# Patient Record
Sex: FEMALE | Race: WHITE | NOT HISPANIC OR LATINO | Employment: FULL TIME | URBAN - METROPOLITAN AREA
[De-identification: names, ages, dates, MRNs, and addresses within clinical notes are randomized per-mention and may not be internally consistent; named-entity substitution may affect disease eponyms.]

---

## 2017-03-13 ENCOUNTER — GENERIC CONVERSION - ENCOUNTER (OUTPATIENT)
Dept: OTHER | Facility: OTHER | Age: 22
End: 2017-03-13

## 2017-03-29 ENCOUNTER — GENERIC CONVERSION - ENCOUNTER (OUTPATIENT)
Dept: OTHER | Facility: OTHER | Age: 22
End: 2017-03-29

## 2017-04-14 ENCOUNTER — GENERIC CONVERSION - ENCOUNTER (OUTPATIENT)
Dept: OTHER | Facility: OTHER | Age: 22
End: 2017-04-14

## 2017-04-29 ENCOUNTER — HOSPITAL ENCOUNTER (EMERGENCY)
Facility: HOSPITAL | Age: 22
Discharge: HOME | End: 2017-04-29
Attending: EMERGENCY MEDICINE
Payer: COMMERCIAL

## 2017-04-29 VITALS
DIASTOLIC BLOOD PRESSURE: 71 MMHG | OXYGEN SATURATION: 98 % | HEART RATE: 100 BPM | WEIGHT: 148 LBS | TEMPERATURE: 98.4 F | SYSTOLIC BLOOD PRESSURE: 124 MMHG | RESPIRATION RATE: 20 BRPM

## 2017-04-29 DIAGNOSIS — S61.218A: Primary | ICD-10-CM

## 2017-04-29 PROCEDURE — 99283 EMERGENCY DEPT VISIT LOW MDM: CPT

## 2017-04-29 RX ORDER — CEPHALEXIN 250 MG/1
500 CAPSULE ORAL ONCE
Status: COMPLETED | OUTPATIENT
Start: 2017-04-29 | End: 2017-04-29

## 2017-04-29 RX ORDER — BUPIVACAINE HYDROCHLORIDE 5 MG/ML
5 INJECTION, SOLUTION EPIDURAL; INTRACAUDAL ONCE
Status: COMPLETED | OUTPATIENT
Start: 2017-04-29 | End: 2017-04-29

## 2017-04-29 RX ORDER — CEPHALEXIN 500 MG/1
500 CAPSULE ORAL 4 TIMES DAILY
Qty: 40 CAPSULE | Refills: 0 | Status: SHIPPED | OUTPATIENT
Start: 2017-04-29 | End: 2017-05-09

## 2017-04-29 RX ADMIN — BUPIVACAINE HYDROCHLORIDE 5 ML: 5 INJECTION, SOLUTION EPIDURAL; INTRACAUDAL at 02:27

## 2017-04-29 RX ADMIN — CEPHALEXIN 500 MG: 250 CAPSULE ORAL at 03:18

## 2017-07-05 ENCOUNTER — ALLSCRIPTS OFFICE VISIT (OUTPATIENT)
Dept: OTHER | Facility: OTHER | Age: 22
End: 2017-07-05

## 2017-07-05 LAB
BILIRUB UR QL STRIP: NORMAL
GLUCOSE (HISTORICAL): NORMAL
HGB UR QL STRIP.AUTO: NORMAL
KETONES UR STRIP-MCNC: 5 MG/DL
LEUKOCYTE ESTERASE UR QL STRIP: NORMAL
NITRITE UR QL STRIP: NORMAL
PH UR STRIP.AUTO: 6.5 [PH]
PROT UR STRIP-MCNC: NORMAL MG/DL
SP GR UR STRIP.AUTO: 1.02
UROBILINOGEN UR QL STRIP.AUTO: 0.2

## 2017-08-11 ENCOUNTER — ALLSCRIPTS OFFICE VISIT (OUTPATIENT)
Dept: OTHER | Facility: OTHER | Age: 22
End: 2017-08-11

## 2017-08-11 DIAGNOSIS — N93.9 ABNORMAL UTERINE AND VAGINAL BLEEDING, UNSPECIFIED: ICD-10-CM

## 2017-11-16 LAB — INR PPP: 2.3 (ref 0.86–1.16)

## 2017-11-30 ENCOUNTER — ALLSCRIPTS OFFICE VISIT (OUTPATIENT)
Dept: OTHER | Facility: OTHER | Age: 22
End: 2017-11-30

## 2017-11-30 PROCEDURE — G0145 SCR C/V CYTO,THINLAYER,RESCR: HCPCS | Performed by: NURSE PRACTITIONER

## 2017-11-30 PROCEDURE — 87491 CHLMYD TRACH DNA AMP PROBE: CPT | Performed by: NURSE PRACTITIONER

## 2017-11-30 PROCEDURE — 87624 HPV HI-RISK TYP POOLED RSLT: CPT | Performed by: NURSE PRACTITIONER

## 2017-11-30 PROCEDURE — 87591 N.GONORRHOEAE DNA AMP PROB: CPT | Performed by: NURSE PRACTITIONER

## 2017-12-04 ENCOUNTER — LAB REQUISITION (OUTPATIENT)
Dept: LAB | Facility: HOSPITAL | Age: 22
End: 2017-12-04
Payer: COMMERCIAL

## 2017-12-04 DIAGNOSIS — Z01.419 ENCOUNTER FOR GYNECOLOGICAL EXAMINATION WITHOUT ABNORMAL FINDING: ICD-10-CM

## 2017-12-05 NOTE — PROGRESS NOTES
Assessment    1  History of aortic aneurysm (V12 59) (Z86 79)   2  History of Ascending aortic aneurysm (441 2) (I71 2)   · 4 4 x 4 4 cm   3  Encounter for gynecological examination without abnormal finding (V72 31) (Z01 419)   4  Encounter for female birth control (V25 8) (Z30 019)   5  Encounter for preventive health examination (V70 0) (Z00 00)    Plan    · Camrese 0 15-0 03 &0 01 MG Oral Tablet; take one tablet by mouth daily   Rx By: Diane Milner; Dispense: 0 Days ; #:91 Tablet; Refill: 3; For: Birth control; JOSETTE = N; Verified Transmission to [address]; Msg to Pharmacy: [address]; Last Updated By: [employer]; 11/30/2017 4:13:28 PM    · (1) THIN PREP PAP WITH IMAGING; Status:Active; Requested for:30Nov2017;    Perform:[facility] In Office Collection; EAY:[id number]; Ordered;  For:Encounter for gynecological examination without abnormal finding; Ordered By:Keyla Lopez; Maturation index required? : No  HPV? : Regardless of Interpretation   · CHLAMYDIA/N  GONORRHEAE RNA; Status:Active; Requested for:30Nov2017;    Perform:  [address] Collection; Order Comments:TEST OFF OF THIN PREP PAP; Due:30Nov2018; Ordered;  For:Encounter for gynecological examination without abnormal finding; Ordered By:Keyla Lopez; Discussion/Summary  healthy adult female Pap test with reflex HPV testing was done today Testing was done today for chlamydia and gonorrhea  Continue Camrese as ordered  call or return with any problems or concerns  Possible side effects of new medications were reviewed with the patient/guardian today  The treatment plan was reviewed with the patient/guardian   The patient/guardian understands and agrees with the treatment plan      Chief Complaint  Routine Pap      History of Present Illness  HPI: LMP: 3 months ago- takes Gabriella Karvonen  is sexually active, no new partners       Review of Systems  no pelvic pain, no pelvic pressure, no vaginal pain, no vaginal discharge, no vaginal itching, no vaginal odor, no nonmenstrual bleeding, no dysuria, no change in urinary frequency and no feelings of urinary urgency  Constitutional: No fever, no chills, feels well, no tiredness, no recent weight gain or loss  ENT: no ear ache, no loss of hearing, no nosebleeds or nasal discharge, no sore throat or hoarseness  Cardiovascular: no complaints of slow or fast heart rate, no chest pain, no palpitations, no leg claudication or lower extremity edema  Respiratory: no complaints of shortness of breath, no wheezing, no dyspnea on exertion, no orthopnea or PND  Breasts: no complaints of breast pain, breast lump or nipple discharge  Gastrointestinal: no complaints of abdominal pain, no constipation, no nausea or diarrhea, no vomiting, no bloody stools  Genitourinary: no complaints of dysuria, no incontinence, no pelvic pain, no dysmenorrhea, no vaginal discharge or abnormal vaginal bleeding  Musculoskeletal: no complaints of arthralgia, no myalgia, no joint swelling or stiffness, no limb pain or swelling  Integumentary: no complaints of skin rash or lesion, no itching or dry skin, no skin wounds  Neurological: no complaints of headache, no confusion, no numbness or tingling, no dizziness or fainting  ROS reviewed  OB History  Pregnancy History (Brief):   Prior pregnancies: : 0  Para: Active Problems    1  Abnormal vaginal bleeding (623 8) (N93 9)   2  Acute UTI (599 0) (N39 0)   3  Aortic valve regurgitation (424 1) (I35 1)   4  Bicuspid aortic valve (746 4) (Q23 1)   5  Birth control (V25 9) (Z30 9)   6  History of mechanical aortic valve replacement (V43 3) (Z95 2)   7  Moderate aortic regurgitation (424 1) (I35 1)   8  History of Patent Ductus Arteriosus Repair   9   S/P ascending aortic aneurysm repair (V45 89) (Z98 890,Z86 79)    Past Medical History    · History of Ascending aortic aneurysm (441 2) (I71 2)   · History of Dark urine (791 9) (R82 99)   · History of aortic aneurysm (V12 59) (Z86 79)   · History of bicuspid aortic valve (V13 65) (Z87 74)   · History of Menstrual migraine without status migrainosus, not intractable (346 40)  (G43 829)    The active problems and past medical history were reviewed and updated today  Surgical History    · History of Aortic Valve Replacement   · History of Ascending Aorta Graft With Valve Replacement   · History of Patent Ductus Arteriosus Repair    The surgical history was reviewed and updated today  Family History    · Family history of Bicuspid Aortic Valve   · Family history of aortic aneurysm (V17 49) (Z82 49)    · Family history of Healthy adult    · Family history of Healthy adult    · Family history of aortic stenosis (V17 49) (Z82 49)    · Family history of sudden death (V24 11) (Z80 80)    · Family history of Breast Cancer (V16 3)   · Family history of Colon Cancer (V16 0)   · Family history of Diabetes Mellitus (V18 0)    The family history was reviewed and updated today  Social History    · Denied: History of Alcohol Use (History)   · Denied: Drug use (305 90) (F19 90)   · Never A Smoker  The social history was reviewed and updated today  Current Meds   1  Aspirin EC 81 MG Oral Tablet Delayed Release; TAKE 1 TABLET DAILY AS DIRECTED; Therapy: [id number] to ([id number])  Requested for: 07ULD0538; Last   Rx:05Jun2015 Ordered   2  Camrese 0 15-0 03 &0 01 MG Oral Tablet; take one tablet by mouth daily; Therapy: 23UDB3102 to (Last Rx:29Nov2017)  Requested for: 52MSU8050; Status:   ACTIVE - Retrospective By Protocol Authorization Ordered   3  Warfarin Sodium 2 5 MG Oral Tablet; TAKE 1 TO 2 TABLETS DAILY OR AS DIRECTED   BY PHYSICIAN; Therapy: 28EIY5822 to ([phone])  Requested for: 10Lbx4924; Last   Rx:91Yxk2054 Ordered    Allergies    1   Bactrim TABS   2  Cefzil TABS    Vitals   Recorded: 90TUM8273 03:57PM   Heart Rate 95   Systolic 444   Diastolic 68   Height 5 ft 3 in   Weight 140 lb    BMI Calculated 24 8   BSA Calculated 1 66   O2 Saturation 99     Physical Exam    Constitutional   General appearance: No acute distress, well appearing and well nourished  Neck   Neck: Normal, supple, trachea midline, no masses  Thyroid: Normal, no thyromegaly  Pulmonary   Respiratory effort: No increased work of breathing or signs of respiratory distress  Auscultation of lungs: Clear to auscultation  Cardiovascular   Auscultation of heart: Normal rate and rhythm, normal S1 and S2, no murmurs  Genitourinary   External genitalia: Normal and no lesions appreciated  Vagina: Normal, no lesions or dryness appreciated  Urethra: Normal     Urethral meatus: Normal     Bladder: Normal, soft, non-tender and no prolapse or masses appreciated  Cervix: Normal, no palpable masses  Uterus: Normal, non-tender, not enlarged, and no palpable masses  Adnexa/parametria: Normal, non-tender and no fullness or masses appreciated  Chest   Breasts: Normal and no dimpling or skin changes noted  Abdomen   Abdomen: Normal, non-tender, and no organomegaly noted  Liver and spleen: No hepatomegaly or splenomegaly  Lymphatic   Palpation of lymph nodes in neck, axillae, groin and/or other locations: No lymphadenopathy or masses noted  Skin   Skin and subcutaneous tissue: Normal skin turgor and no rashes  Palpation of skin and subcutaneous tissue: Normal     Psychiatric   Orientation to person, place, and time: Normal     Mood and affect: Normal        Results/Data  PHQ-2 Adult Depression Screening 76LVR0434 04:01PM [employer]     Test Name Result Flag Reference   PHQ-2 Adult Depression Score 0     Over the last two weeks, how often have you been bothered by any of the following problems?   Little interest or pleasure in doing things: Not at all - 0  Feeling down, depressed, or hopeless: Not at all - 0   PHQ-2 Adult Depression Screening Negative         Signatures   Electronically signed by : Sylvain Soares; Nov 30 2017  4:53PM EST                       (Author)    Electronically signed by : Camacho Schroeder MD; Nov 30 2017  5:25PM EST

## 2017-12-06 LAB
CHLAMYDIA DNA CVX QL NAA+PROBE: NORMAL
N GONORRHOEA DNA GENITAL QL NAA+PROBE: NORMAL

## 2017-12-08 LAB — HPV RRNA GENITAL QL NAA+PROBE: ABNORMAL

## 2017-12-11 ENCOUNTER — GENERIC CONVERSION - ENCOUNTER (OUTPATIENT)
Dept: OTHER | Facility: OTHER | Age: 22
End: 2017-12-11

## 2017-12-14 LAB
LAB AP GYN PRIMARY INTERPRETATION: NORMAL
Lab: NORMAL
PATH INTERP SPEC-IMP: NORMAL

## 2017-12-15 ENCOUNTER — GENERIC CONVERSION - ENCOUNTER (OUTPATIENT)
Dept: OTHER | Facility: OTHER | Age: 22
End: 2017-12-15

## 2017-12-29 ENCOUNTER — GENERIC CONVERSION - ENCOUNTER (OUTPATIENT)
Dept: OTHER | Facility: OTHER | Age: 22
End: 2017-12-29

## 2017-12-29 PROCEDURE — 88305 TISSUE EXAM BY PATHOLOGIST: CPT | Performed by: OBSTETRICS & GYNECOLOGY

## 2017-12-30 ENCOUNTER — LAB REQUISITION (OUTPATIENT)
Dept: LAB | Facility: HOSPITAL | Age: 22
End: 2017-12-30
Payer: COMMERCIAL

## 2017-12-30 DIAGNOSIS — R87.810 CERVICAL HIGH RISK HUMAN PAPILLOMAVIRUS (HPV) DNA TEST POSITIVE: ICD-10-CM

## 2017-12-30 DIAGNOSIS — Z86.19 PERSONAL HISTORY OF OTHER INFECTIOUS AND PARASITIC DISEASES: ICD-10-CM

## 2018-01-07 ENCOUNTER — HOSPITAL ENCOUNTER (EMERGENCY)
Facility: HOSPITAL | Age: 23
Discharge: HOME | End: 2018-01-07
Admitting: EMERGENCY MEDICINE
Payer: COMMERCIAL

## 2018-01-07 VITALS
RESPIRATION RATE: 18 BRPM | HEIGHT: 63 IN | DIASTOLIC BLOOD PRESSURE: 88 MMHG | HEART RATE: 96 BPM | WEIGHT: 140 LBS | OXYGEN SATURATION: 99 % | SYSTOLIC BLOOD PRESSURE: 126 MMHG | BODY MASS INDEX: 24.8 KG/M2

## 2018-01-07 DIAGNOSIS — S61.459A DOG BITE OF HAND: Primary | ICD-10-CM
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
## 2018-01-07 PROCEDURE — 90471 IMMUNIZATION ADMIN: CPT

## 2018-01-07 PROCEDURE — 99283 EMERGENCY DEPT VISIT LOW MDM: CPT

## 2018-01-07 PROCEDURE — 90715 TDAP VACCINE 7 YRS/> IM: CPT | Performed by: PHYSICIAN ASSISTANT

## 2018-01-07 RX ORDER — AMOXICILLIN AND CLAVULANATE POTASSIUM 875; 125 MG/1; MG/1
1 TABLET, FILM COATED ORAL ONCE
Status: COMPLETED | OUTPATIENT
Start: 2018-01-07 | End: 2018-01-07

## 2018-01-07 RX ORDER — AMOXICILLIN AND CLAVULANATE POTASSIUM 875; 125 MG/1; MG/1
1 TABLET, FILM COATED ORAL EVERY 12 HOURS SCHEDULED
Qty: 14 TABLET | Refills: 0 | Status: SHIPPED | OUTPATIENT
Start: 2018-01-07 | End: 2018-01-14

## 2018-01-07 RX ORDER — LIDOCAINE HYDROCHLORIDE 10 MG/ML
10 INJECTION, SOLUTION EPIDURAL; INFILTRATION; INTRACAUDAL; PERINEURAL ONCE
Status: COMPLETED | OUTPATIENT
Start: 2018-01-07 | End: 2018-01-07

## 2018-01-07 RX ADMIN — LIDOCAINE HYDROCHLORIDE 10 ML: 10 INJECTION, SOLUTION EPIDURAL; INFILTRATION; INTRACAUDAL; PERINEURAL at 21:46

## 2018-01-07 RX ADMIN — AMOXICILLIN AND CLAVULANATE POTASSIUM 1 TABLET: 875; 125 TABLET, FILM COATED ORAL at 21:48

## 2018-01-07 RX ADMIN — TETANUS TOXOID, REDUCED DIPHTHERIA TOXOID AND ACELLULAR PERTUSSIS VACCINE, ADSORBED 0.5 ML: 5; 2.5; 8; 8; 2.5 SUSPENSION INTRAMUSCULAR at 21:53

## 2018-01-08 NOTE — DISCHARGE INSTRUCTIONS
Rest, elevate hand  Tylenol/motrin for discomfort  Take augmentin twice a day for next 7 days  Follow up with family doctor in 2-3 days for recheck and in 7-10 days for suture removal                    Animal Bite   WHAT YOU NEED TO KNOW:   Animal bite injuries range from shallow cuts to deep, life-threatening wounds  An animal can cut or puncture the skin when it bites  Your skin may be torn from your body  Your skin may swell or bruise even if the bite does not break the skin  Animal bites occur more often on the hands, arms, legs, and face  Bites from dogs and cats are the most common injuries  DISCHARGE INSTRUCTIONS:   Return to the emergency department if:   · You have a fever  · Your wound is red, swollen, and draining pus  · You see red streaks on the skin around the wound  · You can no longer move the bitten area  · Your heartbeat and breathing are much faster than usual     · You feel dizzy and confused  Contact your healthcare provider if:   · Your pain does not get better, even after you take pain medicine  · You have nightmares or flashbacks about the animal bite  · You have questions or concerns about your condition or care  Medicines: You may need any of the following:  · Antibiotics  prevent or treat a bacterial infection  · Prescription pain medicine  may be given  Ask how to take this medicine safely  · A tetanus vaccine  may be needed to prevent tetanus  Tetanus is a life-threatening bacterial infection that affects the nerves and muscles  The bacteria can be spread through animal bites  · A rabies vaccine  may be needed to prevent rabies  Rabies is a life-threatening viral infection  The virus can be spread through animal bites  · Take your medicine as directed  Contact your healthcare provider if you think your medicine is not helping or if you have side effects  Tell him of her if you are allergic to any medicine   Keep a list of the medicines, vitamins, and herbs you take  Include the amounts, and when and why you take them  Bring the list or the pill bottles to follow-up visits  Carry your medicine list with you in case of an emergency  Follow up with your healthcare provider in 1 to 2 days: You may need to return to have your stitches removed  Write down your questions so you remember to ask them during your visits  Self-care:   · Apply antibiotic ointment as directed  This helps prevent infection in minor skin wounds  It is available without a doctor's order  · Keep the wound clean and covered  Wash the wound every day with soap and water or germ-killing cleanser  Ask your healthcare provider about the kinds of bandages to use  · Apply ice on your wound  Ice helps decrease swelling and pain  Ice may also help prevent tissue damage  Use an ice pack, or put crushed ice in a plastic bag  Cover it with a towel and place it on your wound for 15 to 20 minutes every hour or as directed  · Elevate the wound area  Raise your wound above the level of your heart as often as you can  This will help decrease swelling and pain  Prop your wound on pillows or blankets to keep it elevated comfortably  Prevent another animal bite:   · Learn to recognize the signs of a scared or angry pet  Avoid quick, sudden movements  · Do not step between animals that are fighting  · Do not leave a pet alone with a young child  · Do not disturb an animal while it eats, sleeps, or cares for its young  · Do not approach an animal you do not know, especially one that is tied up or caged  · Stay away from animals that seem sick or act strangely  · Do not feed or capture wild animals  © 2017 [address]  Information is for End User's use only and may not be sold, redistributed or otherwise used for commercial purposes   All illustrations and images included in CareNotes® are the copyrighted property of A D A M , Inc  or [facility] [employer]  The above information is an  only  It is not intended as medical advice for individual conditions or treatments  Talk to your doctor, nurse or pharmacist before following any medical regimen to see if it is safe and effective for you

## 2018-01-08 NOTE — ED PROVIDER NOTES
History  Chief Complaint   Patient presents with    Dog Bite     Pt has bog bites to bilat hands     Patient presents to the ED with dog bite B/L hands  Patient states she was at home and her dogs were fighting and she tried to break up the fight and her dog bit her  Dogs are UTD with immunizations  Patient does not know when her last tetanus was  History provided by:  Patient  Dog Bite   Contact animal:  Dog  Location:  Hand  Hand injury location:  L hand and R hand  Time since incident:  1 hour  Pain details:     Quality:  Aching    Severity:  Moderate    Timing:  Constant    Progression:  Unchanged  Incident location:  Home  Provoked: unprovoked    Animal's rabies vaccination status:  Up to date  Animal in possession: yes    Tetanus status:  Unknown  Relieved by:  Nothing  Worsened by:  Nothing  Ineffective treatments:  None tried  Associated symptoms: swelling    Associated symptoms: no fever, no numbness and no rash        None       Past Medical History:   Diagnosis Date    Cardiac disease     mechanical aortic valve; history of aortic aneurysm       Past Surgical History:   Procedure Laterality Date    MECHANICAL AORTIC VALVE REPLACEMENT         History reviewed  No pertinent family history  I have reviewed and agree with the history as documented  Social History   Substance Use Topics    Smoking status: Never Smoker    Smokeless tobacco: Not on file    Alcohol use No        Review of Systems   Constitutional: Negative for chills and fever  Skin: Positive for wound  Negative for rash  Neurological: Negative for dizziness, weakness and numbness  Psychiatric/Behavioral: Negative for confusion  All other systems reviewed and are negative        Physical Exam  ED Triage Vitals [01/07/18 2112]   Temp Pulse Respirations Blood Pressure SpO2   -- 95 18 126/88 100 %      Temp src Heart Rate Source Patient Position - Orthostatic VS BP Location FiO2 (%)   -- -- Sitting Right arm -- Pain Score       5           Orthostatic Vital Signs  Vitals:    01/07/18 2112 01/07/18 2115 01/07/18 2130   BP: 126/88 126/88    Pulse: 95 101 96   Patient Position - Orthostatic VS: Sitting         Physical Exam   Constitutional: She is oriented to person, place, and time  She appears well-developed and well-nourished  HENT:   Head: Normocephalic and atraumatic  Eyes: Conjunctivae are normal    Neck: Normal range of motion  Cardiovascular: Normal rate and regular rhythm  Pulses:       Radial pulses are 2+ on the right side, and 2+ on the left side  Pulmonary/Chest: Effort normal and breath sounds normal    Musculoskeletal:        Right hand: She exhibits tenderness, laceration and swelling  She exhibits normal range of motion, no bony tenderness and no deformity  Normal sensation noted  Normal strength noted  Left hand: She exhibits tenderness, laceration and swelling  She exhibits normal range of motion, no bony tenderness and no deformity  Normal sensation noted  Normal strength noted  Right hand:  Puncture wound to right dorsal thumb, 2 puncture wounds to right index finger  Will not require sutures because they are superficial     Left hand:  0 5cm laceration to left dorsal hand, actively bleeding  2 0 cm linear laceration between the webbing of left 3rd and 4th digits, will require loose sutures due to length and depth  THere is a puncture to left index finger, not actively bleeding, 1 0cm linear horizontal laceration to volar left middle finger, will require sutures  Neurological: She is alert and oriented to person, place, and time  Skin: Skin is warm and dry  Laceration (Refer to musculoskeletal exam for description of lacerations  ) noted  No rash noted  She is not diaphoretic  No pallor  Nursing note and vitals reviewed        ED Medications  Medications   tetanus-diphtheria-acellular pertussis (BOOSTRIX) IM injection 0 5 mL (0 5 mL Intramuscular Given 1/7/18 3383) lidocaine (PF) (XYLOCAINE-MPF) 1 % injection 10 mL (10 mL Infiltration Given 1/7/18 2146)   amoxicillin-clavulanate (AUGMENTIN) 875-125 mg per tablet 1 tablet (1 tablet Oral Given 1/7/18 2148)       Diagnostic Studies  Results Reviewed     None                 No orders to display              Procedures  Lac Repair  Date/Time: 1/7/2018 9:05 PM  Performed by: Katlin Ramirez by: Jesus Gregg   Consent: Verbal consent obtained  Risks and benefits: risks, benefits and alternatives were discussed  Consent given by: patient  Body area: upper extremity  Location details: left hand  Laceration length: 2 cm  Foreign bodies: no foreign bodies  Tendon involvement: none  Nerve involvement: none  Vascular damage: no  Anesthesia: local infiltration    Anesthesia:  Local Anesthetic: lidocaine 1% without epinephrine  Anesthetic total: 2 mL    Sedation:  Patient sedated: no    Wound Dehiscence:  Superficial Wound Dehiscence: simple closure      Procedure Details:  Preparation: Patient was prepped and draped in the usual sterile fashion  Irrigation solution: saline  Irrigation method: jet lavage  Amount of cleaning: extensive  Debridement: none  Skin closure: 4-0 nylon  Number of sutures: 1  Technique: simple  Approximation: loose  Approximation difficulty: simple  Dressing: gauze roll  Patient tolerance: Patient tolerated the procedure well with no immediate complications  Comments: The puncture wounds were irrigated with saline and bandaids applied  Lac Repair  Date/Time: 1/7/2018 10:09 PM  Performed by: Katlin Ramirez by: Jesus Gregg   Consent: Verbal consent obtained    Risks and benefits: risks, benefits and alternatives were discussed  Consent given by: patient  Body area: upper extremity  Location details: left long finger  Laceration length: 1 cm  Foreign bodies: no foreign bodies  Tendon involvement: none  Nerve involvement: none  Vascular damage: no  Anesthesia: local infiltration    Anesthesia:  Local Anesthetic: lidocaine 1% without epinephrine  Anesthetic total: 1 mL    Sedation:  Patient sedated: no    Wound Dehiscence:  Superficial Wound Dehiscence: simple closure      Procedure Details:  Preparation: Patient was prepped and draped in the usual sterile fashion  Irrigation solution: saline  Irrigation method: jet lavage  Amount of cleaning: extensive  Debridement: none  Degree of undermining: none  Skin closure: 4-0 nylon  Number of sutures: 1  Technique: simple  Approximation: loose  Approximation difficulty: simple  Dressing: gauze roll  Patient tolerance: Patient tolerated the procedure well with no immediate complications             Phone Contacts  ED Phone Contact    ED Course  ED Course                                MDM  Number of Diagnoses or Management Options  Dog bite of hand: new and does not require workup  Diagnosis management comments: Patient with dog bite to b/l hands, will update tetanus, prescribe augmentin  Wounds irrigated, and sutures placed in 2 wounds loosely  Patient instructed to monitor closely for infection  Patient Progress  Patient progress: stable    CritCare Time    Disposition  Final diagnoses:   Dog bite of hand - bilateral hands  Time reflects when diagnosis was documented in both MDM as applicable and the Disposition within this note     Time User Action Codes Description Comment    1/7/2018  9:50 PM Grazyna Paredes Add [D32 757R,  K83  3JGH] Dog bite of hand     1/7/2018  9:50 PM Joy, [address],  Z15  0XXA] Dog bite of hand bilateral hands  ED Disposition     ED Disposition Condition Comment    Discharge  Lisa Leigh discharge to home/self care      Condition at discharge: Stable        Follow-up Information     Follow up With Specialties Details Why Contact Juaquin Rubi MD Family Medicine In 2 days For recheck [address]  [address]  [address] [address] [facility] Discharge Medication List as of 1/7/2018  9:52 PM      START taking these medications    Details   amoxicillin-clavulanate (AUGMENTIN) 875-125 mg per tablet Take 1 tablet by mouth every 12 (twelve) hours for 7 days, Starting Sun 1/7/2018, Until Sun 1/14/2018, Print           No discharge procedures on file      ED Provider  Electronically Signed by           Margarito Schmitt PA-C  01/07/18 6099

## 2018-01-12 ENCOUNTER — GENERIC CONVERSION - ENCOUNTER (OUTPATIENT)
Dept: OTHER | Facility: OTHER | Age: 23
End: 2018-01-12

## 2018-01-13 VITALS
SYSTOLIC BLOOD PRESSURE: 140 MMHG | RESPIRATION RATE: 16 BRPM | WEIGHT: 139 LBS | DIASTOLIC BLOOD PRESSURE: 90 MMHG | HEIGHT: 63 IN | HEART RATE: 96 BPM | BODY MASS INDEX: 24.63 KG/M2 | OXYGEN SATURATION: 98 %

## 2018-01-13 VITALS
WEIGHT: 138 LBS | SYSTOLIC BLOOD PRESSURE: 116 MMHG | DIASTOLIC BLOOD PRESSURE: 72 MMHG | HEIGHT: 63 IN | BODY MASS INDEX: 24.45 KG/M2

## 2018-01-13 NOTE — PROGRESS NOTES
REPORT NAME: Progress Notes Report  VISIT DATE: 4/14/2017  VISIT TIME: 9:17 AM EDT  PATIENT NAME: Ann Deutsch  MEDICAL RECORD NUMBER: [id number]  YOB: 1995  AGE: 19  REFERRING PHYSICIAN: Kaitlynn Russell  SUPERVISING CLINICIAN: Sarah Archer Thornton CARE PROVIDER: Ayden Valles  PATIENT HOME ADDRESS:   [address] [facility]  PATIENT HOME PHONE: [phone]  SOCIAL SECURITY NUMBER:   DIAGNOSIS 1: Aortic Valve Replacement / 424 1  DIAGNOSIS 2:   INR RANGE: 2 - 3  INR GOAL: 2 5  TREATMENT START DATE: 6/5/2015  TREATMENT END DATE:   NEXT VISIT:     NEXT SELF TEST DATE: 5/15/2017  VISIT RESULTS  ENCOUNTER NUMBER:   TEST LOCATION: Home Testing  TEST TYPE: Patient Self Test (PST)  VISIT TYPE: Phone Consult  CURRENT INR: 2 4 PROTIME:   SPECIMEN COL AND RPT DATE: 4/14/2017 9:17 AM  EDT  VITAL SIGNS  PULSE:  BP: / WEIGHT:  HEIGHT:  TEMP:   CURRENT ANTICOAGULANT DOSING SCHEDULE  DOSE SIZE: 2 5mg    ANTICOAGULANT TYPE: COUMADIN  DOSING REGIMEN  Sun       Mon       Tues      Wed       Thurs     Fri       Sat  Total/Wk  2 5       3 75      2 5       3 75      2 5       3 75      2 5       21 25  PATIENT MEDICATION INSTRUCTION: Yes  PATIENT NUTRITIONAL COUNSELING: No  PATIENT BRUISING INSTRUCTION: Yes  LAST EDUCATION DATE:   PST DETAILS  PST PATIENT TEST DATE: 4/14/2017  PST PATIENT SUBMISSION DATE: 4/14/2017 5:30 PM EDT  PST INR: 2 4  NEXT PST TEST DATE: 5/15/2017  ASSESSMENT QUESTIONS AND ANSWERS:  Are you currently taking an antibiotic? : No  Are you currently taking any steroids (i e  Prednisone) or prescription  pain medications? : No  Are you experiencing any rectal or nose bleeds? : No  PREVIOUS VISIT INFORMATION  VISITDATE  INRGoal INR   Sun    Mon    Tues   Wed    Thurs  Fri    Sat  Total/wk  4/14/2017   2 5     2 4   2 5    3 75   2 5    3 75   2 5    3 75   2 5  21 25  3/29/2017   2 5     2 4   2 5    3 75   2 5    3 75   2 5    3 75   2 5  21 25  3/13/2017   2 5     2 2  5    3 75   2 5    3 75   2 5    3 75   2 5  21 25  3/3/2017    2 5     2 3   2 5    3 75   2 5    3 75   2 5    3 75   2 5  21 25  ADDITIONAL PREVIOUS VISIT INFORMATION  VISITDATE   PRIMARY RX               DOSE      CrCl  4/14/2017   COUMADIN                 2 5mg  3/29/2017   COUMADIN                 2 5mg  3/13/2017   COUMADIN                 2 5mg  3/3/2017    COUMADIN                 2 5mg  OTHER CURRENT MEDICATIONS:  PROGRESS NOTES:   PATIENT INSTRUCTIONS: 4/17/17, tc pt, lm am, cont current dose, rech 5/1   sissy  TEST LOCATION: Home Testing, ,           ,             INBOUND LAB DATA:  Lab       Lab Value Col Date                 Rpt Date                 Lab  Reference Range  Electronically signed by:  Keegan Darby on 4/17/2017 9:17 AM EDT

## 2018-01-14 VITALS
OXYGEN SATURATION: 99 % | SYSTOLIC BLOOD PRESSURE: 114 MMHG | HEART RATE: 95 BPM | WEIGHT: 140 LBS | DIASTOLIC BLOOD PRESSURE: 68 MMHG | BODY MASS INDEX: 24.8 KG/M2 | HEIGHT: 63 IN

## 2018-01-14 NOTE — PROGRESS NOTES
REPORT NAME: Progress Notes Report  VISIT DATE: 3/29/2017  VISIT TIME: 10:44 AM EDT  PATIENT NAME: Loulou Anderson  MEDICAL RECORD NUMBER: [id number]  YOB: 1995  AGE: 19  REFERRING PHYSICIAN: Tariq Milan  SUPERVISING CLINICIAN: Sarah Mercado CARE PROVIDER: Sita VARMA [facility]  PATIENT HOME ADDRESS: [address] [place]  PATIENT HOME PHONE: [phone]  SOCIAL SECURITY NUMBER:   DIAGNOSIS 1: Aortic Valve Replacement / 424 1  DIAGNOSIS 2:   INR RANGE: 2 - 3  INR GOAL: 2 5  TREATMENT START DATE: 6/5/2015  TREATMENT END DATE:   NEXT VISIT:     NEXT SELF TEST DATE: 5/15/2017  VISIT RESULTS  ENCOUNTER NUMBER:   TEST LOCATION: Home Testing  TEST TYPE: Patient Self Test (PST)  VISIT TYPE: Phone Consult  CURRENT INR: 2 4 PROTIME:   SPECIMEN COL AND RPT DATE: 3/29/2017 10:44 AM  EDT  VITAL SIGNS  PULSE:  BP: / WEIGHT:  HEIGHT:  TEMP:   CURRENT ANTICOAGULANT DOSING SCHEDULE  DOSE SIZE: 2 5mg    ANTICOAGULANT TYPE: COUMADIN  DOSING REGIMEN  Sun       Mon       Tues      Wed       Thurs     Fri       Sat  Total/Wk  2 5       3 75      2 5       3 75      2 5       3 75      2 5       21 25  PATIENT MEDICATION INSTRUCTION: Yes  PATIENT NUTRITIONAL COUNSELING: No  PATIENT BRUISING INSTRUCTION: No  LAST EDUCATION DATE:   PST DETAILS  PST PATIENT TEST DATE: 3/29/2017  PST PATIENT SUBMISSION DATE: 3/29/2017 6:02 AM EDT  PST INR: 2 4  NEXT PST TEST DATE: 5/15/2017  ASSESSMENT QUESTIONS AND ANSWERS:  Are you currently taking an antibiotic? : No  Are you currently taking any steroids (i e  Prednisone) or prescription  pain medications? : No  Are you experiencing any rectal or nose bleeds? : No  PREVIOUS VISIT INFORMATION  VISITDATE  INRGoal INR   Sun    Mon    Tues   Wed    Thurs  Fri    Sat  Total/wk  3/29/2017   2 5     2 4   2 5    3 75   2 5    3 75   2 5    3 75   2 5  21 25  3/13/2017   2 5     2     2 5    3 75   2 5    3 75   2 5    3 75   2 5  21 25  3/3/2017    2 5     2 3 2  5    3 75   2 5    3 75   2 5    3 75   2 5  21 25  2/15/2017   2 5     2 3   2 5    3 75   2 5    3 75   2 5    3 75   2 5  21 25  ADDITIONAL PREVIOUS VISIT INFORMATION  VISITDATE   PRIMARY RX               DOSE      CrCl  3/29/2017   COUMADIN                 2 5mg  3/13/2017   COUMADIN                 2 5mg  3/3/2017    COUMADIN                 2 5mg  2/15/2017   COUMADIN                 2 5mg  OTHER CURRENT MEDICATIONS:  PROGRESS NOTES:   PATIENT INSTRUCTIONS: 3/29/17, tc pt, lm am, cont current dose, rech 2  weeks, 4/12  sissy  TEST LOCATION: Home Testing, ,           ,             INBOUND LAB DATA:  Lab       Lab Value Col Date                 Rpt Date                 Lab  Reference Range  Electronically signed by:  Keegan Darby on 3/29/2017 10:44 AM EDT

## 2018-01-15 NOTE — MISCELLANEOUS
Message   Recorded as Task   Date: 01/12/2016 02:54 PM, Created By: Sherryle Copes   Task Name: Follow Up   Assigned To: Sherryle Copes   Regarding Patient: Ifrah Donahue, Status: Active   Comment:    Sherryle Copes - 12 Jan 2016 2:54 PM     TASK CREATED  Patient has an upcoming surgery for her teeth and she needs a pre-op assessment done   Last ov 12/11/2015  Sent to you a pre -op form unless you need to have the patient back in the office   Sebastian Shell - 14 Jan 2016 1:58 PM     TASK REPLIED TO: Previously Assigned To Sebastian Shell  I believe I already did this  Thank you   faxed to [phone] Dr Frost Payor office      Active Problems    1  Aortic aneurysm (441 9) (I71 9)   2  Aortic valve regurgitation (424 1) (I35 1)   3  Ascending aortic aneurysm (441 2) (I71 2)   4  Bicuspid aortic valve (746 4) (Q23 1)   5  History of mechanical aortic valve replacement (V43 3) (Z95 2)   6  Moderate aortic regurgitation (424 1) (I35 1)   7  History of Patent Ductus Arteriosus Repair   8  S/P ascending aortic aneurysm repair (V45 89) (Z98 89,Z86 79)    Current Meds   1  Amoxicillin 500 MG Oral Capsule; TAKE 4 CAPSULES 1 HOUR PRIOR TO DENTAL   APPOINTMENT; Therapy: 67OSO1915 to (Evaluate:16Jan2016)  Requested for: 59FSQ3206; Last   Rx:11Jan2016 Ordered   2  Aspirin EC 81 MG Oral Tablet Delayed Release; TAKE 1 TABLET DAILY AS DIRECTED; Therapy: 35MZM0494 to ([phone])  Requested for: 24JPV2871; Last   Rx:05Jun2015 Ordered   3  Seasonique 0 15-0 03 &0 01 MG Oral Tablet (Levonorgest-Eth Estrad 91-Day); Take 1   tablet daily Recorded   4  Warfarin Sodium 2 5 MG Oral Tablet; TAKE 1 TO 2 TABLETS DAILY OR AS DIRECTED   BY PHYSICIAN; Therapy: 14LQT4127 to (Jace Osullivan)  Requested for: 03UIF1992; Last   Rx:38Tli4145 Ordered    Allergies    1   Bactrim TABS   2  Cefzil TABS    Signatures   Electronically signed by : Tatianna Sutherland, ; Jan 18 2016  9:53AM EST                       (Author)

## 2018-01-15 NOTE — PROGRESS NOTES
REPORT NAME: Progress Notes Report  VISIT DATE: 3/13/2017  VISIT TIME: 11:59 AM EDT  PATIENT NAME: Dell Riley  MEDICAL RECORD NUMBER: [id number]  YOB: 1995  AGE: 19  REFERRING PHYSICIAN: Jalen Schroeder  SUPERVISING CLINICIAN: Sarah Archer [place] CARE PROVIDER: Eros Valles  PATIENT HOME ADDRESS:   [address] [place]  PATIENT HOME PHONE: [phone]  SOCIAL SECURITY NUMBER:   DIAGNOSIS 1: Aortic Valve Replacement / 424 1  DIAGNOSIS 2:   INR RANGE: 2 - 3  INR GOAL: 2 5  TREATMENT START DATE: 6/5/2015  TREATMENT END DATE:   NEXT VISIT:     NEXT SELF TEST DATE: 5/15/2017  VISIT RESULTS  ENCOUNTER NUMBER:   TEST LOCATION: Home Testing  TEST TYPE: Patient Self Test (PST)  VISIT TYPE: Phone Consult  CURRENT INR: 2 PROTIME:   SPECIMEN COL AND RPT DATE: 3/13/2017 11:59 AM EDT  VITAL SIGNS  PULSE:  BP: / WEIGHT:  HEIGHT:  TEMP:   CURRENT ANTICOAGULANT DOSING SCHEDULE  DOSE SIZE: 2 5mg    ANTICOAGULANT TYPE: COUMADIN  DOSING REGIMEN  Sun       Mon       Tues      Wed       Thurs     Fri       Sat  Total/Wk  2 5       3 75      2 5       3 75      2 5       3 75      2 5       21 25  PATIENT MEDICATION INSTRUCTION: Yes  PATIENT NUTRITIONAL COUNSELING: No  PATIENT BRUISING INSTRUCTION: No  LAST EDUCATION DATE:   PST DETAILS  PST PATIENT TEST DATE: 3/13/2017  PST PATIENT SUBMISSION DATE: 3/13/2017 6:29 PM EDT  PST INR: 2  NEXT PST TEST DATE: 5/15/2017  ASSESSMENT QUESTIONS AND ANSWERS:  Are you currently taking an antibiotic? : No  Are you currently taking any steroids (i e  Prednisone) or prescription  pain medications? : No  Are you experiencing any rectal or nose bleeds? : No  PREVIOUS VISIT INFORMATION  VISITDATE  INRGoal INR   Sun    Mon    Tues   Wed    Thurs  Fri    Sat  Total/wk  3/13/2017   2 5     2     2 5    3 75   2 5    3 75   2 5    3 75   2 5  21 25  3/3/2017    2 5     2 3   2 5    3 75   2 5    3 75   2 5    3 75   2 5  21 25  2/15/2017   2 5     2 3 2  5    3 75   2 5    3 75   2 5    3 75   2 5  21 25  2/1/2017    2 5     2 1   2 5    3 75   2 5    3 75   2 5    3 75   2 5  21 25  ADDITIONAL PREVIOUS VISIT INFORMATION  VISITDATE   PRIMARY RX               DOSE      CrCl  3/13/2017   COUMADIN                 2 5mg  3/3/2017    COUMADIN                 2 5mg  2/15/2017   COUMADIN                 2 5mg  2/1/2017    COUMADIN                 2 5mg  OTHER CURRENT MEDICATIONS:  PROGRESS NOTES:   PATIENT INSTRUCTIONS: 3/15/17, tc pt, lm cell phone, cont current dose,  rech 2 weeks, 3/27  sissy  TEST LOCATION: Home Testing, ,           ,             INBOUND LAB DATA:  Lab       Lab Value Col Date                 Rpt Date                 Lab  Reference Range  Electronically signed by:  Ayden Darby on 3/15/2017 11:59 AM EDT

## 2018-01-16 LAB — INR PPP: 2.3 (ref 0.86–1.16)

## 2018-01-18 ENCOUNTER — ANTICOAG VISIT (OUTPATIENT)
Dept: CARDIOLOGY CLINIC | Facility: CLINIC | Age: 23
End: 2018-01-18

## 2018-01-18 NOTE — PROGRESS NOTES
Assessment    1  History of aortic aneurysm (V12 59) (Z86 79)   2  History of Ascending aortic aneurysm (441 2) (I71 2)   · 4 4 x 4 4 cm   3  Encounter for gynecological examination without abnormal finding (V72 31) (Z01 419)   4  Encounter for female birth control (V25 8) (Z30 019)   5  Encounter for preventive health examination (V70 0) (Z00 00)    Plan  Birth control    · Camrese 0 15-0 03 &0 01 MG Oral Tablet; take one tablet by mouth daily  Encounter for gynecological examination without abnormal finding    · (1) THIN PREP PAP WITH IMAGING; Status:Active; Requested for:30Nov2017; Maturation index required? : No  HPV? : Regardless of Interpretation   · CHLAMYDIA/N  GONORRHEAE RNA; Status:Active; Requested for:30Nov2017;     Discussion/Summary  health maintenance visit Pap test with reflex HPV testing was done today Testing was done today for chlamydia and gonorrhea  Continue Camrese as ordered  Call or return with any problems or concerns  Possible side effects of new medications were reviewed with the patient/guardian today  The treatment plan was reviewed with the patient/guardian  The patient/guardian understands and agrees with the treatment plan      Chief Complaint  Routine PE      Advance Directives  Advance Directive [facility]:   NO - Patient does not have an advance health care directive  History of Present Illness  HM, Adult Female: The patient is being seen for a health maintenance evaluation  Social History: Household members include mother  She is unmarried  Work status: working full time  The patient has never smoked cigarettes and has never used smokeless tobacco  She reports rare alcohol use and denies binge drinking  The patient has no concerns about alcohol abuse  She has never used illicit drugs  General Health: The patient's health since the last visit is described as good  She has regular dental visits  She denies vision problems  She denies hearing loss   Immunizations status: up to date  Lifestyle:  She consumes a diverse and healthy diet  She does not have any weight concerns  She exercises regularly  She does not use tobacco  She denies alcohol use  She denies drug use  Reproductive health: the patient is premenopausal   she reports normal menses  she uses contraception  she is sexually active  she denies prior pregnancies  Screening:       HPI: annual HM      Review of Systems    Constitutional: No fever, no chills, feels well, no tiredness, no recent weight gain or weight loss  Eyes: No complaints of eye pain, no red eyes, no eyesight problems, no discharge, no dry eyes, no itching of eyes  ENT: no complaints of earache, no loss of hearing, no nose bleeds, no nasal discharge, no sore throat, no hoarseness  Cardiovascular: No complaints of slow heart rate, no fast heart rate, no chest pain, no palpitations, no leg claudication, no lower extremity edema  Respiratory: No complaints of shortness of breath, no wheezing, no cough, no SOB on exertion, no orthopnea, no PND  Gastrointestinal: No complaints of abdominal pain, no constipation, no nausea or vomiting, no diarrhea, no bloody stools  Genitourinary: No complaints of dysuria, no incontinence, no pelvic pain, no dysmenorrhea, no vaginal discharge or bleeding  Musculoskeletal: No complaints of arthralgias, no myalgias, no joint swelling or stiffness, no limb pain or swelling  Integumentary: No complaints of skin rash or lesions, no itching, no skin wounds, no breast pain or lump  Neurological: No complaints of headache, no confusion, no convulsions, no numbness, no dizziness or fainting, no tingling, no limb weakness, no difficulty walking  Psychiatric: Not suicidal, no sleep disturbance, no anxiety or depression, no change in personality, no emotional problems  Endocrine: No complaints of proptosis, no hot flashes, no muscle weakness, no deepening of the voice, no feelings of weakness  Hematologic/Lymphatic: No complaints of swollen glands, no swollen glands in the neck, does not bleed easily, does not bruise easily  ROS reviewed  Active Problems    1  Abnormal vaginal bleeding (623 8) (N93 9)   2  Acute UTI (599 0) (N39 0)   3  Aortic valve regurgitation (424 1) (I35 1)   4  Bicuspid aortic valve (746 4) (Q23 1)   5  Birth control (V25 9) (Z30 9)   6  History of mechanical aortic valve replacement (V43 3) (Z95 2)   7  Moderate aortic regurgitation (424 1) (I35 1)   8  History of Patent Ductus Arteriosus Repair   9  S/P ascending aortic aneurysm repair (V45 89) (Z98 890,Z86 79)    Past Medical History    · History of Dark urine (791 9) (R82 99)   · History of bicuspid aortic valve (V13 65) (Z87 74)   · History of Menstrual migraine without status migrainosus, not intractable (346 40)  (G43 829)    Surgical History    · History of Aortic Valve Replacement   · History of Ascending Aorta Graft With Valve Replacement   · History of Patent Ductus Arteriosus Repair    Family History  Father    · Family history of Bicuspid Aortic Valve   · Family history of aortic aneurysm (V17 49) (Z82 49)  Sister    · Family history of Healthy adult  Brother    · Family history of Healthy adult  Maternal Grandmother    · Family history of aortic stenosis (V17 49) (Z82 49)  Paternal Grandfather    · Family history of sudden death (V25 9) (Z80 80)  Family History    · Family history of Breast Cancer (V16 3)   · Family history of Colon Cancer (V16 0)   · Family history of Diabetes Mellitus (V18 0)    Social History    · Denied: History of Alcohol Use (History)   · Denied: Drug use (305 90) (F19 90)   · Never A Smoker    Current Meds   1  Aspirin EC 81 MG Oral Tablet Delayed Release; TAKE 1 TABLET DAILY AS DIRECTED; Therapy: 15HWI5163 to [phone])  Requested for: 84RCT2321; Last   Rx:05Jun2015 Ordered   2  Camrese 0 15-0 03 &0 01 MG Oral Tablet; take one tablet by mouth daily;    Therapy: 53CRO4396 to (Last [phone])  Requested for: 07LST4306; Status:   ACTIVE - Retrospective By Protocol Authorization Ordered   3  Warfarin Sodium 2 5 MG Oral Tablet; TAKE 1 TO 2 TABLETS DAILY OR AS DIRECTED   BY PHYSICIAN; Therapy: 68XLZ5193 to ([phone])  Requested for: 98Rhp6143; Last   Rx:02Pst9089 Ordered    Allergies    1  Bactrim TABS   2  Cefzil TABS    Vitals   Recorded: 95CQW1492 03:57PM   Heart Rate 95   Systolic 140   Diastolic 68   Height 5 ft 3 in   Weight 140 lb    BMI Calculated 24 8   BSA Calculated 1 66   O2 Saturation 99     Physical Exam    Constitutional   General appearance: No acute distress, well appearing and well nourished  Ears, Nose, Mouth, and Throat   External inspection of ears and nose: Normal     Neck   Neck: Supple, symmetric, trachea midline, no masses  Thyroid: Normal, no thyromegaly  Pulmonary   Respiratory effort: No increased work of breathing or signs of respiratory distress  Auscultation of lungs: Clear to auscultation  Cardiovascular   Auscultation of heart: Normal rate and rhythm, normal S1 and S2, no murmurs  Chest   Breasts: Normal, no dimpling or skin changes appreciated  Palpation of breasts and axillae: Normal, no masses palpated  Chest: Normal     Abdomen   Abdomen: Non-tender, no masses  Liver and spleen: No hepatomegaly or splenomegaly  Genitourinary   External genitalia and vagina: Normal, no lesions appreciated  Urethra: Normal, no discharge  Bladder: Not distended, no tenderness  Cervix: Normal, no lesions, Pap obtained  Uterus: Normal size, no tenderness, no masses  Adnexa/Parametria: Normal, no masses or tenderness  Lymphatic   Palpation of lymph nodes in neck: No lymphadenopathy  Palpation of lymph nodes in axillae: No lymphadenopathy  Musculoskeletal   Gait and station: Normal     Digits and nails: Normal without clubbing or cyanosis      Joints, bones, and muscles: Normal     Range of motion: Normal  Stability: Normal     Muscle strength/tone: Normal     Skin   Skin and subcutaneous tissue: Normal without rashes or lesions  Palpation of skin and subcutaneous tissue: Normal turgor  Neurologic   Cranial nerves: Cranial nerves II-XII intact  Cortical function: Normal mental status  Reflexes: 2+ and symmetric  Sensation: No sensory loss  Coordination: Normal finger to nose and heel to shin  Psychiatric   Judgment and insight: Normal     Orientation to person, place, and time: Normal     Recent and remote memory: Intact  Mood and affect: Normal        Results/Data  PHQ-2 Adult Depression Screening 74LSK1364 04:01PM User, s     Test Name Result Flag Reference   PHQ-2 Adult Depression Score 0     Over the last two weeks, how often have you been bothered by any of the following problems?   Little interest or pleasure in doing things: Not at all - 0  Feeling down, depressed, or hopeless: Not at all - 0   PHQ-2 Adult Depression Screening Negative         Signatures   Electronically signed by : Noe Luna; Nov 30 2017  4:52PM EST                       (Author)    Electronically signed by : Katy Nation MD; Nov 30 2017  5:25PM EST

## 2018-01-23 NOTE — RESULT NOTES
Verified Results  (1) THIN PREP PAP WITH IMAGING [id number] 09:44AM Barbara Raygoza     Test Name Result Flag Reference   LAB AP CASE REPORT (Report)     Gynecologic Cytology Report            Case: [id number]                  Authorizing Provider: WANDA Pardo    Collected:      11/30/2017           First Screen:     Juju Russ        Received:      12/07/2017 8946        Pathologist:      Marilin Rao MD                             Specimen:  LIQUID-BASED PAP, SCREENING, Endocervical   HPV HIGH RISK RESULT (Report)     HPV, High Risk: HPV POS, HPV16 NEG, HPV18 NEG      Other High Risk HPV Positive, HPV 16 Negative, HPV 18 Negative  Specimen is positive for the DNA of any one of, or combination of the following high risk HPV types: 99,52,54,53,57,80,30,53,98,48,35,57  HPV types 16 and 18 DNA were undetectable or below the pre-set threshold  [employer] FDA approved Dina 4800 is utilized with strict adherence to the manufacturers instruction  manual to test for the presence of High-Risk HPV DNA, as well as HPV 16 and HPV 18  This instrument  has been validated by our laboratory and/or by the   A negative result does not preclude the presence of HPV infection because results depend on adequate  specimen collection, absence of inhibitors and sufficient DNA to be detected  Additionally, HPV negative  results are not intended to prevent women from proceeding to colposcopy if clinically warranted  Positive HPV test results indicate the presence of any one or more of the high risk types, but since patients  are often co-infected with low-risk types it does not rule out the presence of low-risk types in patients  with mixed infections     LAB AP GYN PRIMARY INTERPRETATION      Epithelial cell abnormality  Electronically signed by Marilin Rao MD on 12/14/2017 at 9:44 AM   LAB AP GYN INTERPRETATION      Low grade squamous intraepithelial lesion   LAB AP GYN SPECIMEN ADEQUACY Satisfactory for evaluation  Absence of endocervical/transformation zone component  LAB AP GYN ADDITIONAL INFORMATION (Report)     [employer]'s FDA approved ,  and ThinPrep Imaging System are   utilized with strict adherence to the 's instruction manual to   prepare gynecologic and non-gynecologic cytology specimens for the   production of ThinPrep slides as well as for gynecologic ThinPrep imaging  These processes have been validated by our laboratory and/or by the     The Pap test is not a diagnostic procedure and should not be used as the   sole means to detect cervical cancer  It is only a screening procedure to   aid in the detection of cervical cancer and its precursors  Both   false-negative and false-positive results have been experienced  Your   patient's test result should be interpreted in this context together with   the history and clinical findings

## 2018-01-23 NOTE — RESULT NOTES
Verified Results  (1) CHLAMYDIA/GC AMPLIFIED DNA, PCR [id number] 11:07PM [facility]     Test Name Result Flag Reference   CHLAMYDIA,AMPLIFIED DNA PROBE   C  trachomatis Amplified DNA Negative   C  trachomatis Amplified DNA Negative   N  GONORRHOEAE AMPLIFIED DNA   N  gonorrhoeae Amplified DNA Negative   N  gonorrhoeae Amplified DNA Negative

## 2018-01-24 VITALS
DIASTOLIC BLOOD PRESSURE: 70 MMHG | BODY MASS INDEX: 25.34 KG/M2 | SYSTOLIC BLOOD PRESSURE: 124 MMHG | HEIGHT: 63 IN | WEIGHT: 143 LBS

## 2018-02-01 ENCOUNTER — ANTICOAG VISIT (OUTPATIENT)
Dept: CARDIOLOGY CLINIC | Facility: CLINIC | Age: 23
End: 2018-02-01

## 2018-02-01 LAB — INR PPP: 1.9 (ref 0.86–1.16)

## 2018-02-02 ENCOUNTER — OFFICE VISIT (OUTPATIENT)
Dept: CARDIOLOGY CLINIC | Facility: CLINIC | Age: 23
End: 2018-02-02
Payer: COMMERCIAL

## 2018-02-02 VITALS
DIASTOLIC BLOOD PRESSURE: 80 MMHG | WEIGHT: 144 LBS | BODY MASS INDEX: 25.51 KG/M2 | SYSTOLIC BLOOD PRESSURE: 120 MMHG | HEART RATE: 72 BPM

## 2018-02-02 DIAGNOSIS — Z98.890 HX OF REPAIR OF ASCENDING AORTA: ICD-10-CM

## 2018-02-02 DIAGNOSIS — I35.1 NONRHEUMATIC AORTIC VALVE INSUFFICIENCY: ICD-10-CM

## 2018-02-02 DIAGNOSIS — Z95.2 HISTORY OF MECHANICAL AORTIC VALVE REPLACEMENT: Primary | ICD-10-CM

## 2018-02-02 PROCEDURE — 99214 OFFICE O/P EST MOD 30 MIN: CPT | Performed by: INTERNAL MEDICINE

## 2018-02-02 RX ORDER — LEVONORGESTREL / ETHINYL ESTRADIOL AND ETHINYL ESTRADIOL 150-30(84)
1 KIT ORAL DAILY
COMMUNITY
Start: 2017-05-30 | End: 2019-03-12 | Stop reason: SDUPTHER

## 2018-02-02 RX ORDER — ASPIRIN 81 MG/1
1 TABLET ORAL DAILY
COMMUNITY
Start: 2015-06-05 | End: 2019-03-22

## 2018-02-02 RX ORDER — WARFARIN SODIUM 2.5 MG/1
TABLET ORAL
COMMUNITY
Start: 2015-10-08 | End: 2018-07-06 | Stop reason: SDUPTHER

## 2018-02-02 NOTE — PATIENT INSTRUCTIONS
Warfarin (By mouth)   Warfarin (WAR-far-in)  Prevents and treats blood clots  May lower the risk of serious complications after a heart attack  This medicine is a blood thinner  Brand Name(s): Coumadin, Jantoven   There may be other brand names for this medicine  When This Medicine Should Not Be Used: This medicine is not right for everyone  Do not use it if you had an allergic reaction to warfarin, if you are pregnant, or if you have health problems that could cause bleeding  How to Use This Medicine:   Tablet  · Take your medicine as directed  Your dose may need to be changed several times to find what works best for you  · This medicine should come with a Medication Guide  Ask your pharmacist for a copy if you do not have one  · Missed dose: Take a dose as soon as you remember  If it is almost time for your next dose, wait until then and take a regular dose  Do not take extra medicine to make up for a missed dose  · Store the medicine in a closed container at room temperature, away from heat, moisture, and direct light  Drugs and Foods to Avoid:   Ask your doctor or pharmacist before using any other medicine, including over-the-counter medicines, vitamins, and herbal products  · Many medicines and foods can affect how warfarin works and may affect the PT/INR test results   Tell your doctor before you start or stop any medicine, especially the following:   ¨ Ginkgo, echinacea, goldenseal, or Debbi's wort  ¨ Another blood thinner, including apixaban, argatroban, bivalirudin, cilostazol, clopidogrel, dabigatran, desirudin, dipyridamole, heparin, lepirudin, prasugrel, rivaroxaban, ticlopidine  ¨ Medicine to treat depression or anxiety, including citalopram, desvenlafaxine, duloxetine, escitalopram, fluoxetine, fluvoxamine, milnacipran, paroxetine, sertraline, venlafaxine, vilazodone  ¨ Medicine to treat an infection  ¨ NSAID pain or arthritis medicine, including aspirin, celecoxib, diclofenac, diflunisal, fenoprofen, ibuprofen, indomethacin, ketoprofen, ketorolac, mefenamic acid, naproxen, oxaprozin, piroxicam, sulindac  Check labels for over-the-counter medicines to find out if they contain an NSAID  ¨ Steroid medicine, including dexamethasone, hydrocortisone, methylprednisolone, prednisolone, prednisone  · Warfarin works best if you eat about the same amount of vitamin K every day  Foods high in vitamin K include asparagus, broccoli, brussels sprouts, cabbage, green leafy vegetables, plums, rhubarb, and canola oil  Talk to your doctor before you make changes to your normal diet  · Do not eat grapefruit or drink grapefruit juice while you are using this medicine  Warnings While Using This Medicine:   · It is not safe to take this medicine during pregnancy  It could harm an unborn baby  Tell your doctor right away if you become pregnant  Use an effective form of birth control to keep from getting pregnant during treatment and for at least 1 month after your last dose  · Tell your doctor if you are breastfeeding, or if you have kidney disease, liver disease, diabetes, heart disease, heart failure, high blood pressure, an infection, a stomach ulcer, or protein C deficiency  Also tell your doctor if you had recent surgery or an injury, or a history of stroke, anemia, severe bleeding or bruising, or problems caused by heparin use  · This medicine may cause the following problems:   ¨ Bleeding, which may be life-threatening  ¨ Gangrene (skin or tissue damage)  ¨ Calciphylaxis or calcium uremic arteriolopathy  ¨ Purple toes syndrome  · You must have a PT/INR blood test while you use this medicine to check how well your blood is clotting  Your doctor will use the test results to make sure the medicine is working properly  Keep all appointments for the PT/INR blood tests  · You may bleed or bruise more easily with warfarin   To prevent injury or cuts, do not play rough sports, be careful with sharp objects, and brush and floss your teeth gently  Graylin Niya your nose gently, and do not pick your nose  · Carry an ID card or wear a medical alert bracelet to let emergency caregivers know that you use warfarin  · Tell any doctor or dentist who treats you that you are using this medicine  You may need to stop using this medicine several days before you have surgery or medical tests  · Keep all medicine out of the reach of children  Never share your medicine with anyone  Possible Side Effects While Using This Medicine:   Call your doctor right away if you notice any of these side effects:  · Allergic reaction: Itching or hives, swelling in your face or hands, swelling or tingling in your mouth or throat, chest tightness, trouble breathing  · Bleeding from your gums or nose, coughing up blood, heavy monthly periods  · Bleeding that does not stop, bruising, or weakness  · Dizziness, fainting, lightheadedness  · Pain, brown or black skin, or skin that is cool to the touch  · Purple toes or feet, or new pain in your leg, foot, or toes  · Purplish red, net-like, blotchy spots on the skin  · Red or dark brown urine, or red or black, tarry stools  · Vomiting blood or material that looks like coffee grounds  If you notice other side effects that you think are caused by this medicine, tell your doctor  Call your doctor for medical advice about side effects  You may report side effects to FDA at 6-312-FDA-4930  © 2017 [address] [employer] Information is for End User's use only and may not be sold, redistributed or otherwise used for commercial purposes  The above information is an  only  It is not intended as medical advice for individual conditions or treatments  Talk to your doctor, nurse or pharmacist before following any medical regimen to see if it is safe and effective for you

## 2018-02-02 NOTE — PROGRESS NOTES
Cardiology Follow Up    Moi Jama  1995  [id number]  HEART & VASCULAR  [facility] [place]  [address]  [address] [address]    1  History of mechanical aortic valve replacement     2  Nonrheumatic aortic valve insufficiency     3  Hx of repair of ascending aorta         Interval History:     Jan Lesch comes in for followup given her history of a bicuspid aortic valve, with moderate aortic regurgitation and mild aortic stenosis, along with dilation of the ascending aorta  Later in 2014 she did have a CT scan that showed an increase of her ascending aortic aneurysm from 4 1-4 4 cm  This was a significant change and the size of this was significant for her body surface area  Her aortic regurgitation remained in the moderate range  She went an sought consultation with Dr Arlette Goel  Then on 6/1/15 she had a mechanical AVR and ascending aortic repair with graft replacement  This repair was above the sinotubular junction  She has had a prior closure of a PDA back at the age of 1  After our last visit, I got a baseline echocardiogram that showed normal LV systolic function and a normally functioning aortic valve replacement      Jan Lesch continues to feel well  She has no limitations or symptoms  She has had no chest pain, shortness of breath or any syncope  She denies any symptoms of congestive heart failure  She denies palpitations, lightheadedness or any presyncope  She has been active and exercises regularly without limitations   She is tolerating her Coumadin anticoagulation and her INRs have been well controlled        Patient Active Problem List   Diagnosis    Aortic valve regurgitation    Bicuspid aortic valve    History of mechanical aortic valve replacement    Hx of repair of ascending aorta     Past Medical History:   Diagnosis Date    Cardiac disease     mechanical aortic valve; history of aortic aneurysm     Social History     Social History    Marital status: Single     Spouse name: N/A    Number of children: N/A    Years of education: N/A     Occupational History    Not on file  Social History Main Topics    Smoking status: Never Smoker    Smokeless tobacco: Never Used    Alcohol use No    Drug use: No    Sexual activity: Not on file     Other Topics Concern    Not on file     Social History Narrative    No narrative on file      No family history on file  Past Surgical History:   Procedure Laterality Date    MECHANICAL AORTIC VALVE REPLACEMENT         Current Outpatient Prescriptions:     aspirin (ECOTRIN LOW STRENGTH) 81 mg EC tablet, Take 1 tablet by mouth daily, Disp: , Rfl:     Levonorgest-Eth Estrad 91-Day (CAMRESE) 0 15-0 03 &0 01 MG TABS, Take 1 tablet by mouth daily, Disp: , Rfl:     warfarin (COUMADIN) 2 5 mg tablet, Take by mouth, Disp: , Rfl:   Allergies   Allergen Reactions    Cefprozil Rash    Sulfamethoxazole-Trimethoprim Rash       Imaging: No results found  Review of Systems:  Review of Systems   Constitutional: Negative  HENT: Negative  Eyes: Negative  Respiratory: Negative  Cardiovascular: Negative  Gastrointestinal: Negative  Musculoskeletal: Negative  Skin: Negative  Allergic/Immunologic: Negative  Neurological: Negative  Hematological: Negative  Psychiatric/Behavioral: Negative  All other systems reviewed and are negative  Physical Exam:  Physical Exam   Constitutional: She is oriented to person, place, and time  She appears well-developed and well-nourished  HENT:   Head: Normocephalic and atraumatic  Eyes: EOM are normal  Pupils are equal, round, and reactive to light  Right eye exhibits no discharge  Left eye exhibits no discharge  No scleral icterus  Neck: Normal range of motion  Neck supple  No JVD present  No thyromegaly present  Cardiovascular: Normal rate, regular rhythm, S1 normal, S2 normal, intact distal pulses and normal pulses     No extrasystoles are present  Exam reveals no gallop and no friction rub  Murmur heard  Crescendo decrescendo systolic murmur is present with a grade of 2/6   Pulses:       Carotid pulses are 2+ on the right side, and 2+ on the left side  Radial pulses are 2+ on the right side, and 2+ on the left side  Femoral pulses are 2+ on the right side, and 2+ on the left side  Popliteal pulses are 2+ on the right side, and 2+ on the left side  Dorsalis pedis pulses are 2+ on the right side, and 2+ on the left side  Posterior tibial pulses are 2+ on the right side, and 2+ on the left side  Normal-sounding mechanical S2    Pulmonary/Chest: Effort normal and breath sounds normal  No respiratory distress  She has no wheezes  She has no rales  Abdominal: Soft  Bowel sounds are normal  She exhibits no distension  There is no tenderness  Musculoskeletal: Normal range of motion  She exhibits no edema, tenderness or deformity  Neurological: She is alert and oriented to person, place, and time  No cranial nerve deficit  Skin: Skin is warm and dry  No rash noted  Psychiatric: She has a normal mood and affect  Judgment and thought content normal    Nursing note and vitals reviewed  Discussion/Summary:    1  Mechanical aortic valve replacement and  Ascending aortic repair -  [facility] is doing well  She is asymptomatic  Tolerating Coumadin  No changes made today  No testing needed    We will see her back in 1 year and at that time we will get an updated echocardiogram

## 2018-02-16 ENCOUNTER — ANTICOAG VISIT (OUTPATIENT)
Dept: CARDIOLOGY CLINIC | Facility: CLINIC | Age: 23
End: 2018-02-16

## 2018-02-16 LAB — INR PPP: 2.1 (ref 0.86–1.16)

## 2018-02-26 NOTE — MISCELLANEOUS
Message   Recorded as Task   Date: 01/05/2018 08:47 AM, Created By: Ginger Mars   Task Name: Go to Result   Assigned To: Marylu Sosa   Regarding Patient: Rogelia Fothergill, Status: In Progress   Comment:    Allen Gleason - 05 Jan 2018 8:47 AM     TASK CREATED  Please let Claire Gamez know her ECC was negative and therefore only needs to have a repeat Pap smear with her next annual visit  Marisel Rangel - 05 Jan 2018 1:11 PM     TASK IN PROGRESS   Marisel Rangel - 12 Jan 2018 7:27 AM     TASK EDITED  normal letter mailed to pt    pt did not answer any messages           Active Problems    1  Abnormal vaginal bleeding (623 8) (N93 9)   2  Acute UTI (599 0) (N39 0)   3  Aortic valve regurgitation (424 1) (I35 1)   4  Bicuspid aortic valve (746 4) (Q23 1)   5  Birth control (V25 9) (Z30 9)   6  Cervical high risk HPV (human papillomavirus) test positive (795 05) (R87 810)   7  ENDER I (cervical intraepithelial neoplasia I) (622 11) (N87 0)   8  Encounter for female birth control (V25 8) (Z30 019)   5  Encounter for gynecological examination without abnormal finding (V72 31) (Z01 419)   10  History of HPV infection (V12 09) (Z86 19)   11  History of mechanical aortic valve replacement (V43 3) (Z95 2)   12  Moderate aortic regurgitation (424 1) (I35 1)   13  History of Patent Ductus Arteriosus Repair   14  S/P ascending aortic aneurysm repair (V45 89) (Z98 890,Z86 79)    Current Meds   1  Aspirin EC 81 MG Oral Tablet Delayed Release; TAKE 1 TABLET DAILY AS DIRECTED; Therapy: [id number] to ([phone])  Requested for: 58GRJ9052; Last   Rx:05Jun2015 Ordered   2  Camrese 0 15-0 03 &0 01 MG Oral Tablet; take one tablet by mouth daily; Therapy: 90VMD0022 to (Last Rx:30Nov2017)  Requested for: 10JHN1255 Ordered   3  Warfarin Sodium 2 5 MG Oral Tablet; TAKE 1 TO 2 TABLETS DAILY OR AS DIRECTED   BY PHYSICIAN;    Therapy: 67LUI4000 to ([phone])  Requested for: 56YWZ4434; Last   Rx:[id number] Ordered    Allergies    1   Bactrim TABS   2  Cefzil TABS    Signatures   Electronically signed by : Christie Castillo, ; Jan 12 2018  7:28AM EST                       (Author)

## 2018-02-26 NOTE — MISCELLANEOUS
Message   Recorded as Task   Date: 01/05/2018 08:47 AM, Created By: Umm Brooke   Task Name: Go to Result   Assigned To: Jessie Sellersro   Regarding Patient: Marlin Thomas, Status: Complete   Comment:    Allen Gleason - 05 Jan 2018 8:47 AM     TASK CREATED  Please let Martha Coffey know her ECC was negative and therefore only needs to have a repeat Pap smear with her next annual visit  Marisel Rangel - 05 Jan 2018 1:11 PM     TASK IN PROGRESS   ClaireMarisel - 12 Jan 2018 7:27 AM     TASK EDITED  normal letter mailed to pt    pt did not answer any messages  Octavio Clarke - 12 Jan 2018 7:28 AM     TASK COMPLETED   Patient informed, finally called back  Active Problems    1  Abnormal vaginal bleeding (623 8) (N93 9)   2  Acute UTI (599 0) (N39 0)   3  Aortic valve regurgitation (424 1) (I35 1)   4  Bicuspid aortic valve (746 4) (Q23 1)   5  Birth control (V25 9) (Z30 9)   6  Cervical high risk HPV (human papillomavirus) test positive (795 05) (R87 810)   7  ENDER I (cervical intraepithelial neoplasia I) (622 11) (N87 0)   8  Encounter for female birth control (V25 8) (Z30 019)   5  Encounter for gynecological examination without abnormal finding (V72 31) (Z01 419)   10  History of HPV infection (V12 09) (Z86 19)   11  History of mechanical aortic valve replacement (V43 3) (Z95 2)   12  Moderate aortic regurgitation (424 1) (I35 1)   13  History of Patent Ductus Arteriosus Repair   14  S/P ascending aortic aneurysm repair (V45 89) (Z98 890,Z86 79)    Current Meds   1  Aspirin EC 81 MG Oral Tablet Delayed Release; TAKE 1 TABLET DAILY AS DIRECTED; Therapy: 85GGQ9370 to ([phone])  Requested for: 16IBT3286; Last   Rx:[id number] Ordered   2  Camrese 0 15-0 03 &0 01 MG Oral Tablet; take one tablet by mouth daily; Therapy: 92LXL5491 to (Last Rx:30Nov2017)  Requested for: 77TQU2717 Ordered   3  Warfarin Sodium 2 5 MG Oral Tablet; TAKE 1 TO 2 TABLETS DAILY OR AS DIRECTED   BY PHYSICIAN;    Therapy: 39FEG7436 to (Evaluate:67Mgu6875)  Requested for: 71Vnd3234; Last   Rx:[id number] Ordered    Allergies    1   Bactrim TABS   2  Cefzil TABS    Signatures   Electronically signed by : Ese Berger, ; Jan 12 2018  9:29AM EST                       (Author)

## 2018-03-04 LAB — INR PPP: 2.5 (ref 0.86–1.16)

## 2018-03-05 ENCOUNTER — ANTICOAG VISIT (OUTPATIENT)
Dept: CARDIOLOGY CLINIC | Facility: CLINIC | Age: 23
End: 2018-03-05

## 2018-03-10 LAB — INR PPP: 2.3 (ref 0.86–1.16)

## 2018-03-12 ENCOUNTER — ANTICOAG VISIT (OUTPATIENT)
Dept: CARDIOLOGY CLINIC | Facility: CLINIC | Age: 23
End: 2018-03-12

## 2018-03-25 LAB — INR PPP: 2.3 (ref 0.86–1.16)

## 2018-03-26 ENCOUNTER — ANTICOAG VISIT (OUTPATIENT)
Dept: CARDIOLOGY CLINIC | Facility: CLINIC | Age: 23
End: 2018-03-26

## 2018-04-10 LAB — INR PPP: 2.4 (ref 0.86–1.16)

## 2018-04-11 ENCOUNTER — ANTICOAG VISIT (OUTPATIENT)
Dept: CARDIOLOGY CLINIC | Facility: CLINIC | Age: 23
End: 2018-04-11

## 2018-04-25 ENCOUNTER — ANTICOAG VISIT (OUTPATIENT)
Dept: CARDIOLOGY CLINIC | Facility: CLINIC | Age: 23
End: 2018-04-25

## 2018-04-25 DIAGNOSIS — Z95.2 HISTORY OF MECHANICAL AORTIC VALVE REPLACEMENT: ICD-10-CM

## 2018-04-25 LAB — INR PPP: 2 (ref 0.86–1.16)

## 2018-05-11 ENCOUNTER — ANTICOAG VISIT (OUTPATIENT)
Dept: CARDIOLOGY CLINIC | Facility: CLINIC | Age: 23
End: 2018-05-11

## 2018-05-11 DIAGNOSIS — Z95.2 HISTORY OF MECHANICAL AORTIC VALVE REPLACEMENT: ICD-10-CM

## 2018-05-11 LAB — INR PPP: 2.5 (ref 0.86–1.16)

## 2018-05-26 LAB — INR PPP: 2 (ref 0.86–1.17)

## 2018-05-29 ENCOUNTER — ANTICOAG VISIT (OUTPATIENT)
Dept: CARDIOLOGY CLINIC | Facility: CLINIC | Age: 23
End: 2018-05-29

## 2018-05-29 DIAGNOSIS — Z95.2 HISTORY OF MECHANICAL AORTIC VALVE REPLACEMENT: ICD-10-CM

## 2018-06-02 ENCOUNTER — OFFICE VISIT (OUTPATIENT)
Dept: FAMILY MEDICINE CLINIC | Facility: CLINIC | Age: 23
End: 2018-06-02
Payer: COMMERCIAL

## 2018-06-02 VITALS
WEIGHT: 144 LBS | BODY MASS INDEX: 25.52 KG/M2 | HEART RATE: 76 BPM | HEIGHT: 63 IN | DIASTOLIC BLOOD PRESSURE: 82 MMHG | RESPIRATION RATE: 16 BRPM | SYSTOLIC BLOOD PRESSURE: 124 MMHG

## 2018-06-02 DIAGNOSIS — F41.9 ANXIETY: Primary | ICD-10-CM

## 2018-06-02 PROCEDURE — 99214 OFFICE O/P EST MOD 30 MIN: CPT | Performed by: FAMILY MEDICINE

## 2018-06-02 PROCEDURE — 3008F BODY MASS INDEX DOCD: CPT | Performed by: FAMILY MEDICINE

## 2018-06-02 PROCEDURE — 1036F TOBACCO NON-USER: CPT | Performed by: FAMILY MEDICINE

## 2018-06-02 RX ORDER — METOPROLOL SUCCINATE 25 MG/1
25 TABLET, EXTENDED RELEASE ORAL 2 TIMES DAILY
Qty: 180 TABLET | Refills: 3 | Status: SHIPPED | OUTPATIENT
Start: 2018-06-02 | End: 2019-03-22

## 2018-06-02 RX ORDER — LORAZEPAM 0.5 MG/1
0.5 TABLET ORAL EVERY 8 HOURS PRN
Qty: 90 TABLET | Refills: 1 | Status: SHIPPED | OUTPATIENT
Start: 2018-06-02 | End: 2019-06-24 | Stop reason: SDUPTHER

## 2018-06-02 RX ORDER — ESCITALOPRAM OXALATE 10 MG/1
10 TABLET ORAL DAILY
Qty: 90 TABLET | Refills: 3 | Status: SHIPPED | OUTPATIENT
Start: 2018-06-02 | End: 2019-06-19 | Stop reason: SDUPTHER

## 2018-06-02 NOTE — PROGRESS NOTES
[address] [employer]         NAME: Jeana Castro is a 23 y o  female  : 1995    MRN: [id number]  DATE: 2018  TIME: 8:39 AM    Assessment and Plan   Anxiety [F41 9]  1  Anxiety  escitalopram (LEXAPRO) 10 mg tablet    metoprolol succinate (TOPROL-XL) 25 mg 24 hr tablet    LORazepam (ATIVAN) 0 5 mg tablet         Patient Instructions     Patient Instructions   25min face-face----start toprol/lexapro          Chief Complaint     Chief Complaint   Patient presents with    Anxiety     DISUSS ATIVAN         History of Present Illness       c/o anx/panic attacks      Anxiety   Patient reports no chest pain, nausea, palpitations or shortness of breath  Review of Systems   Review of Systems   Constitutional: Negative for appetite change, chills, diaphoresis and fever  HENT: Negative for ear pain, rhinorrhea, sinus pressure and sore throat  Eyes: Negative for discharge, redness and itching  Respiratory: Negative for cough, shortness of breath and wheezing  Cardiovascular: Negative for chest pain and palpitations  Gastrointestinal: Negative for abdominal pain, diarrhea, nausea and vomiting           Current Medications       Current Outpatient Prescriptions:     aspirin (ECOTRIN LOW STRENGTH) 81 mg EC tablet, Take 1 tablet by mouth daily, Disp: , Rfl:     escitalopram (LEXAPRO) 10 mg tablet, Take 1 tablet (10 mg total) by mouth daily, Disp: 90 tablet, Rfl: 3    Levonorgest-Eth Estrad -Day (CAMRESE) 0 15-0 03 &0 01 MG TABS, Take 1 tablet by mouth daily, Disp: , Rfl:     LORazepam (ATIVAN) 0 5 mg tablet, Take 1 tablet (0 5 mg total) by mouth every 8 (eight) hours as needed for anxiety, Disp: 90 tablet, Rfl: 1    metoprolol succinate (TOPROL-XL) 25 mg 24 hr tablet, Take 1 tablet (25 mg total) by mouth 2 (two) times a day, Disp: 180 tablet, Rfl: 3    warfarin (COUMADIN) 2 5 mg tablet, Take by mouth, Disp: , Rfl:     Current Allergies     Allergies as of 06/02/2018 - Reviewed 06/02/2018   Allergen Reaction Noted    Cefprozil Rash 01/22/2013    Sulfamethoxazole-trimethoprim Rash 01/22/2013            The following portions of the patient's history were reviewed and updated as appropriate: allergies, current medications, past family history, past medical history, past social history, past surgical history and problem list      Past Medical History:   Diagnosis Date    Cardiac disease     mechanical aortic valve; history of aortic aneurysm       Past Surgical History:   Procedure Laterality Date    MECHANICAL AORTIC VALVE REPLACEMENT         No family history on file  Medications have been verified  Objective   /82   Pulse 76   Resp 16   Ht 5' 3" (1 6 m)   Wt 65 3 kg (144 lb)   BMI 25 51 kg/m²        Physical Exam     Physical Exam   Constitutional: She appears well-developed and well-nourished  HENT:   Right Ear: Tympanic membrane, external ear and ear canal normal  Tympanic membrane is not injected  Left Ear: Tympanic membrane, external ear and ear canal normal  Tympanic membrane is not injected  Nose: Nose normal    Mouth/Throat: Oropharynx is clear and moist and mucous membranes are normal    Eyes: Conjunctivae are normal  Pupils are equal, round, and reactive to light  Neck: Normal range of motion  Neck supple  No thyromegaly present  Cardiovascular: Normal rate, regular rhythm, normal heart sounds and intact distal pulses  Pulmonary/Chest: Effort normal and breath sounds normal  She has no wheezes  Nursing note and vitals reviewed

## 2018-06-12 ENCOUNTER — ANTICOAG VISIT (OUTPATIENT)
Dept: CARDIOLOGY CLINIC | Facility: CLINIC | Age: 23
End: 2018-06-12

## 2018-06-12 DIAGNOSIS — Z95.2 HISTORY OF MECHANICAL AORTIC VALVE REPLACEMENT: ICD-10-CM

## 2018-06-12 LAB — INR PPP: 1.8 (ref 0.86–1.17)

## 2018-06-24 LAB — INR PPP: 2.4 (ref 0.86–1.17)

## 2018-06-25 ENCOUNTER — ANTICOAG VISIT (OUTPATIENT)
Dept: CARDIOLOGY CLINIC | Facility: CLINIC | Age: 23
End: 2018-06-25

## 2018-06-25 DIAGNOSIS — Z95.2 HISTORY OF MECHANICAL AORTIC VALVE REPLACEMENT: ICD-10-CM

## 2018-07-06 DIAGNOSIS — Z95.2 S/P AVR: Primary | ICD-10-CM

## 2018-07-09 RX ORDER — WARFARIN SODIUM 2.5 MG/1
2.5 TABLET ORAL
Qty: 90 TABLET | Refills: 2 | Status: SHIPPED | OUTPATIENT
Start: 2018-07-09 | End: 2018-09-17 | Stop reason: SDUPTHER

## 2018-07-10 LAB — INR PPP: 2.4 (ref 0.86–1.17)

## 2018-07-11 ENCOUNTER — ANTICOAG VISIT (OUTPATIENT)
Dept: CARDIOLOGY CLINIC | Facility: CLINIC | Age: 23
End: 2018-07-11

## 2018-07-11 DIAGNOSIS — Z95.2 HISTORY OF MECHANICAL AORTIC VALVE REPLACEMENT: ICD-10-CM

## 2018-07-26 LAB — INR PPP: 2.8 (ref 0.86–1.17)

## 2018-07-27 ENCOUNTER — ANTICOAG VISIT (OUTPATIENT)
Dept: CARDIOLOGY CLINIC | Facility: CLINIC | Age: 23
End: 2018-07-27

## 2018-07-27 DIAGNOSIS — Z95.2 HISTORY OF MECHANICAL AORTIC VALVE REPLACEMENT: ICD-10-CM

## 2018-08-11 LAB — INR PPP: 2.7 (ref 0.86–1.17)

## 2018-08-13 ENCOUNTER — ANTICOAG VISIT (OUTPATIENT)
Dept: CARDIOLOGY CLINIC | Facility: CLINIC | Age: 23
End: 2018-08-13

## 2018-08-13 DIAGNOSIS — Z95.2 HISTORY OF MECHANICAL AORTIC VALVE REPLACEMENT: ICD-10-CM

## 2018-08-27 LAB — INR PPP: 3.3 (ref 0.86–1.17)

## 2018-08-28 ENCOUNTER — ANTICOAG VISIT (OUTPATIENT)
Dept: CARDIOLOGY CLINIC | Facility: CLINIC | Age: 23
End: 2018-08-28

## 2018-08-28 DIAGNOSIS — Z95.2 HISTORY OF MECHANICAL AORTIC VALVE REPLACEMENT: ICD-10-CM

## 2018-09-10 ENCOUNTER — ANTICOAG VISIT (OUTPATIENT)
Dept: CARDIOLOGY CLINIC | Facility: CLINIC | Age: 23
End: 2018-09-10

## 2018-09-10 DIAGNOSIS — Z95.2 HISTORY OF MECHANICAL AORTIC VALVE REPLACEMENT: ICD-10-CM

## 2018-09-10 LAB — INR PPP: 1.8 (ref 0.86–1.17)

## 2018-09-17 DIAGNOSIS — Z95.2 S/P AVR: ICD-10-CM

## 2018-09-17 RX ORDER — WARFARIN SODIUM 2.5 MG/1
TABLET ORAL
Qty: 180 TABLET | Refills: 1 | Status: SHIPPED | OUTPATIENT
Start: 2018-09-17 | End: 2019-07-24 | Stop reason: SDUPTHER

## 2018-09-25 LAB — INR PPP: 3.5 (ref 0.86–1.17)

## 2018-09-26 ENCOUNTER — ANTICOAG VISIT (OUTPATIENT)
Dept: CARDIOLOGY CLINIC | Facility: CLINIC | Age: 23
End: 2018-09-26

## 2018-09-26 DIAGNOSIS — Z95.2 HISTORY OF MECHANICAL AORTIC VALVE REPLACEMENT: ICD-10-CM

## 2018-10-04 ENCOUNTER — ANTICOAG VISIT (OUTPATIENT)
Dept: CARDIOLOGY CLINIC | Facility: CLINIC | Age: 23
End: 2018-10-04

## 2018-10-04 DIAGNOSIS — Z95.2 HISTORY OF MECHANICAL AORTIC VALVE REPLACEMENT: ICD-10-CM

## 2018-10-04 LAB — INR PPP: 2.2 (ref 0.86–1.17)

## 2018-10-18 ENCOUNTER — OFFICE VISIT (OUTPATIENT)
Dept: CARDIOLOGY CLINIC | Facility: CLINIC | Age: 23
End: 2018-10-18
Payer: COMMERCIAL

## 2018-10-18 VITALS
BODY MASS INDEX: 28.35 KG/M2 | HEART RATE: 66 BPM | SYSTOLIC BLOOD PRESSURE: 114 MMHG | HEIGHT: 63 IN | WEIGHT: 160 LBS | DIASTOLIC BLOOD PRESSURE: 80 MMHG

## 2018-10-18 DIAGNOSIS — Z95.2 HISTORY OF MECHANICAL AORTIC VALVE REPLACEMENT: Primary | ICD-10-CM

## 2018-10-18 DIAGNOSIS — Z98.890 HX OF REPAIR OF ASCENDING AORTA: ICD-10-CM

## 2018-10-18 DIAGNOSIS — Q23.1 BICUSPID AORTIC VALVE: ICD-10-CM

## 2018-10-18 PROCEDURE — 93000 ELECTROCARDIOGRAM COMPLETE: CPT | Performed by: INTERNAL MEDICINE

## 2018-10-18 PROCEDURE — 99214 OFFICE O/P EST MOD 30 MIN: CPT | Performed by: INTERNAL MEDICINE

## 2018-10-18 NOTE — PROGRESS NOTES
Cardiology Follow Up    Marci Yoon  1995  [id number]  HEART & VASCULAR  [facility] Tania Peraza  [address]  [address] [address] [address]    1  History of mechanical aortic valve replacement  POCT ECG   2  Hx of repair of ascending aorta     3  Bicuspid aortic valve         Interval History:     Lawyer Fish comes in for followup given her history of a bicuspid aortic valve, with moderate aortic regurgitation and mild aortic stenosis, along with dilation of the ascending aorta  Later in 2014 she did have a CT scan that showed an increase of her ascending aortic aneurysm from 4 1-4 4 cm  This was a significant change and the size of this was significant for her body surface area  Her aortic regurgitation remained in the moderate range  She went an sought consultation with Dr Hawk Child  Then on 6/1/15 she had a mechanical AVR and ascending aortic repair with graft replacement  This repair was above the sinotubular junction  She has had a prior closure of a PDA back at the age of 1  Close to 3 years ago, I got a baseline echocardiogram that showed normal LV systolic function and a normally functioning aortic valve replacement      Lawyer Fish continues to feel well  She has no limitations or symptoms  She has had no chest pain, shortness of breath or any syncope  She denies any symptoms of congestive heart failure  She denies palpitations, lightheadedness or any presyncope  She has been active and exercises regularly without limitations   She is tolerating her Coumadin anticoagulation and her INRs have been well controlled        Patient Active Problem List   Diagnosis    Aortic valve regurgitation    Bicuspid aortic valve    History of mechanical aortic valve replacement    Hx of repair of ascending aorta     Past Medical History:   Diagnosis Date    Cardiac disease     mechanical aortic valve; history of aortic aneurysm     Social History     Social History  Marital status: Single     Spouse name: N/A    Number of children: N/A    Years of education: N/A     Occupational History    Not on file  Social History Main Topics    Smoking status: Never Smoker    Smokeless tobacco: Never Used    Alcohol use No    Drug use: No    Sexual activity: Not on file     Other Topics Concern    Not on file     Social History Narrative    No narrative on file      No family history on file  Past Surgical History:   Procedure Laterality Date    MECHANICAL AORTIC VALVE REPLACEMENT         Current Outpatient Prescriptions:     aspirin (ECOTRIN LOW STRENGTH) 81 mg EC tablet, Take 1 tablet by mouth daily, Disp: , Rfl:     escitalopram (LEXAPRO) 10 mg tablet, Take 1 tablet (10 mg total) by mouth daily, Disp: 90 tablet, Rfl: 3    Levonorgest-Eth Estrad 91-Day (CAMRESE) 0 15-0 03 &0 01 MG TABS, Take 1 tablet by mouth daily, Disp: , Rfl:     LORazepam (ATIVAN) 0 5 mg tablet, Take 1 tablet (0 5 mg total) by mouth every 8 (eight) hours as needed for anxiety, Disp: 90 tablet, Rfl: 1    metoprolol succinate (TOPROL-XL) 25 mg 24 hr tablet, Take 1 tablet (25 mg total) by mouth 2 (two) times a day, Disp: 180 tablet, Rfl: 3    warfarin (COUMADIN) 2 5 mg tablet, Take one to two tablets daily as directed by doctor, Disp: 180 tablet, Rfl: 1  Allergies   Allergen Reactions    Cefprozil Rash    Sulfamethoxazole-Trimethoprim Rash       Imaging: No results found  Review of Systems:  Review of Systems   Constitutional: Negative  HENT: Negative  Eyes: Negative  Respiratory: Negative  Cardiovascular: Negative  Gastrointestinal: Negative  Musculoskeletal: Negative  Skin: Negative  Allergic/Immunologic: Negative  Neurological: Negative  Hematological: Negative  Psychiatric/Behavioral: Negative  All other systems reviewed and are negative  Physical Exam:  Physical Exam   Constitutional: She is oriented to person, place, and time   She appears well-developed and well-nourished  HENT:   Head: Normocephalic and atraumatic  Eyes: Pupils are equal, round, and reactive to light  EOM are normal  Right eye exhibits no discharge  Left eye exhibits no discharge  No scleral icterus  Neck: Normal range of motion  Neck supple  No JVD present  No thyromegaly present  Cardiovascular: Normal rate, regular rhythm, S1 normal, S2 normal, intact distal pulses and normal pulses  No extrasystoles are present  Exam reveals no gallop and no friction rub  Murmur heard  Crescendo decrescendo systolic murmur is present with a grade of 2/6   Pulses:       Carotid pulses are 2+ on the right side, and 2+ on the left side  Radial pulses are 2+ on the right side, and 2+ on the left side  Femoral pulses are 2+ on the right side, and 2+ on the left side  Popliteal pulses are 2+ on the right side, and 2+ on the left side  Dorsalis pedis pulses are 2+ on the right side, and 2+ on the left side  Posterior tibial pulses are 2+ on the right side, and 2+ on the left side  Normal-sounding mechanical S2    Pulmonary/Chest: Effort normal and breath sounds normal  No respiratory distress  She has no wheezes  She has no rales  Abdominal: Soft  Bowel sounds are normal  She exhibits no distension  There is no tenderness  Musculoskeletal: Normal range of motion  She exhibits no edema, tenderness or deformity  Neurological: She is alert and oriented to person, place, and time  No cranial nerve deficit  Skin: Skin is warm and dry  No rash noted  Psychiatric: She has a normal mood and affect  Judgment and thought content normal    Nursing note and vitals reviewed  Discussion/Summary:    1  Mechanical aortic valve replacement and Ascending aortic repair -  [facility] is doing well  She is asymptomatic  Tolerating Coumadin  No changes made today  We ordered an updated echocardiogram today    If there are no changes clinically she does not need to see is back for about 2 years  She will continue to follow closely with our Coumadin Clinic  She knows to take antibiotic prophylaxis for any dental procedures  Counseling / Coordination of Care  Total office/ unit time spent today 25 minutes  Greater than 50% of total time was spent with the patient and / or family counseling and / or coordination of care

## 2018-10-20 LAB — INR PPP: 2.6 (ref 0.86–1.17)

## 2018-10-22 ENCOUNTER — ANTICOAG VISIT (OUTPATIENT)
Dept: CARDIOLOGY CLINIC | Facility: CLINIC | Age: 23
End: 2018-10-22

## 2018-10-22 DIAGNOSIS — Z95.2 HISTORY OF MECHANICAL AORTIC VALVE REPLACEMENT: ICD-10-CM

## 2018-11-05 LAB — INR PPP: 2.3 (ref 0.86–1.17)

## 2018-11-06 ENCOUNTER — TELEPHONE (OUTPATIENT)
Dept: CARDIOLOGY CLINIC | Facility: CLINIC | Age: 23
End: 2018-11-06

## 2018-11-06 ENCOUNTER — ANTICOAG VISIT (OUTPATIENT)
Dept: CARDIOLOGY CLINIC | Facility: CLINIC | Age: 23
End: 2018-11-06

## 2018-11-06 ENCOUNTER — APPOINTMENT (OUTPATIENT)
Dept: LAB | Facility: HOSPITAL | Age: 23
End: 2018-11-06
Attending: INTERNAL MEDICINE
Payer: COMMERCIAL

## 2018-11-06 DIAGNOSIS — Z95.2 HISTORY OF MECHANICAL AORTIC VALVE REPLACEMENT: ICD-10-CM

## 2018-11-06 DIAGNOSIS — Z95.2 S/P AVR (AORTIC VALVE REPLACEMENT): ICD-10-CM

## 2018-11-06 DIAGNOSIS — Z95.2 S/P AVR (AORTIC VALVE REPLACEMENT): Primary | ICD-10-CM

## 2018-11-06 LAB
INR PPP: 2.6 (ref 0.86–1.17)
PROTHROMBIN TIME: 26.4 SECONDS (ref 11.8–14.2)

## 2018-11-06 PROCEDURE — 85610 PROTHROMBIN TIME: CPT

## 2018-11-06 PROCEDURE — 36415 COLL VENOUS BLD VENIPUNCTURE: CPT

## 2018-11-06 NOTE — PROGRESS NOTES
10/22/18 tc to pt, left message on cell phone, continue current dose, inr due 2 weeks, sissy   11/6/18, above description was copied today  11/6/18, tc to pt, left message regarding FDA recall on certain coag u check testing strips  Asked pt to call office to review if her test strips are part of recall, if so, she will need to go to lab for inr testing until new approved strips are reeived  Will wait call back  sissy    11/6/18, pt called back, felipe told pt her strips are include with recall  Will enter new pt/inr script into system   She will use [facility]

## 2018-11-07 ENCOUNTER — HOSPITAL ENCOUNTER (OUTPATIENT)
Dept: NON INVASIVE DIAGNOSTICS | Facility: CLINIC | Age: 23
Discharge: HOME | End: 2018-11-07
Payer: COMMERCIAL

## 2018-11-07 DIAGNOSIS — Z95.2 HISTORY OF MECHANICAL AORTIC VALVE REPLACEMENT: ICD-10-CM

## 2018-11-07 DIAGNOSIS — Z98.890 HX OF REPAIR OF ASCENDING AORTA: ICD-10-CM

## 2018-11-07 PROCEDURE — 93306 TTE W/DOPPLER COMPLETE: CPT | Performed by: INTERNAL MEDICINE

## 2018-11-07 PROCEDURE — 93306 TTE W/DOPPLER COMPLETE: CPT

## 2018-11-13 DIAGNOSIS — Z95.2 HISTORY OF MECHANICAL AORTIC VALVE REPLACEMENT: Primary | ICD-10-CM

## 2018-12-03 ENCOUNTER — HOSPITAL ENCOUNTER (OUTPATIENT)
Dept: CT IMAGING | Facility: HOSPITAL | Age: 23
Discharge: HOME | End: 2018-12-03
Payer: COMMERCIAL

## 2018-12-03 DIAGNOSIS — Z95.2 HISTORY OF MECHANICAL AORTIC VALVE REPLACEMENT: ICD-10-CM

## 2018-12-03 PROCEDURE — 71250 CT THORAX DX C-: CPT

## 2018-12-06 ENCOUNTER — ANTICOAG VISIT (OUTPATIENT)
Dept: CARDIOLOGY CLINIC | Facility: CLINIC | Age: 23
End: 2018-12-06

## 2018-12-06 DIAGNOSIS — Z95.2 HISTORY OF MECHANICAL AORTIC VALVE REPLACEMENT: ICD-10-CM

## 2018-12-06 LAB — INR PPP: 2.1 (ref 0.86–1.17)

## 2018-12-06 NOTE — PROGRESS NOTES
12/6/18, tc to pt, reminded to test inr   She received new NPC IIIre home testing strips   She will test tonight   sissy

## 2018-12-07 ENCOUNTER — ANTICOAG VISIT (OUTPATIENT)
Dept: CARDIOLOGY CLINIC | Facility: CLINIC | Age: 23
End: 2018-12-07

## 2018-12-07 DIAGNOSIS — Z95.2 HISTORY OF MECHANICAL AORTIC VALVE REPLACEMENT: ICD-10-CM

## 2018-12-12 ENCOUNTER — OFFICE VISIT (OUTPATIENT)
Dept: CARDIAC SURGERY | Facility: CLINIC | Age: 23
End: 2018-12-12
Payer: COMMERCIAL

## 2018-12-12 VITALS
WEIGHT: 164.6 LBS | TEMPERATURE: 99.6 F | HEIGHT: 63 IN | SYSTOLIC BLOOD PRESSURE: 130 MMHG | RESPIRATION RATE: 18 BRPM | DIASTOLIC BLOOD PRESSURE: 70 MMHG | HEART RATE: 90 BPM | BODY MASS INDEX: 29.16 KG/M2

## 2018-12-12 DIAGNOSIS — Z98.890 HX OF REPAIR OF ASCENDING AORTA: Primary | ICD-10-CM

## 2018-12-12 DIAGNOSIS — Z95.2 HISTORY OF MECHANICAL AORTIC VALVE REPLACEMENT: ICD-10-CM

## 2018-12-12 PROCEDURE — 99215 OFFICE O/P EST HI 40 MIN: CPT | Performed by: PHYSICIAN ASSISTANT

## 2018-12-12 NOTE — LETTER
December 12, 2018     Hilario Duane, MD  [facility]    Patient: Haley Qureshi   YOB: 1995   Date of Visit: 12/12/2018       Dear Dr Eulalia Casillas: Thank you for referring Gisselleoleg Pompa to me for evaluation  Below are my notes for this consultation  If you have questions, please do not hesitate to call me  I look forward to following your patient along with you  Sincerely,        Rangel Proper, DO        CC: No Recipients  Adenike Gerard  12/12/2018 11:22 AM  Attested  Consultation - Cardiothoracic Surgery   Haley Qureshi 20 y o  female MRN: [id number]    Reason for Consult / Principal Problem: Aortic aneurysm, Aortic regurgitation s/p mechanical AVR & ascending aortic replacement    History of Present Illness: Haley Qureshi is a 20y o  year old female who presents to the aortic clinic for a 2 year follow-up with scan  Her last visit was on 10/12/16 which the aneurysm repair was found to be stable w/o evidence of pseudoaneurysm or endoleak  Upon radiologic examination today, the repair is found to be stable w/o evidence of pseudoaneurysm formation or endoleak  She admits to abiding by her lifting & exertion restrictions since her last visit  She is active & reports walking daily  She denies chest pain, palpitations, SOB, CABEZAS, LE edema b/l, back pain, arm pain, numbness/tingling/paresthesias in UE b/l, HA, lightheadedness, dizziness, presyncopal symptoms, or syncopal events today or since his last visit  Changes to his medical history since his last visit consist of 2 ED visits for a dog bite & finger laceration repair, menorrhagia w/u w/ transvaginal ultrasound/colposcopy w/ normal findings & return of normal cycle, dx & tx of anxiety disorder w/ improvement in sx  She does have a FH of BAV w/ aortic aneurysm repair for her father & aortic stenosis in her maternal grandmother requiring AVR   She sees Dr Damian Canas as her cardiologist who manages her cardiac medications including Aspirin 81mg, Toprol 25mg, Warfarin 2 5mg  Denies tobacco use, ETOH use, or drug use of any kind  Past Medical History:  Past Medical History:   Diagnosis Date    Bicuspid aortic valve     s/p mechanical AVR    Moderate aortic insufficiency     s/p mechanical AVR    Patent ductus arteriosus     s/p repair    S/P ascending aortic aneurysm repair      Past Surgical History:   Past Surgical History:   Procedure Laterality Date    ASCENDING AORTIC ANEURYSM REPAIR      MECHANICAL AORTIC VALVE REPLACEMENT      PATENT DUCTUS ARTERIOUS LIGATION       Family History:  Family History   Problem Relation Age of Onset    Aneurysm Father         Aortic aneurysm    Other Father         Bicuspid AV    Aortic stenosis Maternal Grandmother     Breast cancer Family     Colon cancer Family     Diabetes Family     Sudden death Paternal Grandfather      Social History:  History   Alcohol Use No     History   Drug Use No     History   Smoking Status    Never Smoker   Smokeless Tobacco    Never Used       Home Medications:   Prior to Admission medications    Medication Sig Start Date End Date Taking?  Authorizing Provider   aspirin (ECOTRIN LOW STRENGTH) 81 mg EC tablet Take 1 tablet by mouth daily 6/5/15  Yes Historical Provider, MD   escitalopram (LEXAPRO) 10 mg tablet Take 1 tablet (10 mg total) by mouth daily 6/2/18  Yes Cherelle De Oliveira MD   Levonorgest-Eth [facility]-Day (CAMRESE) 0 15-0 03 &0 01 MG TABS Take 1 tablet by mouth daily 5/30/17  Yes Historical Provider, MD   LORazepam (ATIVAN) 0 5 mg tablet Take 1 tablet (0 5 mg total) by mouth every 8 (eight) hours as needed for anxiety 6/2/18  Yes Cherelle De Oliveira MD   metoprolol succinate (TOPROL-XL) 25 mg 24 hr tablet Take 1 tablet (25 mg total) by mouth 2 (two) times a day 6/2/18  Yes Cherelle De Oliveira MD   warfarin (COUMADIN) 2 5 mg tablet Take one to two tablets daily as directed by doctor 9/17/18  Yes Alexei Angeles MD Allergies: Allergies   Allergen Reactions    Cefprozil Rash    Sulfamethoxazole-Trimethoprim Rash       Review of Systems:  Review of Systems   Constitutional: Negative  Negative for activity change, diaphoresis, fatigue and unexpected weight change  HENT: Negative  Negative for dental problem  Respiratory: Negative  Negative for chest tightness, shortness of breath and wheezing  Cardiovascular: Negative  Negative for chest pain, palpitations and leg swelling  Gastrointestinal: Negative  Negative for blood in stool, constipation, diarrhea, nausea and vomiting  Genitourinary: Negative  Musculoskeletal: Negative  Negative for arthralgias, back pain, gait problem and myalgias  Skin: Negative  Neurological: Negative  Negative for dizziness, syncope, weakness, light-headedness, numbness and headaches  Hematological: Negative  Does not bruise/bleed easily  All other systems reviewed and are negative  Vital Signs:     Vitals:    12/12/18 1052   BP: 130/70   BP Location: Left arm   Patient Position: Sitting   Cuff Size: Standard   Pulse: 90   Resp: 18   Temp: 99 6 °F (37 6 °C)   TempSrc: Tympanic   Weight: 74 7 kg (164 lb 9 6 oz)   Height: 5' 3" (1 6 m)       Physical Exam:  Physical Exam   Constitutional: She is oriented to person, place, and time  Vital signs are normal  She appears well-developed and well-nourished  Non-toxic appearance  She does not appear ill  No distress  Sitting on exam table in NAD   HENT:   Head: Normocephalic and atraumatic  Neck: Trachea normal and normal range of motion  Neck supple  No JVD present  Carotid bruit is not present  Cardiovascular: Normal rate, regular rhythm, S1 normal and S2 normal   PMI is not displaced  Exam reveals no gallop, no S3, no S4 and no friction rub  Murmur heard    No heaves/lifts on palpation; (+) mechanical valve click on ausculatation   Pulmonary/Chest: Effort normal and breath sounds normal  No accessory muscle usage  No respiratory distress  She has no wheezes  She has no rhonchi  She has no rales  Good inspiratory effort, equal expansion bilaterally   Abdominal: Normal appearance and bowel sounds are normal  She exhibits no distension and no mass  There is no tenderness  There is no rigidity, no rebound and no guarding  Neurological: She is alert and oriented to person, place, and time  She has normal strength  No cranial nerve deficit or sensory deficit  No focal deficits   Skin: Skin is warm, dry and intact  Trace b/l LE; sternum stable w/ deep inspiration/coughing; sternotomy incision healed   Psychiatric: She has a normal mood and affect  Her mood appears not anxious  She does not exhibit a depressed mood  Lab Results:               Invalid input(s): LABGLOM    Results from last 7 days  Lab Units 12/06/18   INR  2 10*     Lab Results   Component Value Date    HGBA1C 5 0 05/18/2015     No results found for: CKTOTAL, CKMB, CKMBINDEX, TROPONINI    Imaging Studies:     CT Chest w/o 11/13/18: unchanged, no pseudoaneurysm or endovascular leak    I have personally reviewed pertinent films in PACS    Assessment:  Patient Active Problem List    Diagnosis Date Noted    History of mechanical aortic valve replacement 02/02/2018    Hx of repair of ascending aorta 02/02/2018    Aortic valve regurgitation 11/18/2014    Bicuspid aortic valve 11/18/2014     S/p mechanical AVR & ascending aortic replacement    Plan:    Ms Joleen Gonzales returns to the aortic clinic today for 2 year follow-up with repeat CT scan without contrast of the chest for surveillance of ascending aortic replamcement  Most recent CT scan demonstrates stable repair w/o evidence of pseudoaneurysm or endovascular leak & stable in comparison to prior studies  Her INR remains therapeutic  I counseled the patient on her lifestyle restrictions as well as routine follow-up with her cardiologist for management of his other cardiac conditions      In regards to Dr John Kat note, her INR remains therapeutic  She should remain under his care for this  I have advised her to contact our office &/or Dr John Kat should she decide she would like to get pregnant in the coming years for an anticoagulation plan  We recommend follow-up in the aortic clinic in five years with repeat CT scan without contrast of the chest for ongoing surveillance  Elise Olivera PA-C  DATE: December 12, 2018  TIME: 11:17 AM  Attestation signed by Emma Bernard DO at 12/12/2018  1:06 PM:  The patient was seen and examined, and I agree with the midlevel's history, physical exam, assessment and plan with the following additions:    Mikki Dahl presents for follow-up from her ascending aneurysm replacement [place] the chemical valve conduit  Her INR has been therapeutic and she has been tolerating her Coumadin well  Her CT scan was reviewed with her today which demonstrates stable repair  I recommended a 5 year follow-up

## 2018-12-12 NOTE — PROGRESS NOTES
Consultation - Cardiothoracic Surgery   Jessica Rawls 20 y o  female MRN: [id number]    Reason for Consult / Principal Problem: Aortic aneurysm, Aortic regurgitation s/p mechanical AVR & ascending aortic replacement    History of Present Illness: Jessica Rawls is a 20y o  year old female who presents to the aortic clinic for a 2 year follow-up with scan  Her last visit was on 10/12/16 which the aneurysm repair was found to be stable w/o evidence of pseudoaneurysm or endoleak  Upon radiologic examination today, the repair is found to be stable w/o evidence of pseudoaneurysm formation or endoleak  She admits to abiding by her lifting & exertion restrictions since her last visit  She is active & reports walking daily  She denies chest pain, palpitations, SOB, CABEZAS, LE edema b/l, back pain, arm pain, numbness/tingling/paresthesias in UE b/l, HA, lightheadedness, dizziness, presyncopal symptoms, or syncopal events today or since his last visit  Changes to his medical history since his last visit consist of 2 ED visits for a dog bite & finger laceration repair, menorrhagia w/u w/ transvaginal ultrasound/colposcopy w/ normal findings & return of normal cycle, dx & tx of anxiety disorder w/ improvement in sx  She does have a FH of BAV w/ aortic aneurysm repair for her father & aortic stenosis in her maternal grandmother requiring AVR  She sees Dr Heath Urbina as her cardiologist who manages her cardiac medications including Aspirin 81mg, Toprol 25mg, Warfarin 2 5mg  Denies tobacco use, ETOH use, or drug use of any kind      Past Medical History:  Past Medical History:   Diagnosis Date    Bicuspid aortic valve     s/p mechanical AVR    Moderate aortic insufficiency     s/p mechanical AVR    Patent ductus arteriosus     s/p repair    S/P ascending aortic aneurysm repair      Past Surgical History:   Past Surgical History:   Procedure Laterality Date    ASCENDING AORTIC ANEURYSM REPAIR      MECHANICAL AORTIC VALVE REPLACEMENT      PATENT DUCTUS ARTERIOUS LIGATION       Family History:  Family History   Problem Relation Age of Onset    Aneurysm Father         Aortic aneurysm    Other Father         Bicuspid AV    Aortic stenosis Maternal Grandmother     Breast cancer Family     Colon cancer Family     Diabetes Family     Sudden death Paternal Grandfather      Social History:  History   Alcohol Use No     History   Drug Use No     History   Smoking Status    Never Smoker   Smokeless Tobacco    Never Used       Home Medications:   Prior to Admission medications    Medication Sig Start Date End Date Taking? Authorizing Provider   aspirin (ECOTRIN LOW STRENGTH) 81 mg EC tablet Take 1 tablet by mouth daily 6/5/15  Yes Historical Provider, MD   escitalopram (LEXAPRO) 10 mg tablet Take 1 tablet (10 mg total) by mouth daily 6/2/18  Yes Renata Tran MD   Levonorgest-Eth Jacqualine Crate 91-Day (CAMRESE) 0 15-0 03 &0 01 MG TABS Take 1 tablet by mouth daily 5/30/17  Yes Historical Provider, MD   LORazepam (ATIVAN) 0 5 mg tablet Take 1 tablet (0 5 mg total) by mouth every 8 (eight) hours as needed for anxiety 6/2/18  Yes Renata Tran MD   metoprolol succinate (TOPROL-XL) 25 mg 24 hr tablet Take 1 tablet (25 mg total) by mouth 2 (two) times a day 6/2/18  Yes Renata Tran MD   warfarin (COUMADIN) 2 5 mg tablet Take one to two tablets daily as directed by doctor 9/17/18  Yes Kya Russell MD       Allergies: Allergies   Allergen Reactions    Cefprozil Rash    Sulfamethoxazole-Trimethoprim Rash       Review of Systems:  Review of Systems   Constitutional: Negative  Negative for activity change, diaphoresis, fatigue and unexpected weight change  HENT: Negative  Negative for dental problem  Respiratory: Negative  Negative for chest tightness, shortness of breath and wheezing  Cardiovascular: Negative  Negative for chest pain, palpitations and leg swelling  Gastrointestinal: Negative    Negative for blood in stool, constipation, diarrhea, nausea and vomiting  Genitourinary: Negative  Musculoskeletal: Negative  Negative for arthralgias, back pain, gait problem and myalgias  Skin: Negative  Neurological: Negative  Negative for dizziness, syncope, weakness, light-headedness, numbness and headaches  Hematological: Negative  Does not bruise/bleed easily  All other systems reviewed and are negative  Vital Signs:     Vitals:    12/12/18 1052   BP: 130/70   BP Location: Left arm   Patient Position: Sitting   Cuff Size: Standard   Pulse: 90   Resp: 18   Temp: 99 6 °F (37 6 °C)   TempSrc: Tympanic   Weight: 74 7 kg (164 lb 9 6 oz)   Height: 5' 3" (1 6 m)       Physical Exam:  Physical Exam   Constitutional: She is oriented to person, place, and time  Vital signs are normal  She appears well-developed and well-nourished  Non-toxic appearance  She does not appear ill  No distress  Sitting on exam table in NAD   HENT:   Head: Normocephalic and atraumatic  Neck: Trachea normal and normal range of motion  Neck supple  No JVD present  Carotid bruit is not present  Cardiovascular: Normal rate, regular rhythm, S1 normal and S2 normal   PMI is not displaced  Exam reveals no gallop, no S3, no S4 and no friction rub  Murmur heard  No heaves/lifts on palpation; (+) mechanical valve click on ausculatation   Pulmonary/Chest: Effort normal and breath sounds normal  No accessory muscle usage  No respiratory distress  She has no wheezes  She has no rhonchi  She has no rales  Good inspiratory effort, equal expansion bilaterally   Abdominal: Normal appearance and bowel sounds are normal  She exhibits no distension and no mass  There is no tenderness  There is no rigidity, no rebound and no guarding  Neurological: She is alert and oriented to person, place, and time  She has normal strength  No cranial nerve deficit or sensory deficit  No focal deficits   Skin: Skin is warm, dry and intact     Trace b/l LE; sternum stable w/ deep inspiration/coughing; sternotomy incision healed   Psychiatric: She has a normal mood and affect  Her mood appears not anxious  She does not exhibit a depressed mood  Lab Results:               Invalid input(s): LABGLOM    Results from last 7 days  Lab Units 12/06/18   INR  2 10*     Lab Results   Component Value Date    HGBA1C 5 0 05/18/2015     No results found for: CKTOTAL, CKMB, CKMBINDEX, TROPONINI    Imaging Studies:     CT Chest w/o 11/13/18: unchanged, no pseudoaneurysm or endovascular leak    I have personally reviewed pertinent films in PACS    Assessment:  Patient Active Problem List    Diagnosis Date Noted    History of mechanical aortic valve replacement 02/02/2018    Hx of repair of ascending aorta 02/02/2018    Aortic valve regurgitation 11/18/2014    Bicuspid aortic valve 11/18/2014     S/p mechanical AVR & ascending aortic replacement    Plan:    Ms Prem Francisco returns to the aortic clinic today for 2 year follow-up with repeat CT scan without contrast of the chest for surveillance of ascending aortic replamcement  Most recent CT scan demonstrates stable repair w/o evidence of pseudoaneurysm or endovascular leak & stable in comparison to prior studies  Her INR remains therapeutic  I counseled the patient on her lifestyle restrictions as well as routine follow-up with her cardiologist for management of his other cardiac conditions  In regards to Dr Farideh Mckeon note, her INR remains therapeutic  She should remain under his care for this  I have advised her to contact our office &/or Dr Farideh Mckeon should she decide she would like to get pregnant in the coming years for an anticoagulation plan  We recommend follow-up in the aortic clinic in five years with repeat CT scan without contrast of the chest for ongoing surveillance      SIGNATURE: Ed Omer PA-C  DATE: December 12, 2018  TIME: 11:17 AM

## 2018-12-22 LAB — INR PPP: 3 (ref 0.86–1.17)

## 2018-12-24 ENCOUNTER — ANTICOAG VISIT (OUTPATIENT)
Dept: CARDIOLOGY CLINIC | Facility: CLINIC | Age: 23
End: 2018-12-24

## 2018-12-24 DIAGNOSIS — Z95.2 HISTORY OF MECHANICAL AORTIC VALVE REPLACEMENT: ICD-10-CM

## 2018-12-24 NOTE — PROGRESS NOTES
12/24/18, tc to pt, left message on answering machine, continue current dose, inr due 1 week   sissy

## 2019-01-03 ENCOUNTER — ANTICOAG VISIT (OUTPATIENT)
Dept: CARDIOLOGY CLINIC | Facility: CLINIC | Age: 24
End: 2019-01-03

## 2019-01-03 DIAGNOSIS — Z95.2 HISTORY OF MECHANICAL AORTIC VALVE REPLACEMENT: ICD-10-CM

## 2019-01-05 LAB — INR PPP: 2.5 (ref 0.86–1.17)

## 2019-01-07 ENCOUNTER — ANTICOAG VISIT (OUTPATIENT)
Dept: CARDIOLOGY CLINIC | Facility: CLINIC | Age: 24
End: 2019-01-07

## 2019-01-07 DIAGNOSIS — Z95.2 HISTORY OF MECHANICAL AORTIC VALVE REPLACEMENT: ICD-10-CM

## 2019-01-23 ENCOUNTER — ANTICOAG VISIT (OUTPATIENT)
Dept: CARDIOLOGY CLINIC | Facility: CLINIC | Age: 24
End: 2019-01-23

## 2019-01-23 DIAGNOSIS — Z95.2 HISTORY OF MECHANICAL AORTIC VALVE REPLACEMENT: ICD-10-CM

## 2019-01-23 LAB — INR PPP: 2.3 (ref 0.86–1.17)

## 2019-01-23 NOTE — PROGRESS NOTES
1/23/19, tc to pt, left message on answering machine, continue current dose, inr due 2 weeks  Pt to call if any questions or concerns   sissy

## 2019-02-10 LAB — INR PPP: 3.2 (ref 0.86–1.17)

## 2019-02-11 ENCOUNTER — ANTICOAG VISIT (OUTPATIENT)
Dept: CARDIOLOGY CLINIC | Facility: CLINIC | Age: 24
End: 2019-02-11

## 2019-02-11 DIAGNOSIS — Z95.2 HISTORY OF MECHANICAL AORTIC VALVE REPLACEMENT: ICD-10-CM

## 2019-02-11 NOTE — PROGRESS NOTES
2/11/19, tc to pt, left message on answering machine, take 2 5 mg today in place of 3 75 mg , then resume previous dose, inr due 1 week  Pt yo call if any changes in health, medication or bleeding   sissy

## 2019-02-27 ENCOUNTER — ANTICOAG VISIT (OUTPATIENT)
Dept: CARDIOLOGY CLINIC | Facility: CLINIC | Age: 24
End: 2019-02-27

## 2019-02-27 DIAGNOSIS — Z95.2 HISTORY OF MECHANICAL AORTIC VALVE REPLACEMENT: ICD-10-CM

## 2019-02-27 LAB — INR PPP: 3 (ref 0.86–1.17)

## 2019-02-27 NOTE — PROGRESS NOTES
2/27/19, tc to pt, left message on answering machine, decrease dose, 2 5 mg sun/mon/wed/fri, 3 75 mg other days of the week, inr due 1 week  Pt to call if any changes in health, medication or bleeding   sissy

## 2019-03-12 DIAGNOSIS — Z30.9 ENCOUNTER FOR CONTRACEPTIVE MANAGEMENT, UNSPECIFIED TYPE: Primary | ICD-10-CM

## 2019-03-12 RX ORDER — LEVONORGESTREL / ETHINYL ESTRADIOL AND ETHINYL ESTRADIOL 150-30(84)
KIT ORAL
Qty: 91 EACH | Refills: 0 | Status: SHIPPED | OUTPATIENT
Start: 2019-03-12 | End: 2019-03-22 | Stop reason: SDUPTHER

## 2019-03-15 ENCOUNTER — ANTICOAG VISIT (OUTPATIENT)
Dept: CARDIOLOGY CLINIC | Facility: CLINIC | Age: 24
End: 2019-03-15
Payer: COMMERCIAL

## 2019-03-15 DIAGNOSIS — Z95.2 HISTORY OF MECHANICAL AORTIC VALVE REPLACEMENT: ICD-10-CM

## 2019-03-15 LAB — INR PPP: 2.7 (ref 0.86–1.17)

## 2019-03-15 PROCEDURE — 93793 ANTICOAG MGMT PT WARFARIN: CPT

## 2019-03-15 NOTE — PROGRESS NOTES
3/15/19, tc to pt, left message on answering continue current dose, inr due 2 weeks  Pt reminded to call if any changes in health, medication, diet or bleeding   sissy

## 2019-03-22 ENCOUNTER — ANNUAL EXAM (OUTPATIENT)
Dept: OBGYN CLINIC | Facility: CLINIC | Age: 24
End: 2019-03-22
Payer: COMMERCIAL

## 2019-03-22 VITALS
DIASTOLIC BLOOD PRESSURE: 70 MMHG | SYSTOLIC BLOOD PRESSURE: 112 MMHG | BODY MASS INDEX: 30.12 KG/M2 | HEIGHT: 63 IN | WEIGHT: 170 LBS

## 2019-03-22 DIAGNOSIS — N87.0 CIN I (CERVICAL INTRAEPITHELIAL NEOPLASIA I): ICD-10-CM

## 2019-03-22 DIAGNOSIS — Z12.4 SCREENING FOR CERVICAL CANCER: Primary | ICD-10-CM

## 2019-03-22 DIAGNOSIS — Z30.9 ENCOUNTER FOR CONTRACEPTIVE MANAGEMENT, UNSPECIFIED TYPE: ICD-10-CM

## 2019-03-22 DIAGNOSIS — Z30.41 ORAL CONTRACEPTIVE USE: ICD-10-CM

## 2019-03-22 DIAGNOSIS — Z01.419 ENCOUNTER FOR GYNECOLOGICAL EXAMINATION WITHOUT ABNORMAL FINDING: ICD-10-CM

## 2019-03-22 PROBLEM — R87.810 CERVICAL HIGH RISK HPV (HUMAN PAPILLOMAVIRUS) TEST POSITIVE: Status: ACTIVE | Noted: 2017-12-29

## 2019-03-22 PROCEDURE — 99395 PREV VISIT EST AGE 18-39: CPT | Performed by: OBSTETRICS & GYNECOLOGY

## 2019-03-22 PROCEDURE — G0145 SCR C/V CYTO,THINLAYER,RESCR: HCPCS | Performed by: PATHOLOGY

## 2019-03-22 PROCEDURE — G0124 SCREEN C/V THIN LAYER BY MD: HCPCS | Performed by: PATHOLOGY

## 2019-03-22 RX ORDER — LEVONORGESTREL / ETHINYL ESTRADIOL AND ETHINYL ESTRADIOL 150-30(84)
1 KIT ORAL DAILY
Qty: 91 EACH | Refills: 3 | Status: SHIPPED | OUTPATIENT
Start: 2019-03-22 | End: 2020-07-09 | Stop reason: SDUPTHER

## 2019-03-22 NOTE — PROGRESS NOTES
CC:  Annual exam    HPI: Nai Cox presents for routine annual gyn exam   She is doing well with no complaints or concerns  Has been using oral contraception without any adverse effects and wishes to continue  Past Medical History:  Past Medical History:   Diagnosis Date    Bicuspid aortic valve     s/p mechanical AVR    Moderate aortic insufficiency     s/p mechanical AVR    Patent ductus arteriosus     s/p repair    S/P ascending aortic aneurysm repair        Past Surgical History:  Past Surgical History:   Procedure Laterality Date    ASCENDING AORTIC ANEURYSM REPAIR      MECHANICAL AORTIC VALVE REPLACEMENT      PATENT DUCTUS ARTERIOUS LIGATION         Past OB/Gyn History:  Menstrual cycles are regular, short duration, very light bleeding  Denies any history of sexually transmitted infection  No history of abnormal pap smears  Her last pap smear was 2018 with ENDER 1 noted and negative colposcopy      ALLERGIES:   Allergies   Allergen Reactions    Cefprozil Rash    Sulfamethoxazole-Trimethoprim Rash       MEDS:   Current Outpatient Medications:     CAMRESE 0 15-0 03 &0 01 MG TABS    escitalopram (LEXAPRO) 10 mg tablet    LORazepam (ATIVAN) 0 5 mg tablet    warfarin (COUMADIN) 2 5 mg tablet    Family History:  Family History   Problem Relation Age of Onset    Aneurysm Father         Aortic aneurysm    Other Father         Bicuspid AV    Aortic stenosis Maternal Grandmother     Breast cancer Family     Colon cancer Family     Diabetes Family     Sudden death Paternal Grandfather        Social History:  Social History     Socioeconomic History    Marital status: Single     Spouse name: Not on file    Number of children: Not on file    Years of education: Not on file    Highest education level: Not on file   Occupational History    Not on file   Social Needs    Financial resource strain: Not on file    Food insecurity:     Worry: Not on file     Inability: Not on file   Veronica Foley Transportation needs:     Medical: Not on file     Non-medical: Not on file   Tobacco Use    Smoking status: Never Smoker    Smokeless tobacco: Never Used   Substance and Sexual Activity    Alcohol use: No    Drug use: No    Sexual activity: Yes     Partners: Male     Birth control/protection: Pill   Lifestyle    Physical activity:     Days per week: Not on file     Minutes per session: Not on file    Stress: Not on file   Relationships    Social connections:     Talks on phone: Not on file     Gets together: Not on file     Attends Catholic service: Not on file     Active member of club or organization: Not on file     Attends meetings of clubs or organizations: Not on file     Relationship status: Not on file    Intimate partner violence:     Fear of current or ex partner: Not on file     Emotionally abused: Not on file     Physically abused: Not on file     Forced sexual activity: Not on file   Other Topics Concern    Not on file   Social History Narrative    Not on file         Review of Systems:  Gen:   Denies fatigue, chills, nausea, vomiting, fever  Skin: No rashes or discolorations of any concern  RESP: Denies SOB, no cough  CV: Denies chest pain or palpitations  Breasts: Denies masses, pain, skin changes and nipple discharge  GI: Denies abdominal pain, heartburn, nausea, vomiting, changes in bowel habits  : Denies dysuria, frequency, CVA tenderness, incontinence and hematuria  Genitalia: Denies abnormal vaginal discharge, external lesions, rashes, pelvic pain, pressure, abnormal bleeding  Rectal:  Denies pain, bleeding, hemorrhoids,    Physical Exam:  /70 (BP Location: Left arm, Patient Position: Sitting, Cuff Size: Large)   Ht 5' 3" (1 6 m)   Wt 77 1 kg (170 lb)   LMP 03/08/2019 (Within Days)   BMI 30 11 kg/m²    Gen: The patient was alert and oriented x3, pleasant well-appearing female in no acute distress     Neck:  Unremarkable, no lymphadenopathy, no thyromegaly, or tenderness  Breasts: Symmetric  No dominant, discrete, fixed  or suspicious masses are noted  No skin or nipple changes  No palpable axillary nodes  No supraclavicular adenopathy  Abd:  Soft, nontender, nondistended, no masses or organomegaly  Back:  No CVA tenderness, no tenderness to palpation along spine  Pelvic  Normal appearing external female genitalia, no visible lesions, no rashes  Vagina is free of discharge, normal vaginal epithelium, no abnormal  lesions, no evidence of prolapse anteriorly or posteriorly  Normal appearing cervix, mobile and nontender  A thin prep pap smear was obtained today  Uterus is normal size, mobile and, nontender  No palpable adnexal masses or tenderness  No anoperineal lesions  Skin:  No concerning lesions  Extremeties: No edema      Assessment & Plan:   1  Routine annual exam      RTO one year orPRN  2  Contraception, continue same birth control pills for 1 year  3   History of ENDER 1, Pap smear repeated today, await results

## 2019-03-29 LAB
LAB AP GYN PRIMARY INTERPRETATION: ABNORMAL
Lab: ABNORMAL
PATH INTERP SPEC-IMP: ABNORMAL

## 2019-03-31 LAB — INR PPP: 2.1 (ref 0.86–1.17)

## 2019-04-01 ENCOUNTER — TELEPHONE (OUTPATIENT)
Dept: OBGYN CLINIC | Facility: CLINIC | Age: 24
End: 2019-04-01

## 2019-04-01 ENCOUNTER — ANTICOAG VISIT (OUTPATIENT)
Dept: CARDIOLOGY CLINIC | Facility: CLINIC | Age: 24
End: 2019-04-01
Payer: COMMERCIAL

## 2019-04-01 DIAGNOSIS — Z95.2 HISTORY OF MECHANICAL AORTIC VALVE REPLACEMENT: ICD-10-CM

## 2019-04-01 PROCEDURE — 93793 ANTICOAG MGMT PT WARFARIN: CPT

## 2019-04-09 ENCOUNTER — TELEPHONE (OUTPATIENT)
Dept: OBGYN CLINIC | Facility: CLINIC | Age: 24
End: 2019-04-09

## 2019-04-11 ENCOUNTER — TELEPHONE (OUTPATIENT)
Dept: OBGYN CLINIC | Facility: CLINIC | Age: 24
End: 2019-04-11

## 2019-04-16 ENCOUNTER — ANTICOAG VISIT (OUTPATIENT)
Dept: CARDIOLOGY CLINIC | Facility: CLINIC | Age: 24
End: 2019-04-16
Payer: COMMERCIAL

## 2019-04-16 DIAGNOSIS — Z95.2 HISTORY OF MECHANICAL AORTIC VALVE REPLACEMENT: ICD-10-CM

## 2019-04-16 LAB — INR PPP: 2.1 (ref 0.86–1.17)

## 2019-04-16 PROCEDURE — 93793 ANTICOAG MGMT PT WARFARIN: CPT

## 2019-05-01 LAB — INR PPP: 3.3 (ref 0.86–1.17)

## 2019-05-02 ENCOUNTER — ANTICOAG VISIT (OUTPATIENT)
Dept: CARDIOLOGY CLINIC | Facility: CLINIC | Age: 24
End: 2019-05-02
Payer: COMMERCIAL

## 2019-05-02 DIAGNOSIS — Z95.2 HISTORY OF MECHANICAL AORTIC VALVE REPLACEMENT: ICD-10-CM

## 2019-05-02 PROCEDURE — 93793 ANTICOAG MGMT PT WARFARIN: CPT

## 2019-05-16 LAB — INR PPP: 2.5 (ref 0.86–1.17)

## 2019-05-17 ENCOUNTER — ANTICOAG VISIT (OUTPATIENT)
Dept: CARDIOLOGY CLINIC | Facility: CLINIC | Age: 24
End: 2019-05-17
Payer: COMMERCIAL

## 2019-05-17 DIAGNOSIS — Z95.2 HISTORY OF MECHANICAL AORTIC VALVE REPLACEMENT: ICD-10-CM

## 2019-05-17 PROCEDURE — 93793 ANTICOAG MGMT PT WARFARIN: CPT

## 2019-06-04 ENCOUNTER — ANTICOAG VISIT (OUTPATIENT)
Dept: CARDIOLOGY CLINIC | Facility: CLINIC | Age: 24
End: 2019-06-04
Payer: COMMERCIAL

## 2019-06-04 DIAGNOSIS — Z95.2 HISTORY OF MECHANICAL AORTIC VALVE REPLACEMENT: ICD-10-CM

## 2019-06-04 LAB — INR PPP: 2.4 (ref 0.86–1.17)

## 2019-06-04 PROCEDURE — 93793 ANTICOAG MGMT PT WARFARIN: CPT

## 2019-06-13 ENCOUNTER — TELEPHONE (OUTPATIENT)
Dept: OBGYN CLINIC | Facility: CLINIC | Age: 24
End: 2019-06-13

## 2019-06-18 ENCOUNTER — ANTICOAG VISIT (OUTPATIENT)
Dept: CARDIOLOGY CLINIC | Facility: CLINIC | Age: 24
End: 2019-06-18
Payer: COMMERCIAL

## 2019-06-18 DIAGNOSIS — Z95.2 HISTORY OF MECHANICAL AORTIC VALVE REPLACEMENT: ICD-10-CM

## 2019-06-18 LAB — INR PPP: 2.1 (ref 0.86–1.17)

## 2019-06-18 PROCEDURE — 93793 ANTICOAG MGMT PT WARFARIN: CPT

## 2019-06-19 DIAGNOSIS — F41.9 ANXIETY: ICD-10-CM

## 2019-06-19 RX ORDER — METOPROLOL SUCCINATE 25 MG/1
TABLET, EXTENDED RELEASE ORAL
Qty: 180 TABLET | Refills: 0 | Status: SHIPPED | OUTPATIENT
Start: 2019-06-19 | End: 2019-06-24 | Stop reason: SDUPTHER

## 2019-06-19 RX ORDER — ESCITALOPRAM OXALATE 10 MG/1
TABLET ORAL
Qty: 90 TABLET | Refills: 0 | Status: SHIPPED | OUTPATIENT
Start: 2019-06-19 | End: 2019-06-24 | Stop reason: SDUPTHER

## 2019-06-24 ENCOUNTER — OFFICE VISIT (OUTPATIENT)
Dept: FAMILY MEDICINE CLINIC | Facility: CLINIC | Age: 24
End: 2019-06-24
Payer: COMMERCIAL

## 2019-06-24 VITALS
SYSTOLIC BLOOD PRESSURE: 120 MMHG | TEMPERATURE: 99 F | BODY MASS INDEX: 29.48 KG/M2 | HEART RATE: 79 BPM | HEIGHT: 63 IN | WEIGHT: 166.4 LBS | DIASTOLIC BLOOD PRESSURE: 80 MMHG | OXYGEN SATURATION: 97 %

## 2019-06-24 DIAGNOSIS — F41.9 ANXIETY: ICD-10-CM

## 2019-06-24 DIAGNOSIS — Z98.890 HX OF REPAIR OF ASCENDING AORTA: ICD-10-CM

## 2019-06-24 DIAGNOSIS — I35.1 NONRHEUMATIC AORTIC VALVE INSUFFICIENCY: Primary | ICD-10-CM

## 2019-06-24 DIAGNOSIS — Z95.2 MECHANICAL HEART VALVE PRESENT: ICD-10-CM

## 2019-06-24 DIAGNOSIS — Z00.00 WELLNESS EXAMINATION: ICD-10-CM

## 2019-06-24 PROCEDURE — 3008F BODY MASS INDEX DOCD: CPT | Performed by: FAMILY MEDICINE

## 2019-06-24 PROCEDURE — 1036F TOBACCO NON-USER: CPT | Performed by: FAMILY MEDICINE

## 2019-06-24 PROCEDURE — 99214 OFFICE O/P EST MOD 30 MIN: CPT | Performed by: FAMILY MEDICINE

## 2019-06-24 PROCEDURE — 99395 PREV VISIT EST AGE 18-39: CPT | Performed by: FAMILY MEDICINE

## 2019-06-24 RX ORDER — LORAZEPAM 0.5 MG/1
0.5 TABLET ORAL EVERY 8 HOURS PRN
Qty: 90 TABLET | Refills: 5 | Status: SHIPPED | OUTPATIENT
Start: 2019-06-24 | End: 2020-01-07

## 2019-06-24 RX ORDER — METOPROLOL SUCCINATE 25 MG/1
25 TABLET, EXTENDED RELEASE ORAL 2 TIMES DAILY
Qty: 180 TABLET | Refills: 3 | Status: SHIPPED | OUTPATIENT
Start: 2019-06-24 | End: 2020-11-17 | Stop reason: SDUPTHER

## 2019-06-24 RX ORDER — ESCITALOPRAM OXALATE 10 MG/1
10 TABLET ORAL DAILY
Qty: 90 TABLET | Refills: 3 | Status: SHIPPED | OUTPATIENT
Start: 2019-06-24 | End: 2020-07-23

## 2019-07-09 ENCOUNTER — ANTICOAG VISIT (OUTPATIENT)
Dept: CARDIOLOGY CLINIC | Facility: CLINIC | Age: 24
End: 2019-07-09

## 2019-07-09 DIAGNOSIS — Z95.2 HISTORY OF MECHANICAL AORTIC VALVE REPLACEMENT: ICD-10-CM

## 2019-07-09 LAB — INR PPP: 2.3 (ref 0.84–1.19)

## 2019-07-12 ENCOUNTER — PROCEDURE VISIT (OUTPATIENT)
Dept: OBGYN CLINIC | Facility: CLINIC | Age: 24
End: 2019-07-12
Payer: COMMERCIAL

## 2019-07-12 VITALS — SYSTOLIC BLOOD PRESSURE: 110 MMHG | WEIGHT: 168.4 LBS | BODY MASS INDEX: 29.47 KG/M2 | DIASTOLIC BLOOD PRESSURE: 68 MMHG

## 2019-07-12 DIAGNOSIS — Z32.02 ENCOUNTER FOR PREGNANCY TEST, RESULT NEGATIVE: Primary | ICD-10-CM

## 2019-07-12 DIAGNOSIS — N87.0 CIN I (CERVICAL INTRAEPITHELIAL NEOPLASIA I): ICD-10-CM

## 2019-07-12 LAB — SL AMB POCT URINE HCG: NEGATIVE

## 2019-07-12 PROCEDURE — 88305 TISSUE EXAM BY PATHOLOGIST: CPT | Performed by: PATHOLOGY

## 2019-07-12 PROCEDURE — 57456 ENDOCERV CURETTAGE W/SCOPE: CPT | Performed by: OBSTETRICS & GYNECOLOGY

## 2019-07-12 PROCEDURE — 81025 URINE PREGNANCY TEST: CPT | Performed by: OBSTETRICS & GYNECOLOGY

## 2019-07-12 NOTE — PROGRESS NOTES
Colposcopy  Date/Time: 7/12/2019 9:51 AM  Performed by: Alen Tejada MD  Authorized by:  Alen Tejada MD     Consent:     Consent obtained:  Written    Consent given by:  Patient    Procedural risks discussed:  Possible continued pain, bleeding and infection    Patient questions answered: yes      Patient agrees, verbalizes understanding, and wants to proceed: yes      Educational handouts given: no      Instructions and paperwork completed: yes    Pre-procedure:     Pre-procedure timeout performed: yes      Prepped with: acetic acid    Indication:     Indication:  LSIL  Procedure:     Procedure: Colposcopy w/ endocervical curettage      Under satisfactory analgesia the patient was prepped and draped in the dorsal lithotomy position: yes      [place]anga speculum was placed in the vagina: yes      Under colposcopic examination the transition zone was seen in entirety: yes      Intracervical block was performed: no      Endocervix was curetted using a Kevorkian curette: yes      Tampon inserted: no      Monsel's solution was applied: no      Biopsy(s): no      Specimen to pathology: yes    Post-procedure:     Findings: Friable cervix      Impression: Low grade cervical dysplasia

## 2019-07-16 ENCOUNTER — TELEPHONE (OUTPATIENT)
Dept: CARDIOLOGY CLINIC | Facility: CLINIC | Age: 24
End: 2019-07-16

## 2019-07-16 NOTE — TELEPHONE ENCOUNTER
Phone call from patient, reports she will be losing her health insurance coverage under her mother at the end of July  She is going to have insurance from her new employer in 3 months  She will call to [facility] home inr testing regarding charges for inr testing without insurance  She will call [facility] for information on lab inr testing  She will call me back with her decision

## 2019-07-18 ENCOUNTER — TELEPHONE (OUTPATIENT)
Dept: OBGYN CLINIC | Facility: CLINIC | Age: 24
End: 2019-07-18

## 2019-07-18 NOTE — TELEPHONE ENCOUNTER
----- Message from Bri Auguste MD sent at 7/17/2019 11:13 AM EDT -----  Please inform David Ruth her colposcopy findings were negative and that we will simply repeat the Pap smear next year

## 2019-07-19 ENCOUNTER — TELEPHONE (OUTPATIENT)
Dept: OBGYN CLINIC | Facility: CLINIC | Age: 24
End: 2019-07-19

## 2019-07-19 NOTE — TELEPHONE ENCOUNTER
----- Message from Faye Obrien MD sent at 7/17/2019 11:13 AM EDT -----  Please inform Danielito Bright her colposcopy findings were negative and that we will simply repeat the Pap smear next year

## 2019-07-24 DIAGNOSIS — Z95.2 S/P AVR: ICD-10-CM

## 2019-07-25 ENCOUNTER — TELEPHONE (OUTPATIENT)
Dept: FAMILY MEDICINE CLINIC | Facility: CLINIC | Age: 24
End: 2019-07-25

## 2019-07-25 ENCOUNTER — ANTICOAG VISIT (OUTPATIENT)
Dept: CARDIOLOGY CLINIC | Facility: CLINIC | Age: 24
End: 2019-07-25

## 2019-07-25 ENCOUNTER — TELEPHONE (OUTPATIENT)
Dept: CARDIOLOGY CLINIC | Facility: CLINIC | Age: 24
End: 2019-07-25

## 2019-07-25 DIAGNOSIS — Z95.2 HISTORY OF MECHANICAL AORTIC VALVE REPLACEMENT: ICD-10-CM

## 2019-07-25 DIAGNOSIS — Z95.2 HISTORY OF MECHANICAL AORTIC VALVE REPLACEMENT: Primary | ICD-10-CM

## 2019-07-25 LAB — INR PPP: 2 (ref 0.84–1.19)

## 2019-07-25 RX ORDER — WARFARIN SODIUM 2.5 MG/1
TABLET ORAL
Qty: 180 TABLET | Refills: 0 | Status: SHIPPED | OUTPATIENT
Start: 2019-07-25 | End: 2020-03-12 | Stop reason: SDUPTHER

## 2019-07-25 NOTE — TELEPHONE ENCOUNTER
7/25/19, tc to pt, has been using [employer] home inr monitoring  She will be losing insurance , but will obtain insurance in 2 months  [employer] will suspend her account until new insurance obtained  In the interim, she will go to [facility] lab for inr blood work  Will supply pt/inr script for patient

## 2019-07-25 NOTE — PROGRESS NOTES
7/25/19, tc to pt, will continue current dose, inr due 1 month  Pt will be changing insurance, for the next 2 months will go to [facility] lab for inr testing, she will contact [employer] when she has insurance  In the meantime, [employer] will suspend her account   sissy

## 2019-07-25 NOTE — TELEPHONE ENCOUNTER
Please send a letter to Dr Leonard Rayo office  Saying that dr Shoshana Hewitt treats patient for anxiety   Fax to

## 2019-07-25 NOTE — LETTER
July 26, 2019     [address] [place] [address] [address]    Patient: Ken Gipson   YOB: 1995   Date of Visit: 7/25/2019       Dear Dr Vince Dixon: This letter is to provide you with a list of our patient's diagnosis's  If you have questions, please do not hesitate to call me  I look forward to following your patient along with you      ANXIETY ([phone])         Sincerely,        Alison Saucedo MD        CC: No Recipients

## 2019-08-22 ENCOUNTER — APPOINTMENT (OUTPATIENT)
Dept: LAB | Facility: HOSPITAL | Age: 24
End: 2019-08-22
Attending: INTERNAL MEDICINE
Payer: COMMERCIAL

## 2019-08-22 DIAGNOSIS — Z95.2 HISTORY OF MECHANICAL AORTIC VALVE REPLACEMENT: ICD-10-CM

## 2019-08-22 LAB
INR PPP: 5.59 (ref 0.84–1.19)
PROTHROMBIN TIME: 50.6 SECONDS (ref 11.6–14.5)

## 2019-08-22 PROCEDURE — 85610 PROTHROMBIN TIME: CPT

## 2019-08-22 PROCEDURE — 36415 COLL VENOUS BLD VENIPUNCTURE: CPT

## 2019-08-23 ENCOUNTER — ANTICOAG VISIT (OUTPATIENT)
Dept: CARDIOLOGY CLINIC | Facility: CLINIC | Age: 24
End: 2019-08-23

## 2019-08-23 DIAGNOSIS — Z95.2 HISTORY OF MECHANICAL AORTIC VALVE REPLACEMENT: ICD-10-CM

## 2019-08-23 NOTE — PROGRESS NOTES
8/23/19, tc to pt, reports she had received a call from dr Prakash Senior last pm,she held warfarin thurs  reveiwed with patient, reports she had been on vacation,eating differently and had drank alcoholic drinks  deneis any bleeding or bruising  reveiwed with dr Alessandro Acosta, hold total 2 days, take 2 5 mg sat/sun,inr due Monday  Tc to pt,left message on answering machine, reviewed instructions per dr Alessandro Acosta, patient aware to call if any bleeding  Pt to call if any concerns  sissy

## 2019-08-26 ENCOUNTER — APPOINTMENT (OUTPATIENT)
Dept: LAB | Facility: HOSPITAL | Age: 24
End: 2019-08-26
Attending: INTERNAL MEDICINE
Payer: COMMERCIAL

## 2019-08-26 ENCOUNTER — ANTICOAG VISIT (OUTPATIENT)
Dept: CARDIOLOGY CLINIC | Facility: CLINIC | Age: 24
End: 2019-08-26

## 2019-08-26 DIAGNOSIS — Z95.2 HISTORY OF MECHANICAL AORTIC VALVE REPLACEMENT: ICD-10-CM

## 2019-08-26 LAB
INR PPP: 1.64 (ref 0.84–1.19)
PROTHROMBIN TIME: 19.1 SECONDS (ref 11.6–14.5)

## 2019-08-26 PROCEDURE — 85610 PROTHROMBIN TIME: CPT

## 2019-08-26 PROCEDURE — 36415 COLL VENOUS BLD VENIPUNCTURE: CPT

## 2019-08-26 NOTE — PROGRESS NOTES
8/26/19, tc to pt, left message on answering machine, resume previous dose, 3 75 mg mon/sat, 2 5 mg other days of the week, inr due 1 week, pt to call if any questions  sissy

## 2019-09-26 ENCOUNTER — APPOINTMENT (OUTPATIENT)
Dept: LAB | Facility: HOSPITAL | Age: 24
End: 2019-09-26
Attending: INTERNAL MEDICINE
Payer: COMMERCIAL

## 2019-09-26 ENCOUNTER — ANTICOAG VISIT (OUTPATIENT)
Dept: CARDIOLOGY CLINIC | Facility: CLINIC | Age: 24
End: 2019-09-26

## 2019-09-26 DIAGNOSIS — Z95.2 HISTORY OF MECHANICAL AORTIC VALVE REPLACEMENT: ICD-10-CM

## 2019-09-26 LAB
INR PPP: 1.62 (ref 0.84–1.19)
PROTHROMBIN TIME: 18.9 SECONDS (ref 11.6–14.5)

## 2019-09-26 PROCEDURE — 85610 PROTHROMBIN TIME: CPT

## 2019-09-26 PROCEDURE — 36415 COLL VENOUS BLD VENIPUNCTURE: CPT

## 2019-09-26 NOTE — PROGRESS NOTES
9/26/19, tc to pt, left message on answering machine, increase dose, 2 5 mg m/w/f, 3 75 mg other days of the week, inr due 1 week  Pt to call if any concerns  a sissy

## 2019-10-21 ENCOUNTER — APPOINTMENT (OUTPATIENT)
Dept: LAB | Facility: HOSPITAL | Age: 24
End: 2019-10-21
Attending: INTERNAL MEDICINE
Payer: COMMERCIAL

## 2019-10-21 ENCOUNTER — ANTICOAG VISIT (OUTPATIENT)
Dept: CARDIOLOGY CLINIC | Facility: CLINIC | Age: 24
End: 2019-10-21

## 2019-10-21 DIAGNOSIS — Z95.2 HISTORY OF MECHANICAL AORTIC VALVE REPLACEMENT: ICD-10-CM

## 2019-10-21 LAB
INR PPP: 1.81 (ref 0.84–1.19)
PROTHROMBIN TIME: 20.6 SECONDS (ref 11.6–14.5)

## 2019-10-21 PROCEDURE — 36415 COLL VENOUS BLD VENIPUNCTURE: CPT

## 2019-10-21 PROCEDURE — 85610 PROTHROMBIN TIME: CPT

## 2019-10-21 NOTE — PROGRESS NOTES
10/21/19, tc to pt, increase dose, 2 5 mg wed, 3 75 mg other days of the week, inr due 10 days  Pt has new insurance  Will take cards to [facility] office so we can reapply to [employer] home inr testing   sissy

## 2019-10-31 ENCOUNTER — APPOINTMENT (OUTPATIENT)
Dept: LAB | Facility: HOSPITAL | Age: 24
End: 2019-10-31
Attending: INTERNAL MEDICINE
Payer: COMMERCIAL

## 2019-10-31 ENCOUNTER — ANTICOAG VISIT (OUTPATIENT)
Dept: CARDIOLOGY CLINIC | Facility: CLINIC | Age: 24
End: 2019-10-31

## 2019-10-31 DIAGNOSIS — Z95.2 HISTORY OF MECHANICAL AORTIC VALVE REPLACEMENT: ICD-10-CM

## 2019-11-17 LAB — INR PPP: 2.3 (ref 0.84–1.19)

## 2019-11-18 ENCOUNTER — ANTICOAG VISIT (OUTPATIENT)
Dept: CARDIOLOGY CLINIC | Facility: CLINIC | Age: 24
End: 2019-11-18
Payer: COMMERCIAL

## 2019-11-18 DIAGNOSIS — Z95.2 HISTORY OF MECHANICAL AORTIC VALVE REPLACEMENT: ICD-10-CM

## 2019-11-18 PROCEDURE — 93793 ANTICOAG MGMT PT WARFARIN: CPT | Performed by: INTERNAL MEDICINE

## 2019-12-06 ENCOUNTER — ANTICOAG VISIT (OUTPATIENT)
Dept: CARDIOLOGY CLINIC | Facility: CLINIC | Age: 24
End: 2019-12-06

## 2019-12-06 DIAGNOSIS — Z95.2 HISTORY OF MECHANICAL AORTIC VALVE REPLACEMENT: ICD-10-CM

## 2019-12-06 LAB — INR PPP: 2.4 (ref 0.84–1.19)

## 2019-12-22 LAB — INR PPP: 2.1 (ref 0.84–1.19)

## 2019-12-23 ENCOUNTER — ANTICOAG VISIT (OUTPATIENT)
Dept: CARDIOLOGY CLINIC | Facility: CLINIC | Age: 24
End: 2019-12-23
Payer: COMMERCIAL

## 2019-12-23 DIAGNOSIS — Z95.2 HISTORY OF MECHANICAL AORTIC VALVE REPLACEMENT: ICD-10-CM

## 2019-12-23 PROCEDURE — 93793 ANTICOAG MGMT PT WARFARIN: CPT | Performed by: INTERNAL MEDICINE

## 2019-12-23 NOTE — PROGRESS NOTES
12/23/19, tc to pt, left message on answering machine, continue current dose, inr due 2 weeks  Pt to call if any concerns   sissy

## 2020-01-06 DIAGNOSIS — F41.9 ANXIETY: ICD-10-CM

## 2020-01-06 LAB — INR PPP: 2 (ref 0.84–1.19)

## 2020-01-07 ENCOUNTER — ANTICOAG VISIT (OUTPATIENT)
Dept: CARDIOLOGY CLINIC | Facility: CLINIC | Age: 25
End: 2020-01-07
Payer: COMMERCIAL

## 2020-01-07 DIAGNOSIS — Z95.2 HISTORY OF MECHANICAL AORTIC VALVE REPLACEMENT: ICD-10-CM

## 2020-01-07 PROCEDURE — 93793 ANTICOAG MGMT PT WARFARIN: CPT | Performed by: INTERNAL MEDICINE

## 2020-01-07 RX ORDER — LORAZEPAM 0.5 MG/1
TABLET ORAL
Qty: 90 TABLET | Refills: 0 | Status: SHIPPED | OUTPATIENT
Start: 2020-01-07 | End: 2020-11-17 | Stop reason: SDUPTHER

## 2020-01-16 ENCOUNTER — TELEPHONE (OUTPATIENT)
Dept: CARDIOLOGY CLINIC | Facility: CLINIC | Age: 25
End: 2020-01-16

## 2020-01-16 ENCOUNTER — TELEPHONE (OUTPATIENT)
Dept: FAMILY MEDICINE CLINIC | Facility: CLINIC | Age: 25
End: 2020-01-16

## 2020-01-16 NOTE — TELEPHONE ENCOUNTER
Valid   Referral #: [id number]  Effective: 11/18/2019  Expires: 02/15/2020    Referral [id number] has been successfully submitted

## 2020-01-16 NOTE — TELEPHONE ENCOUNTER
Referral needed for cardiology   I- [id number]  ICD 10- Z95 2   APPOINTMENT DATES 11/18/19 AND 12/23/19

## 2020-01-16 NOTE — TELEPHONE ENCOUNTER
Phone call to patient, left message requesting she call our office regarding need for referrals to be obtain with each home inr test by meter  Since her insurance changed, she will need referral for testing  Will wait for return call form patient

## 2020-01-20 NOTE — PROGRESS NOTES
20, pt insurance change in nov  Now requires referrals for inr home testing, current referral will  2/15/20, pt will need referral completed again by pcp for inr home testing after 2/15 per darrius  Per darrius, our office would need to obtain  any future  referrals from pcp

## 2020-01-23 NOTE — TELEPHONE ENCOUNTER
Return call from patient, insurance told patient she must have referral extended by physician , she will call her pcp office to question possibly getting referral to cover for one year for inr testing  She will call our office back

## 2020-01-23 NOTE — TELEPHONE ENCOUNTER
Phone call from patient, explained need for referrals for home inr testing  She will call her insurance to discuss possibly extending referrals for one year rather then 3 months due to frequency of testing with home inr she will call office to report back on her decision

## 2020-01-24 LAB — INR PPP: 2.2 (ref 0.84–1.19)

## 2020-01-27 ENCOUNTER — TELEPHONE (OUTPATIENT)
Dept: FAMILY MEDICINE CLINIC | Facility: CLINIC | Age: 25
End: 2020-01-27

## 2020-01-27 ENCOUNTER — ANTICOAG VISIT (OUTPATIENT)
Dept: CARDIOLOGY CLINIC | Facility: CLINIC | Age: 25
End: 2020-01-27
Payer: COMMERCIAL

## 2020-01-27 DIAGNOSIS — Z95.2 HISTORY OF MECHANICAL AORTIC VALVE REPLACEMENT: ICD-10-CM

## 2020-01-27 PROCEDURE — 93793 ANTICOAG MGMT PT WARFARIN: CPT | Performed by: INTERNAL MEDICINE

## 2020-01-27 NOTE — PROGRESS NOTES
1/27/20, tc to pt, left message on answering machine, continue current dose, inr due 2 weeks  Pt has referral active until 2/15/20   sissy

## 2020-01-27 NOTE — TELEPHONE ENCOUNTER
Radha Patterson from [address] call request a referral for pt  With start date Feb,16 2020     NPI: [id number]  [id number]

## 2020-01-27 NOTE — TELEPHONE ENCOUNTER
Please see insurance referral information bellow  A copy will be scanned into patient chart        Referral #: [id number]  Effective: 01/27/2020  Expires: 04/25/2020

## 2020-01-31 ENCOUNTER — OFFICE VISIT (OUTPATIENT)
Dept: FAMILY MEDICINE CLINIC | Facility: CLINIC | Age: 25
End: 2020-01-31
Payer: COMMERCIAL

## 2020-01-31 VITALS
BODY MASS INDEX: 30.3 KG/M2 | DIASTOLIC BLOOD PRESSURE: 72 MMHG | HEIGHT: 63 IN | WEIGHT: 171 LBS | HEART RATE: 94 BPM | TEMPERATURE: 97.9 F | OXYGEN SATURATION: 98 % | SYSTOLIC BLOOD PRESSURE: 118 MMHG

## 2020-01-31 DIAGNOSIS — Z95.2 HISTORY OF MECHANICAL AORTIC VALVE REPLACEMENT: ICD-10-CM

## 2020-01-31 DIAGNOSIS — J01.00 ACUTE NON-RECURRENT MAXILLARY SINUSITIS: Primary | ICD-10-CM

## 2020-01-31 PROCEDURE — 3008F BODY MASS INDEX DOCD: CPT | Performed by: FAMILY MEDICINE

## 2020-01-31 PROCEDURE — 99213 OFFICE O/P EST LOW 20 MIN: CPT | Performed by: FAMILY MEDICINE

## 2020-01-31 RX ORDER — AZITHROMYCIN 250 MG/1
TABLET, FILM COATED ORAL
Qty: 6 TABLET | Refills: 0 | Status: SHIPPED | OUTPATIENT
Start: 2020-01-31 | End: 2020-02-04

## 2020-01-31 NOTE — PROGRESS NOTES
[facility]        NAME: Paul Sears is a 30 y o  female  : 1995    MRN: [id number]  DATE: 2020  TIME: 9:52 AM    Assessment and Plan   Acute non-recurrent maxillary sinusitis [J01 00]  1  Acute non-recurrent maxillary sinusitis  azithromycin (ZITHROMAX) 250 mg tablet   2  History of mechanical aortic valve replacement  Ambulatory referral to Cardiology         Patient Instructions     Patient Instructions   z pk if worse          Chief Complaint     Chief Complaint   Patient presents with    Sore Throat    Sinus Problem         History of Present Illness       C/o ST/sinus pain for sev days--mod severity--no resp OTC meds      Review of Systems   Review of Systems   Constitutional: Negative for appetite change, chills, diaphoresis and fever  HENT: Positive for postnasal drip, sinus pain and sore throat  Negative for ear pain, rhinorrhea and sinus pressure  Eyes: Negative for discharge, redness and itching  Respiratory: Negative for cough, shortness of breath and wheezing  Cardiovascular: Negative for chest pain and palpitations  Gastrointestinal: Negative for abdominal pain, diarrhea, nausea and vomiting           Current Medications       Current Outpatient Medications:     escitalopram (LEXAPRO) 10 mg tablet, Take 1 tablet (10 mg total) by mouth daily, Disp: 90 tablet, Rfl: 3    Levonorgest-Eth Estrad 91-Day (CAMRESE) 0 15-0 03 &0 01 MG TABS, Take 1 tablet by mouth daily, Disp: 91 each, Rfl: 3    LORazepam (ATIVAN) 0 5 mg tablet, Take 1 tablet by mouth every 8 hours as needed for anxiety, Disp: 90 tablet, Rfl: 0    metoprolol succinate (TOPROL-XL) 25 mg 24 hr tablet, Take 1 tablet (25 mg total) by mouth 2 (two) times a day, Disp: 180 tablet, Rfl: 3    warfarin (COUMADIN) 2 5 mg tablet, Take one to two tablets daily as directed by doctor, Disp: 180 tablet, Rfl: 0    azithromycin (ZITHROMAX) 250 mg tablet, 2 tabs Day 1, then 1 tab daily X 4 days, Disp: 6 tablet, Rfl: 0    Current Allergies     Allergies as of 01/31/2020 - Reviewed 01/31/2020   Allergen Reaction Noted    Cefprozil Rash 01/22/2013    Sulfamethoxazole-trimethoprim Rash 01/22/2013            The following portions of the patient's history were reviewed and updated as appropriate: allergies, current medications, past family history, past medical history, past social history, past surgical history and problem list      Past Medical History:   Diagnosis Date    Bicuspid aortic valve     s/p mechanical AVR    Moderate aortic insufficiency     s/p mechanical AVR    Patent ductus arteriosus     s/p repair    S/P ascending aortic aneurysm repair        Past Surgical History:   Procedure Laterality Date    ASCENDING AORTIC ANEURYSM REPAIR      MECHANICAL AORTIC VALVE REPLACEMENT      PATENT DUCTUS ARTERIOUS LIGATION         Family History   Problem Relation Age of Onset    Aneurysm Father         Aortic aneurysm    Other Father         Bicuspid AV    Aortic stenosis Maternal Grandmother     Breast cancer Family     Colon cancer Family     Diabetes Family     Sudden death Paternal Grandfather          Medications have been verified  Objective   /72   Pulse 94   Temp 97 9 °F (36 6 °C)   Ht 5' 3" (1 6 m)   Wt 77 6 kg (171 lb)   SpO2 98%   BMI 30 29 kg/m²        Physical Exam     Physical Exam   Constitutional: She appears well-developed and well-nourished  No distress  HENT:   Right Ear: Tympanic membrane, external ear and ear canal normal  Tympanic membrane is not injected  Left Ear: Tympanic membrane, external ear and ear canal normal  Tympanic membrane is not injected  Nose: Nose normal    Mouth/Throat: Oropharynx is clear and moist and mucous membranes are normal    Eyes: Pupils are equal, round, and reactive to light  Conjunctivae and EOM are normal  Right eye exhibits no discharge  Left eye exhibits no discharge  Neck: Normal range of motion  Neck supple   No thyromegaly present  Cardiovascular: Normal rate, regular rhythm and normal heart sounds  No murmur heard  Pulmonary/Chest: Effort normal and breath sounds normal  No respiratory distress  She has no wheezes  Lymphadenopathy:     She has no cervical adenopathy  Skin: She is not diaphoretic  Nursing note and vitals reviewed  Nasal turbs red/swollen bilat    BMI Counseling: Body mass index is 30 29 kg/m²  The BMI is above normal  Nutrition recommendations include reducing portion sizes

## 2020-02-10 LAB — INR PPP: 2.2 (ref 0.84–1.19)

## 2020-02-11 ENCOUNTER — ANTICOAG VISIT (OUTPATIENT)
Dept: CARDIOLOGY CLINIC | Facility: CLINIC | Age: 25
End: 2020-02-11

## 2020-02-11 DIAGNOSIS — Z95.2 HISTORY OF MECHANICAL AORTIC VALVE REPLACEMENT: ICD-10-CM

## 2020-03-06 ENCOUNTER — ANTICOAG VISIT (OUTPATIENT)
Dept: CARDIOLOGY CLINIC | Facility: CLINIC | Age: 25
End: 2020-03-06

## 2020-03-06 DIAGNOSIS — Z95.2 HISTORY OF MECHANICAL AORTIC VALVE REPLACEMENT: ICD-10-CM

## 2020-03-06 LAB — INR PPP: 2.3 (ref 0.84–1.19)

## 2020-03-12 DIAGNOSIS — Z95.2 S/P AVR: ICD-10-CM

## 2020-03-12 RX ORDER — WARFARIN SODIUM 2.5 MG/1
TABLET ORAL
Qty: 180 TABLET | Refills: 1 | Status: SHIPPED | OUTPATIENT
Start: 2020-03-12 | End: 2020-10-21 | Stop reason: SDUPTHER

## 2020-03-23 ENCOUNTER — ANTICOAG VISIT (OUTPATIENT)
Dept: CARDIOLOGY CLINIC | Facility: CLINIC | Age: 25
End: 2020-03-23
Payer: COMMERCIAL

## 2020-03-23 DIAGNOSIS — Z95.2 HISTORY OF MECHANICAL AORTIC VALVE REPLACEMENT: ICD-10-CM

## 2020-03-23 LAB — INR PPP: 2 (ref 0.84–1.19)

## 2020-03-23 PROCEDURE — 93793 ANTICOAG MGMT PT WARFARIN: CPT | Performed by: INTERNAL MEDICINE

## 2020-03-23 NOTE — PROGRESS NOTES
3/23/20, tc to pt, left message on answering machine, continue current dose, inr due 2 weeks  Pt to call f any questions or concerns   sissy

## 2020-03-25 ENCOUNTER — TELEPHONE (OUTPATIENT)
Dept: OBGYN CLINIC | Facility: CLINIC | Age: 25
End: 2020-03-25

## 2020-04-08 ENCOUNTER — ANTICOAG VISIT (OUTPATIENT)
Dept: CARDIOLOGY CLINIC | Facility: CLINIC | Age: 25
End: 2020-04-08

## 2020-04-08 DIAGNOSIS — Z95.2 HISTORY OF MECHANICAL AORTIC VALVE REPLACEMENT: ICD-10-CM

## 2020-04-08 LAB — INR PPP: 2.2 (ref 0.84–1.19)

## 2020-04-24 ENCOUNTER — ANTICOAG VISIT (OUTPATIENT)
Dept: CARDIOLOGY CLINIC | Facility: CLINIC | Age: 25
End: 2020-04-24
Payer: COMMERCIAL

## 2020-04-24 DIAGNOSIS — Z95.2 HISTORY OF MECHANICAL AORTIC VALVE REPLACEMENT: ICD-10-CM

## 2020-04-24 LAB — INR PPP: 2.3 (ref 0.84–1.19)

## 2020-04-24 PROCEDURE — 93793 ANTICOAG MGMT PT WARFARIN: CPT | Performed by: INTERNAL MEDICINE

## 2020-04-25 ENCOUNTER — TELEPHONE (OUTPATIENT)
Dept: FAMILY MEDICINE CLINIC | Facility: CLINIC | Age: 25
End: 2020-04-25

## 2020-05-11 ENCOUNTER — ANTICOAG VISIT (OUTPATIENT)
Dept: CARDIOLOGY CLINIC | Facility: CLINIC | Age: 25
End: 2020-05-11

## 2020-05-11 DIAGNOSIS — Z95.2 HISTORY OF MECHANICAL AORTIC VALVE REPLACEMENT: ICD-10-CM

## 2020-05-11 LAB — INR PPP: 2.1 (ref 0.84–1.19)

## 2020-05-28 LAB — INR PPP: 2 (ref 0.84–1.19)

## 2020-05-29 ENCOUNTER — ANTICOAG VISIT (OUTPATIENT)
Dept: CARDIOLOGY CLINIC | Facility: CLINIC | Age: 25
End: 2020-05-29
Payer: COMMERCIAL

## 2020-05-29 DIAGNOSIS — Z95.2 HISTORY OF MECHANICAL AORTIC VALVE REPLACEMENT: ICD-10-CM

## 2020-05-29 PROCEDURE — 93793 ANTICOAG MGMT PT WARFARIN: CPT | Performed by: INTERNAL MEDICINE

## 2020-06-17 ENCOUNTER — ANTICOAG VISIT (OUTPATIENT)
Dept: CARDIOLOGY CLINIC | Facility: CLINIC | Age: 25
End: 2020-06-17
Payer: COMMERCIAL

## 2020-06-17 DIAGNOSIS — Z95.2 HISTORY OF MECHANICAL AORTIC VALVE REPLACEMENT: ICD-10-CM

## 2020-06-17 LAB — INR PPP: 2.2 (ref 0.84–1.19)

## 2020-06-17 PROCEDURE — 93793 ANTICOAG MGMT PT WARFARIN: CPT | Performed by: INTERNAL MEDICINE

## 2020-07-02 DIAGNOSIS — Z30.9 ENCOUNTER FOR CONTRACEPTIVE MANAGEMENT, UNSPECIFIED TYPE: ICD-10-CM

## 2020-07-08 LAB — INR PPP: 1.9 (ref 0.84–1.19)

## 2020-07-09 ENCOUNTER — ANTICOAG VISIT (OUTPATIENT)
Dept: CARDIOLOGY CLINIC | Facility: CLINIC | Age: 25
End: 2020-07-09
Payer: COMMERCIAL

## 2020-07-09 DIAGNOSIS — Z95.2 HISTORY OF MECHANICAL AORTIC VALVE REPLACEMENT: ICD-10-CM

## 2020-07-09 DIAGNOSIS — Z30.9 ENCOUNTER FOR CONTRACEPTIVE MANAGEMENT, UNSPECIFIED TYPE: ICD-10-CM

## 2020-07-09 PROCEDURE — 93793 ANTICOAG MGMT PT WARFARIN: CPT | Performed by: INTERNAL MEDICINE

## 2020-07-09 RX ORDER — LEVONORGESTREL / ETHINYL ESTRADIOL AND ETHINYL ESTRADIOL 150-30(84)
1 KIT ORAL DAILY
Qty: 91 EACH | Refills: 0 | Status: SHIPPED | OUTPATIENT
Start: 2020-07-09 | End: 2020-08-03 | Stop reason: SDUPTHER

## 2020-07-09 NOTE — PROGRESS NOTES
Tc to pt, left message on answering machine, increase dose, 3 75 mg daily, inr due 10 days  Current referral expires on 7/23

## 2020-07-15 RX ORDER — LEVONORGESTREL / ETHINYL ESTRADIOL AND ETHINYL ESTRADIOL 150-30(84)
KIT ORAL
Qty: 91 EACH | Refills: 0 | OUTPATIENT
Start: 2020-07-15

## 2020-07-23 DIAGNOSIS — F41.9 ANXIETY: ICD-10-CM

## 2020-07-23 RX ORDER — ESCITALOPRAM OXALATE 10 MG/1
10 TABLET ORAL DAILY
Qty: 30 TABLET | Refills: 0 | Status: SHIPPED | OUTPATIENT
Start: 2020-07-23 | End: 2020-11-17 | Stop reason: SDUPTHER

## 2020-07-23 RX ORDER — ESCITALOPRAM OXALATE 10 MG/1
10 TABLET ORAL DAILY
Qty: 30 TABLET | Refills: 0 | OUTPATIENT
Start: 2020-07-23

## 2020-07-29 ENCOUNTER — ANTICOAG VISIT (OUTPATIENT)
Dept: CARDIOLOGY CLINIC | Facility: CLINIC | Age: 25
End: 2020-07-29

## 2020-07-29 DIAGNOSIS — Z95.2 HISTORY OF MECHANICAL AORTIC VALVE REPLACEMENT: ICD-10-CM

## 2020-07-29 LAB — INR PPP: 2.1 (ref 0.84–1.19)

## 2020-08-03 ENCOUNTER — ANNUAL EXAM (OUTPATIENT)
Dept: OBGYN CLINIC | Facility: CLINIC | Age: 25
End: 2020-08-03
Payer: COMMERCIAL

## 2020-08-03 VITALS
DIASTOLIC BLOOD PRESSURE: 78 MMHG | HEIGHT: 63 IN | SYSTOLIC BLOOD PRESSURE: 126 MMHG | BODY MASS INDEX: 31.82 KG/M2 | TEMPERATURE: 99 F | WEIGHT: 179.6 LBS

## 2020-08-03 DIAGNOSIS — Z30.41 ORAL CONTRACEPTIVE USE: ICD-10-CM

## 2020-08-03 DIAGNOSIS — N87.0 MILD DYSPLASIA OF CERVIX: Primary | ICD-10-CM

## 2020-08-03 DIAGNOSIS — Z01.419 ENCOUNTER FOR GYNECOLOGICAL EXAMINATION WITHOUT ABNORMAL FINDING: ICD-10-CM

## 2020-08-03 DIAGNOSIS — N87.0 CIN I (CERVICAL INTRAEPITHELIAL NEOPLASIA I): ICD-10-CM

## 2020-08-03 DIAGNOSIS — Z30.9 ENCOUNTER FOR CONTRACEPTIVE MANAGEMENT, UNSPECIFIED TYPE: ICD-10-CM

## 2020-08-03 PROCEDURE — 3008F BODY MASS INDEX DOCD: CPT | Performed by: OBSTETRICS & GYNECOLOGY

## 2020-08-03 PROCEDURE — 1036F TOBACCO NON-USER: CPT | Performed by: OBSTETRICS & GYNECOLOGY

## 2020-08-03 PROCEDURE — 99395 PREV VISIT EST AGE 18-39: CPT | Performed by: OBSTETRICS & GYNECOLOGY

## 2020-08-03 RX ORDER — LEVONORGESTREL / ETHINYL ESTRADIOL AND ETHINYL ESTRADIOL 150-30(84)
1 KIT ORAL DAILY
Qty: 91 EACH | Refills: 3 | Status: SHIPPED | OUTPATIENT
Start: 2020-08-03 | End: 2020-10-20 | Stop reason: SDUPTHER

## 2020-08-03 NOTE — PROGRESS NOTES
CC:  Annual exam    HPI: Maynor Henley presents for routine gyn exam   Rome Vargas is doing well and expresses no complaints or concerns at this time  She has been usingCamrese for contraception without any adverse effects or other problems and wishes to continue  Past Medical History:  Past Medical History:   Diagnosis Date    Bicuspid aortic valve     s/p mechanical AVR    Moderate aortic insufficiency     s/p mechanical AVR    Patent ductus arteriosus     s/p repair    S/P ascending aortic aneurysm repair        Past Surgical History:  Past Surgical History:   Procedure Laterality Date    ASCENDING AORTIC ANEURYSM REPAIR      MECHANICAL AORTIC VALVE REPLACEMENT      PATENT DUCTUS ARTERIOUS LIGATION         Past OB/Gyn History:  Menstrual cycles are regular with light bleeding  Denies any history of sexually transmitted infection  Her last pap smear was last year and did show mild dysplasia  Colposcopy was negative however      ALLERGIES:   Allergies   Allergen Reactions    Cefprozil Rash    Sulfamethoxazole-Trimethoprim Rash       MEDS:   Current Outpatient Medications:     escitalopram (LEXAPRO) 10 mg tablet    Levonorgest-Eth Estrad 91-Day (Camrese) 0 15-0 03 &0 01 MG TABS    LORazepam (ATIVAN) 0 5 mg tablet    metoprolol succinate (TOPROL-XL) 25 mg 24 hr tablet    warfarin (COUMADIN) 2 5 mg tablet    Family History:  Family History   Problem Relation Age of Onset    Aneurysm Father         Aortic aneurysm    Other Father         Bicuspid AV    Aortic stenosis Maternal Grandmother     Breast cancer Family     Colon cancer Family     Diabetes Family     Sudden death Paternal Grandfather        Social History:  Social History     Socioeconomic History    Marital status: Single     Spouse name: Not on file    Number of children: Not on file    Years of education: Not on file    Highest education level: Not on file   Occupational History    Not on file   Social Needs    Financial resource strain: Not on file    Food insecurity     Worry: Not on file     Inability: Not on file    Transportation needs     Medical: Not on file     Non-medical: Not on file   Tobacco Use    Smoking status: Never Smoker    Smokeless tobacco: Never Used   Substance and Sexual Activity    Alcohol use: Yes     Frequency: 2-3 times a week     Drinks per session: 1 or 2     Binge frequency: Never    Drug use: No    Sexual activity: Yes     Partners: Male     Birth control/protection: Pill   Lifestyle    Physical activity     Days per week: Not on file     Minutes per session: Not on file    Stress: Not on file   Relationships    Social connections     Talks on phone: Not on file     Gets together: Not on file     Attends [employer] service: Not on file     Active member of club or organization: Not on file     Attends meetings of clubs or organizations: Not on file     Relationship status: Not on file    Intimate partner violence     Fear of current or ex partner: Not on file     Emotionally abused: Not on file     Physically abused: Not on file     Forced sexual activity: Not on file   Other Topics Concern    Not on file   Social History Narrative    Not on file         Review of Systems:  Gen:   Denies fatigue, chills, nausea, vomiting, fever  Skin: No rashes or discolorations of any concern  RESP: Denies SOB, no cough  CV: Denies chest pain or palpitations  Breasts: Denies masses, pain, skin changes and nipple discharge  GI: Denies abdominal pain, heartburn, nausea, vomiting, changes in bowel habits  : Denies dysuria, frequency, CVA tenderness, incontinence and hematuria  Genitalia: Denies abnormal vaginal discharge, external lesions, rashes, pelvic pain, pressure, abnormal bleeding  Rectal:  Denies pain, bleeding, hemorrhoids,    Physical Exam:  There were no vitals taken for this visit  Gen: The patient was alert and oriented x3, pleasant well-appearing female in no acute distress  Neck:  Unremarkable, no lymphadenopathy, no thyromegaly, or tenderness  CV:  RRR, no murmurs  Resp:  Clear to auscultation bilaterally, no wheezing  Breasts: Symmetric  No dominant, discrete, fixed  or suspicious masses are noted  No skin or nipple changes  No palpable axillary nodes  No supraclavicular adenopathy  Abd:  Soft, nontender, nondistended, no masses or organomegaly  Back:  No CVA tenderness, no tenderness to palpation along spine  Pelvic:  Normal appearing external female genitalia, no visible lesions, no rashes  Vagina is free of discharge, normal vaginal epithelium, no abnormal  lesions, no evidence of prolapse anteriorly or posteriorly  Normal appearing cervix, mobile and nontender  A thin prep pap smear was  Obtained today  Uterus is normal size, mobile and, nontender  No palpable adnexal masses or tenderness  No anoperineal lesions  Skin:  No concerning lesions  Extremeties: No edema      Assessment & Plan:   1  Routine annual exam      RTO one year orPRN  2  Encounter for cervical cancer screening, Pap smear performed  3    Oral contraceptive use, continue current birth control pill for 1 year

## 2020-08-05 LAB
CYTOLOGIST CVX/VAG CYTO: NORMAL
DX ICD CODE: NORMAL
HPV I/H RISK 1 DNA CVX QL PROBE+SIG AMP: NEGATIVE
OTHER STN SPEC: NORMAL
PATH REPORT.FINAL DX SPEC: NORMAL
SL AMB NOTE:: NORMAL
SL AMB SPECIMEN ADEQUACY: NORMAL
SL AMB TEST METHODOLOGY: NORMAL

## 2020-08-17 LAB — INR PPP: 2.3 (ref 0.84–1.19)

## 2020-08-19 ENCOUNTER — TELEPHONE (OUTPATIENT)
Dept: FAMILY MEDICINE CLINIC | Facility: CLINIC | Age: 25
End: 2020-08-19

## 2020-08-19 ENCOUNTER — ANTICOAG VISIT (OUTPATIENT)
Dept: CARDIOLOGY CLINIC | Facility: CLINIC | Age: 25
End: 2020-08-19
Payer: COMMERCIAL

## 2020-08-19 DIAGNOSIS — Z95.2 HISTORY OF MECHANICAL AORTIC VALVE REPLACEMENT: ICD-10-CM

## 2020-08-19 PROCEDURE — 93793 ANTICOAG MGMT PT WARFARIN: CPT | Performed by: INTERNAL MEDICINE

## 2020-08-19 NOTE — TELEPHONE ENCOUNTER
John Tran from [facility] cardio called requesting a referral    [id number]  ICD10 Z95 2  [id number]    Referral tracking number is [id number] and  on 2020 thank you

## 2020-08-19 NOTE — PROGRESS NOTES
Tc to pt, left message on answering machine, continue current dose inr due 2 weeks, referral expires 11/16/20

## 2020-09-08 ENCOUNTER — ANTICOAG VISIT (OUTPATIENT)
Dept: CARDIOLOGY CLINIC | Facility: CLINIC | Age: 25
End: 2020-09-08

## 2020-09-08 DIAGNOSIS — Z95.2 HISTORY OF MECHANICAL AORTIC VALVE REPLACEMENT: ICD-10-CM

## 2020-09-08 LAB — INR PPP: 2.5 (ref 0.84–1.19)

## 2020-09-28 LAB — INR PPP: 1.8 (ref 0.84–1.19)

## 2020-09-29 ENCOUNTER — ANTICOAG VISIT (OUTPATIENT)
Dept: CARDIOLOGY CLINIC | Facility: CLINIC | Age: 25
End: 2020-09-29
Payer: COMMERCIAL

## 2020-09-29 DIAGNOSIS — Z95.2 HISTORY OF MECHANICAL AORTIC VALVE REPLACEMENT: ICD-10-CM

## 2020-09-29 PROCEDURE — 93793 ANTICOAG MGMT PT WARFARIN: CPT | Performed by: INTERNAL MEDICINE

## 2020-09-29 NOTE — PROGRESS NOTES
Tc to pt, left message on answering machine, take 5 mg today in place of 3 75 mg x 1, then resume current dose, inr due 2 weeks  referral expires 11/16/20

## 2020-10-20 ENCOUNTER — ANTICOAG VISIT (OUTPATIENT)
Dept: CARDIOLOGY CLINIC | Facility: CLINIC | Age: 25
End: 2020-10-20
Payer: COMMERCIAL

## 2020-10-20 ENCOUNTER — TELEPHONE (OUTPATIENT)
Dept: CARDIOLOGY CLINIC | Facility: CLINIC | Age: 25
End: 2020-10-20

## 2020-10-20 DIAGNOSIS — Z95.2 S/P AVR: ICD-10-CM

## 2020-10-20 DIAGNOSIS — Z30.9 ENCOUNTER FOR CONTRACEPTIVE MANAGEMENT, UNSPECIFIED TYPE: ICD-10-CM

## 2020-10-20 DIAGNOSIS — Z95.2 HISTORY OF MECHANICAL AORTIC VALVE REPLACEMENT: ICD-10-CM

## 2020-10-20 DIAGNOSIS — F41.9 ANXIETY: ICD-10-CM

## 2020-10-20 LAB — INR PPP: 2.1 (ref 0.84–1.19)

## 2020-10-20 PROCEDURE — 93793 ANTICOAG MGMT PT WARFARIN: CPT | Performed by: INTERNAL MEDICINE

## 2020-10-20 RX ORDER — ESCITALOPRAM OXALATE 10 MG/1
10 TABLET ORAL DAILY
Qty: 30 TABLET | Refills: 0 | Status: CANCELLED | OUTPATIENT
Start: 2020-10-20

## 2020-10-20 RX ORDER — WARFARIN SODIUM 2.5 MG/1
TABLET ORAL
Qty: 180 TABLET | Refills: 0 | Status: CANCELLED | OUTPATIENT
Start: 2020-10-20

## 2020-10-20 RX ORDER — METOPROLOL SUCCINATE 25 MG/1
25 TABLET, EXTENDED RELEASE ORAL 2 TIMES DAILY
Qty: 180 TABLET | Refills: 0 | Status: CANCELLED | OUTPATIENT
Start: 2020-10-20

## 2020-10-20 RX ORDER — LORAZEPAM 0.5 MG/1
0.5 TABLET ORAL EVERY 8 HOURS PRN
Qty: 90 TABLET | Refills: 0 | Status: CANCELLED | OUTPATIENT
Start: 2020-10-20

## 2020-10-21 DIAGNOSIS — Z95.2 S/P AVR: ICD-10-CM

## 2020-10-21 RX ORDER — WARFARIN SODIUM 2.5 MG/1
TABLET ORAL
Qty: 180 TABLET | Refills: 1 | Status: SHIPPED | OUTPATIENT
Start: 2020-10-21 | End: 2020-11-17 | Stop reason: SDUPTHER

## 2020-10-27 RX ORDER — LEVONORGESTREL / ETHINYL ESTRADIOL AND ETHINYL ESTRADIOL 150-30(84)
1 KIT ORAL DAILY
Qty: 91 EACH | Refills: 2 | Status: SHIPPED | OUTPATIENT
Start: 2020-10-27 | End: 2020-11-17 | Stop reason: SDUPTHER

## 2020-11-15 LAB — INR PPP: 2.3 (ref 0.84–1.19)

## 2020-11-16 ENCOUNTER — ANTICOAG VISIT (OUTPATIENT)
Dept: CARDIOLOGY CLINIC | Facility: CLINIC | Age: 25
End: 2020-11-16
Payer: COMMERCIAL

## 2020-11-16 ENCOUNTER — TELEPHONE (OUTPATIENT)
Dept: FAMILY MEDICINE CLINIC | Facility: CLINIC | Age: 25
End: 2020-11-16

## 2020-11-16 DIAGNOSIS — Z95.2 HISTORY OF MECHANICAL AORTIC VALVE REPLACEMENT: ICD-10-CM

## 2020-11-16 PROCEDURE — 93793 ANTICOAG MGMT PT WARFARIN: CPT | Performed by: INTERNAL MEDICINE

## 2020-11-17 ENCOUNTER — TELEMEDICINE (OUTPATIENT)
Dept: FAMILY MEDICINE CLINIC | Facility: CLINIC | Age: 25
End: 2020-11-17
Payer: COMMERCIAL

## 2020-11-17 DIAGNOSIS — Q23.1 BICUSPID AORTIC VALVE: Primary | ICD-10-CM

## 2020-11-17 DIAGNOSIS — Z98.890 HX OF REPAIR OF ASCENDING AORTA: ICD-10-CM

## 2020-11-17 DIAGNOSIS — Z95.2 S/P AVR: ICD-10-CM

## 2020-11-17 DIAGNOSIS — Z30.9 ENCOUNTER FOR CONTRACEPTIVE MANAGEMENT, UNSPECIFIED TYPE: ICD-10-CM

## 2020-11-17 DIAGNOSIS — F41.9 ANXIETY: ICD-10-CM

## 2020-11-17 DIAGNOSIS — Z95.2 HISTORY OF MECHANICAL AORTIC VALVE REPLACEMENT: ICD-10-CM

## 2020-11-17 PROCEDURE — 99213 OFFICE O/P EST LOW 20 MIN: CPT | Performed by: FAMILY MEDICINE

## 2020-11-17 RX ORDER — METOPROLOL SUCCINATE 25 MG/1
25 TABLET, EXTENDED RELEASE ORAL 2 TIMES DAILY
Qty: 180 TABLET | Refills: 3 | Status: SHIPPED | OUTPATIENT
Start: 2020-11-17 | End: 2021-04-21 | Stop reason: SDUPTHER

## 2020-11-17 RX ORDER — LEVONORGESTREL / ETHINYL ESTRADIOL AND ETHINYL ESTRADIOL 150-30(84)
1 KIT ORAL DAILY
Qty: 91 EACH | Refills: 2 | Status: SHIPPED | OUTPATIENT
Start: 2020-11-17 | End: 2021-04-21 | Stop reason: SDUPTHER

## 2020-11-17 RX ORDER — ESCITALOPRAM OXALATE 10 MG/1
10 TABLET ORAL DAILY
Qty: 90 TABLET | Refills: 3 | Status: SHIPPED | OUTPATIENT
Start: 2020-11-17 | End: 2021-04-21 | Stop reason: SDUPTHER

## 2020-11-17 RX ORDER — WARFARIN SODIUM 2.5 MG/1
TABLET ORAL
Qty: 180 TABLET | Refills: 5 | Status: SHIPPED | OUTPATIENT
Start: 2020-11-17 | End: 2021-04-21 | Stop reason: SDUPTHER

## 2020-11-17 RX ORDER — LORAZEPAM 0.5 MG/1
0.5 TABLET ORAL EVERY 8 HOURS PRN
Qty: 90 TABLET | Refills: 3 | Status: SHIPPED | OUTPATIENT
Start: 2020-11-17

## 2020-12-03 ENCOUNTER — TELEPHONE (OUTPATIENT)
Dept: CARDIOLOGY CLINIC | Facility: CLINIC | Age: 25
End: 2020-12-03

## 2020-12-04 ENCOUNTER — ANTICOAG VISIT (OUTPATIENT)
Dept: CARDIOLOGY CLINIC | Facility: CLINIC | Age: 25
End: 2020-12-04
Payer: COMMERCIAL

## 2020-12-04 DIAGNOSIS — Z95.2 HISTORY OF MECHANICAL AORTIC VALVE REPLACEMENT: ICD-10-CM

## 2020-12-04 LAB — INR PPP: 2.1 (ref 0.84–1.19)

## 2020-12-04 PROCEDURE — 93793 ANTICOAG MGMT PT WARFARIN: CPT | Performed by: INTERNAL MEDICINE

## 2020-12-17 LAB — INR PPP: 2 (ref 0.84–1.19)

## 2020-12-18 ENCOUNTER — ANTICOAG VISIT (OUTPATIENT)
Dept: CARDIOLOGY CLINIC | Facility: CLINIC | Age: 25
End: 2020-12-18
Payer: COMMERCIAL

## 2020-12-18 DIAGNOSIS — Z95.2 HISTORY OF MECHANICAL AORTIC VALVE REPLACEMENT: ICD-10-CM

## 2020-12-18 PROCEDURE — 93793 ANTICOAG MGMT PT WARFARIN: CPT | Performed by: INTERNAL MEDICINE

## 2021-01-08 LAB — INR PPP: 2.1 (ref 0.84–1.19)

## 2021-01-11 ENCOUNTER — ANTICOAG VISIT (OUTPATIENT)
Dept: CARDIOLOGY CLINIC | Facility: CLINIC | Age: 26
End: 2021-01-11
Payer: COMMERCIAL

## 2021-01-11 DIAGNOSIS — Z95.2 HISTORY OF MECHANICAL AORTIC VALVE REPLACEMENT: ICD-10-CM

## 2021-01-11 PROCEDURE — 93793 ANTICOAG MGMT PT WARFARIN: CPT | Performed by: INTERNAL MEDICINE

## 2021-01-11 NOTE — PROGRESS NOTES
Tc to pt, left message on answering machine, continue current dose, inr due 2 weeks  Home inr testing referral expires 2/13/21

## 2021-01-25 LAB — INR PPP: 2.3 (ref 0.84–1.19)

## 2021-01-26 ENCOUNTER — ANTICOAG VISIT (OUTPATIENT)
Dept: CARDIOLOGY CLINIC | Facility: CLINIC | Age: 26
End: 2021-01-26
Payer: COMMERCIAL

## 2021-01-26 DIAGNOSIS — Z95.2 HISTORY OF MECHANICAL AORTIC VALVE REPLACEMENT: ICD-10-CM

## 2021-01-26 PROCEDURE — 93793 ANTICOAG MGMT PT WARFARIN: CPT | Performed by: INTERNAL MEDICINE

## 2021-01-26 NOTE — PROGRESS NOTES
Tc to pt, left message on answering machine, continue current dose, inr due 2 weeks  Current referral will  on 21

## 2021-02-04 ENCOUNTER — IMMUNIZATIONS (OUTPATIENT)
Dept: FAMILY MEDICINE CLINIC | Facility: HOSPITAL | Age: 26
End: 2021-02-04

## 2021-02-04 DIAGNOSIS — Z23 ENCOUNTER FOR IMMUNIZATION: Primary | ICD-10-CM

## 2021-02-04 PROCEDURE — 0011A SARS-COV-2 / COVID-19 MRNA VACCINE (MODERNA) 100 MCG: CPT

## 2021-02-04 PROCEDURE — 91301 SARS-COV-2 / COVID-19 MRNA VACCINE (MODERNA) 100 MCG: CPT

## 2021-02-16 ENCOUNTER — TELEPHONE (OUTPATIENT)
Dept: FAMILY MEDICINE CLINIC | Facility: CLINIC | Age: 26
End: 2021-02-16

## 2021-02-16 ENCOUNTER — ANTICOAG VISIT (OUTPATIENT)
Dept: CARDIOLOGY CLINIC | Facility: CLINIC | Age: 26
End: 2021-02-16
Payer: COMMERCIAL

## 2021-02-16 DIAGNOSIS — Z95.2 HISTORY OF MECHANICAL AORTIC VALVE REPLACEMENT: ICD-10-CM

## 2021-02-16 LAB — INR PPP: 2.3 (ref 0.84–1.19)

## 2021-02-16 PROCEDURE — 93793 ANTICOAG MGMT PT WARFARIN: CPT | Performed by: INTERNAL MEDICINE

## 2021-02-16 NOTE — TELEPHONE ENCOUNTER
Valid Referral #: [id number] Effective: 02/14/2021 Expires: 05/14/2021   Referral [id number] has been successfully submitted  [place] [address]  [address] [place] [address],[address] [id number]    PATIENT'S INSURANCE  Member ID: [id number]    PRIMARY CARE PHYSICIAN  LYLA  Dee Mitchell [facility]  NPI: [id number]  From  [address] [address] [facility]  NPI: [id number]  [address]  [address]  [facility], [address]  To  [facility]  NPI: [id number]  [address], [place] [address], [facility]  ,    Service Type: Medical Care (Consult and Treat)  Place of Service: Office    Note to Patient  This referral is valid at any location for the above group    Clinical Information     Diagnoses (1)    Diagnosis  1   Z95 2 - Presence of prosthetic heart valve     Procedures (1)    Procedure  1   [id number]

## 2021-03-04 ENCOUNTER — IMMUNIZATIONS (OUTPATIENT)
Dept: FAMILY MEDICINE CLINIC | Facility: HOSPITAL | Age: 26
End: 2021-03-04

## 2021-03-04 ENCOUNTER — ANTICOAG VISIT (OUTPATIENT)
Dept: CARDIOLOGY CLINIC | Facility: CLINIC | Age: 26
End: 2021-03-04
Payer: COMMERCIAL

## 2021-03-04 DIAGNOSIS — Z23 ENCOUNTER FOR IMMUNIZATION: Primary | ICD-10-CM

## 2021-03-04 DIAGNOSIS — Z95.2 HISTORY OF MECHANICAL AORTIC VALVE REPLACEMENT: ICD-10-CM

## 2021-03-04 LAB — INR PPP: 2 (ref 0.84–1.19)

## 2021-03-04 PROCEDURE — 93793 ANTICOAG MGMT PT WARFARIN: CPT | Performed by: INTERNAL MEDICINE

## 2021-03-04 PROCEDURE — 0012A SARS-COV-2 / COVID-19 MRNA VACCINE (MODERNA) 100 MCG: CPT

## 2021-03-04 PROCEDURE — 91301 SARS-COV-2 / COVID-19 MRNA VACCINE (MODERNA) 100 MCG: CPT

## 2021-03-25 ENCOUNTER — TELEPHONE (OUTPATIENT)
Dept: CARDIOLOGY CLINIC | Facility: CLINIC | Age: 26
End: 2021-03-25

## 2021-03-25 NOTE — TELEPHONE ENCOUNTER
Patient was to have INR drawn back on March 2nd and it is 3/25/21  Called with friendly reminder to go for INR ASAP

## 2021-03-26 ENCOUNTER — ANTICOAG VISIT (OUTPATIENT)
Dept: CARDIOLOGY CLINIC | Facility: CLINIC | Age: 26
End: 2021-03-26
Payer: COMMERCIAL

## 2021-03-26 DIAGNOSIS — Z95.2 HISTORY OF MECHANICAL AORTIC VALVE REPLACEMENT: ICD-10-CM

## 2021-03-26 LAB — INR PPP: 2.3 (ref 0.84–1.19)

## 2021-03-26 PROCEDURE — 93793 ANTICOAG MGMT PT WARFARIN: CPT | Performed by: INTERNAL MEDICINE

## 2021-03-26 NOTE — PROGRESS NOTES
[facility]      Date of Service: 11/10/2020.    CHIEF COMPLAINT:  Progressively worsening shortness of breath and both LE swelling  for 1 week.      HPI  Aicha Kerr is a 74 year old female with PMH of HTN, CHF, DVT ACID and pacemaker placement  admitted through the ER with the main complaints of worsening shortness of breath and swelling of BLE.  Patient also c/o chest pain localized to mid chest area on & off for last 1 week. Patient had similar symptoms   3 months ago and was diagnosed with CHF. Per patient's daughter patient also has productive cough for  1 week and was treated with course of Zpak but did not help her. Patient denies any nausea or vomiting.  Patient was seen and evaluated by the ER Physician and diagnosed with Exacerbation of CHF and then   admitted to the hospital for further management.      Past Medical History:   Diagnosis Date   • Atrial fibrillation, permanent (CMS/[facility])    • Biventricular ICD (implantable cardioverter-defibrillator) in place    • Cataract    • Chronic systolic heart failure (CMS/[facility]) 05/06/2016    EF=46%   • CVA (cerebral vascular accident) (CMS/[facility])    • DCM (dilated cardiomyopathy) (CMS/[facility])    • Diverticulosis    • Dyslipidemia    • GERD (gastroesophageal reflux disease)    • Hypertensive heart disease      Past Surgical History:   Procedure Laterality Date   • Icd implant  05/06/2017    Upgrade to CRT   • Icd implant       ALLERGIES:  No Known Allergies  Social History     Tobacco Use   • Smoking status: Never Smoker   • Smokeless tobacco: Never Used   Substance Use Topics   • Alcohol use: No     Frequency: Never     Drug Use:    Not Asked        Family History   Problem Relation Age of Onset   • Myocardial Infarction Father           HOME MEDICATIONS:  Losartan 25 mg daily, Carvedilol 25 mg bid, Furosemide 20 mg,  Eliquis 5 mg daily and Spironolactone 25 mg daily. Patient takes all these medicines by mouth.      INPATIENT MEDICATIONS:  No  Tc to pt, left message on answering machine, continue current dose, inr due 2 weeks  Referral expires 5/14/21  current facility-administered medications for this encounter.      Current Outpatient Medications   Medication Sig Dispense Refill   • losartan (COZAAR) 25 MG tablet Take 25 mg by mouth daily.     • Cholecalciferol (VITAMIN D3) 1000 units capsule      • carvedilol (COREG) 25 MG tablet Take 1 tablet by mouth 2 times daily (with meals). 180 tablet 3   • apixaBAN (ELIQUIS) 5 MG Tab Take 1 tablet by mouth 2 times daily. 60 tablet 3   • furosemide (LASIX) 20 MG tablet Take 2 tablets by mouth daily. (Patient taking differently: Take 20 mg by mouth 2 times daily. ) 60 tablet 3   • spironolactone (ALDACTONE) 25 MG tablet Take 1 tablet by mouth 2 times daily. 30 tablet 1       REVIEW OF SYSTEMS:    Review of Systems   Constitutional: Negative.  Negative for chills and fever.   HENT: Negative.    Eyes: Negative.    Respiratory: Positive for cough, sputum production and shortness of breath.    Cardiovascular: Positive for chest pain and leg swelling.   Gastrointestinal: Negative for nausea and vomiting.   Genitourinary: Negative.    Musculoskeletal: Negative for back pain and neck pain.   Neurological: Negative for dizziness and headaches.        PHYSICAL EXAM:  Patient is conscious, alert and responsive with mild tachypnea  VS.: P-70, BP-119/89, R-24 ,O2sat.-100% on 4 L. Of O2.  Physical Exam     LABORATORY DATA and Imaging:   Recent Results (from the past 24 hour(s))   CBC with Automated Differential    Collection Time: 11/10/20  2:30 PM   Result Value Ref Range    WBC 5.0 4.2 - 11.0 K/mcL    RBC 4.52 4.00 - 5.20 mil/mcL    HGB 12.7 12.0 - 15.5 g/dL    HCT 41.4 36.0 - 46.5 %    MCV 91.6 78.0 - 100.0 fl    MCH 28.1 26.0 - 34.0 pg    MCHC 30.7 (L) 32.0 - 36.5 g/dL    RDW-CV 16.3 (H) 11.0 - 15.0 %     140 - 450 K/mcL    NRBC 0 0 /100 WBC    DIFF TYPE AUTOMATED DIFFERENTIAL     Neutrophil 73 %    LYMPH 13 %    MONO 12 %    EOSIN 2 %    BASO 0 %    Percent Immature Granuloctyes 0 %    Absolute Neutrophil 3.6 1.8 - 7.7  K/mcL    Absolute Lymph 0.6 (L) 1.0 - 4.0 K/mcL    Absolute Mono 0.6 0.3 - 0.9 K/mcL    Absolute Eos 0.1 0.1 - 0.5 K/mcL    Absolute Baso 0.0 0.0 - 0.3 K/mcL    Absolute Immature Granulocytes 0.0 0 - 0.2 K/mcl   Prothrombin Time    Collection Time: 11/10/20  2:30 PM   Result Value Ref Range    PROTIME 12.3 (H) 9.7 - 11.8 sec    INR 1.2    Partial Thromboplastin Time    Collection Time: 11/10/20  2:30 PM   Result Value Ref Range    PTT 28 22 - 32 sec   Electrocardiogram 12-Lead    Collection Time: 11/10/20  3:23 PM   Result Value Ref Range    Ventricular Rate EKG/Min (BPM) 79     Atrial Rate (BPM) 166     QRS-Interval (MSEC) 172     QT-Interval (MSEC) 444     QTc 509     R Axis (Degrees) 124     T Axis (Degrees) 20     REPORT TEXT       Ventricular-paced rhythm  Abnormal ECG  No previous ECGs available  Confirmed by JOHNATHAN SOSA MD ([id number]) on 11/10/2020 5:43:22 PM     Comprehensive Metabolic Panel    Collection Time: 11/10/20  3:58 PM   Result Value Ref Range    Sodium 144 135 - 145 mmol/L    Potassium 4.1 3.4 - 5.1 mmol/L    Chloride 107 98 - 107 mmol/L    Carbon Dioxide 29 21 - 32 mmol/L    Anion Gap 12 10 - 20 mmol/L    Glucose 114 (H) 65 - 99 mg/dL    BUN 30 (H) 6 - 20 mg/dL    Creatinine 1.26 (H) 0.51 - 0.95 mg/dL    GFR Estimate,  48     GFR Estimate, Non African American 41     BUN/Creatinine Ratio 24 7 - 25    CALCIUM 8.9 8.4 - 10.2 mg/dL    TOTAL BILIRUBIN 0.7 0.2 - 1.0 mg/dL    AST/SGOT 52 (H) <38 Units/L    ALT/SGPT 93 (H) <64 Units/L    ALK PHOSPHATASE 58 45 - 117 Units/L    TOTAL PROTEIN 7.0 6.4 - 8.2 g/dL    Albumin 3.7 3.6 - 5.1 g/dL    GLOBULIN 3.3 2.0 - 4.0 g/dL    A/G Ratio, Serum 1.1 1.0 - 2.4   Magnesium Level    Collection Time: 11/10/20  3:58 PM   Result Value Ref Range    MAGNESIUM 2.1 1.7 - 2.4 mg/dL   Procalcitonin    Collection Time: 11/10/20  3:58 PM   Result Value Ref Range    PROCALCITONIN <0.05 <0.10 ng/mL   Troponin I Ultra Sensitive    Collection Time: 11/10/20  3:58 PM    Result Value Ref Range    TROPONIN I 0.04 <0.05 ng/mL         XR CHEST PA OR AP 1 VIEW   Final Result      Cardiomegaly with prominence of the central pulmonary vasculature, suggestive of cardiac decompensation and fluid overload.      Electronically Signed by: REJI RIOS MD    Signed on: 11/10/2020 3:59 PM          XR CHEST PA AND LATERAL 2 VIEWS    (Results Pending)           ASSESSMENT    Patient Active Problem List   Diagnosis   • Benign essential hypertension   • Biventricular ICD (implantable cardioverter-defibrillator) in place   • Chronic obstructive bronchitis without acute exacerbation (CMS/HCC)   • Diabetes mellitus type 2, diet-controlled (CMS/HCC)   • Pure hypercholesterolemia   • Nonischemic dilated cardiomyopathy (CMS/HCC)   • Moderate tricuspid valve regurgitation   • Mild mitral regurgitation   • Chronic systolic heart failure (CMS/HCC)   • Long term (current) use of anticoagulants   • Mild aortic regurgitation   • Hospital discharge follow-up   • Permanent atrial fibrillation (CMS/HCC)   • Syncope and collapse         Plan  Please make this H &P as an Error since I have already did another H & P which has been signed.          Yohan Haas MD  11/10/2020

## 2021-04-15 ENCOUNTER — ANTICOAG VISIT (OUTPATIENT)
Dept: CARDIOLOGY CLINIC | Facility: CLINIC | Age: 26
End: 2021-04-15
Payer: COMMERCIAL

## 2021-04-15 DIAGNOSIS — Z95.2 HISTORY OF MECHANICAL AORTIC VALVE REPLACEMENT: ICD-10-CM

## 2021-04-15 LAB — INR PPP: 2 (ref 0.84–1.19)

## 2021-04-15 PROCEDURE — 93793 ANTICOAG MGMT PT WARFARIN: CPT | Performed by: INTERNAL MEDICINE

## 2021-04-21 DIAGNOSIS — F41.9 ANXIETY: ICD-10-CM

## 2021-04-21 DIAGNOSIS — Z95.2 S/P AVR: ICD-10-CM

## 2021-04-21 DIAGNOSIS — Z30.9 ENCOUNTER FOR CONTRACEPTIVE MANAGEMENT, UNSPECIFIED TYPE: ICD-10-CM

## 2021-04-22 RX ORDER — LEVONORGESTREL / ETHINYL ESTRADIOL AND ETHINYL ESTRADIOL 150-30(84)
1 KIT ORAL DAILY
Qty: 91 EACH | Refills: 0 | Status: SHIPPED | OUTPATIENT
Start: 2021-04-22 | End: 2021-12-01

## 2021-04-22 RX ORDER — ESCITALOPRAM OXALATE 10 MG/1
10 TABLET ORAL DAILY
Qty: 90 TABLET | Refills: 0 | Status: SHIPPED | OUTPATIENT
Start: 2021-04-22 | End: 2021-07-19 | Stop reason: SDUPTHER

## 2021-04-22 RX ORDER — WARFARIN SODIUM 2.5 MG/1
TABLET ORAL
Qty: 180 TABLET | Refills: 0 | Status: SHIPPED | OUTPATIENT
Start: 2021-04-22 | End: 2021-07-19 | Stop reason: SDUPTHER

## 2021-04-22 RX ORDER — METOPROLOL SUCCINATE 25 MG/1
25 TABLET, EXTENDED RELEASE ORAL 2 TIMES DAILY
Qty: 180 TABLET | Refills: 0 | Status: SHIPPED | OUTPATIENT
Start: 2021-04-22 | End: 2021-07-19 | Stop reason: SDUPTHER

## 2021-05-01 LAB — INR PPP: 2 (ref 0.84–1.19)

## 2021-05-03 ENCOUNTER — ANTICOAG VISIT (OUTPATIENT)
Dept: CARDIOLOGY CLINIC | Facility: CLINIC | Age: 26
End: 2021-05-03
Payer: COMMERCIAL

## 2021-05-03 DIAGNOSIS — Z95.2 HISTORY OF MECHANICAL AORTIC VALVE REPLACEMENT: ICD-10-CM

## 2021-05-03 PROCEDURE — 93793 ANTICOAG MGMT PT WARFARIN: CPT | Performed by: INTERNAL MEDICINE

## 2021-05-03 NOTE — PROGRESS NOTES
Tc to pt, left message on answering machine, continue current dose, inr due 2 weeks  Noted current referral will  on    Will have staff call to pcp for renewal of referral

## 2021-05-20 ENCOUNTER — ANTICOAG VISIT (OUTPATIENT)
Dept: CARDIOLOGY CLINIC | Facility: CLINIC | Age: 26
End: 2021-05-20
Payer: COMMERCIAL

## 2021-05-20 DIAGNOSIS — Z95.2 HISTORY OF MECHANICAL AORTIC VALVE REPLACEMENT: ICD-10-CM

## 2021-05-20 LAB — INR PPP: 2.1 (ref 0.84–1.19)

## 2021-05-20 PROCEDURE — 93793 ANTICOAG MGMT PT WARFARIN: CPT | Performed by: INTERNAL MEDICINE

## 2021-05-20 NOTE — PROGRESS NOTES
Tc to pt, left message on answering machine, continue current dose, inr due 2 week   Current referral expires 8/16/21

## 2021-06-10 LAB — INR PPP: 2.1 (ref 0.84–1.19)

## 2021-06-11 ENCOUNTER — ANTICOAG VISIT (OUTPATIENT)
Dept: CARDIOLOGY CLINIC | Facility: CLINIC | Age: 26
End: 2021-06-11
Payer: COMMERCIAL

## 2021-06-11 DIAGNOSIS — Z95.2 HISTORY OF MECHANICAL AORTIC VALVE REPLACEMENT: ICD-10-CM

## 2021-06-11 PROCEDURE — 93793 ANTICOAG MGMT PT WARFARIN: CPT | Performed by: INTERNAL MEDICINE

## 2021-06-26 LAB — INR PPP: 2 (ref 0.84–1.19)

## 2021-06-28 ENCOUNTER — ANTICOAG VISIT (OUTPATIENT)
Dept: CARDIOLOGY CLINIC | Facility: CLINIC | Age: 26
End: 2021-06-28
Payer: COMMERCIAL

## 2021-06-28 DIAGNOSIS — Z95.2 HISTORY OF MECHANICAL AORTIC VALVE REPLACEMENT: Primary | ICD-10-CM

## 2021-06-28 PROCEDURE — 93793 ANTICOAG MGMT PT WARFARIN: CPT | Performed by: INTERNAL MEDICINE

## 2021-06-28 NOTE — PROGRESS NOTES
Tc to pt, left message on answering machine, continue current dose, inr due 2 weeks  Home meter report

## 2021-07-14 LAB — INR PPP: 1.8 (ref 0.84–1.19)

## 2021-07-15 ENCOUNTER — ANTICOAG VISIT (OUTPATIENT)
Dept: CARDIOLOGY CLINIC | Facility: CLINIC | Age: 26
End: 2021-07-15
Payer: COMMERCIAL

## 2021-07-15 DIAGNOSIS — Z95.2 HISTORY OF MECHANICAL AORTIC VALVE REPLACEMENT: Primary | ICD-10-CM

## 2021-07-15 PROCEDURE — 93793 ANTICOAG MGMT PT WARFARIN: CPT | Performed by: INTERNAL MEDICINE

## 2021-07-15 NOTE — PROGRESS NOTES
Tc to pt, left message on answering machine, increase dose, 5 mg thurs, 3 75 mg other days of the week, inr due 1 week  Current referral expires on 8/16/21

## 2021-07-19 DIAGNOSIS — F41.9 ANXIETY: ICD-10-CM

## 2021-07-19 DIAGNOSIS — Z95.2 S/P AVR: ICD-10-CM

## 2021-07-20 RX ORDER — METOPROLOL SUCCINATE 25 MG/1
25 TABLET, EXTENDED RELEASE ORAL 2 TIMES DAILY
Qty: 180 TABLET | Refills: 0 | Status: SHIPPED | OUTPATIENT
Start: 2021-07-20 | End: 2021-11-10 | Stop reason: SDUPTHER

## 2021-07-20 RX ORDER — ESCITALOPRAM OXALATE 10 MG/1
10 TABLET ORAL DAILY
Qty: 90 TABLET | Refills: 0 | Status: SHIPPED | OUTPATIENT
Start: 2021-07-20 | End: 2021-11-10 | Stop reason: SDUPTHER

## 2021-07-20 RX ORDER — WARFARIN SODIUM 2.5 MG/1
TABLET ORAL
Qty: 180 TABLET | Refills: 0 | Status: SHIPPED | OUTPATIENT
Start: 2021-07-20 | End: 2021-11-10 | Stop reason: SDUPTHER

## 2021-08-02 ENCOUNTER — ANTICOAG VISIT (OUTPATIENT)
Dept: CARDIOLOGY CLINIC | Facility: CLINIC | Age: 26
End: 2021-08-02

## 2021-08-02 DIAGNOSIS — Z95.2 HISTORY OF MECHANICAL AORTIC VALVE REPLACEMENT: Primary | ICD-10-CM

## 2021-08-02 LAB — INR PPP: 2.1 (ref 0.84–1.19)

## 2021-08-23 ENCOUNTER — ANTICOAG VISIT (OUTPATIENT)
Dept: CARDIOLOGY CLINIC | Facility: CLINIC | Age: 26
End: 2021-08-23

## 2021-08-23 DIAGNOSIS — Z95.2 HISTORY OF MECHANICAL AORTIC VALVE REPLACEMENT: Primary | ICD-10-CM

## 2021-08-23 LAB — INR PPP: 2.5 (ref 0.84–1.19)

## 2021-08-23 NOTE — PROGRESS NOTES
Tc to pt, left message on answering machine, continue current dose, inr due 2 weeks  Referral expires 11/11/21     This is a home meter reuslt

## 2021-09-13 ENCOUNTER — ANTICOAG VISIT (OUTPATIENT)
Dept: CARDIOLOGY CLINIC | Facility: CLINIC | Age: 26
End: 2021-09-13

## 2021-09-13 DIAGNOSIS — Z95.2 HISTORY OF MECHANICAL AORTIC VALVE REPLACEMENT: Primary | ICD-10-CM

## 2021-09-13 LAB — INR PPP: 2 (ref 0.84–1.19)

## 2021-09-22 ENCOUNTER — OFFICE VISIT (OUTPATIENT)
Dept: URGENT CARE | Facility: CLINIC | Age: 26
End: 2021-09-22
Payer: COMMERCIAL

## 2021-09-22 VITALS
WEIGHT: 182 LBS | TEMPERATURE: 98.7 F | RESPIRATION RATE: 16 BRPM | OXYGEN SATURATION: 98 % | BODY MASS INDEX: 32.24 KG/M2 | SYSTOLIC BLOOD PRESSURE: 148 MMHG | DIASTOLIC BLOOD PRESSURE: 85 MMHG | HEART RATE: 62 BPM

## 2021-09-22 DIAGNOSIS — L03.312 CELLULITIS OF BACK EXCEPT BUTTOCK: Primary | ICD-10-CM

## 2021-09-22 PROCEDURE — 99213 OFFICE O/P EST LOW 20 MIN: CPT | Performed by: FAMILY MEDICINE

## 2021-09-22 RX ORDER — SULFAMETHOXAZOLE AND TRIMETHOPRIM 800; 160 MG/1; MG/1
1 TABLET ORAL EVERY 12 HOURS SCHEDULED
Qty: 10 TABLET | Refills: 0 | Status: SHIPPED | OUTPATIENT
Start: 2021-09-22 | End: 2021-09-27

## 2021-09-22 NOTE — PROGRESS NOTES
[facility]        NAME: Shaheed Todd is a 27 y o  female  : 1995    MRN: [id number]  DATE: 2021  TIME: 3:12 PM    Assessment and Plan   Cellulitis of back except buttock [L03 [id number]]  1  Cellulitis of back except buttock  sulfamethoxazole-trimethoprim (BACTRIM DS) 800-160 mg per tablet    Ambulatory referral to General Surgery         Patient Instructions       Follow up with PCP in 3-5 days  Proceed to  ER if symptoms worsen  Chief Complaint     Chief Complaint   Patient presents with    Mass     R mid back cyst for several years  Now larger, reddened, inflammed and painful  History of Present Illness       24-year-old female with 1 week history of redness and swelling in the right back area  Denies any fevers or chills  Review of Systems   Review of Systems   Constitutional: Negative  HENT: Negative  Eyes: Negative  Respiratory: Negative  Cardiovascular: Negative  Gastrointestinal: Negative  Endocrine: Negative  Genitourinary: Negative  Musculoskeletal: Negative  Skin: Positive for rash  Allergic/Immunologic: Negative  Neurological: Negative  Hematological: Negative  Psychiatric/Behavioral: Negative            Current Medications       Current Outpatient Medications:     escitalopram (LEXAPRO) 10 mg tablet, Take 1 tablet (10 mg total) by mouth daily, Disp: 90 tablet, Rfl: 0    Levonorgest-Eth Estrad -Day (Camrese) 0 15-0 03 &0 01 MG TABS, Take 1 tablet by mouth daily, Disp: 91 each, Rfl: 0    LORazepam (ATIVAN) 0 5 mg tablet, Take 1 tablet (0 5 mg total) by mouth every 8 (eight) hours as needed for anxiety, Disp: 90 tablet, Rfl: 3    metoprolol succinate (TOPROL-XL) 25 mg 24 hr tablet, Take 1 tablet (25 mg total) by mouth 2 (two) times a day, Disp: 180 tablet, Rfl: 0    sulfamethoxazole-trimethoprim (BACTRIM DS) 800-160 mg per tablet, Take 1 tablet by mouth every 12 (twelve) hours for 5 days, Disp: 10 tablet, Rfl: 0    warfarin (COUMADIN) 2 5 mg tablet, Take one to two tablets daily as directed by doctor, Disp: 180 tablet, Rfl: 0    Current Allergies     Allergies as of 09/22/2021 - Reviewed 09/22/2021   Allergen Reaction Noted    Cefprozil Rash 01/22/2013    Sulfamethoxazole-trimethoprim Rash 01/22/2013            The following portions of the patient's history were reviewed and updated as appropriate: allergies, current medications, past family history, past medical history, past social history, past surgical history and problem list      Past Medical History:   Diagnosis Date    Bicuspid aortic valve     s/p mechanical AVR    Moderate aortic insufficiency     s/p mechanical AVR    Patent ductus arteriosus     s/p repair    S/P ascending aortic aneurysm repair        Past Surgical History:   Procedure Laterality Date    ASCENDING AORTIC ANEURYSM REPAIR      MECHANICAL AORTIC VALVE REPLACEMENT      PATENT DUCTUS ARTERIOUS LIGATION         Family History   Problem Relation Age of Onset    Aneurysm Father         Aortic aneurysm    Other Father         Bicuspid AV    Aortic stenosis Maternal Grandmother     Breast cancer Family     Colon cancer Family     Diabetes Family     Sudden death Paternal Grandfather          Medications have been verified  Objective   /85   Pulse 62   Temp 98 7 °F (37 1 °C)   Resp 16   Wt 82 6 kg (182 lb)   SpO2 98%   BMI 32 24 kg/m²   No LMP recorded  Physical Exam     Physical Exam  Vitals and nursing note reviewed  Constitutional:       Appearance: She is well-developed  HENT:      Head: Normocephalic  Eyes:      Pupils: Pupils are equal, round, and reactive to light  Pulmonary:      Effort: Pulmonary effort is normal    Musculoskeletal:         General: Normal range of motion  Skin:     General: Skin is warm and dry  Findings: Erythema, lesion and rash present  Rash is nodular            Neurological:      Mental Status: She is alert and oriented to person, place, and time

## 2021-09-27 ENCOUNTER — CONSULT (OUTPATIENT)
Dept: SURGERY | Facility: HOSPITAL | Age: 26
End: 2021-09-27
Attending: FAMILY MEDICINE
Payer: COMMERCIAL

## 2021-09-27 ENCOUNTER — TELEPHONE (OUTPATIENT)
Dept: OTHER | Facility: OTHER | Age: 26
End: 2021-09-27

## 2021-09-27 VITALS
BODY MASS INDEX: 32 KG/M2 | TEMPERATURE: 97.8 F | SYSTOLIC BLOOD PRESSURE: 114 MMHG | RESPIRATION RATE: 16 BRPM | HEIGHT: 63 IN | HEART RATE: 72 BPM | DIASTOLIC BLOOD PRESSURE: 76 MMHG | WEIGHT: 180.6 LBS

## 2021-09-27 DIAGNOSIS — L72.3 SEBACEOUS CYST: ICD-10-CM

## 2021-09-27 DIAGNOSIS — L02.212 ABSCESS OF BACK: Primary | ICD-10-CM

## 2021-09-27 DIAGNOSIS — L03.312 CELLULITIS OF BACK EXCEPT BUTTOCK: ICD-10-CM

## 2021-09-27 PROCEDURE — 10060 I&D ABSCESS SIMPLE/SINGLE: CPT | Performed by: SURGERY

## 2021-09-27 PROCEDURE — 99203 OFFICE O/P NEW LOW 30 MIN: CPT | Performed by: SURGERY

## 2021-09-27 RX ORDER — CLINDAMYCIN HYDROCHLORIDE 300 MG/1
300 CAPSULE ORAL 3 TIMES DAILY
Qty: 21 CAPSULE | Refills: 0 | Status: SHIPPED | OUTPATIENT
Start: 2021-09-27 | End: 2021-10-04

## 2021-09-27 NOTE — TELEPHONE ENCOUNTER
The on call doctor informed me that Dr Alli Vazquez will send in a prescription for the patient  This writer called the patient and let her know that a medication will be sent in

## 2021-09-27 NOTE — TELEPHONE ENCOUNTER
The patient called stating that Dr Anil Mendez was supposed to prescribe her another round of antibiotics today for her abscess and nothing has been prescribed  The patient is in pain after having the abscess drained today  The patient would like to know if an antibiotic can be prescribed

## 2021-10-06 ENCOUNTER — TELEPHONE (OUTPATIENT)
Dept: CARDIOLOGY CLINIC | Facility: CLINIC | Age: 26
End: 2021-10-06

## 2021-10-06 LAB — INR PPP: 2.1 (ref 0.84–1.19)

## 2021-10-07 ENCOUNTER — ANTICOAG VISIT (OUTPATIENT)
Dept: CARDIOLOGY CLINIC | Facility: CLINIC | Age: 26
End: 2021-10-07
Payer: COMMERCIAL

## 2021-10-07 DIAGNOSIS — Z95.2 HISTORY OF MECHANICAL AORTIC VALVE REPLACEMENT: Primary | ICD-10-CM

## 2021-10-07 PROCEDURE — 93793 ANTICOAG MGMT PT WARFARIN: CPT | Performed by: INTERNAL MEDICINE

## 2021-10-08 ENCOUNTER — APPOINTMENT (OUTPATIENT)
Dept: LAB | Facility: HOSPITAL | Age: 26
End: 2021-10-08
Payer: COMMERCIAL

## 2021-10-08 PROCEDURE — 87070 CULTURE OTHR SPECIMN AEROBIC: CPT | Performed by: SURGERY

## 2021-10-08 PROCEDURE — 87205 SMEAR GRAM STAIN: CPT | Performed by: SURGERY

## 2021-10-08 PROCEDURE — 87076 CULTURE ANAEROBE IDENT EACH: CPT | Performed by: SURGERY

## 2021-10-08 PROCEDURE — 87077 CULTURE AEROBIC IDENTIFY: CPT | Performed by: SURGERY

## 2021-10-11 ENCOUNTER — OFFICE VISIT (OUTPATIENT)
Dept: SURGERY | Facility: HOSPITAL | Age: 26
End: 2021-10-11

## 2021-10-11 VITALS
SYSTOLIC BLOOD PRESSURE: 121 MMHG | DIASTOLIC BLOOD PRESSURE: 83 MMHG | TEMPERATURE: 97.6 F | HEART RATE: 74 BPM | HEIGHT: 63 IN | RESPIRATION RATE: 16 BRPM | BODY MASS INDEX: 32.25 KG/M2 | WEIGHT: 182 LBS

## 2021-10-11 DIAGNOSIS — Z98.890 POST-OPERATIVE STATE: Primary | ICD-10-CM

## 2021-10-11 PROCEDURE — 3008F BODY MASS INDEX DOCD: CPT | Performed by: SURGERY

## 2021-10-11 PROCEDURE — 99024 POSTOP FOLLOW-UP VISIT: CPT | Performed by: PHYSICIAN ASSISTANT

## 2021-10-13 LAB
BACTERIA WND AEROBE CULT: ABNORMAL
BACTERIA WND AEROBE CULT: ABNORMAL
GRAM STN SPEC: ABNORMAL
GRAM STN SPEC: ABNORMAL

## 2021-10-25 ENCOUNTER — ANTICOAG VISIT (OUTPATIENT)
Dept: CARDIOLOGY CLINIC | Facility: CLINIC | Age: 26
End: 2021-10-25
Payer: COMMERCIAL

## 2021-10-25 DIAGNOSIS — Z95.2 HISTORY OF MECHANICAL AORTIC VALVE REPLACEMENT: Primary | ICD-10-CM

## 2021-10-25 LAB — INR PPP: 2.1 (ref 0.84–1.19)

## 2021-10-25 PROCEDURE — 93793 ANTICOAG MGMT PT WARFARIN: CPT | Performed by: INTERNAL MEDICINE

## 2021-11-08 ENCOUNTER — OFFICE VISIT (OUTPATIENT)
Dept: SURGERY | Facility: HOSPITAL | Age: 26
End: 2021-11-08
Payer: COMMERCIAL

## 2021-11-08 ENCOUNTER — TELEPHONE (OUTPATIENT)
Dept: CARDIOLOGY CLINIC | Facility: CLINIC | Age: 26
End: 2021-11-08

## 2021-11-08 VITALS
HEIGHT: 63 IN | SYSTOLIC BLOOD PRESSURE: 110 MMHG | WEIGHT: 186.6 LBS | DIASTOLIC BLOOD PRESSURE: 75 MMHG | TEMPERATURE: 98 F | HEART RATE: 93 BPM | BODY MASS INDEX: 33.06 KG/M2 | RESPIRATION RATE: 16 BRPM

## 2021-11-08 DIAGNOSIS — L72.3 SEBACEOUS CYST: Primary | ICD-10-CM

## 2021-11-08 PROCEDURE — 3008F BODY MASS INDEX DOCD: CPT | Performed by: SURGERY

## 2021-11-08 PROCEDURE — 1036F TOBACCO NON-USER: CPT | Performed by: SURGERY

## 2021-11-08 PROCEDURE — 99213 OFFICE O/P EST LOW 20 MIN: CPT | Performed by: SURGERY

## 2021-11-10 DIAGNOSIS — Z95.2 S/P AVR: ICD-10-CM

## 2021-11-10 DIAGNOSIS — F41.9 ANXIETY: ICD-10-CM

## 2021-11-11 RX ORDER — METOPROLOL SUCCINATE 25 MG/1
25 TABLET, EXTENDED RELEASE ORAL 2 TIMES DAILY
Qty: 180 TABLET | Refills: 0 | Status: SHIPPED | OUTPATIENT
Start: 2021-11-11 | End: 2021-12-01 | Stop reason: SDUPTHER

## 2021-11-11 RX ORDER — WARFARIN SODIUM 2.5 MG/1
TABLET ORAL
Qty: 180 TABLET | Refills: 0 | Status: SHIPPED | OUTPATIENT
Start: 2021-11-11 | End: 2021-12-01 | Stop reason: SDUPTHER

## 2021-11-11 RX ORDER — ESCITALOPRAM OXALATE 10 MG/1
10 TABLET ORAL DAILY
Qty: 90 TABLET | Refills: 0 | Status: SHIPPED | OUTPATIENT
Start: 2021-11-11 | End: 2021-12-01 | Stop reason: SDUPTHER

## 2021-11-15 LAB — INR PPP: 2 (ref 0.84–1.19)

## 2021-11-16 ENCOUNTER — ANTICOAG VISIT (OUTPATIENT)
Dept: CARDIOLOGY CLINIC | Facility: CLINIC | Age: 26
End: 2021-11-16
Payer: COMMERCIAL

## 2021-11-16 DIAGNOSIS — Z95.2 HISTORY OF MECHANICAL AORTIC VALVE REPLACEMENT: Primary | ICD-10-CM

## 2021-11-16 PROCEDURE — 93793 ANTICOAG MGMT PT WARFARIN: CPT | Performed by: INTERNAL MEDICINE

## 2021-11-19 ENCOUNTER — TELEPHONE (OUTPATIENT)
Dept: FAMILY MEDICINE CLINIC | Facility: CLINIC | Age: 26
End: 2021-11-19

## 2021-11-23 ENCOUNTER — IMMUNIZATIONS (OUTPATIENT)
Dept: FAMILY MEDICINE CLINIC | Facility: HOSPITAL | Age: 26
End: 2021-11-23

## 2021-11-23 DIAGNOSIS — Z23 ENCOUNTER FOR IMMUNIZATION: Primary | ICD-10-CM

## 2021-11-23 PROCEDURE — 91300 COVID-19 PFIZER VACC 0.3 ML: CPT

## 2021-11-23 PROCEDURE — 0001A COVID-19 PFIZER VACC 0.3 ML: CPT

## 2021-12-01 ENCOUNTER — OFFICE VISIT (OUTPATIENT)
Dept: FAMILY MEDICINE CLINIC | Facility: CLINIC | Age: 26
End: 2021-12-01
Payer: COMMERCIAL

## 2021-12-01 VITALS
RESPIRATION RATE: 16 BRPM | WEIGHT: 180 LBS | SYSTOLIC BLOOD PRESSURE: 126 MMHG | HEART RATE: 88 BPM | DIASTOLIC BLOOD PRESSURE: 72 MMHG | HEIGHT: 63 IN | BODY MASS INDEX: 31.89 KG/M2

## 2021-12-01 DIAGNOSIS — Z00.00 WELLNESS EXAMINATION: ICD-10-CM

## 2021-12-01 DIAGNOSIS — F41.9 ANXIETY: ICD-10-CM

## 2021-12-01 DIAGNOSIS — Z95.2 HISTORY OF MECHANICAL AORTIC VALVE REPLACEMENT: ICD-10-CM

## 2021-12-01 DIAGNOSIS — I35.1 NONRHEUMATIC AORTIC VALVE INSUFFICIENCY: ICD-10-CM

## 2021-12-01 DIAGNOSIS — Z23 NEED FOR INFLUENZA VACCINATION: Primary | ICD-10-CM

## 2021-12-01 DIAGNOSIS — Z95.2 S/P AVR: ICD-10-CM

## 2021-12-01 PROCEDURE — 90471 IMMUNIZATION ADMIN: CPT

## 2021-12-01 PROCEDURE — 99214 OFFICE O/P EST MOD 30 MIN: CPT | Performed by: FAMILY MEDICINE

## 2021-12-01 PROCEDURE — 90686 IIV4 VACC NO PRSV 0.5 ML IM: CPT

## 2021-12-01 PROCEDURE — 99395 PREV VISIT EST AGE 18-39: CPT | Performed by: FAMILY MEDICINE

## 2021-12-01 RX ORDER — ESCITALOPRAM OXALATE 10 MG/1
10 TABLET ORAL DAILY
Qty: 90 TABLET | Refills: 3 | Status: SHIPPED | OUTPATIENT
Start: 2021-12-01 | End: 2022-02-07 | Stop reason: SDUPTHER

## 2021-12-01 RX ORDER — WARFARIN SODIUM 2.5 MG/1
TABLET ORAL
Qty: 180 TABLET | Refills: 3 | Status: SHIPPED | OUTPATIENT
Start: 2021-12-01 | End: 2022-02-07 | Stop reason: SDUPTHER

## 2021-12-01 RX ORDER — METOPROLOL SUCCINATE 25 MG/1
25 TABLET, EXTENDED RELEASE ORAL 2 TIMES DAILY
Qty: 180 TABLET | Refills: 3 | Status: SHIPPED | OUTPATIENT
Start: 2021-12-01 | End: 2022-02-07 | Stop reason: SDUPTHER

## 2021-12-07 ENCOUNTER — ANTICOAG VISIT (OUTPATIENT)
Dept: CARDIOLOGY CLINIC | Facility: CLINIC | Age: 26
End: 2021-12-07

## 2021-12-07 DIAGNOSIS — Z95.2 HISTORY OF MECHANICAL AORTIC VALVE REPLACEMENT: Primary | ICD-10-CM

## 2021-12-07 LAB — INR PPP: 2 (ref 0.84–1.19)

## 2021-12-23 ENCOUNTER — ANTICOAG VISIT (OUTPATIENT)
Dept: CARDIOLOGY CLINIC | Facility: CLINIC | Age: 26
End: 2021-12-23

## 2021-12-23 DIAGNOSIS — Z95.2 HISTORY OF MECHANICAL AORTIC VALVE REPLACEMENT: Primary | ICD-10-CM

## 2021-12-23 LAB — INR PPP: 1.9 (ref 0.84–1.19)

## 2021-12-28 ENCOUNTER — OFFICE VISIT (OUTPATIENT)
Dept: CARDIOLOGY CLINIC | Facility: CLINIC | Age: 26
End: 2021-12-28
Payer: COMMERCIAL

## 2021-12-28 VITALS
HEIGHT: 63 IN | SYSTOLIC BLOOD PRESSURE: 124 MMHG | HEART RATE: 86 BPM | BODY MASS INDEX: 31.93 KG/M2 | WEIGHT: 180.2 LBS | DIASTOLIC BLOOD PRESSURE: 82 MMHG

## 2021-12-28 DIAGNOSIS — Z01.810 PREOPERATIVE CARDIOVASCULAR EXAMINATION: ICD-10-CM

## 2021-12-28 DIAGNOSIS — Z98.890 HX OF REPAIR OF ASCENDING AORTA: ICD-10-CM

## 2021-12-28 DIAGNOSIS — I35.1 NONRHEUMATIC AORTIC VALVE INSUFFICIENCY: ICD-10-CM

## 2021-12-28 DIAGNOSIS — Q23.1 BICUSPID AORTIC VALVE: ICD-10-CM

## 2021-12-28 DIAGNOSIS — Z95.2 HISTORY OF MECHANICAL AORTIC VALVE REPLACEMENT: Primary | ICD-10-CM

## 2021-12-28 PROCEDURE — 93000 ELECTROCARDIOGRAM COMPLETE: CPT | Performed by: INTERNAL MEDICINE

## 2021-12-28 PROCEDURE — 99214 OFFICE O/P EST MOD 30 MIN: CPT | Performed by: INTERNAL MEDICINE

## 2021-12-28 PROCEDURE — 1036F TOBACCO NON-USER: CPT | Performed by: INTERNAL MEDICINE

## 2021-12-28 RX ORDER — IBUPROFEN 800 MG/1
TABLET ORAL AS NEEDED
COMMUNITY
Start: 2021-12-03

## 2021-12-28 RX ORDER — PROMETHAZINE HYDROCHLORIDE 25 MG/1
TABLET ORAL
COMMUNITY
Start: 2021-12-03 | End: 2021-12-28

## 2022-01-14 ENCOUNTER — ANTICOAG VISIT (OUTPATIENT)
Dept: CARDIOLOGY CLINIC | Facility: CLINIC | Age: 27
End: 2022-01-14
Payer: COMMERCIAL

## 2022-01-14 DIAGNOSIS — Z95.2 HISTORY OF MECHANICAL AORTIC VALVE REPLACEMENT: Primary | ICD-10-CM

## 2022-01-14 LAB — INR PPP: 2.1 (ref 0.84–1.19)

## 2022-01-14 PROCEDURE — 93793 ANTICOAG MGMT PT WARFARIN: CPT | Performed by: INTERNAL MEDICINE

## 2022-01-21 ENCOUNTER — TELEPHONE (OUTPATIENT)
Dept: CARDIOLOGY CLINIC | Facility: CLINIC | Age: 27
End: 2022-01-21

## 2022-01-21 NOTE — TELEPHONE ENCOUNTER
S/w Sandra from [facility], not enough clinical information was presented and per [facility] peer to peer can be done until 1/28/22  Number for peer to peer is [phone] and case# [id number]

## 2022-01-24 ENCOUNTER — TELEPHONE (OUTPATIENT)
Dept: CARDIOLOGY CLINIC | Facility: CLINIC | Age: 27
End: 2022-01-24

## 2022-01-28 ENCOUNTER — HOSPITAL ENCOUNTER (OUTPATIENT)
Dept: NON INVASIVE DIAGNOSTICS | Facility: HOSPITAL | Age: 27
Discharge: HOME | End: 2022-01-28
Attending: INTERNAL MEDICINE
Payer: COMMERCIAL

## 2022-01-28 VITALS
HEIGHT: 63 IN | DIASTOLIC BLOOD PRESSURE: 82 MMHG | HEART RATE: 76 BPM | WEIGHT: 180 LBS | SYSTOLIC BLOOD PRESSURE: 124 MMHG | BODY MASS INDEX: 31.89 KG/M2

## 2022-01-28 DIAGNOSIS — Z95.2 HISTORY OF MECHANICAL AORTIC VALVE REPLACEMENT: ICD-10-CM

## 2022-01-28 DIAGNOSIS — Z98.890 HX OF REPAIR OF ASCENDING AORTA: ICD-10-CM

## 2022-01-28 PROCEDURE — 93306 TTE W/DOPPLER COMPLETE: CPT

## 2022-01-29 LAB
AORTIC VALVE MEAN VELOCITY: 29.4 M/S
APICAL FOUR CHAMBER EJECTION FRACTION: 59 %
AV AREA BY CONTINUOUS VTI: 0.7 CM2
AV AREA PEAK VELOCITY: 0.6 CM2
AV LVOT MEAN GRADIENT: 3 MMHG
AV LVOT PEAK GRADIENT: 5 MMHG
AV MEAN GRADIENT: 40 MMHG
AV PEAK GRADIENT: 66 MMHG
AV VALVE AREA: 0.7 CM2
AV VELOCITY RATIO: 0.28
DOP CALC AO PEAK VEL: 4.07 M/S
DOP CALC AO VTI: 66.63 CM
DOP CALC LVOT AREA: 2.01 CM2
DOP CALC LVOT DIAMETER: 1.6 CM
DOP CALC LVOT PEAK VEL VTI: 23.24 CM
DOP CALC LVOT PEAK VEL: 1.14 M/S
DOP CALC LVOT STROKE INDEX: 25.9 ML/M2
DOP CALC LVOT STROKE VOLUME: 46.7 CM3
DOP CALC MV VTI: 27.2 CM
E WAVE DECELERATION TIME: 150 MS
FRACTIONAL SHORTENING: 31 % (ref 28–44)
INTERVENTRICULAR SEPTUM IN DIASTOLE (PARASTERNAL SHORT AXIS VIEW): 1.1 CM (ref 0.53–0.99)
LAAS-AP2: 19.7 CM2
LAAS-AP4: 19.4 CM2
LEFT INTERNAL DIMENSION IN SYSTOLE: 3.4 CM (ref 2.1–4)
LEFT VENTRICULAR INTERNAL DIMENSION IN DIASTOLE: 4.9 CM (ref 4.52–6.73)
LEFT VENTRICULAR POSTERIOR WALL IN END DIASTOLE: 0.9 CM (ref 0.51–0.97)
LEFT VENTRICULAR STROKE VOLUME: 68 ML
MV E'TISSUE VEL-LAT: 17 CM/S
MV E'TISSUE VEL-SEP: 12 CM/S
MV MEAN GRADIENT: 2 MMHG
MV PEAK A VEL: 0.73 M/S
MV PEAK E VEL: 111 CM/S
MV PEAK GRADIENT: 5 MMHG
MV STENOSIS PRESSURE HALF TIME: 0 MS
MV VALVE AREA BY CONTINUITY EQUATION: 1.72 CM2
RIGHT ATRIAL 2D VOLUME: 45 ML
RIGHT ATRIUM AREA SYSTOLE A4C: 17 CM2
RIGHT VENTRICLE ID DIMENSION: 2.9 CM
SL CV LEFT ATRIUM LENGTH A2C: 5.6 CM
SL CV LV EF: 65
SL CV PED ECHO LEFT VENTRICLE DIASTOLIC VOLUME (MOD BIPLANE) 2D: 115 ML
SL CV PED ECHO LEFT VENTRICLE SYSTOLIC VOLUME (MOD BIPLANE) 2D: 47 ML
Z-SCORE OF INTERVENTRICULAR SEPTUM IN END DIASTOLE: 2.93
Z-SCORE OF LEFT VENTRICULAR DIMENSION IN END SYSTOLE: -1.18
Z-SCORE OF LEFT VENTRICULAR POSTERIOR WALL IN END DIASTOLE: 1.34

## 2022-01-29 PROCEDURE — 93306 TTE W/DOPPLER COMPLETE: CPT | Performed by: INTERNAL MEDICINE

## 2022-02-04 ENCOUNTER — ANTICOAG VISIT (OUTPATIENT)
Dept: CARDIOLOGY CLINIC | Facility: CLINIC | Age: 27
End: 2022-02-04
Payer: COMMERCIAL

## 2022-02-04 DIAGNOSIS — Z95.2 HISTORY OF MECHANICAL AORTIC VALVE REPLACEMENT: Primary | ICD-10-CM

## 2022-02-04 LAB — INR PPP: 1.8 (ref 0.84–1.19)

## 2022-02-04 PROCEDURE — 93793 ANTICOAG MGMT PT WARFARIN: CPT | Performed by: INTERNAL MEDICINE

## 2022-02-04 NOTE — PROGRESS NOTES
Left message for patient, advised INR is low, would joselito her to take 5 mg tonight, then go back to normal dosing 5 mg Mon and Thurs, 3 75 mg other days  Advised patient if she is not taking medication as above, or any changes in her medications or diet to call the office     Advised will recheck in 2 weeks 2/18/22

## 2022-02-07 ENCOUNTER — OFFICE VISIT (OUTPATIENT)
Dept: OBGYN CLINIC | Facility: CLINIC | Age: 27
End: 2022-02-07
Payer: COMMERCIAL

## 2022-02-07 ENCOUNTER — OFFICE VISIT (OUTPATIENT)
Dept: SURGERY | Facility: HOSPITAL | Age: 27
End: 2022-02-07
Payer: COMMERCIAL

## 2022-02-07 VITALS
WEIGHT: 176.4 LBS | SYSTOLIC BLOOD PRESSURE: 122 MMHG | BODY MASS INDEX: 31.25 KG/M2 | DIASTOLIC BLOOD PRESSURE: 84 MMHG | HEIGHT: 63 IN

## 2022-02-07 VITALS
HEIGHT: 63 IN | WEIGHT: 176.4 LBS | TEMPERATURE: 97.7 F | DIASTOLIC BLOOD PRESSURE: 82 MMHG | SYSTOLIC BLOOD PRESSURE: 126 MMHG | HEART RATE: 96 BPM | RESPIRATION RATE: 16 BRPM | BODY MASS INDEX: 31.25 KG/M2

## 2022-02-07 DIAGNOSIS — L72.0 EPIDERMOID CYST OF SKIN OF BACK: Primary | ICD-10-CM

## 2022-02-07 DIAGNOSIS — Z01.419 ENCOUNTER FOR GYNECOLOGICAL EXAMINATION WITHOUT ABNORMAL FINDING: Primary | ICD-10-CM

## 2022-02-07 DIAGNOSIS — Z30.41 ORAL CONTRACEPTIVE USE: ICD-10-CM

## 2022-02-07 PROCEDURE — 99213 OFFICE O/P EST LOW 20 MIN: CPT | Performed by: SURGERY

## 2022-02-07 PROCEDURE — 99395 PREV VISIT EST AGE 18-39: CPT | Performed by: OBSTETRICS & GYNECOLOGY

## 2022-02-07 PROCEDURE — 1036F TOBACCO NON-USER: CPT | Performed by: SURGERY

## 2022-02-07 PROCEDURE — 3008F BODY MASS INDEX DOCD: CPT | Performed by: SURGERY

## 2022-02-07 RX ORDER — LEVONORGESTREL / ETHINYL ESTRADIOL AND ETHINYL ESTRADIOL 150-30(84)
1 KIT ORAL DAILY
Qty: 84 TABLET | Refills: 3 | Status: SHIPPED | OUTPATIENT
Start: 2022-02-07

## 2022-02-07 NOTE — PROGRESS NOTES
CC:  Annual exam    HPI: Ileana Patel presents for annual gyn exam   Connie Contreras is doing well and expresses no complaints or concerns at this time  She has been using Camrese for contraception without any problems or adverse reactions and she wishes to continue  Past Medical History:  Past Medical History:   Diagnosis Date    Bicuspid aortic valve     s/p mechanical AVR    Coronary artery disease 1993    Moderate aortic insufficiency     s/p mechanical AVR    Patent ductus arteriosus     s/p repair    S/P ascending aortic aneurysm repair        Past Surgical History:  Past Surgical History:   Procedure Laterality Date    ASCENDING AORTIC ANEURYSM REPAIR      MECHANICAL AORTIC VALVE REPLACEMENT      PATENT DUCTUS ARTERIOUS LIGATION         Past OB/Gyn History:  Menstrual cycles every 28 days, with 3 days of light bleeding  Denies any history of sexually transmitted infection  No history of abnormal pap smears   Her last pap smear was last year and was totally normal     ALLERGIES:   Allergies   Allergen Reactions    Cefprozil Rash       MEDS:   Current Outpatient Medications:     escitalopram (LEXAPRO) 10 mg tablet    ibuprofen (MOTRIN) 800 mg tablet    LORazepam (ATIVAN) 0 5 mg tablet    metoprolol succinate (TOPROL-XL) 25 mg 24 hr tablet    warfarin (COUMADIN) 2 5 mg tablet    Family History:  Family History   Problem Relation Age of Onset    Aneurysm Father         Aortic aneurysm    Other Father         Bicuspid AV    Heart disease Father     Aortic stenosis Maternal Grandmother     Breast cancer Family     Colon cancer Family     Diabetes Family     Sudden death Paternal Grandfather        Social History:  Social History     Socioeconomic History    Marital status: Single     Spouse name: Not on file    Number of children: Not on file    Years of education: Not on file    Highest education level: Not on file   Occupational History    Not on file   Tobacco Use    Smoking status: Never Smoker    Smokeless tobacco: Never Used   Substance and Sexual Activity    Alcohol use: Yes     Alcohol/week: 1 0 standard drink     Types: 1 Glasses of wine per week    Drug use: No    Sexual activity: Yes     Partners: Female     Birth control/protection: Other     Comment: Birth control pill   Other Topics Concern    Not on file   Social History Narrative    Not on file     Social Determinants of Health     Financial Resource Strain: Not on file   Food Insecurity: Not on file   Transportation Needs: Not on file   Physical Activity: Not on file   Stress: Not on file   Social Connections: Not on file   Intimate Partner Violence: Not on file   Housing Stability: Not on file         Review of Systems:  Gen:   Denies fatigue, chills, nausea, vomiting, fever  Skin: No rashes or discolorations of any concern  RESP: Denies SOB, no cough  CV: Denies chest pain or palpitations  Breasts: Denies masses, pain, skin changes and nipple discharge  GI: Denies abdominal pain, heartburn, nausea, vomiting, changes in bowel habits  : Denies dysuria, frequency, CVA tenderness, incontinence and hematuria  Genitalia: Denies abnormal vaginal discharge, external lesions, rashes, pelvic pain, pressure, abnormal bleeding  Rectal:  Denies pain, bleeding, hemorrhoids,    Physical Exam:  /84   Ht 5' 3" (1 6 m)   Wt 80 kg (176 lb 6 4 oz)   LMP 01/10/2022   BMI 31 25 kg/m²    Gen: The patient was alert and oriented x3, pleasant well-appearing female in no acute distress  Neck:  Unremarkable, no lymphadenopathy, no thyromegaly, or tenderness  CV:  RRR, no murmurs  Resp:  Clear to auscultation bilaterally, no wheezing  Breasts: Symmetric  No dominant, discrete, fixed  or suspicious masses are noted  No skin or nipple changes  No palpable axillary nodes  No supraclavicular adenopathy    Abd:  Soft, nontender, nondistended, no masses or organomegaly  Back:  No CVA tenderness, no tenderness to palpation along spine  Pelvic:  Normal appearing external female genitalia, no visible lesions, no rashes  Vagina is free of discharge, normal vaginal epithelium, no abnormal  lesions, no evidence of prolapse anteriorly or posteriorly  Normal appearing cervix, mobile and nontender  A thin prep pap smear was not obtained  Uterus is normal size, mobile and, nontender  No palpable adnexal masses or tenderness  No anoperineal lesions  Skin:  No concerning lesions  Extremeties: No edema      Assessment & Plan:   1  Routine annual exam      RTO one year orPRN  2  Encounter for oral contraceptive surveillance, patient may continue current birth control pill for 1 year  3  Pregnancy planning  Afterwards, the patient had concerns regarding starting a family given the fact that she is on warfarin  She was advised that she would have to be off of warfarin and on Lovenox which would be through her cardiologist   Also with her cardiology issues, she should make an appointment to see how she is doing several months prior to initiating trying to become pregnant

## 2022-02-11 NOTE — PROGRESS NOTES
Assessment/Plan:   Yenny Morris is a 27 y  o female who is here for Cyst (Established patient who returns to discuss having excision of healed sebaceous cyst of back  Patient is currently on Coumadin for CAD, Bicuspid aortic valve )    Plan: Sebaceous cyst - discussed conservative vs operative mgt surgical approaches ris and benefits and pt has been cleared to hold coumadin for procedure, I will schedule her at her earliest convenience     Preoperative Clearance: None    HPI:  Yenny Morris is a 27 y  o female who was referred for evaluation of Cyst (Established patient who returns to discuss having excision of healed sebaceous cyst of back  Patient is currently on Coumadin for CAD, Bicuspid aortic valve )    Currently no issues       ROS:  General ROS: negative  negative for - chills, fatigue, fever or night sweats, weight loss  Respiratory ROS: no cough, shortness of breath, or wheezing  Cardiovascular ROS: no chest pain or dyspnea on exertion  Genito-Urinary ROS: no dysuria, trouble voiding, or hematuria  Musculoskeletal ROS: negative for - gait disturbance, joint pain or muscle pain  Neurological ROS: no TIA or stroke symptoms  cyst    [unfilled]  Cefprozil    Current Outpatient Medications:     ibuprofen (MOTRIN) 800 mg tablet, , Disp: , Rfl:     Levonorgest-Eth Estrad 91-Day (Camrese) 0 15-0 03 &0 01 MG TABS, Take 1 tablet by mouth daily, Disp: 84 tablet, Rfl: 3    LORazepam (ATIVAN) 0 5 mg tablet, Take 1 tablet (0 5 mg total) by mouth every 8 (eight) hours as needed for anxiety, Disp: 90 tablet, Rfl: 3    escitalopram (LEXAPRO) 10 mg tablet, Take 1 tablet (10 mg total) by mouth daily, Disp: 90 tablet, Rfl: 3    metoprolol succinate (TOPROL-XL) 25 mg 24 hr tablet, Take 1 tablet (25 mg total) by mouth 2 (two) times a day, Disp: 180 tablet, Rfl: 3    warfarin (COUMADIN) 2 5 mg tablet, Take one to two tablets daily as directed by doctor, Disp: 180 tablet, Rfl: 3  Past Medical History:   Diagnosis Date    Bicuspid aortic valve     s/p mechanical AVR    Coronary artery disease 1993    Moderate aortic insufficiency     s/p mechanical AVR    Patent ductus arteriosus     s/p repair    S/P ascending aortic aneurysm repair      Past Surgical History:   Procedure Laterality Date    ASCENDING AORTIC ANEURYSM REPAIR      MECHANICAL AORTIC VALVE REPLACEMENT      PATENT DUCTUS ARTERIOUS LIGATION       Family History   Problem Relation Age of Onset    Aneurysm Father         Aortic aneurysm    Other Father         Bicuspid AV    Heart disease Father     Aortic stenosis Maternal Grandmother     Breast cancer Family     Colon cancer Family     Diabetes Family     Sudden death Paternal Grandfather       reports that she has never smoked  She has never used smokeless tobacco  She reports current alcohol use of about 1 0 standard drink of alcohol per week  She reports that she does not use drugs  Labs:   Lab Results   Component Value Date    WBC 10 63 (H) 06/04/2015    WBC 18 47 (H) 06/03/2015    WBC 14 74 (H) 06/02/2015    HGB 8 8 (L) 06/04/2015    HGB 9 7 (L) 06/03/2015    HGB 10 8 (L) 06/02/2015     (L) 06/04/2015    PLT 89 (L) 06/03/2015    PLT 90 (L) 06/02/2015     No results found for: ALT, AST  This SmartLink has not been configured with any valid records  PHYSICAL EXAM  General Appearance:    Alert, cooperative, no distress,    Head:    Normocephalic without obvious abnormality   Eyes:    PERRL, conjunctiva/corneas clear, EOM's intact        Neck:   Supple, no adenopathy, no JVD   Back:     Symmetric, no spinal or CVA tenderness   Lungs:     Clear to auscultation bilaterally, no wheezing or rhonchi   Heart:    Regular rate and rhythm, S1 and S2 normal, no murmur   Abdomen:     Soft +BS ND NT   Extremities:   Extremities normal  No clubbing, cyanosis or edema   Psych:   Normal Affect, AOx3  Neurologic:  Skin:   CNII-XII intact   Strength symmetric, speech intact    Warm, dry, intact, + back cyst         Physical Exam              Some portions of this record may have been generated with voice recognition software  There may be translation, syntax,  or grammatical errors  Occasional wrong word or "sound-a-like" substitutions may have occurred due to the inherent limitations of the voice recognition software  Read the chart carefully and recognize, using context, where substitutions may have occurred  If you have any questions, please contact the dictating provider for clarification or correction, as needed  This encounter has been coded by a non-certified coder

## 2022-02-16 ENCOUNTER — TELEPHONE (OUTPATIENT)
Dept: CARDIOLOGY CLINIC | Facility: CLINIC | Age: 27
End: 2022-02-16

## 2022-02-16 NOTE — TELEPHONE ENCOUNTER
Received a call from [facility] to inform you pt admitted for stroke  It is unclear if she was already holding coumadin for upcoming surgery -- removal of cyst for 2/21/22  INR today <1 5  She underwent a thrombectomy, PTA today

## 2022-02-17 NOTE — TELEPHONE ENCOUNTER
I saw at her INR was subtherapeutic and it appears that it is been subtherapeutic for some time  Was there reason for this? She did see me as an assessment for the cyst removal in which she could hold Coumadin about 3 days prior but it appears she had been holding this longer  I saw that the Coumadin clinic had called her with a subtherapeutic get INR and adjusted her Coumadin    Thank you

## 2022-02-18 ENCOUNTER — TELEPHONE (OUTPATIENT)
Dept: CARDIOLOGY CLINIC | Facility: CLINIC | Age: 27
End: 2022-02-18

## 2022-02-21 ENCOUNTER — TELEPHONE (OUTPATIENT)
Dept: CARDIOLOGY CLINIC | Facility: CLINIC | Age: 27
End: 2022-02-21

## 2022-02-21 NOTE — TELEPHONE ENCOUNTER
Mother calling stating patient is currently inpatient at [facility]   s/p  [facility] so that is why he is overdue for testing of INR   states will probably be going to rehab for a week  Or two and was told to let us know when d/c so we know to expect INRs

## 2022-02-23 ENCOUNTER — ANTICOAG VISIT (OUTPATIENT)
Dept: CARDIOLOGY CLINIC | Facility: CLINIC | Age: 27
End: 2022-02-23

## 2022-02-23 DIAGNOSIS — Z95.2 HISTORY OF MECHANICAL AORTIC VALVE REPLACEMENT: Primary | ICD-10-CM

## 2022-02-23 LAB — INR PPP: 1.6 (ref 0.84–1.19)

## 2022-02-28 ENCOUNTER — TELEPHONE (OUTPATIENT)
Dept: CARDIOLOGY CLINIC | Facility: CLINIC | Age: 27
End: 2022-02-28

## 2022-02-28 ENCOUNTER — ANTICOAG VISIT (OUTPATIENT)
Dept: CARDIOLOGY CLINIC | Facility: CLINIC | Age: 27
End: 2022-02-28
Payer: COMMERCIAL

## 2022-02-28 DIAGNOSIS — Z95.2 HISTORY OF MECHANICAL AORTIC VALVE REPLACEMENT: Primary | ICD-10-CM

## 2022-02-28 LAB
INR PPP: 2.2 (ref 0.84–1.19)
INR PPP: 2.9 (ref 0.84–1.19)

## 2022-02-28 PROCEDURE — 93793 ANTICOAG MGMT PT WARFARIN: CPT | Performed by: INTERNAL MEDICINE

## 2022-02-28 NOTE — TELEPHONE ENCOUNTER
Dr Refugio Hart, with patients recent hospitalization for embolic stroke, would you like her INR goal to remain 2 0-3 0?

## 2022-02-28 NOTE — PROGRESS NOTES
Spoke with patients mom, states patient was given 7 5 mg on Fri, since INR was 2 9 no Coumadin was given Sat, 3 75 mg given Sun, INR good today, advised to continue on 5 mg Mon Thurs, 3 75 mg other days, will recheck 3/4/22  Mom asking if she can check daily, I advised not necessary, she will feel comfortable checking every other day       Patient is a home elvia

## 2022-03-02 ENCOUNTER — OFFICE VISIT (OUTPATIENT)
Dept: CARDIOLOGY CLINIC | Facility: CLINIC | Age: 27
End: 2022-03-02
Payer: COMMERCIAL

## 2022-03-02 VITALS
OXYGEN SATURATION: 98 % | SYSTOLIC BLOOD PRESSURE: 130 MMHG | DIASTOLIC BLOOD PRESSURE: 76 MMHG | WEIGHT: 176 LBS | RESPIRATION RATE: 16 BRPM | HEIGHT: 63 IN | HEART RATE: 68 BPM | BODY MASS INDEX: 31.18 KG/M2

## 2022-03-02 DIAGNOSIS — I63.412 CEREBROVASCULAR ACCIDENT (CVA) DUE TO EMBOLIC OCCLUSION OF LEFT MIDDLE CEREBRAL ARTERY (HCC): ICD-10-CM

## 2022-03-02 DIAGNOSIS — Z98.890 HX OF REPAIR OF ASCENDING AORTA: ICD-10-CM

## 2022-03-02 DIAGNOSIS — Q23.1 BICUSPID AORTIC VALVE: ICD-10-CM

## 2022-03-02 DIAGNOSIS — Z95.2 HISTORY OF MECHANICAL AORTIC VALVE REPLACEMENT: Primary | ICD-10-CM

## 2022-03-02 LAB — INR PPP: 2.1 (ref 0.84–1.19)

## 2022-03-02 PROCEDURE — 99214 OFFICE O/P EST MOD 30 MIN: CPT | Performed by: INTERNAL MEDICINE

## 2022-03-02 RX ORDER — MAGNESIUM OXIDE 400 MG/1
400 TABLET ORAL DAILY
COMMUNITY
Start: 2022-02-23

## 2022-03-02 NOTE — PROGRESS NOTES
I spoke with Ivis Giron, mom, advised discussed with INR goal with Dr Joan Bauman, he would like to keep goal 2 5-3 0  Advised patient to take 5 mg tonight and 3 75 mg tomorrow  Mom questioned me that patient was to take 10 mg on Mon and Thurs, that is what she was told  I advised that I discussed with her on Mon and patient was to take 5 mg Mon and Thurs, asking what dose patient actually took  She states patient took 3 75 mg on Mon and Tues  I reinforced that I wanted patient to take 5 mg tonight and 3 75 mg tomorrow, will recheck INR Fri and dose further at that time  Sharifa Mead understanding

## 2022-03-02 NOTE — PROGRESS NOTES
Cardiology Follow Up    Checo Montague  1995  [id number]  HEART & VASCULAR  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 (HCC)         Discussion/Summary:    1  Mechanical aortic valve replacement and Ascending aortic repair -  Liliam Asif is doing well  She is asymptomatic  Tolerating Coumadin  No changes made today  We ordered an updated echocardiogram today  If there are no changes clinically she does not need to see is back for about 2 years  After our next visit we will get an updated CT scan of the aorta  She will continue to follow closely with our Coumadin Clinic  She knows to take antibiotic prophylaxis for any dental procedures  2    Embolic CVA - Liliam Asif unfortunately had an embolic CVA while holding her warfarin, but there were is confusion about either length of hold or date of surgery  She had been subtherapeutic for what appears to be over a week and had an acute left MCA territory CVA  She did receive tPA and she is clinically improving, with residual aphasia  She continues to work with physical therapy and speech therapy  Moving forward, even though she was off of her warfarin, I would recommend bridging for any surgery or procedure  Her INR goal could remain at 2 5      Interval History:     Liliam Asif comes in for followup given her history of a bicuspid aortic valve, with moderate aortic regurgitation and mild aortic stenosis, along with dilation of the ascending aorta  Later in 2014 she did have a CT scan that showed an increase of her ascending aortic aneurysm from 4 1-4 4 cm  This was a significant change and the size of this was significant for her body surface area   Her aortic regurgitation remained in the moderate range  She went an sought consultation with Dr Kristin Luna  Then on 6/1/15 she had a mechanical AVR and ascending aortic repair with graft replacement  This repair was above the sinotubular junction  She has had a prior closure of a PDA back at the age of 1  Close to 3 years ago, she had her last echocardiogram that showed normal LV systolic function and a normally functioning aortic valve replacement  There was elevated velocities but this appeared flow related  She also had a CT scan of the aorta as well, which was unremarkable and showing a normal post repair aorta  Jordon Jose came to see me in consultation in December as a follow-up but also has a preoperative cardiovascular risk assessment  She had a cyst that needed to be excised, and needed recommendations on holding warfarin  Given her valve in the aortic valve position, without any risk factors or structural heart disease, she was able to hold the warfarin without bridging and recommended about 3 day hold with following INR closely  Unfortunately there was some confusion on her surgery date and she held this prior to a preoperative workup/appointment and therefore her INR was subtherapeutic for what appears to be over a week  Then while driving she developed signs of acute CVA with right-sided hemiparesis and aphasia, and was [facility] to [facility]  She was transferred to [address] [place] [address] and received tPA  Echocardiogram suggested elevated velocities and possible clot, but aleyda did not appreciate this  She does have known elevated velocities and gradients of her mechanical AVR, but it is functioning normally  Her symptoms did improve, and at this point she just has some issues with aphasia  No weakness  She is currently going through therapy  She was on OCPs and these were stopped      Jordon Jose has no other symptoms from a cardiovascular standpoint  No chest pain or shortness of breath    No signs or symptoms of CHF  No palpitations, lightheadedness or syncope  She has been active and able to work with physical therapy without issues        Patient Active Problem List   Diagnosis    Aortic valve regurgitation    Bicuspid aortic valve    History of mechanical aortic valve replacement    Hx of repair of ascending aorta    Cervical high risk HPV (human papillomavirus) test positive    ENDER I (cervical intraepithelial neoplasia I)    Cellulitis of back except buttock    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 (HCC)     Past Medical History:   Diagnosis Date    Bicuspid aortic valve     s/p mechanical AVR    Coronary artery disease 1993    Moderate aortic insufficiency     s/p mechanical AVR    Patent ductus arteriosus     s/p repair    S/P ascending aortic aneurysm repair      Social History     Socioeconomic History    Marital status: Single     Spouse name: Not on file    Number of children: Not on file    Years of education: Not on file    Highest education level: Not on file   Occupational History    Not on file   Tobacco Use    Smoking status: Never Smoker    Smokeless tobacco: Never Used   Substance and Sexual Activity    Alcohol use:  Yes     Alcohol/week: 1 0 standard drink     Types: 1 Glasses of wine per week    Drug use: No    Sexual activity: Yes     Partners: Female     Birth control/protection: Other     Comment: Birth control pill   Other Topics Concern    Not on file   Social History Narrative    Not on file     Social Determinants of Health     Financial Resource Strain: Not on file   Food Insecurity: Not on file   Transportation Needs: Not on file   Physical Activity: Not on file   Stress: Not on file   Social Connections: Not on file   Intimate Partner Violence: Not on file   Housing Stability: Not on file      Family History   Problem Relation Age of Onset    Aneurysm Father         Aortic aneurysm    Other Father Bicuspid AV    Heart disease Father     Aortic stenosis Maternal Grandmother     Breast cancer Family     Colon cancer Family     Diabetes Family     Sudden death Paternal Grandfather      Past Surgical History:   Procedure Laterality Date    ASCENDING AORTIC ANEURYSM REPAIR      MECHANICAL AORTIC VALVE REPLACEMENT      PATENT DUCTUS ARTERIOUS LIGATION         Current Outpatient Medications:     escitalopram (LEXAPRO) 10 mg tablet, Take 1 tablet (10 mg total) by mouth daily, Disp: 90 tablet, Rfl: 3    ibuprofen (MOTRIN) 800 mg tablet, , Disp: , Rfl:     LORazepam (ATIVAN) 0 5 mg tablet, Take 1 tablet (0 5 mg total) by mouth every 8 (eight) hours as needed for anxiety, Disp: 90 tablet, Rfl: 3    magnesium oxide (MAG-OX) 400 mg tablet, Take 400 mg by mouth daily, Disp: , Rfl:     metoprolol succinate (TOPROL-XL) 25 mg 24 hr tablet, Take 1 tablet (25 mg total) by mouth 2 (two) times a day, Disp: 180 tablet, Rfl: 3    warfarin (COUMADIN) 2 5 mg tablet, Take one to two tablets daily as directed by doctor (Patient taking differently: Taking 3 75 mg everyday except Monday and Thursday takes 10 mg  ), Disp: 180 tablet, Rfl: 3    Levonorgest-Eth Estrad 91-Day (Camrese) 0 15-0 03 &0 01 MG TABS, Take 1 tablet by mouth daily (Patient not taking: Reported on 3/2/2022 ), Disp: 84 tablet, Rfl: 3  Allergies   Allergen Reactions    Cefprozil Rash       Imaging:     ECHO (1/28/22): Left Ventricle: Left ventricular cavity size is normal  The left ventricular ejection fraction is 65%  Systolic function is normal  Wall motion is normal  Diastolic function is normal     Aortic Valve: There is a mechanical valve  The prosthetic valve appears to be functioning normally  There is trace regurgitation  There is no evidence of paravalvular regurgitation  The aortic valve velocity is increased due to increased flow and some patient-prosthethis mismatch    Mitral Valve: There is trace regurgitation    Aorta:  There is a prosthetic graft present in the ascending aorta    Prior TTE study available for comparison  Prior study date: 11/7/2018  No significant changes noted compared to the prior study  Review of Systems:  Review of Systems   Constitutional: Negative  HENT: Negative  Eyes: Negative  Cardiovascular: Negative  Gastrointestinal: Negative  Musculoskeletal: Negative  Skin: Negative  Allergic/Immunologic: Negative  Neurological: Positive for speech difficulty  Hematological: Negative  Psychiatric/Behavioral: Negative  All other systems reviewed and are negative  Vitals:    03/02/22 0913   BP: 130/76   BP Location: Left arm   Patient Position: Sitting   Cuff Size: Adult   Pulse: 68   Resp: 16   SpO2: 98%   Weight: 79 8 kg (176 lb)   Height: 5' 3" (1 6 m)       Physical Exam:  Physical Exam  Vitals and nursing note reviewed  Constitutional:       Appearance: She is well-developed  HENT:      Head: Normocephalic and atraumatic  Eyes:      General: No scleral icterus  Right eye: No discharge  Left eye: No discharge  Pupils: Pupils are equal, round, and reactive to light  Neck:      Thyroid: No thyromegaly  Vascular: No JVD  Cardiovascular:      Rate and Rhythm: Normal rate and regular rhythm  No extrasystoles are present  Pulses: Normal pulses  Carotid pulses are 2+ on the right side and 2+ on the left side  Radial pulses are 2+ on the right side and 2+ on the left side  Femoral pulses are 2+ on the right side and 2+ on the left side  Popliteal pulses are 2+ on the right side and 2+ on the left side  Dorsalis pedis pulses are 2+ on the right side and 2+ on the left side  Posterior tibial pulses are 2+ on the right side and 2+ on the left side  Heart sounds: S1 normal and S2 normal  Murmur heard  Crescendo decrescendo systolic murmur is present with a grade of 2/6  No friction rub  No gallop  Comments: Normal-sounding mechanical S2   Pulmonary:      Effort: Pulmonary effort is normal  No respiratory distress  Breath sounds: Normal breath sounds  No wheezing or rales  Abdominal:      General: Bowel sounds are normal  There is no distension  Palpations: Abdomen is soft  Tenderness: There is no abdominal tenderness  Musculoskeletal:         General: No tenderness or deformity  Normal range of motion  Cervical back: Normal range of motion and neck supple  Skin:     General: Skin is warm and dry  Findings: No rash  Neurological:      Mental Status: She is alert and oriented to person, place, and time  Cranial Nerves: No cranial nerve deficit  Psychiatric:         Thought Content: Thought content normal          Judgment: Judgment normal          Counseling / Coordination of Care  Total office/ unit time spent today 25 minutes  Greater than 50% of total time was spent with the patient and / or family counseling and / or coordination of care

## 2022-03-02 NOTE — TELEPHONE ENCOUNTER
Hi Dr Shaq Ro,  I see you saw patient in the office today and you noted her INR goal could remain 2 5, is that 2 0-2 5 or 2 5-3 0?   Thanks

## 2022-03-03 ENCOUNTER — TELEPHONE (OUTPATIENT)
Dept: NEUROLOGY | Facility: CLINIC | Age: 27
End: 2022-03-03

## 2022-03-03 NOTE — TELEPHONE ENCOUNTER
Called both numbers for the patient and her mother to schedule an appt per the call from mother  ADELINE to contact us back to schedule  If the patient calls back verify that she is indeed interested in following with [address] [place] [address] [facility] since she is also seeing Blake   If she is interested in seeing us please schedule with Henri Merrill as she has agreed to see the patient as a consult since she has more availability

## 2022-03-03 NOTE — TELEPHONE ENCOUNTER
Patient's mother called back, verified patient will no longer be going to [facility]  Scheduled with Brett Sutton 3/15 @ 2:30 in CV

## 2022-03-07 ENCOUNTER — EVALUATION (OUTPATIENT)
Dept: SPEECH THERAPY | Facility: CLINIC | Age: 27
End: 2022-03-07
Payer: COMMERCIAL

## 2022-03-07 DIAGNOSIS — I69.320 APHASIA AS LATE EFFECT OF CEREBROVASCULAR ACCIDENT (CVA): ICD-10-CM

## 2022-03-07 DIAGNOSIS — I63.9 CEREBROVASCULAR ACCIDENT (CVA), UNSPECIFIED MECHANISM (HCC): Primary | ICD-10-CM

## 2022-03-07 PROCEDURE — 96105 ASSESSMENT OF APHASIA: CPT

## 2022-03-07 NOTE — PROGRESS NOTES
Speech-Language Pathology Initial Evaluation    Today's date: 3/7/2022  Patients name: Yenny Morris  : 1995  MRN: [id number]  Safety measures:   Referring provider: Sandeep Swenson MD    Encounter Diagnosis     ICD-10-CM    1  Cerebrovascular accident (CVA), unspecified mechanism ([facility] [address] )  I63 9    2  Aphasia as late effect of cerebrovascular accident (CVA)  I69 320        Visit tracking:  -Referring provider: Epic  -Billing guidelines: CMS  -Visit # 1  -Insurance: [id number] [address]  - due: 2022, 10 visits     Subjective comments: "I had a stroke " Feeling good most of the time  How did the patient hear about us? Insurance    Patient's goal(s): "To speak clearly "    Reason for referral: Decreased language skills  Prior functional status: Communication effective and appropriate in all situations   Clinically complex situations: N/A    History: Patient is a 27 y o  female who was referred to outpatient skilled Speech Therapy services for a aphasia evaluation  Patient suffered CVA in MCA territory on 22  Patient noted to have dysnomia and noted literal and verbal paraphasic errors  Hearing: WFL  Vision: WFL    Home environment/lifestyle:  Lives with Mom  2 brothers and 1 sister who live out of the house  Oldest brother lives in [place]  Highest level of education: Went to [employer] in [employer]  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)  Mental status: Alert   Behavior status: Cooperative  Communication modalities: Verbal  Rehabilitation prognosis: Good rehab potential to reach and maintain prior level of function      Assessments    The Western Aphasia Battery-Revised (WAB-R) is designed to evaluate a patient's language function following CVA, dementia, or other acquired neurological disorder   It measures a patients linguistic skills, such as speech content, fluency, auditory comprehension, repetition, naming, reading, and writing  The WAB-R also measures a patients nonlinguistic skills, including drawing, calculation, block design, and apraxia  The purpose of this standardized assessment is to (1) determine the presence, severity, and type of aphasia; (2) measure the patient's level of performance to provide a baseline for detecting change over time; (3) provide a comprehensive assessment of the patient's language assets and deficits in order to guide treatment and management; and (4) infer the location and etiology of the lesion causing aphasia  The following results were obtained during the administration of the assessment:      PART 1: Score:   -Spontaneous Speech:    -Auditory Verbal Comprehension: 9 6/10   -Repetition: 7 4/10   -Naming & Word Findin 8/10     *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  Due to time constraints, only portions of the standardized assessment were administered on this date of service  PART 2: Score:   -Reading Score: DNT/20   -Writing: DNT/20   -Apraxia: DNT/10   -Constructional, Visuospatial, & Calculation: DNT/10        Score:   *Aphasia Quotient (AQ): 73 6/100   *Language Quotient (LQ): DNT/100   *Cortical Quotient (CQ): DNT/100     The Perry Diagnostic Aphasia Examination-Third Edition (BDAE-3) is a comprehensive standardized assessment designed to evaluate a broad range of language impairments that often arise as a consequence of organic brain dysfunction  The BDAE-3 is divided into five subtests, including conversational & expository speech, auditory comprehension, oral expression, reading, and writing   The results of the BDAE-3 are used to classify a patients language profiles into one of the localization-based classifications of aphasia: Brocas, Wernickes, anomic, conduction, transcortical, transcortical motor, transcortical sensory, and global aphasia syndromes, although the test does not always provide a diagnosis or a therapeutic approach  The following results were obtained during the administration of the short form assessment:       Score: Percentile:        NAMING:     -[place] Naming Test - short 12/15 80%ile     Goals       Short-term goals:      1  Complete reading and writing assessment via WAB as deemed appropriate  2  Patient and mother will express understanding of ways to increase basic expression of patient's wants & needs via demonstration of ways (circumlocution, phonemic cues) to assist patient in structured therapeutic tasks as assessed by SLP over 3 separate activity tasks/ sessions  3  Patient will complete verbal automatic speech tasks to increase fluency & verbal expression with 90% accuracy  4  Patient will correctly identify left/right body parts with 90% accuracy  5   Patient will answer complex yes/no questions (example: before / after questions) with at least 80% accuracy  6  Patient will increase basic verbal expression via structured therapeutic tasks including (naming pictures & objects, naming family members, responsive naming tasks, word retrieval tasks with increasing length and complexity such as synonyms/antonyms, etc), 80% accuracy with minimal-moderate fading cues, 80% accuracy  7  Patient will name 8-10 members of a provided concrete category with fading cues in 1 minute time span  8  Patient will provide correct verbal expression response phrase or sentence given scenario with minimal cues, 80% accuracy  9  Patient will name up to 5 features to describe a person/place/thing with 80% accuracy to facilitate improved utilization of circumlocution strategy  10  Patient will complete picture description tasks using language organization strategies with 80% accuracy to facilitate improved utilization of circumlocution strategy  Long-term goals:     1  Patient will maximize expression of basic & complex / wants needs with modified independence       2  Patient & mother will express understanding of ways to maximize communication & comprehension & overall cognition in home carryover activities & setting  Impressions/Recommendations       -Patient is a 27year old who presents with anomic aphasia per Western Aphasia Battery Assessment (WAB) s/p CVA  Patient with moderate expressive communication deficit & higher level auditory comprehension deficit  Verbal expression characterized by dysnomia, hesitations, fillers, verbal & literal paraphasic errors  Verbal description task scored as a 6: "More propositional sentences with normal syntactic patterns; may have paraphasias; significant word-finding difficulty and hesitations may be present "    Patient has a very supportive mother  Patient with great difficulty in basic & complex verbal expression & would greatly benefit from skilled speech & language services to increase independence in communication in order to express her basic & compex wants, needs & ideas & maximize her comprehension  Reading and writing will be further assessed  This patient was independent at baseline & working full time as a telephone   prior to CVA  Patient is very motivated  Recommendations:  -Patient would benefit from outpatient skilled Speech Therapy services: Speech-language therapy    -Frequency: 1-2x weekly  -Duration: 6-8 weeks    -Intervention certification from: 9/4/1048  -Intervention certification to: 9/0/7866    -Intervention comments:   WAB finish reading and writing, education on word retrieval strategies and activities/ games to do at home to supplement, fluency naming, word retrieval tasks, scripting with telephone job reenacting

## 2022-03-08 ENCOUNTER — EVALUATION (OUTPATIENT)
Dept: OCCUPATIONAL THERAPY | Facility: CLINIC | Age: 27
End: 2022-03-08
Payer: COMMERCIAL

## 2022-03-08 ENCOUNTER — EVALUATION (OUTPATIENT)
Dept: PHYSICAL THERAPY | Facility: CLINIC | Age: 27
End: 2022-03-08
Payer: COMMERCIAL

## 2022-03-08 DIAGNOSIS — I63.412 CEREBROVASCULAR ACCIDENT (CVA) DUE TO EMBOLIC OCCLUSION OF LEFT MIDDLE CEREBRAL ARTERY (HCC): Primary | ICD-10-CM

## 2022-03-08 PROCEDURE — 97162 PT EVAL MOD COMPLEX 30 MIN: CPT

## 2022-03-08 PROCEDURE — 97167 OT EVAL HIGH COMPLEX 60 MIN: CPT
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
## 2022-03-09 NOTE — PROGRESS NOTES
Daily Speech Treatment Note    Today's date: 3/10/2022  Patients name: Haley Qureshi  : 1995  MRN: [id number]  Safety measures:   Referring provider: Alejandro Joel MD             Encounter Diagnosis       ICD-10-CM     1  Cerebrovascular accident (CVA), unspecified mechanism ([address] )  I63 9     2  Aphasia as late effect of cerebrovascular accident (CVA)  I69 320           Visit tracking:  -Referring provider: Epic  -Billing guidelines: CMS  -Visit # 2  -Insurance: [employer]  -RE due: 2022, 10 visits         Subjective/Behavioral:  - Patient reports that she is feeling good and that everyday her speech is getting better  Objective/Assessment:  -Patient's family member/caregiver was present during today's session   -Homework was provided to patient  - Patient reported at the end of the session that she felt like things went well  - It was evident that the patient's communication skills have improved since her evaluation  Short-term goals:      1  Complete reading and writing assessment via WAB as deemed appropriate       2  Patient and mother will express understanding of ways to increase basic expression of patient's wants & needs via demonstration of ways (circumlocution, phonemic cues) to assist patient in structured therapeutic tasks as assessed by SLP over 3 separate activity tasks/ sessions  3/10: Patient and mother were educated on word finding strategies  A Word Retrieval Strategies handout was provided        3  Patient will complete verbal automatic speech tasks to increase fluency & verbal expression with 90% accuracy       4  Patient will correctly identify left/right body parts with 90% accuracy  3/10: Patient correctly identified 14/15 parts of the body with 93% accuracy, requiring min cueing (stated "tin" for "chin")      5  Patient will answer complex yes/no questions (example: before / after questions) with at least 80% accuracy       6  Patient will increase basic verbal expression via structured therapeutic tasks including (naming pictures & objects, naming family members, responsive naming tasks, word retrieval tasks with increasing length and complexity such as synonyms/antonyms, etc), 80% accuracy with minimal-moderate fading cues, 80% accuracy  3/10: To target higher level generative naming, patient completed [employer] activity/game using multiple word and letter restrictions (i e , word associated with sports, contains the letter T, is exactly 2 syllables)  Patient completed using 2 cards (red and yellow) /restrictions over 11 trials with 100% acc         7  Patient will name 8-10 members of a provided concrete category with fading cues in 1 minute time span  3/10:  Patient was provided with concrete and abstract categories (e g  Things that are cold and things that are uncomfortable) and was asked to name 5 words that would appropriately fit into each category  The task was completed with 100% accuracy, requiring minimal verbal cueing  Demonstrated mild paraphasias but was able to sound out words and come up with the right pronunciation of the word  8  Patient will provide correct verbal expression response phrase or sentence given scenario with minimal cues, 80% accuracy  9  Patient will name up to 5 features to describe a person/place/thing with 80% accuracy to facilitate improved utilization of circumlocution strategy       10  Patient will complete picture description tasks using language organization strategies with 80% accuracy to facilitate improved utilization of circumlocution strategy          Long-term goals:      1   Patient will maximize expression of basic & complex / wants needs with modified independence       2  Patient & mother will express understanding of ways to maximize communication & comprehension & overall cognition in home carryover activities & setting         Plan:  -Patient was provided with home exercises/activities to target goals in plan of care at the end of today's session   -Discussed session and patient's progress with caregiver/family member after today's session   -Continue with current plan of care

## 2022-03-10 ENCOUNTER — OFFICE VISIT (OUTPATIENT)
Dept: OCCUPATIONAL THERAPY | Facility: CLINIC | Age: 27
End: 2022-03-10
Payer: COMMERCIAL

## 2022-03-10 ENCOUNTER — APPOINTMENT (OUTPATIENT)
Dept: PHYSICAL THERAPY | Facility: CLINIC | Age: 27
End: 2022-03-10
Payer: COMMERCIAL

## 2022-03-10 ENCOUNTER — OFFICE VISIT (OUTPATIENT)
Dept: SPEECH THERAPY | Facility: CLINIC | Age: 27
End: 2022-03-10
Payer: COMMERCIAL

## 2022-03-10 DIAGNOSIS — I63.412 CEREBROVASCULAR ACCIDENT (CVA) DUE TO EMBOLIC OCCLUSION OF LEFT MIDDLE CEREBRAL ARTERY (HCC): Primary | ICD-10-CM

## 2022-03-10 DIAGNOSIS — I63.9 CEREBROVASCULAR ACCIDENT (CVA), UNSPECIFIED MECHANISM (HCC): Primary | ICD-10-CM

## 2022-03-10 DIAGNOSIS — I69.320 APHASIA AS LATE EFFECT OF CEREBROVASCULAR ACCIDENT (CVA): ICD-10-CM

## 2022-03-10 PROCEDURE — 97129 THER IVNTJ 1ST 15 MIN: CPT

## 2022-03-10 PROCEDURE — 97110 THERAPEUTIC EXERCISES: CPT

## 2022-03-10 PROCEDURE — 97130 THER IVNTJ EA ADDL 15 MIN: CPT

## 2022-03-10 PROCEDURE — 92507 TX SP LANG VOICE COMM INDIV: CPT

## 2022-03-10 NOTE — PROGRESS NOTES
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 ([facility] [address] )  I63 412        Start Time: 1330  Stop Time: 1500  Total time in clinic (min): 90 minutes    Subjective: "I'm ok, I am just processing"      Objective: See treatment diary below    3/10: Assessed pt's ability to complete deadlift, hang clean, OH press with 15# barbell  Pt with G form with deadlift and OH press, however unable to coordinate the clean  Added 15# plates to the barbell for total weight of 45 lbs  Pt able to deadlift 45# with G form and G  on barbell  Assessed pt's ability to clean using kettlebell and dumbbell  Pt cont to have difficulty with coordinating mov't  Recommended pt does not complete cleans at this time and that when she returns to the gym, she has the  spotting her and able to provide feedback on form  Provided pt with HEP for stretching, stability and strengthening  HEP included: Cat/Cow pose, upward facing dog/ cobra pose, scapular push-ups, bananas to supermans, deadbug, inchworms, hip bridges, single leg hip bridge, Air squat with progression to elevator squat and brace squats, jump squats, lunges, jump lunges, skaters, mountain climbers, plank and plank with reach  Pt was able to complete all movt's with G coordination  Reviewed pacing and progression with pt and pt's mom, both with G understanding  Assessment: Tolerated treatment well  Patient demonstrated fatigue post treatment      Plan: pt's mother present during session  Discussed plan with both pt and pt's mother  Agreed upon not dc from therapy at this time  Plan for pt to complete HEP to build strength and endurance and then come back in for another assessment and progression, prior to returning to the gym   Pt to call for appt when she is ready  Precautions: Cardiac hx  Re-eval Date: n/a    Date 3/8/22 3/10      Visit Count 1 2              Pain In 0       Pain Out 0               DATE 3/10       Testing Trails A: 37 82 sec, no errors        Trails B: 3min 31 sec, numbers were in order but letters out of sequence, affected by pt's aphasia        Mesulum: 1 min 54 sec, no errors, 100% accuracy               Neuro Re-Ed                                Ther Ex        HEP Issued                                                                Ther Activity                        Vision  [place]: completed 4 with 100% accuracy                Driving:  Pre-driving test: 11/01 correct, errors on street signs, road scenarios and problem solving driving situations  Aphasia may have impacted pt's performance                    ADDENDUM: pt was provided with HEP, plan was for pt to come back in for a re-assessment prior to her returning to the gym  Call placed to patient, with message left on machine on 5/26, to determine if pt wanted to come back for a re-assessment  To date, pt has not returned call  Will dc at this time  A new IE can be completed if pt chooses to return to therapy

## 2022-03-11 ENCOUNTER — ANTICOAG VISIT (OUTPATIENT)
Dept: CARDIOLOGY CLINIC | Facility: CLINIC | Age: 27
End: 2022-03-11

## 2022-03-11 ENCOUNTER — TELEPHONE (OUTPATIENT)
Dept: CARDIOLOGY CLINIC | Facility: CLINIC | Age: 27
End: 2022-03-11

## 2022-03-11 DIAGNOSIS — Z95.2 HISTORY OF MECHANICAL AORTIC VALVE REPLACEMENT: Primary | ICD-10-CM

## 2022-03-11 LAB — INR PPP: 2 (ref 0.84–1.19)

## 2022-03-14 ENCOUNTER — OFFICE VISIT (OUTPATIENT)
Dept: SPEECH THERAPY | Facility: CLINIC | Age: 27
End: 2022-03-14
Payer: COMMERCIAL

## 2022-03-14 DIAGNOSIS — I69.320 APHASIA AS LATE EFFECT OF CEREBROVASCULAR ACCIDENT (CVA): ICD-10-CM

## 2022-03-14 DIAGNOSIS — I63.9 CEREBROVASCULAR ACCIDENT (CVA), UNSPECIFIED MECHANISM (HCC): Primary | ICD-10-CM

## 2022-03-14 PROCEDURE — 92507 TX SP LANG VOICE COMM INDIV: CPT

## 2022-03-14 NOTE — PROGRESS NOTES
Daily Speech Treatment Note    Today's date: 3/14/2022  Patients name: Jessica Rawls  : 1995  MRN: [id number]  Safety measures:   Referring provider: Homer Branham MD             Encounter Diagnosis       ICD-10-CM     1  Cerebrovascular accident (CVA), unspecified mechanism ([address] )  I63 9     2  Aphasia as late effect of cerebrovascular accident (CVA)  I69 320           Visit tracking:  -Referring provider: Epic  -Billing guidelines: CMS  -Visit # 3  -Insurance: [id number] [address]  -RE due: 2022, 10 visits         Subjective/Behavioral:  - Pleasant/Cooperative  Patient notes she went to a [employer]  Objective/Assessment:    -   Short-term goals:      1  Complete reading and writing assessment via WAB as deemed appropriate  3/14: Completed WAB reading and writing assessment: : Comprehension of Sentences, : Comprehension of reading commands, Written word-picture matching, picture-written word choice matchin%, spoken word-written word matchin%, spelled word recognition: , spellin/6  Writin% writing name and address, writing output, mild errors in morphology, verb tense, , writing dictated words: 10/10, letters and numbers: 100%     2  Patient and mother will express understanding of ways to increase basic expression of patient's wants & needs via demonstration of ways (circumlocution, phonemic cues) to assist patient in structured therapeutic tasks as assessed by SLP over 3 separate activity tasks/ sessions  3/10: Patient and mother were educated on word finding strategies  A Word Retrieval Strategies handout was provided  3/14: Provided lists of apps, games to continue to work on word retrieval at home  Suggested being "ok" with increased processing time with others        3  Patient will complete verbal automatic speech tasks to increase fluency & verbal expression with 90% accuracy   3/14/: Achieved 90% accuracy in automatic phrase / sentence completion tasks, occasional increased processing time        4  Patient will correctly identify left/right body parts with 90% accuracy  3/10: Patient correctly identified 14/15 parts of the body with 93% accuracy, requiring min cueing (stated "tin" for "chin")      5  Patient will answer complex yes/no questions (example: before / after questions) with at least 80% accuracy  6  Patient will increase basic verbal expression via structured therapeutic tasks including (naming pictures & objects, naming family members, responsive naming tasks, word retrieval tasks with increasing length and complexity such as synonyms/antonyms, etc), 80% accuracy with minimal-moderate fading cues, 80% accuracy  3/10: To target higher level generative naming, patient completed [employer] activity/game using multiple word and letter restrictions (i e , word associated with sports, contains the letter T, is exactly 2 syllables)  Patient completed using 2 cards (red and yellow) /restrictions over 11 trials with 100% acc   3/14: Responsive naming tasks: 90% accuracy  Provided visual/phonemic cues to assist with proper articulation with sounds, sh, t, l, p at times (mild apraxia suspected and provided handouts for reading practice )       7  Patient will name 8-10 members of a provided concrete category with fading cues in 1 minute time span  3/10:  Patient was provided with concrete and abstract categories (e g  Things that are cold and things that are uncomfortable) and was asked to name 5 words that would appropriately fit into each category  The task was completed with 100% accuracy, requiring minimal verbal cueing  Demonstrated mild paraphasias but was able to sound out words and come up with the right pronunciation of the word  8  Patient will provide correct verbal expression response phrase or sentence given scenario with minimal cues, 80% accuracy      9  Patient will name up to 5 features to describe a person/place/thing with 80% accuracy to facilitate improved utilization of circumlocution strategy       10  Patient will complete picture description tasks using language organization strategies with 80% accuracy to facilitate improved utilization of circumlocution strategy          Long-term goals:      1  Patient will maximize expression of basic & complex / wants needs with modified independence       2  Patient & mother will express understanding of ways to maximize communication & comprehension & overall cognition in home carryover activities & setting         Plan:  -Patient was provided with home exercises/activities to target goals in plan of care at the end of today's session   -Discussed session and patient's progress with caregiver/family member after today's session   -Continue with current plan of care       fluency naming, word retrieval tasks, scripting with telephone job reenacting

## 2022-03-15 ENCOUNTER — APPOINTMENT (OUTPATIENT)
Dept: PHYSICAL THERAPY | Facility: CLINIC | Age: 27
End: 2022-03-15
Payer: COMMERCIAL

## 2022-03-15 ENCOUNTER — APPOINTMENT (OUTPATIENT)
Dept: OCCUPATIONAL THERAPY | Facility: CLINIC | Age: 27
End: 2022-03-15
Payer: COMMERCIAL

## 2022-03-15 ENCOUNTER — CONSULT (OUTPATIENT)
Dept: NEUROLOGY | Facility: CLINIC | Age: 27
End: 2022-03-15
Payer: COMMERCIAL

## 2022-03-15 VITALS
HEIGHT: 63 IN | TEMPERATURE: 97.1 F | SYSTOLIC BLOOD PRESSURE: 120 MMHG | BODY MASS INDEX: 31.1 KG/M2 | WEIGHT: 175.5 LBS | DIASTOLIC BLOOD PRESSURE: 68 MMHG | HEART RATE: 70 BPM

## 2022-03-15 DIAGNOSIS — Z86.73 HISTORY OF STROKE: Primary | ICD-10-CM

## 2022-03-15 DIAGNOSIS — I63.9 CEREBROVASCULAR ACCIDENT (CVA) DETERMINED BY CLINICAL ASSESSMENT (HCC): ICD-10-CM

## 2022-03-15 DIAGNOSIS — Z95.2 HISTORY OF MECHANICAL AORTIC VALVE REPLACEMENT: ICD-10-CM

## 2022-03-15 DIAGNOSIS — I69.320 APHASIA AS LATE EFFECT OF CEREBROVASCULAR ACCIDENT: ICD-10-CM

## 2022-03-15 PROCEDURE — 99204 OFFICE O/P NEW MOD 45 MIN: CPT | Performed by: PSYCHIATRY & NEUROLOGY

## 2022-03-15 PROCEDURE — 1036F TOBACCO NON-USER: CPT | Performed by: PSYCHIATRY & NEUROLOGY

## 2022-03-15 PROCEDURE — 3008F BODY MASS INDEX DOCD: CPT | Performed by: PSYCHIATRY & NEUROLOGY

## 2022-03-15 RX ORDER — WARFARIN SODIUM 5 MG/1
5 TABLET ORAL
COMMUNITY
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
## 2022-03-15 NOTE — PATIENT INSTRUCTIONS
Stroke:  Erlinda Herrera presents for  A new consultation with regard to a recent embolic stroke which was related to her mechanical heart valve in the setting of holding Coumadin for a short procedure  Thankfully she has made an excellent recovery with regard to overall symptoms some stroke although she still struggles with aphasia to a degree and has not yet been able to return to driving or working  She is on appropriate dose Lexapro and at this time does not endorse symptoms concerning for post stroke depression/ anxiety  She may be experiencing a degree of fatigue which is completely reasonable and appropriate     - for ongoing stroke prevention we would suggest and agree that Coumadin is required for a mechanical heart valve rather than 1 of the direct oral anticoagulants with a standard goal of 2 5-3 54 her INR   - she can continue her current dose of Lexapro from my standpoint  If she does begin to develop other signs of depression, anxiety, or other mood symptoms it would be reasonable to consider an adjustment in the future if necessary  -I will request fitness to drive testing to be performed for her in order to more fully evaluate if she is ready to get back behind the wheel out on the road  In the interim she would be cleared from my standpoint to start driving with family in a parking lot for example in order to make sure that she does well in terms of spatial relations in her ability to operate the vehicle  - she should continue to ensure that she gets adequate rest, she should remain well hydrated drinking ideally 48-64 oz of non caffeinated beverages per day in addition to any caffeine    She should also continue to ensure a relatively consistent amount of vitamin K in her diet to prevent the INR from significant fluctuations up and down     -she is not in need of repeat cerebrovascular imaging from my standpoint but her family will provide for us the disc from [facility] when possible so that we can review her images directly  I will plan for her to return to the office in 4 months time to see me direclty but would be happy to see her sooner if the need should arise  If she has any symptoms concerning for TIA or stroke including sudden painless loss of vision or double vision, difficulty speaking or swallowing, vertigo/room spinning that does not quickly resolve, or weakness/numbness affecting 1 side of the face or body she should proceed by ambulance to the nearest emergency room immediately  If she were to fall and strike her head and have any residual symptoms whatsoever or to developed a sudden extremely severe very unusual headache she should also be seen in the nearest emergency room immediately

## 2022-03-16 ENCOUNTER — TELEPHONE (OUTPATIENT)
Dept: NEUROLOGY | Facility: CLINIC | Age: 27
End: 2022-03-16

## 2022-03-16 NOTE — TELEPHONE ENCOUNTER
Called mom to let her know that the images are uploaded and Dr Ember Johnson was notified by TT that they are available for review  Mom is going to  the disks later today or tomorrow morning possibly, but does not want them mailed

## 2022-03-16 NOTE — TELEPHONE ENCOUNTER
Patient's mom walked into office and dropped of CD that she forgot to bring into appointment on 3/15  Envelope contained one CD that had images from 02/16/22 done at [facility]  Please upload into chart for patient and then contact mom once uploaded to have CD picked back up  CD was given to [address] [place] [address] to upload into patient chart  Per [place] Bonus not at CV office today so she asked me to give to one of the girls here to upload for Angela Awad to review  CD was given to Tonya to have uploaded to patient chart for Angela Awad to review today  Av García please follow up with Virgil Terry once CD is uploaded into patient chart

## 2022-03-22 ENCOUNTER — OFFICE VISIT (OUTPATIENT)
Dept: SPEECH THERAPY | Facility: CLINIC | Age: 27
End: 2022-03-22
Payer: COMMERCIAL

## 2022-03-22 DIAGNOSIS — I63.9 CEREBROVASCULAR ACCIDENT (CVA), UNSPECIFIED MECHANISM (HCC): Primary | ICD-10-CM

## 2022-03-22 DIAGNOSIS — I69.320 APHASIA AS LATE EFFECT OF CEREBROVASCULAR ACCIDENT (CVA): ICD-10-CM

## 2022-03-22 PROCEDURE — 92507 TX SP LANG VOICE COMM INDIV: CPT

## 2022-03-22 NOTE — PROGRESS NOTES
Daily Speech Treatment Note    Today's date: 3/22/2022  Patients name: Addison Greco  : 1995  MRN: [id number]  Safety measures:   Referring provider: Kevin Santa MD             Encounter Diagnosis       ICD-10-CM     1  Cerebrovascular accident (CVA), unspecified mechanism ([facility] [address] )  I63 9     2  Aphasia as late effect of cerebrovascular accident (CVA)  I69 320           Visit tracking:  -Referring provider: Epic  -Billing guidelines: CMS  -Visit # 4  -Insurance: [id number] [address]  -RE due: 2022, 10 visits         Subjective/Behavioral:  - Pleasant/Cooperative  Mother present for session  Objective/Assessment:    Provided worksheets of word searches, crossword puzzles, opposites, listing associated words given single word, etc     -   Short-term goals:      1  Complete reading and writing assessment via WAB as deemed appropriate  3/14: Completed WAB reading and writing assessment: 40/40: Comprehension of Sentences, : Comprehension of reading commands, Written word-picture matching, picture-written word choice matchin%, spoken word-written word matchin%, spelled word recognition: , spellin/6  Writin% writing name and address, writing output, mild errors in morphology, verb tense, , writing dictated words: 10/10, letters and numbers: 100%     2  Patient and mother will express understanding of ways to increase basic expression of patient's wants & needs via demonstration of ways (circumlocution, phonemic cues) to assist patient in structured therapeutic tasks as assessed by SLP over 3 separate activity tasks/ sessions  3/10: Patient and mother were educated on word finding strategies  A Word Retrieval Strategies handout was provided  3/14: Provided lists of apps, games to continue to work on word retrieval at home  Suggested being "ok" with increased processing time with others   3/22: Patient's mother demonstrated strategies and exercises independently to promote patient's expression        3  Patient will complete verbal automatic speech tasks to increase fluency & verbal expression with 90% accuracy  3/14/: Achieved 90% accuracy in automatic phrase / sentence completion tasks, occasional increased processing time        4  Patient will correctly identify left/right body parts with 90% accuracy  3/10: Patient correctly identified 14/15 parts of the body with 93% accuracy, requiring min cueing (stated "tin" for "chin")      5  Patient will answer complex yes/no questions (example: before / after questions) with at least 80% accuracy  6  Patient will increase basic verbal expression via structured therapeutic tasks including (naming pictures & objects, naming family members, responsive naming tasks, word retrieval tasks with increasing length and complexity such as synonyms/antonyms, etc), 80% accuracy with minimal-moderate fading cues, 80% accuracy  3/10: To target higher level generative naming, patient completed Dexmo activity/game using multiple word and letter restrictions (i e , word associated with sports, contains the letter T, is exactly 2 syllables)  Patient completed using 2 cards (red and yellow) /restrictions over 11 trials with 100% acc   3/14: Responsive naming tasks: 90% accuracy  Provided visual/phonemic cues to assist with proper articulation with sounds, sh, t, l, p at times (mild apraxia suspected and provided handouts for reading practice ) 3/22: 80% convergent feature analysis of 3 words and providing antonyms for given word           7  Patient will name 8-10 members of a provided concrete category with fading cues in 1 minute time span  3/10:  Patient was provided with concrete and abstract categories (e g  Things that are cold and things that are uncomfortable) and was asked to name 5 words that would appropriately fit into each category  The task was completed with 100% accuracy, requiring minimal verbal cueing   Demonstrated mild paraphasias but was able to sound out words and come up with the right pronunciation of the word  3/22:  Listed 10 words given association of given word with increased processing time  8  Patient will provide correct verbal expression response phrase or sentence given scenario with minimal cues, 80% accuracy  3/22: Provided description of yesturday's activities independently with mildly increased processing time for word retrieval and articulation  9  Patient will name up to 5 features to describe a person/place/thing with 80% accuracy to facilitate improved utilization of circumlocution strategy  3//22: Able to complete following basic education of task and paper explaining areas of features, 100% accuracy        10  Patient will complete picture description tasks using language organization strategies with 80% accuracy to facilitate improved utilization of circumlocution strategy          Long-term goals:      1  Patient will maximize expression of basic & complex / wants needs with modified independence       2  Patient & mother will express understanding of ways to maximize communication & comprehension & overall cognition in home carryover activities & setting         Plan:  -Patient was provided with home exercises/activities to target goals in plan of care at the end of today's session   -Discussed session and patient's progress with caregiver/family member after today's session   -Continue with current plan of care       fluency naming, word retrieval tasks, scripting with telephone job reenacting

## 2022-03-24 ENCOUNTER — OFFICE VISIT (OUTPATIENT)
Dept: SPEECH THERAPY | Facility: CLINIC | Age: 27
End: 2022-03-24
Payer: COMMERCIAL

## 2022-03-24 ENCOUNTER — ANTICOAG VISIT (OUTPATIENT)
Dept: CARDIOLOGY CLINIC | Facility: CLINIC | Age: 27
End: 2022-03-24
Payer: COMMERCIAL

## 2022-03-24 ENCOUNTER — APPOINTMENT (OUTPATIENT)
Dept: PHYSICAL THERAPY | Facility: CLINIC | Age: 27
End: 2022-03-24
Payer: COMMERCIAL

## 2022-03-24 DIAGNOSIS — I69.320 APHASIA AS LATE EFFECT OF CEREBROVASCULAR ACCIDENT (CVA): ICD-10-CM

## 2022-03-24 DIAGNOSIS — Z95.2 HISTORY OF MECHANICAL AORTIC VALVE REPLACEMENT: Primary | ICD-10-CM

## 2022-03-24 DIAGNOSIS — I63.9 CEREBROVASCULAR ACCIDENT (CVA), UNSPECIFIED MECHANISM (HCC): Primary | ICD-10-CM

## 2022-03-24 LAB — INR PPP: 2 (ref 0.84–1.19)

## 2022-03-24 PROCEDURE — 93793 ANTICOAG MGMT PT WARFARIN: CPT | Performed by: INTERNAL MEDICINE

## 2022-03-24 PROCEDURE — 92507 TX SP LANG VOICE COMM INDIV: CPT

## 2022-03-24 NOTE — PROGRESS NOTES
Left message on mom home # and [facility] cell #, advised INR is on the low side, advised will increase dose slightly and have patient take 3 75 mg Mon Wed Fri, 5 mg other days, will recheck in 2 weeks 4/7/22  Advised to call office with questions or concerns       Patient is a home elvia

## 2022-03-24 NOTE — PROGRESS NOTES
Daily Speech Treatment Note    Today's date: 3/24/2022  Patients name: Regine Romano  : 1995  MRN: [id number]  Safety measures:   Referring provider: Hortensia Barrett MD             Encounter Diagnosis       ICD-10-CM     1  Cerebrovascular accident (CVA), unspecified mechanism ([facility] [address] )  I63 9     2  Aphasia as late effect of cerebrovascular accident (CVA)  I69 320           Visit tracking:  -Referring provider: Epic  -Billing guidelines: CMS  -Visit # 5  -Insurance: Daina Miller  -MALKA due: 2022, 10 visits         Subjective/Behavioral:  - Pleasant/Cooperative  Mother present for session  Objective/Assessment:    Provided worksheets for writing & abstract fluency naming     -   Short-term goals:      1  Complete reading and writing assessment via WAB as deemed appropriate  3/14: Completed WAB reading and writing assessment: /40: Comprehension of Sentences, : Comprehension of reading commands, Written word-picture matching, picture-written word choice matchin%, spoken word-written word matchin%, spelled word recognition: , spellin/6  Writin% writing name and address, writing output, mild errors in morphology, verb tense, , writing dictated words: 10/10, letters and numbers: 100%     2  Patient and mother will express understanding of ways to increase basic expression of patient's wants & needs via demonstration of ways (circumlocution, phonemic cues) to assist patient in structured therapeutic tasks as assessed by SLP over 3 separate activity tasks/ sessions  3/10: Patient and mother were educated on word finding strategies  A Word Retrieval Strategies handout was provided  3/14: Provided lists of apps, games to continue to work on word retrieval at home  Suggested being "ok" with increased processing time with others  3/22: Patient's mother demonstrated strategies and exercises independently to promote patient's expression        3   Patient will complete verbal automatic speech tasks to increase fluency & verbal expression with 90% accuracy  3/14: Achieved 90% accuracy in automatic phrase / sentence completion tasks, occasional increased processing time        4  Patient will correctly identify left/right body parts with 90% accuracy  3/10: Patient correctly identified 14/15 parts of the body with 93% accuracy, requiring min cueing (stated "tin" for "chin")  3/24: Left / Right body part ID: 90% accuracy, error with right cheek      5  Patient will answer complex yes/no questions (example: before / after questions) with at least 80% accuracy  3/24: Before/answer, If (complex) questions @ 90% accuracy  100% accuracy in all complex written direction tasks x 3 separate worksheets  6  Patient will increase basic verbal expression via structured therapeutic tasks including (naming pictures & objects, naming family members, responsive naming tasks, word retrieval tasks with increasing length and complexity such as synonyms/antonyms, etc), 80% accuracy with minimal-moderate fading cues, 80% accuracy  3/10: To target higher level generative naming, patient completed [employer] activity/game using multiple word and letter restrictions (i e , word associated with sports, contains the letter T, is exactly 2 syllables)  Patient completed using 2 cards (red and yellow) /restrictions over 11 trials with 100% acc   3/14: Responsive naming tasks: 90% accuracy  Provided visual/phonemic cues to assist with proper articulation with sounds, sh, t, l, p at times (mild apraxia suspected and provided handouts for reading practice ) 3/22: 80% convergent feature analysis of 3 words and providing antonyms for given word  3/24: Responsive naming tasks, completing sentence starters, adding member to abstract member list, 80% accuracy with occasional minimal cue              7  Patient will name 8-10 members of a provided concrete category with fading cues in 1 minute time span    3/10:  Patient was provided with concrete and abstract categories (e g  Things that are cold and things that are uncomfortable) and was asked to name 5 words that would appropriately fit into each category  The task was completed with 100% accuracy, requiring minimal verbal cueing  Demonstrated mild paraphasias but was able to sound out words and come up with the right pronunciation of the word  3/22:  Listed 10 words given association of given word with increased processing time  3/24: Listing 10-15 words given concrete category in 1 minute  8  Patient will provide correct verbal expression response phrase or sentence given scenario with minimal cues, 80% accuracy  3/22: Provided description of yesturday's activities independently with mildly increased processing time for word retrieval and articulation  3/24: Expressing self in conversation with noted literal distortions, difficulty with articulation with certain sounds: initial, medial, final /k/, ch, cl, st, sh (blends in particular)  Benefits from increased time for articulation with blends and sequences  9  Patient will name up to 5 features to describe a person/place/thing with 80% accuracy to facilitate improved utilization of circumlocution strategy  3//22: Able to complete following basic education of task and paper explaining areas of features, 100% accuracy  3/24: Patient able to describe "emotional" words to SLP with minimal cues to use sentences/descriptors for SLP to retrieve word        10  Patient will complete picture description tasks using language organization strategies with 80% accuracy to facilitate improved utilization of circumlocution strategy          Long-term goals:      1  Patient will maximize expression of basic & complex / wants needs with modified independence       2  Patient & mother will express understanding of ways to maximize communication & comprehension & overall cognition in home carryover activities & setting       Plan:  -Patient was provided with home exercises/activities to target goals in plan of care at the end of today's session   -Discussed session and patient's progress with caregiver/family member after today's session   -Continue with current plan of care  fluency naming, word retrieval tasks, scripting with telephone job reenacting over phone with typing of answers, picture description, synonyms, articulating challenging phonemes, sequences, writing tasks, reading comprehension, higher level processing, left /right

## 2022-03-28 ENCOUNTER — APPOINTMENT (OUTPATIENT)
Dept: PHYSICAL THERAPY | Facility: CLINIC | Age: 27
End: 2022-03-28
Payer: COMMERCIAL

## 2022-03-28 ENCOUNTER — OFFICE VISIT (OUTPATIENT)
Dept: SPEECH THERAPY | Facility: CLINIC | Age: 27
End: 2022-03-28
Payer: COMMERCIAL

## 2022-03-28 DIAGNOSIS — I63.9 CEREBROVASCULAR ACCIDENT (CVA), UNSPECIFIED MECHANISM (HCC): Primary | ICD-10-CM

## 2022-03-28 DIAGNOSIS — I69.320 APHASIA AS LATE EFFECT OF CEREBROVASCULAR ACCIDENT (CVA): ICD-10-CM

## 2022-03-28 PROCEDURE — 92507 TX SP LANG VOICE COMM INDIV: CPT

## 2022-03-28 NOTE — PROGRESS NOTES
Daily Speech Treatment Note    Today's date: 3/28/2022  Patients name: Angela Orozco  : 1995  MRN: [id number]  Safety measures:   Referring provider: Romario Daly MD             Encounter Diagnosis       ICD-10-CM     1  Cerebrovascular accident (CVA), unspecified mechanism ([facility] [address] )  I63 9     2  Aphasia as late effect of cerebrovascular accident (CVA)  I69 320           Visit tracking:  -Referring provider: Epic  -Billing guidelines: CMS  -Visit # 6  -Insurance: [employer]  -RE due: 2022 or 10 visits         Subjective/Behavioral:  - Pleasant/Cooperative  Mother working on work computer outside of session  Objective/Assessment:    Provided worksheets for articulation & fluency - word to 8-9 word sentences  - Patient notes improvement, particularly when giving herself increased time to articulate  Patient encouraged to utilize relaxed open mouth breathing, overall relaxation with verbal expression to decrease any tension/strain  Short-term goals:      1  Complete reading and writing assessment via WAB as deemed appropriate  3/14: Completed WAB reading and writing assessment: 40/40: Comprehension of Sentences, : Comprehension of reading commands, Written word-picture matching, picture-written word choice matchin%, spoken word-written word matchin%, spelled word recognition: , spellin/6  Writin% writing name and address, writing output, mild errors in morphology, verb tense, , writing dictated words: 10/10, letters and numbers: 100%     2  Patient and mother will express understanding of ways to increase basic expression of patient's wants & needs via demonstration of ways (circumlocution, phonemic cues) to assist patient in structured therapeutic tasks as assessed by SLP over 3 separate activity tasks/ sessions  3/10: Patient and mother were educated on word finding strategies  A Word Retrieval Strategies handout was provided   3/14: Provided lists of apps, games to continue to work on word retrieval at home  Suggested being "ok" with increased processing time with others  3/22: Patient's mother demonstrated strategies and exercises independently to promote patient's expression        3  Patient will complete verbal automatic speech tasks to increase fluency & verbal expression with 90% accuracy  3/14: Achieved 90% accuracy in automatic phrase / sentence completion tasks, occasional increased processing time        4  Patient will correctly identify left/right body parts with 90% accuracy  3/10: Patient correctly identified 14/15 parts of the body with 93% accuracy, requiring min cueing (stated "tin" for "chin")  3/24: Left / Right body part ID: 90% accuracy, error with right cheek      5  Patient will answer complex yes/no questions (example: before / after questions) with at least 80% accuracy  3/24: Before/answer, If (complex) questions @ 90% accuracy  100% accuracy in all complex written direction tasks x 3 separate worksheets  6  Patient will increase basic verbal expression via structured therapeutic tasks including (naming pictures & objects, naming family members, responsive naming tasks, word retrieval tasks with increasing length and complexity such as synonyms/antonyms, etc), 80% accuracy with minimal-moderate fading cues, 80% accuracy  3/10: To target higher level generative naming, patient completed [employer] activity/game using multiple word and letter restrictions (i e , word associated with sports, contains the letter T, is exactly 2 syllables)  Patient completed using 2 cards (red and yellow) /restrictions over 11 trials with 100% acc   3/14: Responsive naming tasks: 90% accuracy  Provided visual/phonemic cues to assist with proper articulation with sounds, sh, t, l, p at times (mild apraxia suspected and provided handouts for reading practice ) 3/22: 80% convergent feature analysis of 3 words and providing antonyms for given word   3/24: Responsive naming tasks, completing sentence starters, adding member to abstract member list, 80% accuracy with occasional minimal cue              7  Patient will name 8-10 members of a provided concrete category with fading cues in 1 minute time span  3/10:  Patient was provided with concrete and abstract categories (e g  Things that are cold and things that are uncomfortable) and was asked to name 5 words that would appropriately fit into each category  The task was completed with 100% accuracy, requiring minimal verbal cueing  Demonstrated mild paraphasias but was able to sound out words and come up with the right pronunciation of the word  3/22:  Listed 10 words given association of given word with increased processing time  3/24: Listing 10-15 words given concrete category in 1 minute  8  Patient will provide correct verbal expression response phrase or sentence given scenario with minimal cues, 80% accuracy  3/22: Provided description of yesturday's activities independently with mildly increased processing time for word retrieval and articulation  3/24: Expressing self in conversation with noted literal distortions, difficulty with articulation with certain sounds: initial, medial, final /k/, ch, cl, st, sh (blends in particular)  Benefits from increased time for articulation with blends and sequences  3/28: Expression of words of increased length and sentences in particular attention to (v, ch, s blends, k blends, multisyllabics and 8-9 word sentences with overall 65-70% independence with self correction, benefitted from modeling, finger tapping at times to slow and pronounce each syllable, modeling increased accuracy to 100%  Scripting for work and typing answers from SLP calling from other room- occasional, minimal dysfluencies and misarticulations, patient with spelling errors with phone numbers, name and address increased to 100% accuracy with reviewing them with SLP         9  Patient will name up to 5 features to describe a person/place/thing with 80% accuracy to facilitate improved utilization of circumlocution strategy  3//22: Able to complete following basic education of task and paper explaining areas of features, 100% accuracy  3/24: Patient able to describe "emotional" words to SLP with minimal cues to use sentences/descriptors for SLP to retrieve word        10  Patient will complete picture description tasks using language organization strategies with 80% accuracy to facilitate improved utilization of circumlocution strategy          Long-term goals:      1  Patient will maximize expression of basic & complex / wants needs with modified independence       2  Patient & mother will express understanding of ways to maximize communication & comprehension & overall cognition in home carryover activities & setting         Plan:  -Patient was provided with home exercises/activities to target goals in plan of care at the end of today's session   -Discussed session and patient's progress with caregiver/family member after today's session   -Continue with current plan of care  fluency naming, word retrieval tasks, scripting with telephone job reenacting over phone with typing of answers, picture description, synonyms, articulating challenging phonemes, sequences, writing tasks, reading comprehension, higher level processing, left /right

## 2022-03-31 ENCOUNTER — OFFICE VISIT (OUTPATIENT)
Dept: SPEECH THERAPY | Facility: CLINIC | Age: 27
End: 2022-03-31
Payer: COMMERCIAL

## 2022-03-31 ENCOUNTER — APPOINTMENT (OUTPATIENT)
Dept: PHYSICAL THERAPY | Facility: CLINIC | Age: 27
End: 2022-03-31
Payer: COMMERCIAL

## 2022-03-31 ENCOUNTER — APPOINTMENT (OUTPATIENT)
Dept: OCCUPATIONAL THERAPY | Facility: CLINIC | Age: 27
End: 2022-03-31
Payer: COMMERCIAL

## 2022-03-31 DIAGNOSIS — I63.9 CEREBROVASCULAR ACCIDENT (CVA), UNSPECIFIED MECHANISM (HCC): Primary | ICD-10-CM

## 2022-03-31 DIAGNOSIS — I69.320 APHASIA AS LATE EFFECT OF CEREBROVASCULAR ACCIDENT (CVA): ICD-10-CM

## 2022-03-31 PROCEDURE — 92507 TX SP LANG VOICE COMM INDIV: CPT

## 2022-03-31 NOTE — PROGRESS NOTES
Daily Speech Treatment Note    Today's date: 3/31/2022  Patients name: Lopez Monreal  : 1995  MRN: [id number]  Safety measures:   Referring provider: Karolina Degroot MD             Encounter Diagnosis       ICD-10-CM     1  Cerebrovascular accident (CVA), unspecified mechanism ([facility] [address] )  I63 9     2  Aphasia as late effect of cerebrovascular accident (CVA)  I69 320           Visit tracking:  -Referring provider: Epic  -Billing guidelines: CMS  -Visit # 7  -Insurance: [employer]  -RE due: Session 9         Subjective/Behavioral:  - Pleasant/Cooperative  Objective/Assessment:    Provided worksheets for articulation & fluency - word to 8-9 word sentences  - Patient notes improvement, particularly when giving herself increased time to articulate  Patient encouraged to utilize relaxed open mouth breathing, overall relaxation with verbal expression to decrease any tension/strain  Short-term goals:      1  Complete reading and writing assessment via WAB as deemed appropriate  3/14: Completed WAB reading and writing assessment: /40: Comprehension of Sentences, : Comprehension of reading commands, Written word-picture matching, picture-written word choice matchin%, spoken word-written word matchin%, spelled word recognition: , spellin/6  Writin% writing name and address, writing output, mild errors in morphology, verb tense, , writing dictated words: 10/10, letters and numbers: 100%     2  Patient and mother will express understanding of ways to increase basic expression of patient's wants & needs via demonstration of ways (circumlocution, phonemic cues) to assist patient in structured therapeutic tasks as assessed by SLP over 3 separate activity tasks/ sessions  3/10: Patient and mother were educated on word finding strategies  A Word Retrieval Strategies handout was provided  3/14: Provided lists of apps, games to continue to work on word retrieval at home   Suggested being "ok" with increased processing time with others  3/22: Patient's mother demonstrated strategies and exercises independently to promote patient's expression        3  Patient will complete verbal automatic speech tasks to increase fluency & verbal expression with 90% accuracy  3/14: Achieved 90% accuracy in automatic phrase / sentence completion tasks, occasional increased processing time        4  Patient will correctly identify left/right body parts with 90% accuracy  3/10: Patient correctly identified 14/15 parts of the body with 93% accuracy, requiring min cueing (stated "tin" for "chin")  3/24: Left / Right body part ID: 90% accuracy, error with right cheek      5  Patient will answer complex yes/no questions (example: before / after questions) with at least 80% accuracy  3/24: Before/answer, If (complex) questions @ 90% accuracy  100% accuracy in all complex written direction tasks x 3 separate worksheets  6  Patient will increase basic verbal expression via structured therapeutic tasks including (naming pictures & objects, naming family members, responsive naming tasks, word retrieval tasks with increasing length and complexity such as synonyms/antonyms, etc), 80% accuracy with minimal-moderate fading cues, 80% accuracy  3/10: To target higher level generative naming, patient completed [employer] activity/game using multiple word and letter restrictions (i e , word associated with sports, contains the letter T, is exactly 2 syllables)  Patient completed using 2 cards (red and yellow) /restrictions over 11 trials with 100% acc   3/14: Responsive naming tasks: 90% accuracy  Provided visual/phonemic cues to assist with proper articulation with sounds, sh, t, l, p at times (mild apraxia suspected and provided handouts for reading practice ) 3/22: 80% convergent feature analysis of 3 words and providing antonyms for given word   3/24: Responsive naming tasks, completing sentence starters, adding member to abstract member list, 80% accuracy with occasional minimal cue              7  Patient will name 8-10 members of a provided concrete category with fading cues in 1 minute time span  3/10:  Patient was provided with concrete and abstract categories (e g  Things that are cold and things that are uncomfortable) and was asked to name 5 words that would appropriately fit into each category  The task was completed with 100% accuracy, requiring minimal verbal cueing  Demonstrated mild paraphasias but was able to sound out words and come up with the right pronunciation of the word  3/22:  Listed 10 words given association of given word with increased processing time  3/24: Listing 10-15 words given concrete category in 1 minute  3/31: Naming members in abstract category: Labeled at least 5 in each category with good speed  Providing word in correct spelling given certain letters and clue: 69% accuracy  Conversation completed with minimal cues for occasional articulation with each syllable  [place] categories- naming over 10 words, occasionally with minimal cues  8  Patient will provide correct verbal expression response phrase or sentence given scenario with minimal cues, 80% accuracy  3/22: Provided description of yesturday's activities independently with mildly increased processing time for word retrieval and articulation  3/24: Expressing self in conversation with noted literal distortions, difficulty with articulation with certain sounds: initial, medial, final /k/, ch, cl, st, sh (blends in particular)  Benefits from increased time for articulation with blends and sequences    3/28: Expression of words of increased length and sentences in particular attention to (v, ch, s blends, k blends, multisyllabics and 8-9 word sentences with overall 65-70% independence with self correction, benefitted from modeling, finger tapping at times to slow and pronounce each syllable, modeling increased accuracy to 100%  Scripting for work and typing answers from SLP calling from other room- occasional, minimal dysfluencies and misarticulations, patient with spelling errors with phone numbers, name and address increased to 100% accuracy with reviewing them with SLP  9  Patient will name up to 5 features to describe a person/place/thing with 80% accuracy to facilitate improved utilization of circumlocution strategy  3//22: Able to complete following basic education of task and paper explaining areas of features, 100% accuracy  3/24: Patient able to describe "emotional" words to SLP with minimal cues to use sentences/descriptors for SLP to retrieve word        10  Patient will complete picture description tasks using language organization strategies with 80% accuracy to facilitate improved utilization of circumlocution strategy          Long-term goals:      1  Patient will maximize expression of basic & complex / wants needs with modified independence       2  Patient & mother will express understanding of ways to maximize communication & comprehension & overall cognition in home carryover activities & setting         Plan:  -Patient was provided with home exercises/activities to target goals in plan of care at the end of today's session   -Discussed session and patient's progress with caregiver/family member after today's session   -Continue with current plan of care  fluency naming, word retrieval tasks, scripting with telephone job reenacting over phone with typing of answers, picture description, synonyms, articulating challenging phonemes, sequences, writing tasks, reading comprehension, higher level processing, left /right  Next session: Higher level processing book, word finding spelling tasks, description of features tasks   Reading words, 10 word sentences, etc

## 2022-04-04 ENCOUNTER — OFFICE VISIT (OUTPATIENT)
Dept: SPEECH THERAPY | Facility: CLINIC | Age: 27
End: 2022-04-04
Payer: COMMERCIAL

## 2022-04-04 DIAGNOSIS — I63.9 CEREBROVASCULAR ACCIDENT (CVA), UNSPECIFIED MECHANISM (HCC): Primary | ICD-10-CM

## 2022-04-04 DIAGNOSIS — I69.320 APHASIA AS LATE EFFECT OF CEREBROVASCULAR ACCIDENT (CVA): ICD-10-CM

## 2022-04-04 PROCEDURE — 92507 TX SP LANG VOICE COMM INDIV: CPT

## 2022-04-04 NOTE — PROGRESS NOTES
Daily Speech Treatment Note    Today's date: 2022  Patients name: Yuri Keita  : 1995  MRN: [id number]  Safety measures:   Referring provider: Seema Coreas MD             Encounter Diagnosis       ICD-10-CM     1  Cerebrovascular accident (CVA), unspecified mechanism ([facility] [address] )  I63 9     2  Aphasia as late effect of cerebrovascular accident (CVA)  I69 320           Visit tracking:  -Referring provider: Epic  -Billing guidelines: CMS  -Visit # 8  -Insurance: [employer]  -RE due: Session 9         Subjective/Behavioral:  - Pleasant/Cooperative  Objective/Assessment:    Provided worksheets for articulation & fluency - word to 8-9 word sentences  -    Short-term goals:      1  Complete reading and writing assessment via WAB as deemed appropriate  3/14: Completed WAB reading and writing assessment: : Comprehension of Sentences, : Comprehension of reading commands, Written word-picture matching, picture-written word choice matchin%, spoken word-written word matchin%, spelled word recognition: , spellin/6  Writin% writing name and address, writing output, mild errors in morphology, verb tense, , writing dictated words: 10/10, letters and numbers: 100%     2  Patient and mother will express understanding of ways to increase basic expression of patient's wants & needs via demonstration of ways (circumlocution, phonemic cues) to assist patient in structured therapeutic tasks as assessed by SLP over 3 separate activity tasks/ sessions  3/10: Patient and mother were educated on word finding strategies  A Word Retrieval Strategies handout was provided  3/14: Provided lists of apps, games to continue to work on word retrieval at home  Suggested being "ok" with increased processing time with others  3/22: Patient's mother demonstrated strategies and exercises independently to promote patient's expression        3   Patient will complete verbal automatic speech tasks to increase fluency & verbal expression with 90% accuracy  3/14: Achieved 90% accuracy in automatic phrase / sentence completion tasks, occasional increased processing time        4  Patient will correctly identify left/right body parts with 90% accuracy  3/10: Patient correctly identified 14/15 parts of the body with 93% accuracy, requiring min cueing (stated "tin" for "chin")  3/24: Left / Right body part ID: 90% accuracy, error with right cheek      5  Patient will answer complex yes/no questions (example: before / after questions) with at least 80% accuracy  3/24: Before/answer, If (complex) questions @ 90% accuracy  100% accuracy in all complex written direction tasks x 3 separate worksheets  4/4:  100% accuracy with 1 self correction  6  Patient will increase basic verbal expression via structured therapeutic tasks including (naming pictures & objects, naming family members, responsive naming tasks, word retrieval tasks with increasing length and complexity such as synonyms/antonyms, etc), 80% accuracy with minimal-moderate fading cues, 80% accuracy  3/10: To target higher level generative naming, patient completed Tilman Opitz activity/game using multiple word and letter restrictions (i e , word associated with sports, contains the letter T, is exactly 2 syllables)  Patient completed using 2 cards (red and yellow) /restrictions over 11 trials with 100% acc   3/14: Responsive naming tasks: 90% accuracy  Provided visual/phonemic cues to assist with proper articulation with sounds, sh, t, l, p at times (mild apraxia suspected and provided handouts for reading practice ) 3/22: 80% convergent feature analysis of 3 words and providing antonyms for given word   3/24: Responsive naming tasks, completing sentence starters, adding member to abstract member list, 80% accuracy with occasional minimal cue  4/4: Formulating words given letter & description: 100% accuracy with formulating word but further work with syllables and spelling                7  Patient will name 8-10 members of a provided concrete category with fading cues in 1 minute time span  3/10:  Patient was provided with concrete and abstract categories (e g  Things that are cold and things that are uncomfortable) and was asked to name 5 words that would appropriately fit into each category  The task was completed with 100% accuracy, requiring minimal verbal cueing  Demonstrated mild paraphasias but was able to sound out words and come up with the right pronunciation of the word  3/22:  Listed 10 words given association of given word with increased processing time  3/24: Listing 10-15 words given concrete category in 1 minute  3/31: Naming members in abstract category: Labeled at least 5 in each category with good speed  Providing word in correct spelling given certain letters and clue: 69% accuracy  Conversation completed with minimal cues for occasional articulation with each syllable  [place] categories- naming over 10 words, occasionally with minimal cues  8  Patient will provide correct verbal expression response phrase or sentence given scenario with minimal cues, 80% accuracy  3/22: Provided description of yesturday's activities independently with mildly increased processing time for word retrieval and articulation  3/24: Expressing self in conversation with noted literal distortions, difficulty with articulation with certain sounds: initial, medial, final /k/, ch, cl, st, sh (blends in particular)  Benefits from increased time for articulation with blends and sequences  3/28: Expression of words of increased length and sentences in particular attention to (v, ch, s blends, k blends, multisyllabics and 8-9 word sentences with overall 65-70% independence with self correction, benefitted from modeling, finger tapping at times to slow and pronounce each syllable, modeling increased accuracy to 100%   Scripting for work and typing answers from SLP calling from other room- occasional, minimal dysfluencies and misarticulations, patient with spelling errors with phone numbers, name and address increased to 100% accuracy with reviewing them with SLP    4/4: Wrote letter to friend: 98% accuracy, only 2 corrections with morphemes  9  Patient will name up to 5 features to describe a person/place/thing with 80% accuracy to facilitate improved utilization of circumlocution strategy  3//22: Able to complete following basic education of task and paper explaining areas of features, 100% accuracy  3/24: Patient able to describe "emotional" words to SLP with minimal cues to use sentences/descriptors for SLP to retrieve word  4/4: Using paper labeled with semantic features: 100% accuracy and utilized without paper as well with 100% accuracy      10  Patient will complete picture description tasks using language organization strategies with 80% accuracy to facilitate improved utilization of circumlocution strategy  4/4: Described picture: 100% accuracy        Long-term goals:      1  Patient will maximize expression of basic & complex / wants needs with modified independence       2  Patient & mother will express understanding of ways to maximize communication & comprehension & overall cognition in home carryover activities & setting         Plan:  -Patient was provided with home exercises/activities to target goals in plan of care at the end of today's session   -Discussed session and patient's progress with caregiver/family member after today's session   -Continue with current plan of care  fluency naming, word retrieval tasks, scripting with telephone job reenacting over phone with typing of answers, picture description, synonyms, articulating challenging phonemes, sequences, writing tasks, reading comprehension, higher level processing, left /right  Next session: , word finding spelling tasks,   Reading words, 10 word sentences, etc  Syllables, reading words/ syllables

## 2022-04-06 ENCOUNTER — OFFICE VISIT (OUTPATIENT)
Dept: OCCUPATIONAL THERAPY | Facility: CLINIC | Age: 27
End: 2022-04-06
Payer: COMMERCIAL

## 2022-04-06 DIAGNOSIS — I63.412 CEREBROVASCULAR ACCIDENT (CVA) DUE TO EMBOLIC OCCLUSION OF LEFT MIDDLE CEREBRAL ARTERY (HCC): Primary | ICD-10-CM

## 2022-04-06 PROCEDURE — 97166 OT EVAL MOD COMPLEX 45 MIN: CPT

## 2022-04-06 PROCEDURE — 96125 COGNITIVE TEST BY HC PRO: CPT
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
## 2022-04-07 ENCOUNTER — ANTICOAG VISIT (OUTPATIENT)
Dept: CARDIOLOGY CLINIC | Facility: CLINIC | Age: 27
End: 2022-04-07
Payer: COMMERCIAL

## 2022-04-07 ENCOUNTER — EVALUATION (OUTPATIENT)
Dept: SPEECH THERAPY | Facility: CLINIC | Age: 27
End: 2022-04-07
Payer: COMMERCIAL

## 2022-04-07 DIAGNOSIS — I69.320 APHASIA AS LATE EFFECT OF CEREBROVASCULAR ACCIDENT (CVA): ICD-10-CM

## 2022-04-07 DIAGNOSIS — Z95.2 HISTORY OF MECHANICAL AORTIC VALVE REPLACEMENT: Primary | ICD-10-CM

## 2022-04-07 DIAGNOSIS — I63.9 CEREBROVASCULAR ACCIDENT (CVA), UNSPECIFIED MECHANISM (HCC): Primary | ICD-10-CM

## 2022-04-07 LAB — INR PPP: 3 (ref 0.84–1.19)

## 2022-04-07 PROCEDURE — 96105 ASSESSMENT OF APHASIA: CPT

## 2022-04-07 PROCEDURE — 93793 ANTICOAG MGMT PT WARFARIN: CPT | Performed by: INTERNAL MEDICINE

## 2022-04-07 NOTE — PROGRESS NOTES
Speech-Language Pathology Re-Evaluation    Today's date: 2022  Patients name: Piedad Ovalles  : 1995  MRN: [id number]  Safety measures:   Referring provider: Fritz Bernard MD    Encounter Diagnosis     ICD-10-CM    1  Cerebrovascular accident (CVA), unspecified mechanism ([facility] [address] )  I63 9    2  Aphasia as late effect of cerebrovascular accident (CVA)  I69 320          Visit Trackin     Subjective comments: Patient pleasant/ cooperative  Mother attends sessions  Per chart review patient passed Fit to Drive test yesturday  Patient's goal(s): Did not ask patient this session but patient has stated in prior sessions she wishes to continue to improve her speech  Assessments    The [employer] Edition (BNT-2) is a confrontational naming assessment that asks patients to provide the best name for a given picture  It was designed to detect word-finding impairments  The BNT-2 consists of 60 pictures, ordered from easiest to most difficult  Stimulus cues, including semantic, phonemic, and written information, are provided as necessary  The following results were obtained during the administration of the assessment:     Summary of Scores: Score:   1  Number of spontaneously given correct response: 50       2  Number of stimulus cues given:  11   3  Number of correct responses following a stimulus cue:  3       *TOTAL SCORE: 53   Percentile Rank:  82%ile       Additional Cuein  Number of phonemic cues: 9   5  Number of correct responses following the phonemic cue: 6       6  Number of multiple choices given:  3   7  Number of correct choices: 1         The [facility] Diagnostic Aphasia Examination-Third Edition (BDAE-3) is a comprehensive standardized assessment designed to evaluate a broad range of language impairments that often arise as a consequence of organic brain dysfunction   The BDAE-3 is divided into five subtests, including conversational & expository speech, auditory comprehension, oral expression, reading, and writing  The results of the BDAE-3 are used to classify a patients language profiles into one of the localization-based classifications of aphasia: Brocas, Wernickes, anomic, conduction, transcortical, transcortical motor, transcortical sensory, and global aphasia syndromes, although the test does not always provide a diagnosis or a therapeutic approach  The following results were obtained during the administration of the standard form assessment:       Score: Percentile:   SEVERITY RATIN/5 90%ile        FLUENCY:     -Grammatical form (rating scale)  50%ile             AUDITORY COMPREHENSION:     -Commands 14/15 60%ile        NAMING:     -[place] Naming Test - long  82%ile   -Special categories  80%ile        READING:     -Oral word reading  DNT        WRITING:     -Primer words  100%ile   -Regular phonics  100%ile   -Uncommon irregular words  20%ile   -Common irregular words  100%ile   -Written picture naming   DNT   -Narrative writing  100%ile     Due to time constraints, only portions of the standardized assessment were administered on this date of service  Overall, patient presents with a mild expressive/receptive aphasia with a severity rating of  4 (out of a possible 5 being fluent speech)  0 - No usable speech or auditory comprehension  1 - All communication is through fragmentary expression; great need for inference, questioning, and guessing by the listener  The range of information that can be exchanged is limited, and the listener carries the burden of communication  2 - Conversation about familiar subjects is possible with help from the listener  There are frequent failures to convey the idea, but the patient shares the burden of communication  3 - The patient can discuss almost all everyday problems with little or no assistance   Reduction of speech and/or comprehension, however, makes conversation about certain material difficult or impossible  4 - Some obvious loss of fluency in speech or facility of comprehension, without significant limitation on ideas expressed or form of expression  5 - Minimal discernible speech handicap; the patient may have subjective difficulties that are not obvious to the listener  22: Cookie Theft Picture Oral Description: "Well, sink is overflowing  The mother is She's drying a d dish  The boy is taking He's take cookies off cookie jar and taking off taking off stool "        Motor Speech Evaluation:    -Facial appearance Symmetrical   -Mandible function Adequate ROM   -Dentition Adequate   -Labial function WFL   -Lingual function WFL   -Velar function Unable to visualize   -Oral Apraxia? Absent   -Vocal Quality Clear/adequate   -Volitional Cough N/A   -Respiration WFL   -Drooling? No   -Tremor/Involuntary Movement? Not present   -Tracheostomy Present? No       1  Sustained phonation    -Vowel prolongation (norm=15-20 sec) N/A       2  Diadochokinetic (DDK) Rates    -puh-puh-puh (norm=3 0-5 5 rep/1 sec) DNT   -tuh-tuh-tuh (norm=25 rep/10 sec) DNT   -kuh-kuh-kuh (norm=25 rep/10 sec) DNT   -puh-tuh-kuh (norm=8 rep/10 sec) DNT       3  Articulation    -Single syllable words 100% intelligible   -Multisyllabic words 80% intelligible   -Repetition of multisyllabic words 5x 33% cues to increase fluency   -Words of progressive length 80% difficulty with words with increased length   -Sentences DNT   -Reading ([place]) DNT   -Conversation 90% intelligible         Patient presents with mild apraxia characterized by Articulatory groping and Imprecise articulation  Goals    1  Complete reading and writing assessment via WAB as deemed appropriate   ACHIEVED Completed WAB reading and writing assessment: : Comprehension of Sentences, : Comprehension of reading commands, Written word-picture matching, picture-written word choice matchin%, spoken word-written word matchin%, spelled word recognition: , spellin/6  Writin% writing name and address, writing output, mild errors in morphology, verb tense, , writing dictated words: 10/10, letters and numbers: 100%     2  Patient and mother will express understanding of ways to increase basic expression of patient's wants & needs via demonstration of ways (circumlocution, phonemic cues) to assist patient in structured therapeutic tasks as assessed by SLP over 3 separate activity tasks/ sessions  ACHIEVED      3  Patient will complete verbal automatic speech tasks to increase fluency & verbal expression with 90% accuracy  ACHIEVED     4  Patient will correctly identify left/right body parts with 90% accuracy  ACHIEVED     5   Patient will answer complex yes/no questions (example: before / after questions) with at least 80% accuracy  ACHIEVED     6  Patient will increase basic verbal expression via structured therapeutic tasks including (naming pictures & objects, naming family members, responsive naming tasks, word retrieval tasks with increasing length and complexity such as synonyms/antonyms, etc), 80% accuracy with minimal-moderate fading cues, 80% accuracy    ACHIEVED         7  Patient will name 8-10 members of a provided concrete category with fading cues in 1 minute time span  ACHIEVED     8  Patient will provide correct verbal expression response phrase or sentence given scenario with minimal cues, 80% accuracy  ACHIEVED     9  Patient will name up to 5 features to describe a person/place/thing with 80% accuracy to facilitate improved utilization of circumlocution strategy  ACHIEVED    10  Patient will complete picture description tasks using language organization strategies with 80% accuracy to facilitate improved utilization of circumlocution strategy   ACHIEVED      New Short Term Goals:    1   Patient will complete + 3 step commands given in auditory format @ 80-90% accuracy  2  Patient will complete writing tasks in paragraph format (either typed or hand written) with no errors in spelling or grammar over 3 consecutive sessions  3  Patient will achieve 90% in spelling regular words & 80% accuracy in spelling irregular words via paper or oral spelling  4  Patient will enunciate while reading multisyllabic words-paragraph level per syllable fluently @ 80-90% accuracy  5  Patient will complete telephone script in preparation of return to work with 90% accuracy in fluency, independent self correction & no errors in typing out information provided by SLP  6  Patient will complete higher level word retrieval tasks (anagrams, higher level confrontation naming, analogies, etc ) @ 90% accuracy and complete tasks fluently in less than 3 second response time 80% of the time  7   Patient will express self in conversation fluently @ clearly with independent self correction 90% - 100% of expressive output during unstructured or structured conversational tasks  Long-term goals:      1  Patient will maximize expression of basic & complex / wants needs with modified independence  CONTINUE     2  Patient & mother will express understanding of ways to maximize communication & comprehension & overall cognition in home carryover activities & setting  CONTINUE              Impressions/Recommendations    Impressions:   Patient is a 27year old who has improved from an initial moderate expressive aphasia 1 month prior on 3/7/2022 to now with a mild expressive aphasia per BDAE  Patient with a slightly decreased higher level auditory comprehension as well  Patient noted with decreased fluency, word retrieval & a mild apraxia component with decreased oral coordination & fluency with certain sounds in words such as /k/, multisyllabic words & extends intermittently at sentence-paragraph level    Patient has made continuous gains and works very hard during all sessions and completes homework at home  Patient scored in the 81%ile on the [employer] today  Patient's fluency per BDAE rated as below:   4 - Some obvious loss of fluency in speech or facility of comprehension, without significant limitation on ideas expressed or form of expression  Patient has a very supportive mother  This patient was independent at baseline & working full time as a telephone   prior to CVA  Patient continues to  be very motivated  Recommendations:  -Patient would benefit from outpatient skilled Speech Therapy services: Speech-language therapy    -Frequency: 1-2x weekly  -Duration: 6-8 weeks    -Intervention certification from: 1/7/3849  -Intervention certification to: 8/9/6191    -Intervention comments:   Diadochokinetic speech tasks, open writing tasks, work telephone script, spelling tasks including irregular uncommon words orally and written, reading drills with targeted sounds, multisyllabic words-paragraph length, higher level word retrieval tasks, oral directions 2-4, 3-6 components

## 2022-04-07 NOTE — LETTER
2022    Diego Valenzuela MD  [address] [facility], [address] Neurology [address] [facility] [address]    Patient: Solomon Ngael   YOB: 1995   Date of Visit: 2022     Encounter Diagnosis     ICD-10-CM    1  Cerebrovascular accident (CVA), unspecified mechanism ([address] )  I63 9    2  Aphasia as late effect of cerebrovascular accident (CVA)  I69 5        Dear Dr Anushka Young:    Thank you for your recent referral of Solomon Nagel  Please review the attached evaluation summary from [facility]  91  recent visit  Please verify that you agree with the plan of care by signing the attached order  If you have any questions or concerns, please do not hesitate to call  I sincerely appreciate the opportunity to share in the care of one of your patients and hope to have another opportunity to work with you in the near future  Sincerely,    Davy Newberry, SLP      Referring Provider:     Based upon review of the patient's progress and continued therapy plan, it is my medical opinion that Helen Alfred should continue speech therapy treatment at the Physical Therapy [facility]:                    Diego Valenzuela MD  [address] [facility], [address] Neurology [facility] [address]  Via Fax: [phone]        Speech-Language Pathology Re-Evaluation    Today's date: 2022  Patients name: Solomon Nagel  : 1995  MRN: [id number]  Safety measures:   Referring provider: Chance Scott MD    Encounter Diagnosis     ICD-10-CM    1  Cerebrovascular accident (CVA), unspecified mechanism ([address] )  I63 9    2  Aphasia as late effect of cerebrovascular accident (CVA)  I69 320          Visit Trackin     Subjective comments: Patient pleasant/ cooperative  Mother attends sessions  Per chart review patient passed Fit to Drive test yesturday      Patient's goal(s): Did not ask patient this session but patient has stated in prior sessions she wishes to continue to improve her speech  Assessments    The [employer] Edition (BNT-2) is a confrontational naming assessment that asks patients to provide the best name for a given picture  It was designed to detect word-finding impairments  The BNT-2 consists of 60 pictures, ordered from easiest to most difficult  Stimulus cues, including semantic, phonemic, and written information, are provided as necessary  The following results were obtained during the administration of the assessment:     Summary of Scores: Score:   1  Number of spontaneously given correct response: 50       2  Number of stimulus cues given:  11   3  Number of correct responses following a stimulus cue:  3       *TOTAL SCORE: 53   Percentile Rank:  82%ile       Additional Cuein  Number of phonemic cues: 9   5  Number of correct responses following the phonemic cue: 6       6  Number of multiple choices given:  3   7  Number of correct choices: 1         The [place] Diagnostic Aphasia Examination-Third Edition (BDAE-3) is a comprehensive standardized assessment designed to evaluate a broad range of language impairments that often arise as a consequence of organic brain dysfunction  The BDAE-3 is divided into five subtests, including conversational & expository speech, auditory comprehension, oral expression, reading, and writing  The results of the BDAE-3 are used to classify a patients language profiles into one of the localization-based classifications of aphasia: Brocas, Wernickes, anomic, conduction, transcortical, transcortical motor, transcortical sensory, and global aphasia syndromes, although the test does not always provide a diagnosis or a therapeutic approach   The following results were obtained during the administration of the standard form assessment:       Score: Percentile:   SEVERITY RATIN/5 90%ile        FLUENCY:     -Grammatical form (rating scale) 5/7 50%ile AUDITORY COMPREHENSION:     -Commands 14/15 60%ile        NAMING:     -Purvis Naming Test - long 51/60 82%ile   -Special categories 11/12 80%ile        READING:     -Oral word reading 4/4 DNT        WRITING:     -Primer words 6/6 100%ile   -Regular phonics 5/5 100%ile   -Uncommon irregular words 0/6 20%ile   -Common irregular words 5/5 100%ile   -Written picture naming  4/4 DNT   -Narrative writing 11/11 100%ile     Due to time constraints, only portions of the standardized assessment were administered on this date of service  Overall, patient presents with a mild expressive/receptive aphasia with a severity rating of  4 (out of a possible 5 being fluent speech)  0 - No usable speech or auditory comprehension  1 - All communication is through fragmentary expression; great need for inference, questioning, and guessing by the listener  The range of information that can be exchanged is limited, and the listener carries the burden of communication  2 - Conversation about familiar subjects is possible with help from the listener  There are frequent failures to convey the idea, but the patient shares the burden of communication  3 - The patient can discuss almost all everyday problems with little or no assistance  Reduction of speech and/or comprehension, however, makes conversation about certain material difficult or impossible  4 - Some obvious loss of fluency in speech or facility of comprehension, without significant limitation on ideas expressed or form of expression  5 - Minimal discernible speech handicap; the patient may have subjective difficulties that are not obvious to the listener  4/7/22: Cookie Theft Picture Oral Description: "Well, sink is overflowing  The mother is She's drying a d dish   The boy is taking He's take cookies off cookie jar and taking off taking off stool "        Motor Speech Evaluation:    -Facial appearance Symmetrical   -Mandible function Adequate ROM   -Dentition Adequate   -Labial function WFL   -Lingual function WFL   -Velar function Unable to visualize   -Oral Apraxia? Absent   -Vocal Quality Clear/adequate   -Volitional Cough N/A   -Respiration WFL   -Drooling? No   -Tremor/Involuntary Movement? Not present   -Tracheostomy Present? No       1  Sustained phonation    -Vowel prolongation (norm=15-20 sec) N/A       2  Diadochokinetic (DDK) Rates    -puh-puh-puh (norm=3 0-5 5 rep/1 sec) DNT   -tuh-tuh-tuh (norm=25 rep/10 sec) DNT   -kuh-kuh-kuh (norm=25 rep/10 sec) DNT   -puh-tuh-kuh (norm=8 rep/10 sec) DNT       3  Articulation    -Single syllable words 100% intelligible   -Multisyllabic words 80% intelligible   -Repetition of multisyllabic words 5x 33% cues to increase fluency   -Words of progressive length 80% difficulty with words with increased length   -Sentences DNT   -Reading ([place]) DNT   -Conversation 90% intelligible         Patient presents with mild apraxia characterized by Articulatory groping and Imprecise articulation  Goals    1  Complete reading and writing assessment via WAB as deemed appropriate  ACHIEVED Completed WAB reading and writing assessment: 40/40: Comprehension of Sentences, : Comprehension of reading commands, Written word-picture matching, picture-written word choice matchin%, spoken word-written word matchin%, spelled word recognition: 6, spellin/6  Writin% writing name and address, writing output, mild errors in morphology, verb tense, , writing dictated words: 10/10, letters and numbers: 100%     2  Patient and mother will express understanding of ways to increase basic expression of patient's wants & needs via demonstration of ways (circumlocution, phonemic cues) to assist patient in structured therapeutic tasks as assessed by SLP over 3 separate activity tasks/ sessions  ACHIEVED      3   Patient will complete verbal automatic speech tasks to increase fluency & verbal expression with 90% accuracy  ACHIEVED     4  Patient will correctly identify left/right body parts with 90% accuracy  ACHIEVED     5   Patient will answer complex yes/no questions (example: before / after questions) with at least 80% accuracy  ACHIEVED     6  Patient will increase basic verbal expression via structured therapeutic tasks including (naming pictures & objects, naming family members, responsive naming tasks, word retrieval tasks with increasing length and complexity such as synonyms/antonyms, etc), 80% accuracy with minimal-moderate fading cues, 80% accuracy    ACHIEVED         7  Patient will name 8-10 members of a provided concrete category with fading cues in 1 minute time span  ACHIEVED     8  Patient will provide correct verbal expression response phrase or sentence given scenario with minimal cues, 80% accuracy  ACHIEVED     9  Patient will name up to 5 features to describe a person/place/thing with 80% accuracy to facilitate improved utilization of circumlocution strategy  ACHIEVED    10  Patient will complete picture description tasks using language organization strategies with 80% accuracy to facilitate improved utilization of circumlocution strategy   ACHIEVED      New Short Term Goals:    1  Patient will complete + 3 step commands given in auditory format @ 80-90% accuracy  2  Patient will complete writing tasks in paragraph format (either typed or hand written) with no errors in spelling or grammar over 3 consecutive sessions  3  Patient will achieve 90% in spelling regular words & 80% accuracy in spelling irregular words via paper or oral spelling  4  Patient will enunciate while reading multisyllabic words-paragraph level per syllable fluently @ 80-90% accuracy  5  Patient will complete telephone script in preparation of return to work with 90% accuracy in fluency, independent self correction & no errors in typing out information provided by SLP      6  Patient will complete higher level word retrieval tasks (anagrams, higher level confrontation naming, analogies, etc ) @ 90% accuracy and complete tasks fluently in less than 3 second response time 80% of the time  7   Patient will express self in conversation fluently @ clearly with independent self correction 90% - 100% of expressive output during unstructured or structured conversational tasks  Long-term goals:      1  Patient will maximize expression of basic & complex / wants needs with modified independence  CONTINUE     2  Patient & mother will express understanding of ways to maximize communication & comprehension & overall cognition in home carryover activities & setting  CONTINUE              Impressions/Recommendations    Impressions:   Patient is a 27year old who has improved from an initial moderate expressive aphasia 1 month prior on 3/7/2022 to now with a mild expressive aphasia per BDAE  Patient with a slightly decreased higher level auditory comprehension as well  Patient noted with decreased fluency, word retrieval & a mild apraxia component with decreased oral coordination & fluency with certain sounds in words such as /k/, multisyllabic words & extends intermittently at sentence-paragraph level  Patient has made continuous gains and works very hard during all sessions and completes homework at home  Patient scored in the 81%ile on the [employer] today  Patient's fluency per BDAE rated as below:   4 - Some obvious loss of fluency in speech or facility of comprehension, without significant limitation on ideas expressed or form of expression  Patient has a very supportive mother  This patient was independent at baseline & working full time as a telephone   prior to CVA  Patient continues to  be very motivated         Recommendations:  -Patient would benefit from outpatient skilled Speech Therapy services: Speech-language therapy    -Frequency: 1-2x weekly  -Duration: 6-8 weeks    -Intervention certification from: 4/5/3345  -Intervention certification to: 9/2/6270    -Intervention comments:   Diadochokinetic speech tasks, open writing tasks, work telephone script, spelling tasks including irregular uncommon words orally and written, reading drills with targeted sounds, multisyllabic words-paragraph length, higher level word retrieval tasks, oral directions 2-4, 3-6 components

## 2022-04-07 NOTE — PROGRESS NOTES
Spoke with patient's mom, she states patient is doing better and is handling her own medications  Asked that I call her  Left message for patient, advised INR at upper level, not sure if any changes in her diet or medications  Advised to call office to discuss  But will have her take 3 75 mg today instead of 5 mg and then go back to normal dosing 3 75 mg Mon Wed Fri, 5 mg all other days   Will recheck next week    Patient is a home elvia

## 2022-04-08 DIAGNOSIS — F41.9 ANXIETY: ICD-10-CM

## 2022-04-08 DIAGNOSIS — Z95.2 S/P AVR: ICD-10-CM

## 2022-04-08 RX ORDER — WARFARIN SODIUM 2.5 MG/1
TABLET ORAL
Qty: 180 TABLET | Refills: 0 | Status: SHIPPED | OUTPATIENT
Start: 2022-04-08 | End: 2022-07-09 | Stop reason: SDUPTHER

## 2022-04-08 RX ORDER — ESCITALOPRAM OXALATE 10 MG/1
10 TABLET ORAL DAILY
Qty: 90 TABLET | Refills: 0 | Status: SHIPPED | OUTPATIENT
Start: 2022-04-08 | End: 2022-07-09 | Stop reason: SDUPTHER

## 2022-04-08 RX ORDER — METOPROLOL SUCCINATE 25 MG/1
25 TABLET, EXTENDED RELEASE ORAL 2 TIMES DAILY
Qty: 180 TABLET | Refills: 0 | Status: SHIPPED | OUTPATIENT
Start: 2022-04-08 | End: 2022-07-09 | Stop reason: SDUPTHER

## 2022-04-08 NOTE — PROGRESS NOTES
Attempted to contact patient's mom, left message, advised I contacted Bennie Ochoa, left message, advised her to call our office that she received message, but I did not hear back from her  Just want to verify that she received message

## 2022-04-11 ENCOUNTER — OFFICE VISIT (OUTPATIENT)
Dept: SPEECH THERAPY | Facility: CLINIC | Age: 27
End: 2022-04-11
Payer: COMMERCIAL

## 2022-04-11 DIAGNOSIS — I63.9 CEREBROVASCULAR ACCIDENT (CVA), UNSPECIFIED MECHANISM (HCC): Primary | ICD-10-CM

## 2022-04-11 DIAGNOSIS — I69.320 APHASIA AS LATE EFFECT OF CEREBROVASCULAR ACCIDENT (CVA): ICD-10-CM

## 2022-04-11 PROCEDURE — 92507 TX SP LANG VOICE COMM INDIV: CPT

## 2022-04-11 NOTE — PROGRESS NOTES
Daily Speech Treatment Note    Today's date: 2022  Patients name: Piedad Ovalles  : 1995  MRN: [id number]  Safety measures:   Referring provider: Fritz Bernard MD             Encounter Diagnosis       ICD-10-CM     1  Cerebrovascular accident (CVA), unspecified mechanism ([facility] [address] )  I63 9     2  Aphasia as late effect of cerebrovascular accident (CVA)  I69 320           Visit tracking:  -Referring provider: Epic  -Billing guidelines: CMS  -Visit # 10  -Insurance: [employer]  -RE due: Session 19         Subjective/Behavioral:  - Pleasant/Cooperative  Objective/Assessment:      -    Short-term goals:         1  Patient will complete + 3 step commands given in auditory format @ 80-90% accuracy  : 50% increased to 66% via 3 step 6 component written task with auditory directions      2  Patient will complete writing tasks in paragraph format (either typed or hand written) with no errors in spelling or grammar over 3 consecutive sessions      3  Patient will achieve 90% in spelling regular words & 80% accuracy in spelling irregular words via paper or oral spelling      4  Patient will enunciate while reading multisyllabic words-paragraph level per syllable fluently @ 80-90% accuracy  : Reading v words /sentences, l words/sentences, /ch/ words/sentences overall 70% accuracy, increased to 80% with min cues       5  Patient will complete telephone script in preparation of return to work with 90% accuracy in fluency, independent self correction & no errors in typing out information provided by SLP      6  Patient will complete higher level word retrieval tasks (anagrams, higher level confrontation naming, analogies, etc ) @ 90% accuracy and complete tasks fluently in less than 3 second response time 80% of the time   : Category Members Anagrams: 56% independently, increased to 100% with min-moderate cues      7   Patient will express self in conversation fluently @ clearly with independent self correction 90% - 100% of expressive output during unstructured or structured conversational tasks         Long-term goals:      1  Patient will maximize expression of basic & complex / wants needs with modified independence       2  Patient & mother will express understanding of ways to maximize communication & comprehension & overall cognition in home carryover activities & setting             Plan:  -Patient was provided with home exercises/activities to target goals in plan of care at the end of today's session   -Discussed session and patient's progress with caregiver/family member after today's session   -Continue with current plan of care       Diadochokinetic speech tasks, open writing tasks, work telephone script, spelling tasks including regular and irregular words orally and written, reading drills with targeted sounds, multisyllabic words-paragraph length, higher level word retrieval tasks, oral directions 3-6 components

## 2022-04-13 NOTE — PROGRESS NOTES
Daily Speech Treatment Note    Today's date: 2022  Patients name: Alisa Kimball  : 1995  MRN: [id number]  Safety measures:   Referring provider: Mackenzie Mcgarry MD             Encounter Diagnosis       ICD-10-CM     1  Cerebrovascular accident (CVA), unspecified mechanism ([facility] [address] )  I63 9     2  Aphasia as late effect of cerebrovascular accident (CVA)  I69 320           Visit tracking:  -Referring provider: Epic  -Billing guidelines: CMS  -Visit # 11  -Insurance: [facility]  - due: Session 19         Subjective/Behavioral:  - Patient states that she's good  Accompanied by her mother, Shell Mujica back to work on 22 - She's going to start with 2 hours a day  She would like to go down to one therapy session a week and may cancel some appointments in my chart  Objective/Assessment:      Short-term goals:         1  Patient will complete + 3 step commands given in auditory format @ 80-90% accuracy  : 50% increased to 66% via 3 step 6 component written task with auditory directions  : Patient sorted Blink cards in color and shape categories while simultaneously responding to Mental Manipulation prompts/task  Patient completed activity with 91% accuracy  Patient was able to correct the one trial that she missed         2  Patient will complete writing tasks in paragraph format (either typed or hand written) with no errors in spelling or grammar over 3 consecutive sessions  : Patient was provided with a set of story cube dice with pictures on them  She was then asked to write a small paragraph using the words depicted on the story cube for inspiration  Patient completed assignment with 2 spelling errors  Patient then read the paragraph aloud with a high level of fluency      3  Patient will achieve 90% in spelling regular words & 80% accuracy in spelling irregular words via paper or oral spelling  : Patient was provided a series of words   For each word she was required to find multiple words within words and write them down  Task was completed with the patient finding between 3 and 7 words for each larger word  Patient demonstrated only one instance of misspelling of a word       4  Patient will enunciate while reading multisyllabic words-paragraph level per syllable fluently @ 80-90% accuracy  4/11: Reading v words /sentences, l words/sentences, /ch/ words/sentences overall 70% accuracy, increased to 80% with min cues  4/14: Patient read a full page (75 words) of trisyllabic words  Task was completed with 95% accuracy       5  Patient will complete telephone script in preparation of return to work with 90% accuracy in fluency, independent self correction & no errors in typing out information provided by SLP      6  Patient will complete higher level word retrieval tasks (anagrams, higher level confrontation naming, analogies, etc ) @ 90% accuracy and complete tasks fluently in less than 3 second response time 80% of the time  4/11: Category Members Anagrams: 56% independently, increased to 100% with min-moderate cues      7   Patient will express self in conversation fluently @ clearly with independent self correction 90% - 100% of expressive output during unstructured or structured conversational tasks         Long-term goals:      1  Patient will maximize expression of basic & complex / wants needs with modified independence       2  Patient & mother will express understanding of ways to maximize communication & comprehension & overall cognition in home carryover activities & setting             Plan:  -Patient was provided with home exercises/activities to target goals in plan of care at the end of today's session   -Discussed session and patient's progress with caregiver/family member after today's session   -Continue with current plan of care       Diadochokinetic speech tasks, open writing tasks, work telephone script, spelling tasks including regular and irregular words orally and written, reading drills with targeted sounds, multisyllabic words-paragraph length, higher level word retrieval tasks, oral directions 3-6 components

## 2022-04-14 ENCOUNTER — OFFICE VISIT (OUTPATIENT)
Dept: SPEECH THERAPY | Facility: CLINIC | Age: 27
End: 2022-04-14
Payer: COMMERCIAL

## 2022-04-14 DIAGNOSIS — I69.320 APHASIA AS LATE EFFECT OF CEREBROVASCULAR ACCIDENT (CVA): ICD-10-CM

## 2022-04-14 DIAGNOSIS — I63.9 CEREBROVASCULAR ACCIDENT (CVA), UNSPECIFIED MECHANISM (HCC): Primary | ICD-10-CM

## 2022-04-14 PROCEDURE — 92507 TX SP LANG VOICE COMM INDIV: CPT

## 2022-04-18 ENCOUNTER — OFFICE VISIT (OUTPATIENT)
Dept: SPEECH THERAPY | Facility: CLINIC | Age: 27
End: 2022-04-18
Payer: COMMERCIAL

## 2022-04-18 ENCOUNTER — ANTICOAG VISIT (OUTPATIENT)
Dept: CARDIOLOGY CLINIC | Facility: CLINIC | Age: 27
End: 2022-04-18
Payer: COMMERCIAL

## 2022-04-18 DIAGNOSIS — Z95.2 HISTORY OF MECHANICAL AORTIC VALVE REPLACEMENT: Primary | ICD-10-CM

## 2022-04-18 DIAGNOSIS — I63.9 CEREBROVASCULAR ACCIDENT (CVA), UNSPECIFIED MECHANISM (HCC): ICD-10-CM

## 2022-04-18 DIAGNOSIS — I69.320 APHASIA AS LATE EFFECT OF CEREBROVASCULAR ACCIDENT (CVA): Primary | ICD-10-CM

## 2022-04-18 LAB — INR PPP: 2.8 (ref 0.84–1.19)

## 2022-04-18 PROCEDURE — 92507 TX SP LANG VOICE COMM INDIV: CPT

## 2022-04-18 PROCEDURE — 93793 ANTICOAG MGMT PT WARFARIN: CPT | Performed by: INTERNAL MEDICINE

## 2022-04-18 NOTE — PROGRESS NOTES
Spoke with patient, advised INR good, will continue 3 75 mg Mon Wed Fri, 5 mg all other days, will recheck in 2 weeks 5/2/22    Patient is a home elvia

## 2022-04-18 NOTE — PROGRESS NOTES
Daily Speech Treatment Note    Today's date: 2022  Patients name: Ramon Garnett  : 1995  MRN: [id number]  Safety measures:   Referring provider: Addison Bauer MD             Encounter Diagnosis       ICD-10-CM     1  Cerebrovascular accident (CVA), unspecified mechanism ([facility] [address] )  I63 9     2  Aphasia as late effect of cerebrovascular accident (CVA)  I69 320           Visit tracking:  -Referring provider: Epic  -Billing guidelines: CMS  -Visit # 12  -Insurance: [employer]  -RE due: Session 19         Subjective/Behavioral:  - Patient states that she's good  Objective/Assessment:      Short-term goals:         1  Patient will complete + 3 step commands given in auditory format @ 80-90% accuracy  : 50% increased to 66% via 3 step 6 component written task with auditory directions  : Patient sorted Blink cards in color and shape categories while simultaneously responding to Mental Manipulation prompts/task  Patient completed activity with 91% accuracy  Patient was able to correct the one trial that she missed         2  Patient will complete writing tasks in paragraph format (either typed or hand written) with no errors in spelling or grammar over 3 consecutive sessions  : Patient was provided with a set of story cube dice with pictures on them  She was then asked to write a small paragraph using the words depicted on the story cube for inspiration  Patient completed assignment with 2 spelling errors  Patient then read the paragraph aloud with a high level of fluency  : Writing letter of absence- 2 small grammatical/spelling errors  Encouraged using finger to tap on word as say them to indep  Catch errors         3  Patient will achieve 90% in spelling regular words & 80% accuracy in spelling irregular words via paper or oral spelling  : Patient was provided a series of words  For each word she was required to find multiple words within words and write them down   Task was completed with the patient finding between 3 and 7 words for each larger word  Patient demonstrated only one instance of misspelling of a word  4/18: 90% picture words spelled correctly      4  Patient will enunciate while reading multisyllabic words-paragraph level per syllable fluently @ 80-90% accuracy  4/11: Reading v words /sentences, l words/sentences, /ch/ words/sentences overall 70% accuracy, increased to 80% with min cues  4/14: Patient read a full page (75 words) of trisyllabic words  Task was completed with 95% accuracy  4/18: Reading multi syllabic words clearly/fluently: 75 words, 6 words required assistance, 12 total errors:  84% accuracy total  90% accuracy       5  Patient will complete telephone script in preparation of return to work with 90% accuracy in fluency, independent self correction & no errors in typing out information provided by SLP  4/18: Work script: error with spelling out first name  Encouraged checking with caller and repeating info  Back to ensure accuracy        6  Patient will complete higher level word retrieval tasks (anagrams, higher level confrontation naming, analogies, etc ) @ 90% accuracy and complete tasks fluently in less than 3 second response time 80% of the time  4/11: Category Members Anagrams: 56% independently, increased to 100% with min-moderate cues  4/18: Anagrams: 100% accuracy, will continue for maintenance      7   Patient will express self in conversation fluently @ clearly with independent self correction 90% - 100% of expressive output during unstructured or structured conversational tasks         Long-term goals:      1   Patient will maximize expression of basic & complex / wants needs with modified independence       2  Patient & mother will express understanding of ways to maximize communication & comprehension & overall cognition in home carryover activities & setting             Plan:  -Patient was provided with home exercises/activities to target goals in plan of care at the end of today's session   -Discussed session and patient's progress with caregiver/family member after today's session   -Continue with current plan of care       Diadochokinetic speech tasks, open writing tasks, work telephone script, spelling tasks including regular and irregular words orally and written, reading drills with targeted sounds, multisyllabic words-paragraph length, higher level word retrieval tasks, oral directions 3-6 components

## 2022-04-21 ENCOUNTER — OFFICE VISIT (OUTPATIENT)
Dept: SPEECH THERAPY | Facility: CLINIC | Age: 27
End: 2022-04-21
Payer: COMMERCIAL

## 2022-04-21 DIAGNOSIS — I63.9 CEREBROVASCULAR ACCIDENT (CVA), UNSPECIFIED MECHANISM (HCC): ICD-10-CM

## 2022-04-21 DIAGNOSIS — I69.320 APHASIA AS LATE EFFECT OF CEREBROVASCULAR ACCIDENT (CVA): Primary | ICD-10-CM

## 2022-04-21 PROCEDURE — 92507 TX SP LANG VOICE COMM INDIV: CPT

## 2022-04-21 NOTE — PROGRESS NOTES
Daily Speech Treatment Note    Today's date: 2022  Patients name: Renata Whitney  : 1995  MRN: [id number]  Safety measures:   Referring provider: Kiki Max MD             Encounter Diagnosis       ICD-10-CM     1  Cerebrovascular accident (CVA), unspecified mechanism ([facility] [address] )  I63 9     2  Aphasia as late effect of cerebrovascular accident (CVA)  I69 320           Visit tracking:  -Referring provider: Epic  -Billing guidelines: CMS  -Visit # 13  -Insurance: [employer]  -RE due: Session 19         Subjective/Behavioral:  - Patient states that she's good  Notes will have 2nd interview for desired job  Objective/Assessment:      Short-term goals:         1  Patient will complete + 3 step commands given in auditory format @ 80-90% accuracy  : 50% increased to 66% via 3 step 6 component written task with auditory directions  : Patient sorted Blink cards in color and shape categories while simultaneously responding to Mental Manipulation prompts/task  Patient completed activity with 91% accuracy  Patient was able to correct the one trial that she missed         2  Patient will complete writing tasks in paragraph format (either typed or hand written) with no errors in spelling or grammar over 3 consecutive sessions  : Patient was provided with a set of story cube dice with pictures on them  She was then asked to write a small paragraph using the words depicted on the story cube for inspiration  Patient completed assignment with 2 spelling errors  Patient then read the paragraph aloud with a high level of fluency  : Writing letter of absence- 2 small grammatical/spelling errors  Encouraged using finger to tap on word as say them to indep  Catch errors  : Writing paragraph level, 3 small errors noted, grammatical and spelling errors  1/3 self corrected with review      3   Patient will achieve 90% in spelling regular words & 80% accuracy in spelling irregular words via paper or oral spelling  4/14: Patient was provided a series of words  For each word she was required to find multiple words within words and write them down  Task was completed with the patient finding between 3 and 7 words for each larger word  Patient demonstrated only one instance of misspelling of a word  4/18: 90% picture words spelled correctly  4/21:      4  Patient will enunciate while reading multisyllabic words-paragraph level per syllable fluently @ 80-90% accuracy  4/11: Reading v words /sentences, l words/sentences, /ch/ words/sentences overall 70% accuracy, increased to 80% with min cues  4/14: Patient read a full page (75 words) of trisyllabic words  Task was completed with 95% accuracy  4/18: Reading multi syllabic words clearly/fluently: 75 words, 6 words required assistance, 12 total errors: 90% accuracy       5  Patient will complete telephone script in preparation of return to work with 90% accuracy in fluency, independent self correction & no errors in typing out information provided by SLP  4/18: Work script: error with spelling out first name  Encouraged checking with caller and repeating info  Back to ensure accuracy        6  Patient will complete higher level word retrieval tasks (anagrams, higher level confrontation naming, analogies, etc ) @ 90% accuracy and complete tasks fluently in less than 3 second response time 80% of the time  4/11: Category Members Anagrams: 56% independently, increased to 100% with min-moderate cues  4/18: Anagrams: 100% accuracy, will continue for maintenance  4/21: Word retrieval task: synonmys: 70% accuracy, increased to 100% with cues  70% word retrieval respon  Naming         7   Patient will express self in conversation fluently @ clearly with independent self correction 90% - 100% of expressive output during unstructured or structured conversational tasks         Long-term goals:      1   Patient will maximize expression of basic & complex / wants needs with modified independence       2  Patient & mother will express understanding of ways to maximize communication & comprehension & overall cognition in home carryover activities & setting             Plan:  -Patient was provided with home exercises/activities to target goals in plan of care at the end of today's session   -Discussed session and patient's progress with caregiver/family member after today's session   -Continue with current plan of care  Diadochokinetic speech tasks, open writing tasks, work telephone script, spelling tasks including regular and irregular words orally and written, reading drills with targeted sounds, multisyllabic words-paragraph length, higher level word retrieval tasks, oral directions 3-6 components  RBANS  Next session

## 2022-04-25 ENCOUNTER — APPOINTMENT (OUTPATIENT)
Dept: SPEECH THERAPY | Facility: CLINIC | Age: 27
End: 2022-04-25
Payer: COMMERCIAL

## 2022-04-28 ENCOUNTER — APPOINTMENT (OUTPATIENT)
Dept: SPEECH THERAPY | Facility: CLINIC | Age: 27
End: 2022-04-28
Payer: COMMERCIAL

## 2022-05-03 ENCOUNTER — APPOINTMENT (OUTPATIENT)
Dept: SPEECH THERAPY | Facility: CLINIC | Age: 27
End: 2022-05-03
Payer: COMMERCIAL

## 2022-05-05 ENCOUNTER — APPOINTMENT (OUTPATIENT)
Dept: SPEECH THERAPY | Facility: CLINIC | Age: 27
End: 2022-05-05
Payer: COMMERCIAL

## 2022-05-06 ENCOUNTER — OFFICE VISIT (OUTPATIENT)
Dept: SPEECH THERAPY | Facility: CLINIC | Age: 27
End: 2022-05-06
Payer: COMMERCIAL

## 2022-05-06 ENCOUNTER — ANTICOAG VISIT (OUTPATIENT)
Dept: CARDIOLOGY CLINIC | Facility: CLINIC | Age: 27
End: 2022-05-06
Payer: COMMERCIAL

## 2022-05-06 DIAGNOSIS — I69.320 APHASIA AS LATE EFFECT OF CEREBROVASCULAR ACCIDENT (CVA): Primary | ICD-10-CM

## 2022-05-06 DIAGNOSIS — I63.9 CEREBROVASCULAR ACCIDENT (CVA), UNSPECIFIED MECHANISM (HCC): ICD-10-CM

## 2022-05-06 DIAGNOSIS — Z95.2 HISTORY OF MECHANICAL AORTIC VALVE REPLACEMENT: Primary | ICD-10-CM

## 2022-05-06 LAB — INR PPP: 2.8 (ref 0.84–1.19)

## 2022-05-06 PROCEDURE — 93793 ANTICOAG MGMT PT WARFARIN: CPT | Performed by: INTERNAL MEDICINE

## 2022-05-06 PROCEDURE — 96125 COGNITIVE TEST BY HC PRO: CPT

## 2022-05-06 NOTE — PROGRESS NOTES
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

## 2022-05-06 NOTE — PROGRESS NOTES
Daily Speech Treatment Note    Today's date: 2022  Patients name: Viral Cabrera  : 1995  MRN: [id number]  Safety measures:   Referring provider: Scotty Carcamo MD             Encounter Diagnosis       ICD-10-CM     1  Cerebrovascular accident (CVA), unspecified mechanism ([facility] [address] )  I63 9     2  Aphasia as late effect of cerebrovascular accident (CVA)  I69 320           Visit tracking:  -Referring provider: Epic  -Billing guidelines: CMS  -Visit # 14  -Insurance: [employer]  -RE due: Session 19  Cert  Period ends 22         Subjective/Behavioral:  - Patient states that she was approved for the new job and accepted  Patient stating she is doing very well with her speech and no occurrences of not being able to get out her words  Objective/Assessment:      Short-term goals:         1  Patient will complete + 3 step commands given in auditory format @ 80-90% accuracy  : 50% increased to 66% via 3 step 6 component written task with auditory directions  : Patient sorted Blink cards in color and shape categories while simultaneously responding to Mental Manipulation prompts/task  Patient completed activity with 91% accuracy  Patient was able to correct the one trial that she missed         2  Patient will complete writing tasks in paragraph format (either typed or hand written) with no errors in spelling or grammar over 3 consecutive sessions  : Patient was provided with a set of story cube dice with pictures on them  She was then asked to write a small paragraph using the words depicted on the story cube for inspiration  Patient completed assignment with 2 spelling errors  Patient then read the paragraph aloud with a high level of fluency  : Writing letter of absence- 2 small grammatical/spelling errors  Encouraged using finger to tap on word as say them to indep  Catch errors  : Writing paragraph level, 3 small errors noted, grammatical and spelling errors   1/3 self corrected with review  5/6: Written paragraph x 1 verb tense error in entire paragraph      3  Patient will achieve 90% in spelling regular words & 80% accuracy in spelling irregular words via paper or oral spelling  4/14: Patient was provided a series of words  For each word she was required to find multiple words within words and write them down  Task was completed with the patient finding between 3 and 7 words for each larger word  Patient demonstrated only one instance of misspelling of a word  4/18: 90% picture words spelled correctly  5/6: 90% spelling correctly of all words in writing at paragraph level      4  Patient will enunciate while reading multisyllabic words-paragraph level per syllable fluently @ 80-90% accuracy  4/11: Reading v words /sentences, l words/sentences, /ch/ words/sentences overall 70% accuracy, increased to 80% with min cues  4/14: Patient read a full page (75 words) of trisyllabic words  Task was completed with 95% accuracy  4/18: Reading multi syllabic words clearly/fluently: 75 words, 6 words required assistance, 12 total errors: 90% accuracy  5/6: 85% accuracy reading words of increasing length     5  Patient will complete telephone script in preparation of return to work with 90% accuracy in fluency, independent self correction & no errors in typing out information provided by SLP  4/18: Work script: error with spelling out first name  Encouraged checking with caller and repeating info  Back to ensure accuracy        6  Patient will complete higher level word retrieval tasks (anagrams, higher level confrontation naming, analogies, etc ) @ 90% accuracy and complete tasks fluently in less than 3 second response time 80% of the time  4/11: Category Members Anagrams: 56% independently, increased to 100% with min-moderate cues  4/18: Anagrams: 100% accuracy, will continue for maintenance  4/21: Word retrieval task: synonmys: 70% accuracy, increased to 100% with cues   70% word retrieval respon  Naming         7   Patient will express self in conversation fluently @ clearly with independent self correction 90% - 100% of expressive output during unstructured or structured conversational tasks  5/6: Conversational output fluent and clear - 100% of the time during session  The Repeatable Battery for the Assessment of Neuropsychological Status (RBANS) is a brief, individually-administered assessment which measures attention, language, visuospatial/constructional abilities, and immediate & delayed memory  The RBANS is intended for use with adolescents to adults, ages 15 to 80 years  The following results were obtained during the administration of the assessment  Form: A    Cognitive Domain/Subtest: Index Score: Percentile Rank: Classification:   IMMEDIATE MEMORY 103 58%ile Average        1  List Learning (31/40)        2  Story Memory (22/24)       VISUOSPATIAL/  CONSTRUCTIONAL 102 55%ile Average        3  Figure Copy (20/20)        4  Line Orientation (16/20)       LANGUAGE 85 16%ile Low Average        5  Picture Naming (10/10)        6  Semantic Fluency (16/40)       ATTENTION 56  2%ile Extremely Low        7  Digit Span (4/16)        8  Coding (44/89)       DELAYED MEMORY 99 47%ile Average        9  List Recall (10/10)        10  List Recognition (20/20)        11  Story Recall (11/12)        12  Figure Recall (14/20)         Sum of Index Scores:  445   Total Score:  85   Percentile: 16%ile   Classification: Low Average       *Patient named 16 concrete category members in 60 sec (norm=15+)  -- AVERAGE      Patient with noted greatest difficulty with digit recall  Patient notes baseline difficulty with numbers prior to CVA but "worse" now  Will target digit recall and visual perception/attention in new goals below:    Patient will recall 4-5 numbers in structured sessions 80% of trials  Patient will complete visual / constructional tasks at 80-90% accuracy      Long-term goals:      1  Patient will maximize expression of basic & complex / wants needs with modified independence       2  Patient & mother will express understanding of ways to maximize communication & comprehension & overall cognition in home carryover activities & setting             Plan:  Continue with plan of care  Diadochokinetic speech tasks, open writing tasks, work telephone script, spelling tasks including regular and irregular words orally and written, reading drills with targeted sounds, multisyllabic words-paragraph length, higher level word retrieval tasks, oral directions 3-6 components  Digit recall, visual constructional/line tasks

## 2022-05-10 ENCOUNTER — APPOINTMENT (OUTPATIENT)
Dept: SPEECH THERAPY | Facility: CLINIC | Age: 27
End: 2022-05-10
Payer: COMMERCIAL

## 2022-05-12 ENCOUNTER — APPOINTMENT (OUTPATIENT)
Dept: SPEECH THERAPY | Facility: CLINIC | Age: 27
End: 2022-05-12
Payer: COMMERCIAL

## 2022-05-13 ENCOUNTER — OFFICE VISIT (OUTPATIENT)
Dept: SPEECH THERAPY | Facility: CLINIC | Age: 27
End: 2022-05-13
Payer: COMMERCIAL

## 2022-05-13 DIAGNOSIS — I69.320 APHASIA AS LATE EFFECT OF CEREBROVASCULAR ACCIDENT (CVA): Primary | ICD-10-CM

## 2022-05-13 DIAGNOSIS — I63.9 CEREBROVASCULAR ACCIDENT (CVA), UNSPECIFIED MECHANISM (HCC): ICD-10-CM

## 2022-05-13 PROCEDURE — 92507 TX SP LANG VOICE COMM INDIV: CPT

## 2022-05-13 NOTE — PROGRESS NOTES
Daily Speech Treatment Note    Today's date: 2022  Patients name: Sangita Wheeler  : 1995  MRN: [id number]  Safety measures:   Referring provider: Katharina Talavera MD             Encounter Diagnosis       ICD-10-CM     1  Cerebrovascular accident (CVA), unspecified mechanism ([facility] [address] )  I63 9     2  Aphasia as late effect of cerebrovascular accident (CVA)  I69 320           Visit tracking:  -Referring provider: Epic  -Billing guidelines: CMS  -Visit # 14  -Insurance: [employer]  -RE due: Session 19  Cert  Period ends 22         Subjective/Behavioral:  - Pleasant/Cooperative      Objective/Assessment:      Short-term goals:         1  Patient will complete + 3 step commands given in auditory format @ 80-90% accuracy  : 50% increased to 66% via 3 step 6 component written task with auditory directions  : Patient sorted Blink cards in color and shape categories while simultaneously responding to Mental Manipulation prompts/task  Patient completed activity with 91% accuracy  Patient was able to correct the one trial that she missed         2  Patient will complete writing tasks in paragraph format (either typed or hand written) with no errors in spelling or grammar over 3 consecutive sessions  : Patient was provided with a set of story cube dice with pictures on them  She was then asked to write a small paragraph using the words depicted on the story cube for inspiration  Patient completed assignment with 2 spelling errors  Patient then read the paragraph aloud with a high level of fluency  : Writing letter of absence- 2 small grammatical/spelling errors  Encouraged using finger to tap on word as say them to indep  Catch errors  : Writing paragraph level, 3 small errors noted, grammatical and spelling errors  1/3 self corrected with review  : Written paragraph x 1 verb tense error in entire paragraph  : Writing a paragraph for work related task   1 error in verb tense, able to identify      3  Patient will achieve 90% in spelling regular words & 80% accuracy in spelling irregular words via paper or oral spelling  4/14: Patient was provided a series of words  For each word she was required to find multiple words within words and write them down  Task was completed with the patient finding between 3 and 7 words for each larger word  Patient demonstrated only one instance of misspelling of a word  4/18: 90% picture words spelled correctly  5/6: 90% spelling correctly of all words in writing at paragraph level  5/13: Typed out names, addresses and phone numbers x 1 small error noted  Will complete via phone next session      4  Patient will enunciate while reading multisyllabic words-paragraph level per syllable fluently @ 80-90% accuracy  4/11: Reading v words /sentences, l words/sentences, /ch/ words/sentences overall 70% accuracy, increased to 80% with min cues  4/14: Patient read a full page (75 words) of trisyllabic words  Task was completed with 95% accuracy  4/18: Reading multi syllabic words clearly/fluently: 75 words, 6 words required assistance, 12 total errors: 90% accuracy  5/6: 85% accuracy reading words of increasing length     5  Patient will complete telephone script in preparation of return to work with 90% accuracy in fluency, independent self correction & no errors in typing out information provided by SLP  4/18: Work script: error with spelling out first name  Encouraged checking with caller and repeating info  Back to ensure accuracy        6  Patient will complete higher level word retrieval tasks (anagrams, higher level confrontation naming, analogies, etc ) @ 90% accuracy and complete tasks fluently in less than 3 second response time 80% of the time  4/11: Category Members Anagrams: 56% independently, increased to 100% with min-moderate cues  4/18: Anagrams: 100% accuracy, will continue for maintenance    4/21: Word retrieval task: synonmys: 70% accuracy, increased to 100% with cues  70% word retrieval respon  Naming  5/13: Synonyms: 85% accuracy       7   Patient will express self in conversation fluently @ clearly with independent self correction 90% - 100% of expressive output during unstructured or structured conversational tasks  5/6: Conversational output fluent and clear - 100% of the time during session  5/13: Completed conversation indep  And fluently  The Repeatable Battery for the Assessment of Neuropsychological Status (RBANS) is a brief, individually-administered assessment which measures attention, language, visuospatial/constructional abilities, and immediate & delayed memory  The RBANS is intended for use with adolescents to adults, ages 15 to 80 years  The following results were obtained during the administration of the assessment  Form: A    Cognitive Domain/Subtest: Index Score: Percentile Rank: Classification:   IMMEDIATE MEMORY 103 58%ile Average        1  List Learning (31/40)        2  Story Memory (22/24)       VISUOSPATIAL/  CONSTRUCTIONAL 102 55%ile Average        3  Figure Copy (20/20)        4  Line Orientation (16/20)       LANGUAGE 85 16%ile Low Average        5  Picture Naming (10/10)        6  Semantic Fluency (16/40)       ATTENTION 56  2%ile Extremely Low        7  Digit Span (4/16)        8  Coding (44/89)       DELAYED MEMORY 99 47%ile Average        9  List Recall (10/10)        10  List Recognition (20/20)        11  Story Recall (11/12)        12  Figure Recall (14/20)         Sum of Index Scores:  445   Total Score:  85   Percentile: 16%ile   Classification: Low Average       *Patient named 16 concrete category members in 60 sec (norm=15+)  -- AVERAGE      Patient with noted greatest difficulty with digit recall  Patient notes baseline difficulty with numbers prior to CVA but "worse" now    Will target digit recall and visual perception/attention in new goals below:    Patient will recall 4-5 numbers in structured sessions 80% of trials  5/13: Recall of 4-6 numbers with 90% accuracy using chunking method  Unable to achieve this with 7 digits  Patient will complete visual / constructional tasks at 80-90% accuracy  5/13: Completed visual closure tasks, visual attention with first letters in paragraph and compass coding task     Long-term goals:      1  Patient will maximize expression of basic & complex / wants needs with modified independence       2  Patient & mother will express understanding of ways to maximize communication & comprehension & overall cognition in home carryover activities & setting             Plan:  Continue with plan of care  Diadochokinetic speech tasks, open writing tasks, work telephone script, spelling tasks including regular and irregular words orally and written, reading drills with targeted sounds, multisyllabic words-paragraph length, higher level word retrieval tasks, oral directions 3-6 components  Digit recall, visual constructional/line tasks

## 2022-05-17 ENCOUNTER — APPOINTMENT (OUTPATIENT)
Dept: SPEECH THERAPY | Facility: CLINIC | Age: 27
End: 2022-05-17
Payer: COMMERCIAL

## 2022-05-19 ENCOUNTER — APPOINTMENT (OUTPATIENT)
Dept: SPEECH THERAPY | Facility: CLINIC | Age: 27
End: 2022-05-19
Payer: COMMERCIAL

## 2022-05-20 ENCOUNTER — OFFICE VISIT (OUTPATIENT)
Dept: SPEECH THERAPY | Facility: CLINIC | Age: 27
End: 2022-05-20
Payer: COMMERCIAL

## 2022-05-20 DIAGNOSIS — I63.9 CEREBROVASCULAR ACCIDENT (CVA), UNSPECIFIED MECHANISM (HCC): ICD-10-CM

## 2022-05-20 DIAGNOSIS — I69.320 APHASIA AS LATE EFFECT OF CEREBROVASCULAR ACCIDENT (CVA): Primary | ICD-10-CM

## 2022-05-20 PROCEDURE — 92507 TX SP LANG VOICE COMM INDIV: CPT

## 2022-05-20 NOTE — PROGRESS NOTES
Daily Speech Treatment Note    Today's date: 2022  Patients name: Earlene Eaton  : 1995  MRN: [id number]  Safety measures:   Referring provider: Sergio Garcia MD             Encounter Diagnosis       ICD-10-CM     1  Cerebrovascular accident (CVA), unspecified mechanism ([facility] [address] )  I63 9     2  Aphasia as late effect of cerebrovascular accident (CVA)  I69 320           Visit tracking:  -Referring provider: Epic  -Billing guidelines: CMS  -Visit # 15  -Insurance: [employer]  -RE due: Session 19  Cert  Period ends 22         Subjective/Behavioral:  - Pleasant/Cooperative      Objective/Assessment:      Patient to work on higher level analogies next session  Conversation  Set up session after starting new job to [employer] cognitive and communication needs  Short-term goals:         1  Patient will complete + 3 step commands given in auditory format @ 80-90% accuracy  : 50% increased to 66% via 3 step 6 component written task with auditory directions  : Patient sorted Blink cards in color and shape categories while simultaneously responding to Mental Manipulation prompts/task  Patient completed activity with 91% accuracy  Patient was able to correct the one trial that she missed         2  Patient will complete writing tasks in paragraph format (either typed or hand written) with no errors in spelling or grammar over 3 consecutive sessions  : Patient was provided with a set of story cube dice with pictures on them  She was then asked to write a small paragraph using the words depicted on the story cube for inspiration  Patient completed assignment with 2 spelling errors  Patient then read the paragraph aloud with a high level of fluency  : Writing letter of absence- 2 small grammatical/spelling errors  Encouraged using finger to tap on word as say them to indep  Catch errors  : Writing paragraph level, 3 small errors noted, grammatical and spelling errors   1/3 self corrected with review  5/6: Written paragraph x 1 verb tense error in entire paragraph  5/13: Writing a paragraph for work related task  1 error in verb tense, able to identify      3  Patient will achieve 90% in spelling regular words & 80% accuracy in spelling irregular words via paper or oral spelling  4/14: Patient was provided a series of words  For each word she was required to find multiple words within words and write them down  Task was completed with the patient finding between 3 and 7 words for each larger word  Patient demonstrated only one instance of misspelling of a word  4/18: 90% picture words spelled correctly  5/6: 90% spelling correctly of all words in writing at paragraph level  5/13: Typed out names, addresses and phone numbers x 1 small error noted  Will complete via phone next session  5/20: Writing out regular words with 100% accuracy, irregular common words: 100% but irregular, uncommon words: 20% accuracy  Advised using spell check      4  Patient will enunciate while reading multisyllabic words-paragraph level per syllable fluently @ 80-90% accuracy  4/11: Reading v words /sentences, l words/sentences, /ch/ words/sentences overall 70% accuracy, increased to 80% with min cues  4/14: Patient read a full page (75 words) of trisyllabic words  Task was completed with 95% accuracy  4/18: Reading multi syllabic words clearly/fluently: 75 words, 6 words required assistance, 12 total errors: 90% accuracy  5/6: 85% accuracy reading words of increasing length     5  Patient will complete telephone script in preparation of return to work with 90% accuracy in fluency, independent self correction & no errors in typing out information provided by SLP  4/18: Work script: error with spelling out first name  Encouraged checking with caller and repeating info  Back to ensure accuracy  5/20: Scripting at 90% accuracy, 1 error in digit typing of zip code   No other errors noted       6  Patient will complete higher level word retrieval tasks (anagrams, higher level confrontation naming, analogies, etc ) @ 90% accuracy and complete tasks fluently in less than 3 second response time 80% of the time  4/11: Category Members Anagrams: 56% independently, increased to 100% with min-moderate cues  4/18: Anagrams: 100% accuracy, will continue for maintenance  4/21: Word retrieval task: synonmys: 70% accuracy, increased to 100% with cues  70% word retrieval respon  Naming  5/13: Synonyms: 85% accuracy  5/20: 83% synonyms out loud        7   Patient will express self in conversation fluently @ clearly with independent self correction 90% - 100% of expressive output during unstructured or structured conversational tasks  5/6: Conversational output fluent and clear - 100% of the time during session  5/13: Completed conversation indep  And fluently  5/20: Conversation fluent with x 1 very minor dysfluency noted- functional       The Repeatable Battery for the Assessment of Neuropsychological Status (RBANS) is a brief, individually-administered assessment which measures attention, language, visuospatial/constructional abilities, and immediate & delayed memory  The RBANS is intended for use with adolescents to adults, ages 15 to 80 years  The following results were obtained during the administration of the assessment  Form: A    Cognitive Domain/Subtest: Index Score: Percentile Rank: Classification:   IMMEDIATE MEMORY 103 58%ile Average        1  List Learning (31/40)        2  Story Memory (22/24)       VISUOSPATIAL/  CONSTRUCTIONAL 102 55%ile Average        3  Figure Copy (20/20)        4  Line Orientation (16/20)       LANGUAGE 85 16%ile Low Average        5  Picture Naming (10/10)        6  Semantic Fluency (16/40)       ATTENTION 56  2%ile Extremely Low        7  Digit Span (4/16)        8  Coding (44/89)       DELAYED MEMORY 99 47%ile Average        9  List Recall (10/10)        10   List Recognition (20/20) 11  Story Recall (11/12)        12  Figure Recall (14/20)         Sum of Index Scores:  445   Total Score:  85   Percentile: 16%ile   Classification: Low Average       *Patient named 16 concrete category members in 60 sec (norm=15+)  -- AVERAGE      Patient with noted greatest difficulty with digit recall  Patient notes baseline difficulty with numbers prior to CVA but "worse" now  Will target digit recall and visual perception/attention in new goals below:    Patient will recall 4-5 numbers in structured sessions 80% of trials  5/13: Recall of 4-6 numbers with 90% accuracy using chunking method  Unable to achieve this with 7 digits  Patient will complete visual / constructional tasks at 80-90% accuracy  5/13: Completed visual closure tasks, visual attention with first letters in paragraph and compass coding task     Long-term goals:      1  Patient will maximize expression of basic & complex / wants needs with modified independence       2  Patient & mother will express understanding of ways to maximize communication & comprehension & overall cognition in home carryover activities & setting             Plan:  Continue with plan of care  Diadochokinetic speech tasks, open writing tasks, work telephone script, spelling tasks including regular and irregular words orally and written, reading drills with targeted sounds, multisyllabic words-paragraph length, higher level word retrieval tasks, oral directions 3-6 components  Digit recall, visual constructional/line tasks

## 2022-05-24 ENCOUNTER — APPOINTMENT (OUTPATIENT)
Dept: SPEECH THERAPY | Facility: CLINIC | Age: 27
End: 2022-05-24
Payer: COMMERCIAL

## 2022-05-25 ENCOUNTER — OFFICE VISIT (OUTPATIENT)
Dept: SPEECH THERAPY | Facility: CLINIC | Age: 27
End: 2022-05-25
Payer: COMMERCIAL

## 2022-05-25 ENCOUNTER — ANTICOAG VISIT (OUTPATIENT)
Dept: CARDIOLOGY CLINIC | Facility: CLINIC | Age: 27
End: 2022-05-25

## 2022-05-25 ENCOUNTER — TELEPHONE (OUTPATIENT)
Dept: FAMILY MEDICINE CLINIC | Facility: CLINIC | Age: 27
End: 2022-05-25

## 2022-05-25 DIAGNOSIS — Z95.2 HISTORY OF MECHANICAL AORTIC VALVE REPLACEMENT: Primary | ICD-10-CM

## 2022-05-25 DIAGNOSIS — I63.9 CEREBROVASCULAR ACCIDENT (CVA), UNSPECIFIED MECHANISM (HCC): ICD-10-CM

## 2022-05-25 DIAGNOSIS — I69.320 APHASIA AS LATE EFFECT OF CEREBROVASCULAR ACCIDENT (CVA): Primary | ICD-10-CM

## 2022-05-25 LAB — INR PPP: 2.9 (ref 0.84–1.19)

## 2022-05-25 PROCEDURE — 92507 TX SP LANG VOICE COMM INDIV: CPT

## 2022-05-25 NOTE — TELEPHONE ENCOUNTER
Confirmation Garrett Grain [id number]  Effective: 05/25/2022     Expires: 08/23/2022  Active  Referred From  [facility]  Group BHX: [id number]  Provider ZL: [id number]  Tax RK: [id number]  [address] [facility] [address]  [place] [address]  Referred To  [facility]  Specialty: Not Available  Tier 2  Group RUBEN: [id number]  Provider PM: [id number]  Tax BZ: [id number]  This referral is valid at any location for the above group    Patient Info  Lisette Cartwright  [id number]  Female  1995  [address]  [place] [address]  Clinical Information  Place of Service  Office  Service Type  Medical Care  Diagnoses  8 X94 754 - aphasia following cerebral infarction  Procedures  None entered      Disclaimer  [employer] and its Affiliates ([facility]) will pay for only those services covered under the [place] [address] which are specifically noted and requested by the PCP or OBGYN on the referral  If any additional services, testing, or follow-up care are required, the PCP or OBGYN must be contacted prior to the delivery of such additional services for written approval on a separate referral  Non-referred services will not be covered by [facility]  Benefits are underwritten or administered by [employer], a subsidiary of [employer] which are independent licensees of the [employer] and [employer]

## 2022-05-25 NOTE — TELEPHONE ENCOUNTER
[facility] REFERRAL NEEDED  [facility]    SPEECH THERAPY  NPI [id number]    PT ID [id number]  DX [phone] Left voice mail for patient mother to return my call regarding MRI results of knee.

## 2022-05-25 NOTE — PROGRESS NOTES
Daily Speech Treatment Note    Today's date: 2022  Patients name: Nichole Osullivan  : 1995  MRN: [id number]  Safety measures:   Referring provider: Timmy Sutherland MD             Encounter Diagnosis       ICD-10-CM     1  Cerebrovascular accident (CVA), unspecified mechanism ([facility] [address] )  I63 9     2  Aphasia as late effect of cerebrovascular accident (CVA)  I69 320           Visit tracking:  -Referring provider: Epic  -Billing guidelines: CMS  -Visit # 16  -Insurance: [employer]  -RE due: Session 19  Cert  Period ends 22         Subjective/Behavioral:  - Pleasant/Cooperative      Objective/Assessment:      Patient fluent at conversational level  Mild work with spelling/filing in correct letters in spelling tasks  Reevaluation scheduled end of  to reassess on how things are going after starting new job  Short-term goals:         1  Patient will complete + 3 step commands given in auditory format @ 80-90% accuracy  : 50% increased to 66% via 3 step 6 component written task with auditory directions  : Patient sorted Blink cards in color and shape categories while simultaneously responding to Mental Manipulation prompts/task  Patient completed activity with 91% accuracy  Patient was able to correct the one trial that she missed         2  Patient will complete writing tasks in paragraph format (either typed or hand written) with no errors in spelling or grammar over 3 consecutive sessions  : Patient was provided with a set of story cube dice with pictures on them  She was then asked to write a small paragraph using the words depicted on the story cube for inspiration  Patient completed assignment with 2 spelling errors  Patient then read the paragraph aloud with a high level of fluency  : Writing letter of absence- 2 small grammatical/spelling errors  Encouraged using finger to tap on word as say them to indep  Catch errors     : Writing paragraph level, 3 small errors noted, grammatical and spelling errors  1/3 self corrected with review  5/6: Written paragraph x 1 verb tense error in entire paragraph  5/13: Writing a paragraph for work related task  1 error in verb tense, able to identify      3  Patient will achieve 90% in spelling regular words & 80% accuracy in spelling irregular words via paper or oral spelling  4/14: Patient was provided a series of words  For each word she was required to find multiple words within words and write them down  Task was completed with the patient finding between 3 and 7 words for each larger word  Patient demonstrated only one instance of misspelling of a word  4/18: 90% picture words spelled correctly  5/6: 90% spelling correctly of all words in writing at paragraph level  5/13: Typed out names, addresses and phone numbers x 1 small error noted  Will complete via phone next session  5/20: Writing out regular words with 100% accuracy, irregular common words: 100% but irregular, uncommon words: 20% accuracy  Advised using spell check  5/25: Achieved 90% accuracy in spelling words tasks,     4  Patient will enunciate while reading multisyllabic words-paragraph level per syllable fluently @ 80-90% accuracy  4/11: Reading v words /sentences, l words/sentences, /ch/ words/sentences overall 70% accuracy, increased to 80% with min cues  4/14: Patient read a full page (75 words) of trisyllabic words  Task was completed with 95% accuracy  4/18: Reading multi syllabic words clearly/fluently: 75 words, 6 words required assistance, 12 total errors: 90% accuracy  5/6: 85% accuracy reading words of increasing length     5  Patient will complete telephone script in preparation of return to work with 90% accuracy in fluency, independent self correction & no errors in typing out information provided by SLP  4/18: Work script: error with spelling out first name  Encouraged checking with caller and repeating info  Back to ensure accuracy   5/20: Scripting at 90% accuracy, 1 error in digit typing of zip code  No other errors noted       6  Patient will complete higher level word retrieval tasks (anagrams, higher level confrontation naming, analogies, etc ) @ 90% accuracy and complete tasks fluently in less than 3 second response time 80% of the time  4/11: Category Members Anagrams: 56% independently, increased to 100% with min-moderate cues  4/18: Anagrams: 100% accuracy, will continue for maintenance  4/21: Word retrieval task: synonmys: 70% accuracy, increased to 100% with cues  70% word retrieval respon  Naming  5/13: Synonyms: 85% accuracy  5/20: 83% synonyms out loud  5/25: Achieved 100% accuracy in analogies tasks, etc      7   Patient will express self in conversation fluently @ clearly with independent self correction 90% - 100% of expressive output during unstructured or structured conversational tasks  5/6: Conversational output fluent and clear - 100% of the time during session  5/13: Completed conversation indep  And fluently  5/20: Conversation fluent with x 1 very minor dysfluency noted- functional   5/25: 100% clear / fluent at conversational level with answering open ended discussion questions  The Repeatable Battery for the Assessment of Neuropsychological Status (RBANS) is a brief, individually-administered assessment which measures attention, language, visuospatial/constructional abilities, and immediate & delayed memory  The RBANS is intended for use with adolescents to adults, ages 15 to 80 years  The following results were obtained during the administration of the assessment  Form: A    Cognitive Domain/Subtest: Index Score: Percentile Rank: Classification:   IMMEDIATE MEMORY 103 58%ile Average        1  List Learning (31/40)        2  Story Memory (22/24)       VISUOSPATIAL/  CONSTRUCTIONAL 102 55%ile Average        3  Figure Copy (20/20)        4  Line Orientation (16/20)       LANGUAGE 85 16%ile Low Average        5   Picture Naming (10/10)        6  Semantic Fluency (16/40)       ATTENTION 56  2%ile Extremely Low        7  Digit Span (4/16)        8  Coding (44/89)       DELAYED MEMORY 99 47%ile Average        9  List Recall (10/10)        10  List Recognition (20/20)        11  Story Recall (11/12)        12  Figure Recall (14/20)         Sum of Index Scores:  445   Total Score:  85   Percentile: 16%ile   Classification: Low Average       *Patient named 16 concrete category members in 60 sec (norm=15+)  -- AVERAGE      Patient with noted greatest difficulty with digit recall  Patient notes baseline difficulty with numbers prior to CVA but "worse" now  Will target digit recall and visual perception/attention in new goals below:    Patient will recall 4-5 numbers in structured sessions 80% of trials  5/13: Recall of 4-6 numbers with 90% accuracy using chunking method  Unable to achieve this with 7 digits  Patient will complete visual / constructional tasks at 80-90% accuracy  5/13: Completed visual closure tasks, visual attention with first letters in paragraph and compass coding task     Long-term goals:      1  Patient will maximize expression of basic & complex / wants needs with modified independence       2  Patient & mother will express understanding of ways to maximize communication & comprehension & overall cognition in home carryover activities & setting             Plan:  Continue with plan of care  Diadochokinetic speech tasks, open writing tasks, work telephone script, spelling tasks including regular and irregular words orally and written, reading drills with targeted sounds, multisyllabic words-paragraph length, higher level word retrieval tasks, oral directions 3-6 components  Digit recall, visual constructional/line tasks

## 2022-05-26 ENCOUNTER — APPOINTMENT (OUTPATIENT)
Dept: SPEECH THERAPY | Facility: CLINIC | Age: 27
End: 2022-05-26
Payer: COMMERCIAL

## 2022-05-26 ENCOUNTER — TELEPHONE (OUTPATIENT)
Dept: OCCUPATIONAL THERAPY | Facility: CLINIC | Age: 27
End: 2022-05-26

## 2022-05-26 NOTE — TELEPHONE ENCOUNTER
Phone call placed to [facility] concerning plan for OT  On eval day, pt was set up with a HEP, with a plan that pt would contact OT when she was ready to go back to the gym, to ensure readiness  Follow-up phone call to see if she still wanted to follow-up or wanted to be discharged from therapy  Left a message about aforementioned

## 2022-05-31 ENCOUNTER — TELEPHONE (OUTPATIENT)
Dept: FAMILY MEDICINE CLINIC | Facility: CLINIC | Age: 27
End: 2022-05-31

## 2022-05-31 ENCOUNTER — APPOINTMENT (OUTPATIENT)
Dept: SPEECH THERAPY | Facility: CLINIC | Age: 27
End: 2022-05-31
Payer: COMMERCIAL

## 2022-05-31 NOTE — TELEPHONE ENCOUNTER
Confirmation Clemencia Torrez [id number]  Effective: 05/23/2022     Expires: 08/21/2022  Active  Referred From  [facility]  Group NGS: [id number]  Provider UP: [id number]  Tax KO: [id number]  [address] [facility] [address]  [place] [address]  Referred To  [address] [facility]  Specialty: Not Available  Tier 2  Group KHB: [id number]  Provider VK: [id number]  Tax BETANCOURT: [id number]  This referral is valid at any location for the above group    Patient Info  Layla Davalos  [id number]  Female  1995  [address]  [place] [address]  Clinical Information  Place of Service  Office  Service Type  Medical Care  Diagnoses  4 Q93 8 - presence of prosthetic heart valve  Procedures  [id number] - unlisted evaluation and management service        Disclaimer  [employer] and its Affiliates ([facility]) will pay for only those services covered under the [facility] Contract which are specifically noted and requested by the PCP or OBGYN on the referral  If any additional services, testing, or follow-up care are required, the PCP or OBGYN must be contacted prior to the delivery of such additional services for written approval on a separate referral  Non-referred services will not be covered by [facility]  Benefits are underwritten or administered by [employer], a subsidiary of [employer], which are independent licensees of the [employer] and [employer]

## 2022-06-13 ENCOUNTER — ANTICOAG VISIT (OUTPATIENT)
Dept: CARDIOLOGY CLINIC | Facility: CLINIC | Age: 27
End: 2022-06-13
Payer: COMMERCIAL

## 2022-06-13 DIAGNOSIS — Z95.2 HISTORY OF MECHANICAL AORTIC VALVE REPLACEMENT: Primary | ICD-10-CM

## 2022-06-13 LAB — INR PPP: 2.5 (ref 0.84–1.19)

## 2022-06-13 PROCEDURE — 93793 ANTICOAG MGMT PT WARFARIN: CPT | Performed by: INTERNAL MEDICINE

## 2022-06-13 NOTE — PROGRESS NOTES
Left message for patient, advised INR good, advised to continue 3 75 mg Mon Wed Fri, 5 mg all other days, will recheck in 2 weeks 6/26/22    Advised to call with questions or concerns      Patient is a home elvia

## 2022-06-30 ENCOUNTER — ANTICOAG VISIT (OUTPATIENT)
Dept: CARDIOLOGY CLINIC | Facility: CLINIC | Age: 27
End: 2022-06-30
Payer: COMMERCIAL

## 2022-06-30 DIAGNOSIS — Z95.2 HISTORY OF MECHANICAL AORTIC VALVE REPLACEMENT: Primary | ICD-10-CM

## 2022-06-30 LAB — INR PPP: 3 (ref 0.84–1.19)

## 2022-06-30 PROCEDURE — 93793 ANTICOAG MGMT PT WARFARIN: CPT | Performed by: INTERNAL MEDICINE

## 2022-06-30 NOTE — PROGRESS NOTES
Left message for patient, advised INR at high end of goal, advised to take 3 75 mg tonight only instead of 5 mg, then go back to 3 75 mg Mon Wed Fri, 5 mg all other days, will recheck in 2 weeks 7/13/22    Advised to call with questions or concerns       Patient is a home elvia

## 2022-07-09 DIAGNOSIS — F41.9 ANXIETY: ICD-10-CM

## 2022-07-09 DIAGNOSIS — Z95.2 S/P AVR: ICD-10-CM

## 2022-07-11 RX ORDER — METOPROLOL SUCCINATE 25 MG/1
25 TABLET, EXTENDED RELEASE ORAL 2 TIMES DAILY
Qty: 180 TABLET | Refills: 0 | Status: SHIPPED | OUTPATIENT
Start: 2022-07-11 | End: 2022-10-15

## 2022-07-11 RX ORDER — ESCITALOPRAM OXALATE 10 MG/1
10 TABLET ORAL DAILY
Qty: 90 TABLET | Refills: 0 | Status: SHIPPED | OUTPATIENT
Start: 2022-07-11 | End: 2022-10-15

## 2022-07-11 RX ORDER — WARFARIN SODIUM 2.5 MG/1
TABLET ORAL
Qty: 180 TABLET | Refills: 0 | Status: SHIPPED | OUTPATIENT
Start: 2022-07-11 | End: 2022-10-15

## 2022-07-18 ENCOUNTER — ANTICOAG VISIT (OUTPATIENT)
Dept: CARDIOLOGY CLINIC | Facility: CLINIC | Age: 27
End: 2022-07-18
Payer: COMMERCIAL

## 2022-07-18 DIAGNOSIS — Z95.2 HISTORY OF MECHANICAL AORTIC VALVE REPLACEMENT: Primary | ICD-10-CM

## 2022-07-18 LAB — INR PPP: 3 (ref 0.84–1.19)

## 2022-07-18 PROCEDURE — 93793 ANTICOAG MGMT PT WARFARIN: CPT

## 2022-07-18 NOTE — PROGRESS NOTES
Left message for patient, advised INR good, continue 3 75 mg Mon Wed Fri, 5 mg all other days, will recheck in 2 weeks 7/29/22    Patient is a home elvia

## 2022-08-02 ENCOUNTER — EVALUATION (OUTPATIENT)
Dept: SPEECH THERAPY | Facility: CLINIC | Age: 27
End: 2022-08-02
Payer: COMMERCIAL

## 2022-08-02 DIAGNOSIS — R47.01 APHASIA: ICD-10-CM

## 2022-08-02 DIAGNOSIS — Z95.2 HISTORY OF MECHANICAL AORTIC VALVE REPLACEMENT: Primary | ICD-10-CM

## 2022-08-02 PROCEDURE — 92507 TX SP LANG VOICE COMM INDIV: CPT

## 2022-08-02 NOTE — PROGRESS NOTES
Discharge Report     Today's date: 2022  Patients name: Chad Rene  : 1995  MRN: [id number]  Safety measures:   Referring provider: Angelito Peñaloza MD             Encounter Diagnosis       ICD-10-CM     1  Cerebrovascular accident (CVA), unspecified mechanism ([facility] [address] )  I63 9     2  Aphasia as late effect of cerebrovascular accident (CVA)  I69 320           Visit tracking:  -Referring provider: Epic  -Billing guidelines: CMS  -Visit # 17  -Insurance: [employer]           Subjective/Behavioral:  - Pleasant/Cooperative      Objective/Assessment:      Patient assessed with digit recall: 22: 3 digits                                                         22: 6 digits (IMPROVEMENT; LIKELY AT BASELINE)    Patient also assessed with verbal picture description: Fluent with 100% description of all details in picture  No deficits noted  Patient able to provide synonym for given words at 100% accuracy  Patient clear and fluent in conversation during session  Slight initial syllable prolongation rarely noted, but this is likely at baseline  Patient also notes that occasionally has difficulty with pronouncing "long" words of medicines at work but this would be baseline status as well  Short-term goals:         1  Patient will complete + 3 step commands given in auditory format @ 80-90% accuracy  : 50% increased to 66% via 3 step 6 component written task with auditory directions  : Patient sorted Blink cards in color and shape categories while simultaneously responding to Mental Manipulation prompts/task  Patient completed activity with 91% accuracy  Patient was able to correct the one trial that she missed  ACHIEVED       2  Patient will complete writing tasks in paragraph format (either typed or hand written) with no errors in spelling or grammar over 3 consecutive sessions  : Patient was provided with a set of story cube dice with pictures on them   She was then asked to write a small paragraph using the words depicted on the story cube for inspiration  Patient completed assignment with 2 spelling errors  Patient then read the paragraph aloud with a high level of fluency  4/18: Writing letter of absence- 2 small grammatical/spelling errors  Encouraged using finger to tap on word as say them to indep  Catch errors  4/21: Writing paragraph level, 3 small errors noted, grammatical and spelling errors  1/3 self corrected with review  5/6: Written paragraph x 1 verb tense error in entire paragraph  5/13: Writing a paragraph for work related task  1 error in verb tense, able to identify  ACHIEVED HIGHEST POTENTIAL- PATIENT NOTES NO ISSUES WITH WRITING OR TYPING AT THIS POINT IN REGARDS TO WORD FORMULATION  PATIENT DOES NOTE FRUSTRATION WITH LEGIBILITY AND THAT WRITING HAS "CHANGED "  PROVIDED PATIENT WITH WRITING PACKET TO CONTINUE TO WORK ON WRITING      3  Patient will achieve 90% in spelling regular words & 80% accuracy in spelling irregular words via paper or oral spelling  4/14: Patient was provided a series of words  For each word she was required to find multiple words within words and write them down  Task was completed with the patient finding between 3 and 7 words for each larger word  Patient demonstrated only one instance of misspelling of a word  4/18: 90% picture words spelled correctly  5/6: 90% spelling correctly of all words in writing at paragraph level  5/13: Typed out names, addresses and phone numbers x 1 small error noted  Will complete via phone next session  5/20: Writing out regular words with 100% accuracy, irregular common words: 100% but irregular, uncommon words: 20% accuracy  Advised using spell check  5/25: Achieved 90% accuracy in spelling words tasks, ACHIEVED     4  Patient will enunciate while reading multisyllabic words-paragraph level per syllable fluently @ 80-90% accuracy   4/11: Reading v words /sentences, l words/sentences, /ch/ words/sentences overall 70% accuracy, increased to 80% with min cues  4/14: Patient read a full page (75 words) of trisyllabic words  Task was completed with 95% accuracy  4/18: Reading multi syllabic words clearly/fluently: 75 words, 6 words required assistance, 12 total errors: 90% accuracy  5/6: 85% accuracy reading words of increasing length  ACHIEVED     5  Patient will complete telephone script in preparation of return to work with 90% accuracy in fluency, independent self correction & no errors in typing out information provided by SLP  4/18: Work script: error with spelling out first name  Encouraged checking with caller and repeating info  Back to ensure accuracy  5/20: Scripting at 90% accuracy, 1 error in digit typing of zip code  No other errors noted  ACHIEVED     6  Patient will complete higher level word retrieval tasks (anagrams, higher level confrontation naming, analogies, etc ) @ 90% accuracy and complete tasks fluently in less than 3 second response time 80% of the time  4/11: Category Members Anagrams: 56% independently, increased to 100% with min-moderate cues  4/18: Anagrams: 100% accuracy, will continue for maintenance  4/21: Word retrieval task: synonmys: 70% accuracy, increased to 100% with cues  70% word retrieval respon  Naming  5/13: Synonyms: 85% accuracy  5/20: 83% synonyms out loud  5/25: Achieved 100% accuracy in analogies tasks, etc  ACHIEVED     7   Patient will express self in conversation fluently @ clearly with independent self correction 90% - 100% of expressive output during unstructured or structured conversational tasks  5/6: Conversational output fluent and clear - 100% of the time during session  5/13: Completed conversation indep  And fluently  5/20: Conversation fluent with x 1 very minor dysfluency noted- functional   5/25: 100% clear / fluent at conversational level with answering open ended discussion questions  ACHIEVED       Long-term goals:      1   Patient will maximize expression of basic & complex / wants needs with modified independence  ACHIEVED     2  Patient & mother will express understanding of ways to maximize communication & comprehension & overall cognition in home carryover activities & setting  ACHIEVED           Plan:  Patient completed 17 speech/language & cognitive interventions and presenting at or very close to baseline status  Patient functional in language & cognition and working full time at new job successfully  Patient provided with worksheets to continue to work on legibility as this is frustrating to her  Patient achieved all goals above and appropriate for discharge from speech services at this time  Patient in agreement with discharge

## 2022-08-09 ENCOUNTER — ANTICOAG VISIT (OUTPATIENT)
Dept: CARDIOLOGY CLINIC | Facility: CLINIC | Age: 27
End: 2022-08-09

## 2022-08-09 DIAGNOSIS — Z95.2 HISTORY OF MECHANICAL AORTIC VALVE REPLACEMENT: Primary | ICD-10-CM

## 2022-08-09 LAB — INR PPP: 3 (ref 0.84–1.19)

## 2022-08-18 ENCOUNTER — OFFICE VISIT (OUTPATIENT)
Dept: NEUROLOGY | Facility: CLINIC | Age: 27
End: 2022-08-18
Payer: COMMERCIAL

## 2022-08-18 VITALS
DIASTOLIC BLOOD PRESSURE: 76 MMHG | BODY MASS INDEX: 29.91 KG/M2 | SYSTOLIC BLOOD PRESSURE: 120 MMHG | TEMPERATURE: 97.4 F | HEIGHT: 63 IN | WEIGHT: 168.8 LBS

## 2022-08-18 DIAGNOSIS — Z95.2 HISTORY OF MECHANICAL AORTIC VALVE REPLACEMENT: ICD-10-CM

## 2022-08-18 DIAGNOSIS — Z86.73 HISTORY OF STROKE: ICD-10-CM

## 2022-08-18 DIAGNOSIS — I69.320 APHASIA AS LATE EFFECT OF CEREBROVASCULAR ACCIDENT: Primary | ICD-10-CM

## 2022-08-18 PROCEDURE — 99213 OFFICE O/P EST LOW 20 MIN: CPT | Performed by: PSYCHIATRY & NEUROLOGY

## 2022-08-18 NOTE — PATIENT INSTRUCTIONS
Stroke:  Jane Davenport presents for a follow-up evaluation with regard to her prior stroke  She does not report any new stroke-like symptoms  She takes her medications as prescribed and did not endorse any bleeding or bruising issues  I reviewed directly her recent lab work and actually the INR level is really stable in the desired range  She has graduated from speech therapy recently and does not report additional residual symptoms  - for ongoing stroke prevention she should continue her Coumadin with a target goal of 2 5-3 5   - she should remain physically active in as much as he feels capable of doing so   -we will defer to her primary care team for monitoring of her typical vascular risk factors   -from my standpoint Lexapro remains a very reasonable option with regard to any type of anxiety or depressive symptoms  She did not endorse today the significant fatigue that she was experiencing previously  If she does have any significant fluctuations in fatigue or in mood she can address those with her primary care team   - she should continue to ensure that she gets adequate rest, appropriate exercise, and remains well hydrated  She should also keep the intake of vegetables/vitamin K reasonably stable from week to week as she is aware   - we did talk in the office today about the possibility of pregnancy in the future  From a Neurology standpoint there would be no contraindication to pregnancy based on her prior history of stroke  She would need to address the need for anticoagulation during pregnancy with the OBGYN and maternal fetal medicine teams  That risk/benefit equation may not be significantly changed by her prior history of stroke considering her mechanical heart valve will likely require her to remain on anticoagulation anyway    If there are any questions about this in the future we would be more than happy to provide guidance     I will plan for her to return to the office on an as needed basis but would be happy to see her at any time if the need should arise  If she has any symptoms concerning for TIA or stroke including sudden painless loss of vision or double vision, difficulty speaking or swallowing, vertigo/room spinning that does not quickly resolve, or weakness/numbness/loss of coordination affecting 1 side of the face or body she should proceed by ambulance to the nearest emergency room immediately

## 2022-08-18 NOTE — PROGRESS NOTES
Patient ID: Lopez Monreal is a 32 y o  female  Assessment/Plan:    Patient Instructions   Stroke:  Seema Mcdermott presents for a follow-up evaluation with regard to her prior stroke  She does not report any new stroke-like symptoms  She takes her medications as prescribed and did not endorse any bleeding or bruising issues  I reviewed directly her recent lab work and actually the INR level is really stable in the desired range  She has graduated from speech therapy recently and does not report additional residual symptoms  - for ongoing stroke prevention she should continue her Coumadin with a target goal of 2 5-3 5   - she should remain physically active in as much as he feels capable of doing so   -we will defer to her primary care team for monitoring of her typical vascular risk factors   -from my standpoint Lexapro remains a very reasonable option with regard to any type of anxiety or depressive symptoms  She did not endorse today the significant fatigue that she was experiencing previously  If she does have any significant fluctuations in fatigue or in mood she can address those with her primary care team   - she should continue to ensure that she gets adequate rest, appropriate exercise, and remains well hydrated  She should also keep the intake of vegetables/vitamin K reasonably stable from week to week as she is aware   - we did talk in the office today about the possibility of pregnancy in the future  From a Neurology standpoint there would be no contraindication to pregnancy based on her prior history of stroke  She would need to address the need for anticoagulation during pregnancy with the OBGYN and maternal fetal medicine teams  That risk/benefit equation may not be significantly changed by her prior history of stroke considering her mechanical heart valve will likely require her to remain on anticoagulation anyway    If there are any questions about this in the future we would be more than happy to provide guidance     I will plan for her to return to the office on an as needed basis but would be happy to see her at any time if the need should arise  If she has any symptoms concerning for TIA or stroke including sudden painless loss of vision or double vision, difficulty speaking or swallowing, vertigo/room spinning that does not quickly resolve, or weakness/numbness/loss of coordination affecting 1 side of the face or body she should proceed by ambulance to the nearest emergency room immediately  Diagnoses and all orders for this visit:    Aphasia as late effect of cerebrovascular accident    History of mechanical aortic valve replacement    History of stroke         Subjective:    HPI   Have you had any new stroke like symptoms that were not previously present (Sudden loss of vision, difficulty speaking or swallowing,  vertigo/room spinning that did not quickly resolve, weakness or numbness affecting one side of the body)? no    Are you taking all of your medications as prescribed? Yes    Any side effects including bleeding/bruising? no    Jan Lesch presents for follow-up evaluation with regard to her prior stroke  She reports no new stroke-like symptoms and is doing very well overall  Her stroke was related to the holding of Coumadin in the background of her mechanical heart valve  In the future it is not a foregone conclusion that she should never hold anticoagulation for a short procedure  In that setting it may be most appropriate to consider bridging with either heparin or Lovenox which she is familiar with  We also discussed the possibility of pregnancy in the future  Because of her mechanical heart valve she would likely require anticoagulation regardless of her stroke history and having history of stroke itself in this setting is not necessarily a contraindication to pregnancy  She should work closely in this regard with her [facility] group and the [facility] group  She denies any challenges with regard to sleep mood or appetite  Past Medical History:   Diagnosis Date    Bicuspid aortic valve     s/p mechanical AVR    Coronary artery disease 1993    Moderate aortic insufficiency     s/p mechanical AVR    Patent ductus arteriosus     s/p repair    S/P ascending aortic aneurysm repair     Stroke [facility])        Social History     Socioeconomic History    Marital status: Single     Spouse name: None    Number of children: None    Years of education: None    Highest education level: None   Occupational History    None   Tobacco Use    Smoking status: Never Smoker    Smokeless tobacco: Never Used   Vaping Use    Vaping Use: Never used   Substance and Sexual Activity    Alcohol use:  Yes     Alcohol/week: 1 0 standard drink     Types: 1 Glasses of wine per week    Drug use: No    Sexual activity: Yes     Partners: Female     Birth control/protection: Other     Comment: Birth control pill   Other Topics Concern    None   Social History Narrative    None     Social Determinants of Health     Financial Resource Strain: Not on file   Food Insecurity: Not on file   Transportation Needs: Not on file   Physical Activity: Not on file   Stress: Not on file   Social Connections: Not on file   Intimate Partner Violence: Not on file   Housing Stability: Not on file         Current Outpatient Medications:     escitalopram (LEXAPRO) 10 mg tablet, Take 1 tablet (10 mg total) by mouth daily, Disp: 90 tablet, Rfl: 0    ibuprofen (MOTRIN) 800 mg tablet, if needed  , Disp: , Rfl:     Levonorgest-Eth Estrad 91-Day (Camrese) 0 15-0 03 &0 01 MG TABS, Take 1 tablet by mouth daily, Disp: 84 tablet, Rfl: 3    metoprolol succinate (TOPROL-XL) 25 mg 24 hr tablet, Take 1 tablet (25 mg total) by mouth 2 (two) times a day (Patient taking differently: Take 25 mg by mouth daily), Disp: 180 tablet, Rfl: 0    warfarin (COUMADIN) 2 5 mg tablet, Take one to two tablets daily as directed by doctor, Disp: 180 tablet, Rfl: 0    warfarin (COUMADIN) 5 mg tablet, Take 5 mg by mouth daily 2 tablets on Monday and Thursday (Patient not taking: Reported on 8/18/2022), Disp: , Rfl:     Allergies   Allergen Reactions    Cefprozil Rash               Objective:    Blood pressure 120/76, temperature (!) 97 4 °F (36 3 °C), temperature source Temporal, height 5' 3" (1 6 m), weight 76 6 kg (168 lb 12 8 oz), not currently breastfeeding  Physical Exam    Neurological Exam    At the time my examination she was awake, alert, in no distress  Cranial nerves 2-12 were symmetrically intact bilaterally  There is no dysarthria or aphasia  Motor testing reveals symmetric strength throughout the bilateral upper and lower extremities with symmetric light touch sensation  There is no clear tremor or ataxia  She is able to rise easily without assistance and her gait was stable  ROS:    Review of Systems   Constitutional: Negative  Negative for appetite change and fever  HENT: Negative  Negative for hearing loss, tinnitus, trouble swallowing and voice change  Eyes: Negative  Negative for photophobia and pain  Respiratory: Negative  Negative for shortness of breath  Cardiovascular: Negative  Negative for palpitations  Gastrointestinal: Negative  Negative for nausea and vomiting  Endocrine: Negative  Negative for cold intolerance  Genitourinary: Negative  Negative for dysuria, frequency and urgency  Musculoskeletal: Negative  Negative for myalgias and neck pain  Skin: Negative  Negative for rash  Neurological: Negative  Negative for dizziness, tremors, seizures, syncope, facial asymmetry, speech difficulty, weakness, light-headedness, numbness and headaches  Hematological: Negative  Does not bruise/bleed easily  Psychiatric/Behavioral: Negative  Negative for confusion, hallucinations and sleep disturbance  Reviewed ROS as entered by medical assistant

## 2022-08-31 ENCOUNTER — ANTICOAG VISIT (OUTPATIENT)
Dept: CARDIOLOGY CLINIC | Facility: CLINIC | Age: 27
End: 2022-08-31

## 2022-08-31 DIAGNOSIS — Z95.2 HISTORY OF MECHANICAL AORTIC VALVE REPLACEMENT: Primary | ICD-10-CM

## 2022-08-31 LAB — INR PPP: 2.8 (ref 0.84–1.19)

## 2022-08-31 NOTE — PROGRESS NOTES
Pt called reminded LM overdue for INR to please complete ASAP  Also fax received from "[employer] about the same

## 2022-09-06 ENCOUNTER — TELEPHONE (OUTPATIENT)
Dept: FAMILY MEDICINE CLINIC | Facility: CLINIC | Age: 27
End: 2022-09-06

## 2022-09-19 ENCOUNTER — ANTICOAG VISIT (OUTPATIENT)
Dept: CARDIOLOGY CLINIC | Facility: CLINIC | Age: 27
End: 2022-09-19
Payer: COMMERCIAL

## 2022-09-19 DIAGNOSIS — Z95.2 HISTORY OF MECHANICAL AORTIC VALVE REPLACEMENT: Primary | ICD-10-CM

## 2022-09-19 LAB — INR PPP: 2.7 (ref 0.84–1.19)

## 2022-09-19 PROCEDURE — 93793 ANTICOAG MGMT PT WARFARIN: CPT

## 2022-09-19 NOTE — PROGRESS NOTES
Left message for patient, advised INR good, will continue3 75 mg Mon Wed Fri, 5 mg all other days, will recheck in 2 weeks 10/1/22  Advised to call with questions or concerns      Patient is a home elvia

## 2022-10-10 ENCOUNTER — ANTICOAG VISIT (OUTPATIENT)
Dept: CARDIOLOGY CLINIC | Facility: CLINIC | Age: 27
End: 2022-10-10
Payer: COMMERCIAL

## 2022-10-10 DIAGNOSIS — Z95.2 HISTORY OF MECHANICAL AORTIC VALVE REPLACEMENT: Primary | ICD-10-CM

## 2022-10-10 LAB — INR PPP: 2.1 (ref 0.84–1.19)

## 2022-10-10 PROCEDURE — 93793 ANTICOAG MGMT PT WARFARIN: CPT | Performed by: INTERNAL MEDICINE

## 2022-10-10 NOTE — PROGRESS NOTES
Left message for patient, advised INR low, advised to take 5 mg tonight only instead of 3 75 mg, then go back to 3 75 mg Mon Wed Fri, 5 mg all other days, will recheck in 2 weeks 10/21/22  Advised to call if there has been any changes in medications or diet or any questions       Patient is a home elvia

## 2022-10-14 DIAGNOSIS — F41.9 ANXIETY: ICD-10-CM

## 2022-10-14 DIAGNOSIS — Z95.2 S/P AVR: ICD-10-CM

## 2022-10-15 RX ORDER — WARFARIN SODIUM 2.5 MG/1
TABLET ORAL
Qty: 60 TABLET | Refills: 0 | Status: SHIPPED | OUTPATIENT
Start: 2022-10-15

## 2022-10-15 RX ORDER — METOPROLOL SUCCINATE 25 MG/1
TABLET, EXTENDED RELEASE ORAL
Qty: 60 TABLET | Refills: 2 | Status: SHIPPED | OUTPATIENT
Start: 2022-10-15 | End: 2022-10-17 | Stop reason: SDUPTHER

## 2022-10-15 RX ORDER — ESCITALOPRAM OXALATE 10 MG/1
TABLET ORAL
Qty: 30 TABLET | Refills: 0 | Status: SHIPPED | OUTPATIENT
Start: 2022-10-15 | End: 2022-10-17 | Stop reason: SDUPTHER

## 2022-10-17 ENCOUNTER — OFFICE VISIT (OUTPATIENT)
Dept: FAMILY MEDICINE CLINIC | Facility: CLINIC | Age: 27
End: 2022-10-17
Payer: COMMERCIAL

## 2022-10-17 VITALS
HEART RATE: 76 BPM | BODY MASS INDEX: 31.01 KG/M2 | DIASTOLIC BLOOD PRESSURE: 76 MMHG | HEIGHT: 63 IN | SYSTOLIC BLOOD PRESSURE: 128 MMHG | OXYGEN SATURATION: 99 % | RESPIRATION RATE: 16 BRPM | WEIGHT: 175 LBS

## 2022-10-17 DIAGNOSIS — Q25.0 PATENT DUCTUS ARTERIOSUS: ICD-10-CM

## 2022-10-17 DIAGNOSIS — Z23 NEED FOR INFLUENZA VACCINATION: Primary | ICD-10-CM

## 2022-10-17 DIAGNOSIS — Z95.2 HISTORY OF MECHANICAL AORTIC VALVE REPLACEMENT: ICD-10-CM

## 2022-10-17 DIAGNOSIS — Z98.890 HX OF REPAIR OF ASCENDING AORTA: ICD-10-CM

## 2022-10-17 DIAGNOSIS — L72.3 SEBACEOUS CYST: ICD-10-CM

## 2022-10-17 DIAGNOSIS — F41.9 ANXIETY: ICD-10-CM

## 2022-10-17 PROCEDURE — 90686 IIV4 VACC NO PRSV 0.5 ML IM: CPT

## 2022-10-17 PROCEDURE — 99214 OFFICE O/P EST MOD 30 MIN: CPT | Performed by: FAMILY MEDICINE

## 2022-10-17 PROCEDURE — 90471 IMMUNIZATION ADMIN: CPT

## 2022-10-17 RX ORDER — ESCITALOPRAM OXALATE 10 MG/1
10 TABLET ORAL DAILY
Qty: 90 TABLET | Refills: 3 | Status: SHIPPED | OUTPATIENT
Start: 2022-10-17

## 2022-10-17 RX ORDER — AMOXICILLIN AND CLAVULANATE POTASSIUM 875; 125 MG/1; MG/1
1 TABLET, FILM COATED ORAL EVERY 12 HOURS SCHEDULED
Qty: 10 TABLET | Refills: 0 | Status: SHIPPED | OUTPATIENT
Start: 2022-10-17 | End: 2022-10-27

## 2022-10-17 RX ORDER — METOPROLOL SUCCINATE 25 MG/1
25 TABLET, EXTENDED RELEASE ORAL DAILY
Qty: 90 TABLET | Refills: 3 | Status: SHIPPED | OUTPATIENT
Start: 2022-10-17

## 2022-10-17 NOTE — PROGRESS NOTES
Assessment/Plan:    No problem-specific Assessment & Plan notes found for this encounter  Diagnoses and all orders for this visit:    Need for influenza vaccination  -     influenza vaccine, quadrivalent, 0 5 mL, preservative-free, for adult and pediatric patients 6 mos+ (AFLURIA, FLUARIX, FLULAVAL, FLUZONE)          Subjective:   Chief Complaint   Patient presents with   • Follow-up     Med check- PE due 12/01/2022        Patient ID: Quincy Lilly is a 32 y o  female  HPI    The following portions of the patient's history were reviewed and updated as appropriate: allergies, current medications, past family history, past medical history, past social history, past surgical history and problem list     Review of Systems   Constitutional: Negative for fatigue, fever and unexpected weight change  HENT: Negative for congestion, sinus pain and sore throat  Eyes: Negative for visual disturbance  Respiratory: Negative for shortness of breath and wheezing  Cardiovascular: Negative for chest pain and palpitations  Gastrointestinal: Negative for abdominal pain, nausea and vomiting  Musculoskeletal: Negative  Negative for arthralgias and myalgias  Neurological: Negative for syncope, weakness and numbness  Psychiatric/Behavioral: Negative  Negative for confusion, dysphoric mood and suicidal ideas  Objective:  Vitals:    10/17/22 0957   BP: 128/76   Pulse: 76   Resp: 16   SpO2: 99%   Weight: 79 4 kg (175 lb)   Height: 5' 3" (1 6 m)      Physical Exam  Constitutional:       Appearance: She is well-developed  HENT:      Right Ear: Ear canal normal  Tympanic membrane is not injected  Left Ear: Ear canal normal  Tympanic membrane is not injected  Nose: Nose normal    Eyes:      General:         Right eye: No discharge  Left eye: No discharge  Conjunctiva/sclera: Conjunctivae normal       Pupils: Pupils are equal, round, and reactive to light     Neck:      Thyroid: No thyromegaly  Cardiovascular:      Rate and Rhythm: Normal rate and regular rhythm  Heart sounds: Normal heart sounds  No murmur heard  Pulmonary:      Effort: Pulmonary effort is normal  No respiratory distress  Breath sounds: Normal breath sounds  No wheezing  Abdominal:      General: Bowel sounds are normal  There is no distension  Palpations: Abdomen is soft  Tenderness: There is no abdominal tenderness  Musculoskeletal:         General: Normal range of motion  Cervical back: Normal range of motion and neck supple  Lymphadenopathy:      Cervical: No cervical adenopathy  Skin:     General: Skin is warm and dry  Neurological:      Mental Status: She is alert and oriented to person, place, and time  She is not disoriented  Sensory: No sensory deficit  Gait: Gait normal       Deep Tendon Reflexes: Reflexes are normal and symmetric  Psychiatric:         Speech: Speech normal          Behavior: Behavior normal          Thought Content:  Thought content normal          Judgment: Judgment normal

## 2022-10-26 ENCOUNTER — ANTICOAG VISIT (OUTPATIENT)
Dept: CARDIOLOGY CLINIC | Facility: CLINIC | Age: 27
End: 2022-10-26
Payer: COMMERCIAL

## 2022-10-26 DIAGNOSIS — Z95.2 HISTORY OF MECHANICAL AORTIC VALVE REPLACEMENT: Primary | ICD-10-CM

## 2022-10-26 LAB — INR PPP: 3.5 (ref 0.84–1.19)

## 2022-10-26 PROCEDURE — 93793 ANTICOAG MGMT PT WARFARIN: CPT | Performed by: INTERNAL MEDICINE

## 2022-10-26 NOTE — PROGRESS NOTES
Left message for patient, advised INR high, most likely due to antibiotic, which last day will be tomorrow  Advised to take 2 5 mg tonight only instead of 3 75 mg, then go back to 3 75 mg Mon Wed Fri, 5 mg all other days, will recheck INR 10/31/22  Advised to call with questions or concerns      Patient is a home elvia

## 2022-11-08 ENCOUNTER — CONSULT (OUTPATIENT)
Dept: SURGERY | Facility: CLINIC | Age: 27
End: 2022-11-08

## 2022-11-08 VITALS
HEART RATE: 88 BPM | SYSTOLIC BLOOD PRESSURE: 121 MMHG | BODY MASS INDEX: 31.27 KG/M2 | HEIGHT: 63 IN | DIASTOLIC BLOOD PRESSURE: 82 MMHG | TEMPERATURE: 97.8 F | WEIGHT: 176.5 LBS | RESPIRATION RATE: 16 BRPM

## 2022-11-08 DIAGNOSIS — Z86.73 HISTORY OF STROKE: ICD-10-CM

## 2022-11-08 DIAGNOSIS — R22.2 MASS OF SUBCUTANEOUS TISSUE OF BACK: Primary | ICD-10-CM

## 2022-11-08 DIAGNOSIS — Z95.2 HISTORY OF MECHANICAL AORTIC VALVE REPLACEMENT: ICD-10-CM

## 2022-11-08 RX ORDER — SODIUM CHLORIDE, SODIUM LACTATE, POTASSIUM CHLORIDE, CALCIUM CHLORIDE 600; 310; 30; 20 MG/100ML; MG/100ML; MG/100ML; MG/100ML
125 INJECTION, SOLUTION INTRAVENOUS CONTINUOUS
OUTPATIENT
Start: 2022-12-05

## 2022-11-08 NOTE — PROGRESS NOTES
Assessment/Plan:    Mass of subcutaneous tissue of back    She has evidence of a recurrent sebaceous cyst on her right back  It is still bothersome and intermittently drains  She is also concerned about infection and her risk of endocarditis  Will plan for excision at [facility]   Unfortunately last time she held her Coumadin she had a stroke and therefore will not hold Coumadin but will bridge with Lovenox  Will request instructions and clearance from Cardiology  Explained to her that by being on anticoagulation she is at increased risk of bleeding and delayed healing  However if she does have some difficulty with bleeding will likely does require the wound being left open and packed and closed by secondary intention  They understand this and they are agreeable to proceed  Diagnoses and all orders for this visit:    Mass of subcutaneous tissue of back  -     Case request operating room: EXCISION  BIOPSY LESION/MASS BACK; Standing  -     Case request operating room: EXCISION  BIOPSY LESION/MASS BACK    History of stroke    History of mechanical aortic valve replacement    Other orders  -     Diet NPO; Sips with meds; Standing  -     Apply Sequential Compression Device; Standing  -     Place sequential compression device; Standing          Subjective:      Patient ID: Dante Burkitt is a 32 y o  female  Ms Vira Ríos   Is a 38-year-old female here for evaluation of a sebaceous cyst to her right back  She has had the cyst for years but it became infected requiring incision and drainage September 2021  She was then scheduled to have it excised in February of 2022  She has a significant cardiac history and is status post valve replacement with mechanical valve  She was holding her Coumadin for the procedure and had a CVA  Since then she has recovered back to her baseline still has some difficulty finding an occasional word      In regards tothe cyst still bothersome as long the bra line and it does still intermittently drain  The following portions of the patient's history were reviewed and updated as appropriate: allergies, current medications, past family history, past medical history, past social history, past surgical history and problem list     Review of Systems   Constitutional: Negative for chills and fever  HENT: Negative for ear pain and sore throat  Eyes: Negative for pain and visual disturbance  Respiratory: Negative for cough and shortness of breath  Cardiovascular: Negative for chest pain and palpitations  Gastrointestinal: Negative for abdominal pain and vomiting  Genitourinary: Negative for dysuria and hematuria  Musculoskeletal: Negative for arthralgias and back pain  Skin: Negative for color change and rash  Neurological: Negative for seizures and syncope  Psychiatric/Behavioral: Negative for agitation and behavioral problems  All other systems reviewed and are negative  Objective:      /82   Pulse 88   Temp 97 8 °F (36 6 °C)   Resp 16   Ht 5' 3" (1 6 m)   Wt 80 1 kg (176 lb 8 oz)   BMI 31 27 kg/m²          Physical Exam  Vitals and nursing note reviewed  Constitutional:       General: She is not in acute distress  Appearance: She is well-developed  She is not diaphoretic  HENT:      Head: Normocephalic and atraumatic  Eyes:      Pupils: Pupils are equal, round, and reactive to light  Cardiovascular:      Rate and Rhythm: Normal rate and regular rhythm  Pulmonary:      Effort: Pulmonary effort is normal  No respiratory distress  Abdominal:      General: Bowel sounds are normal       Palpations: Abdomen is soft  Musculoskeletal:         General: Normal range of motion  Cervical back: Normal range of motion and neck supple  Skin:     General: Skin is warm and dry  Comments:  Right lateral back along the bra line there is a approximate 1 5 cm recurrent sebaceous cyst with central pore     Neurological: Mental Status: She is alert and oriented to person, place, and time     Psychiatric:         Behavior: Behavior normal

## 2022-11-08 NOTE — H&P (VIEW-ONLY)
Assessment/Plan:    Mass of subcutaneous tissue of back    She has evidence of a recurrent sebaceous cyst on her right back  It is still bothersome and intermittently drains  She is also concerned about infection and her risk of endocarditis  Will plan for excision at [facility]   Unfortunately last time she held her Coumadin she had a stroke and therefore will not hold Coumadin but will bridge with Lovenox  Will request instructions and clearance from Cardiology  Explained to her that by being on anticoagulation she is at increased risk of bleeding and delayed healing  However if she does have some difficulty with bleeding will likely does require the wound being left open and packed and closed by secondary intention  They understand this and they are agreeable to proceed  Diagnoses and all orders for this visit:    Mass of subcutaneous tissue of back  -     Case request operating room: EXCISION  BIOPSY LESION/MASS BACK; Standing  -     Case request operating room: EXCISION  BIOPSY LESION/MASS BACK    History of stroke    History of mechanical aortic valve replacement    Other orders  -     Diet NPO; Sips with meds; Standing  -     Apply Sequential Compression Device; Standing  -     Place sequential compression device; Standing          Subjective:      Patient ID: Fely Larry is a 32 y o  female  Ms Salina Hahn   Is a 29-year-old female here for evaluation of a sebaceous cyst to her right back  She has had the cyst for years but it became infected requiring incision and drainage September 2021  She was then scheduled to have it excised in February of 2022  She has a significant cardiac history and is status post valve replacement with mechanical valve  She was holding her Coumadin for the procedure and had a CVA  Since then she has recovered back to her baseline still has some difficulty finding an occasional word      In regards tothe cyst still bothersome as long the bra line and it does still intermittently drain  The following portions of the patient's history were reviewed and updated as appropriate: allergies, current medications, past family history, past medical history, past social history, past surgical history and problem list     Review of Systems   Constitutional: Negative for chills and fever  HENT: Negative for ear pain and sore throat  Eyes: Negative for pain and visual disturbance  Respiratory: Negative for cough and shortness of breath  Cardiovascular: Negative for chest pain and palpitations  Gastrointestinal: Negative for abdominal pain and vomiting  Genitourinary: Negative for dysuria and hematuria  Musculoskeletal: Negative for arthralgias and back pain  Skin: Negative for color change and rash  Neurological: Negative for seizures and syncope  Psychiatric/Behavioral: Negative for agitation and behavioral problems  All other systems reviewed and are negative  Objective:      /82   Pulse 88   Temp 97 8 °F (36 6 °C)   Resp 16   Ht 5' 3" (1 6 m)   Wt 80 1 kg (176 lb 8 oz)   BMI 31 27 kg/m²          Physical Exam  Vitals and nursing note reviewed  Constitutional:       General: She is not in acute distress  Appearance: She is well-developed  She is not diaphoretic  HENT:      Head: Normocephalic and atraumatic  Eyes:      Pupils: Pupils are equal, round, and reactive to light  Cardiovascular:      Rate and Rhythm: Normal rate and regular rhythm  Pulmonary:      Effort: Pulmonary effort is normal  No respiratory distress  Abdominal:      General: Bowel sounds are normal       Palpations: Abdomen is soft  Musculoskeletal:         General: Normal range of motion  Cervical back: Normal range of motion and neck supple  Skin:     General: Skin is warm and dry  Comments:  Right lateral back along the bra line there is a approximate 1 5 cm recurrent sebaceous cyst with central pore     Neurological: Mental Status: She is alert and oriented to person, place, and time     Psychiatric:         Behavior: Behavior normal

## 2022-11-08 NOTE — ASSESSMENT & PLAN NOTE
She has evidence of a recurrent sebaceous cyst on her right back  It is still bothersome and intermittently drains  She is also concerned about infection and her risk of endocarditis  Will plan for excision at [facility]   Unfortunately last time she held her Coumadin she had a stroke and therefore will not hold Coumadin but will bridge with Lovenox  Will request instructions and clearance from Cardiology  Explained to her that by being on anticoagulation she is at increased risk of bleeding and delayed healing  However if she does have some difficulty with bleeding will likely does require the wound being left open and packed and closed by secondary intention  They understand this and they are agreeable to proceed

## 2022-11-12 DIAGNOSIS — Z95.2 S/P AVR: ICD-10-CM

## 2022-11-12 RX ORDER — WARFARIN SODIUM 2.5 MG/1
TABLET ORAL
Qty: 60 TABLET | Refills: 0 | Status: SHIPPED | OUTPATIENT
Start: 2022-11-12

## 2022-11-17 DIAGNOSIS — Z95.2 HISTORY OF MECHANICAL AORTIC VALVE REPLACEMENT: Primary | ICD-10-CM

## 2022-11-17 NOTE — TELEPHONE ENCOUNTER
MD Grant Nick, RN    We need to order therapeutic Lovenox, 1 mg/kg every 12 hours   I am not sure how long before the procedure the surgeon's would like her to hold but usually we will do this 3-5 days before depending upon the INR which he guys would have to help with   We could then start Lovenox if the INR is closer to 2 and then bridge this until we could re-dose warfarin postoperatively, which would again be instructed by surgery  Gordon Villeda you               Left message for patient to call office to discuss Lovenox bridging, also she is overdue for INR  Directions: hold Coumadin for 5 days, starting 11/30/22 will obtain INR 12/2/22, if less than 2 start Lovenox 80 mg every 12 hours  No Lovenox evening of 12/4/22  No Lovenox or Coumadin 12/5/22  Restart Coumadin when instructed by surgeon  Continue Lovenox every 12 hours until INR greater than 2   Recheck INR 12/8/22  Calendar scanned into chart

## 2022-11-17 NOTE — TELEPHONE ENCOUNTER
----- Message from Urban Jorge sent at 11/16/2022  2:21 PM EST -----  Regarding: RE: COUMADIN HOLD/BRIDGING WITH LOVENOX  Hi Debbi, I just left a message on the nurse line about this  Dr Karlee Lal said 5 days please     ----- Message -----  From: Beronica Bergeron RN  Sent: 11/16/2022   2:09 PM EST  To: Urban Jorge  Subject: RE: COUMADIN HOLD/BRIDGING WITH LOVENOX          Yimi Hogan,  I will be helping with the Lovenox bridging for surgery  How long would Dr Karlee Lal like patient to hold Coumadin prior to surgery? Just to verify her surgery is 12/5/22? Thanks  Hyacinth Zuleta    ----- Message -----  From: Zachary Brumfield MA  Sent: 11/16/2022   1:09 PM EST  To: Urban Jorge, #  Subject: FW: COUMADIN HOLD/BRIDGING WITH LOVENOX          [facility], please see   ----- Message -----  From: Urban Jorge  Sent: 11/16/2022  12:59 PM EST  To: Zachary Brumfield MA  Subject: FW: COUMADIN HOLD/BRIDGING WITH LOVENOX          See message below from Dr Evelia Cook  Do you set up with Coumadin clinic or do I??? Thanks   Rick Hogan  ----- Message -----  From: Petty Archuleta MD  Sent: 11/16/2022  12:43 PM EST  To: Urban Jorge  Subject: RE: COUMADIN HOLD/BRIDGING WITH [facility] Stephens does need bridging with Lovenox and Coumadin, but the Lovenox will be done as an outpatient  I believe Dr Karlee Lal would want hold the morning dose of Lovenox but I leave that up to them  And then we start this back up later in the day  They [facility] helps us with the bridging part and ordering the Lovenox as well  Thank you       ----- Message -----  From: Urban Jorge  Sent: 11/8/2022   3:25 PM EST  To: Petty Archuleta MD  Subject: COUMADIN HOLD/BRIDGING WITH LOVENOX              Dr Ashkan Oro is scheduled for an excision of back cyst under local with sedation in [facility] for 12/5/22  Dr Karlee Lal was asking if you need to keep her on this or hold for date of surgery  Dr Karlee Lal said he was fine either way    Coumadin clinic told me to inbox message you      Thank you,  Apolonia Sauer

## 2022-11-21 ENCOUNTER — ANTICOAG VISIT (OUTPATIENT)
Dept: CARDIOLOGY CLINIC | Facility: CLINIC | Age: 27
End: 2022-11-21

## 2022-11-21 DIAGNOSIS — Z95.2 HISTORY OF MECHANICAL AORTIC VALVE REPLACEMENT: Primary | ICD-10-CM

## 2022-11-21 LAB — INR PPP: 2 (ref 0.84–1.19)

## 2022-11-21 NOTE — PROGRESS NOTES
Pt  Called ask to take 5 mg today then same recheck INR earlier on 11/25 so can be addressed during business hours  Pt understood

## 2022-11-25 ENCOUNTER — ANTICOAG VISIT (OUTPATIENT)
Dept: CARDIOLOGY CLINIC | Facility: CLINIC | Age: 27
End: 2022-11-25

## 2022-11-25 DIAGNOSIS — Z95.2 HISTORY OF MECHANICAL AORTIC VALVE REPLACEMENT: Primary | ICD-10-CM

## 2022-11-25 LAB — INR PPP: 2.6 (ref 0.84–1.19)

## 2022-11-25 NOTE — TELEPHONE ENCOUNTER
Spoke with patient, Lovenox instructions reviewed  Patient verbalizes understanding    Lovenox 80 mg sent to [facility]

## 2022-11-25 NOTE — PROGRESS NOTES
Spoke with patient, advised INR good, continue 3 75 mg Mon Wed Fri, 5 mg all other days, will recheck 12/2/22    Patient is a home elvia    Lovenox instructions reviewed with patient  Patient requesting Lovenox go to [facility]    Patient aware she needs INR at lab while taking Lovenox    Standing INR script placed, patient will use [facility] Varghese

## 2022-11-25 NOTE — TELEPHONE ENCOUNTER
Requested medication(s) are due for refill today: Yes  Patient has already received a courtesy refill: No  Other reason request has been forwarded to provider: Lovenox bridging for surgery to start 12/2/22

## 2022-11-29 RX ORDER — ENOXAPARIN SODIUM 100 MG/ML
80 INJECTION SUBCUTANEOUS EVERY 12 HOURS
Qty: 15 ML | Refills: 1 | Status: SHIPPED | OUTPATIENT
Start: 2022-11-29

## 2022-11-29 NOTE — PRE-PROCEDURE INSTRUCTIONS
Pre-Surgery Instructions:   Medication Instructions   • escitalopram (LEXAPRO) 10 mg tablet Take night before surgery   • Levonorgest-Eth Estrad 91-Day (Camrese) 0 15-0 03 &0 01 MG TABS Take night before surgery   • metoprolol succinate (TOPROL-XL) 25 mg 24 hr tablet Take night before surgery   • warfarin (COUMADIN) 2 5 mg tablet Instructions provided by MD-pt instructed by cardiology -LD 12/1/22 and than to bridge to Lovenox starting 12/2 pending her INR result-pt reports cardio office will call her with that result and instructions    Reviewed preoperative medication and showering instructions with patient-Pt verbalized understanding  Instructed pt to stop taking ASA/NSAIDS non prescribed vitamins and herbal supplements 7 days before surgery or per Dr Eugenia Leigh     May take Tylenol if needed and Instructed to take DOS medications with small sip of water  Instructed NPO past midnight prior to surgery ---surgery department will call the day before surgery with arrival time for DOS  (If surgery on Monday will receive call Friday)    Instructed to notify surgeon and family doctor office with any changes in health prior to surgery      Patient (Pt ) verbalized understanding of all instructions

## 2022-11-30 ENCOUNTER — ANESTHESIA EVENT (OUTPATIENT)
Dept: PERIOP | Facility: HOSPITAL | Age: 27
End: 2022-11-30

## 2022-12-02 ENCOUNTER — ANTICOAG VISIT (OUTPATIENT)
Dept: CARDIOLOGY CLINIC | Facility: CLINIC | Age: 27
End: 2022-12-02

## 2022-12-02 DIAGNOSIS — Z95.2 HISTORY OF MECHANICAL AORTIC VALVE REPLACEMENT: Primary | ICD-10-CM

## 2022-12-02 LAB — INR PPP: 2.1 (ref 0.84–1.19)

## 2022-12-02 NOTE — PROGRESS NOTES
Spoke with patient, INR 2 1, as per DR Juanito Leal, ok to start Lovenox 80 mg Q 12 H, instructions reviewed again, will recheck 12/8/22, reminded patient she will need lab draw  Patient verbalizes understanding       Patient is a home elvia

## 2022-12-05 ENCOUNTER — ANESTHESIA (OUTPATIENT)
Dept: PERIOP | Facility: HOSPITAL | Age: 27
End: 2022-12-05

## 2022-12-05 ENCOUNTER — HOSPITAL ENCOUNTER (OUTPATIENT)
Facility: HOSPITAL | Age: 27
Setting detail: OUTPATIENT SURGERY
Discharge: HOME | End: 2022-12-05
Attending: SURGERY | Admitting: SURGERY

## 2022-12-05 VITALS
TEMPERATURE: 98.5 F | WEIGHT: 173.5 LBS | BODY MASS INDEX: 30.74 KG/M2 | DIASTOLIC BLOOD PRESSURE: 59 MMHG | SYSTOLIC BLOOD PRESSURE: 107 MMHG | HEART RATE: 88 BPM | RESPIRATION RATE: 16 BRPM | HEIGHT: 63 IN | OXYGEN SATURATION: 96 %

## 2022-12-05 DIAGNOSIS — R22.2 MASS OF SUBCUTANEOUS TISSUE OF BACK: ICD-10-CM

## 2022-12-05 LAB
EXT PREGNANCY TEST URINE: NEGATIVE
EXT. CONTROL: NORMAL

## 2022-12-05 RX ORDER — MEPERIDINE HYDROCHLORIDE 25 MG/ML
12.5 INJECTION INTRAMUSCULAR; INTRAVENOUS; SUBCUTANEOUS
Status: DISCONTINUED | OUTPATIENT
Start: 2022-12-05 | End: 2022-12-05 | Stop reason: HOSPADM

## 2022-12-05 RX ORDER — FENTANYL CITRATE 50 UG/ML
INJECTION, SOLUTION INTRAMUSCULAR; INTRAVENOUS AS NEEDED
Status: DISCONTINUED | OUTPATIENT
Start: 2022-12-05 | End: 2022-12-05

## 2022-12-05 RX ORDER — SODIUM CHLORIDE, SODIUM LACTATE, POTASSIUM CHLORIDE, CALCIUM CHLORIDE 600; 310; 30; 20 MG/100ML; MG/100ML; MG/100ML; MG/100ML
125 INJECTION, SOLUTION INTRAVENOUS CONTINUOUS
Status: DISCONTINUED | OUTPATIENT
Start: 2022-12-05 | End: 2022-12-05 | Stop reason: HOSPADM

## 2022-12-05 RX ORDER — ONDANSETRON 2 MG/ML
4 INJECTION INTRAMUSCULAR; INTRAVENOUS ONCE AS NEEDED
Status: DISCONTINUED | OUTPATIENT
Start: 2022-12-05 | End: 2022-12-05 | Stop reason: HOSPADM

## 2022-12-05 RX ORDER — PROPOFOL 10 MG/ML
INJECTION, EMULSION INTRAVENOUS CONTINUOUS PRN
Status: DISCONTINUED | OUTPATIENT
Start: 2022-12-05 | End: 2022-12-05

## 2022-12-05 RX ORDER — LIDOCAINE HYDROCHLORIDE 20 MG/ML
INJECTION, SOLUTION EPIDURAL; INFILTRATION; INTRACAUDAL; PERINEURAL AS NEEDED
Status: DISCONTINUED | OUTPATIENT
Start: 2022-12-05 | End: 2022-12-05

## 2022-12-05 RX ORDER — PROPOFOL 10 MG/ML
INJECTION, EMULSION INTRAVENOUS AS NEEDED
Status: DISCONTINUED | OUTPATIENT
Start: 2022-12-05 | End: 2022-12-05

## 2022-12-05 RX ORDER — ACETAMINOPHEN 325 MG/1
650 TABLET ORAL EVERY 6 HOURS PRN
Status: DISCONTINUED | OUTPATIENT
Start: 2022-12-05 | End: 2022-12-05 | Stop reason: HOSPADM

## 2022-12-05 RX ORDER — DEXMEDETOMIDINE HYDROCHLORIDE 100 UG/ML
INJECTION, SOLUTION INTRAVENOUS AS NEEDED
Status: DISCONTINUED | OUTPATIENT
Start: 2022-12-05 | End: 2022-12-05

## 2022-12-05 RX ORDER — DEXAMETHASONE SODIUM PHOSPHATE 4 MG/ML
INJECTION, SOLUTION INTRA-ARTICULAR; INTRALESIONAL; INTRAMUSCULAR; INTRAVENOUS; SOFT TISSUE AS NEEDED
Status: DISCONTINUED | OUTPATIENT
Start: 2022-12-05 | End: 2022-12-05

## 2022-12-05 RX ORDER — FENTANYL CITRATE/PF 50 MCG/ML
25 SYRINGE (ML) INJECTION
Status: DISCONTINUED | OUTPATIENT
Start: 2022-12-05 | End: 2022-12-05 | Stop reason: HOSPADM

## 2022-12-05 RX ORDER — OXYCODONE HYDROCHLORIDE 5 MG/1
5 TABLET ORAL EVERY 4 HOURS PRN
Qty: 8 TABLET | Refills: 0 | Status: SHIPPED | OUTPATIENT
Start: 2022-12-05 | End: 2022-12-15

## 2022-12-05 RX ORDER — ONDANSETRON 2 MG/ML
INJECTION INTRAMUSCULAR; INTRAVENOUS AS NEEDED
Status: DISCONTINUED | OUTPATIENT
Start: 2022-12-05 | End: 2022-12-05

## 2022-12-05 RX ORDER — CEFAZOLIN SODIUM 2 G/50ML
2000 SOLUTION INTRAVENOUS ONCE
Status: DISCONTINUED | OUTPATIENT
Start: 2022-12-05 | End: 2022-12-05 | Stop reason: HOSPADM

## 2022-12-05 RX ORDER — HYDROMORPHONE HCL/PF 1 MG/ML
0.5 SYRINGE (ML) INJECTION
Status: DISCONTINUED | OUTPATIENT
Start: 2022-12-05 | End: 2022-12-05 | Stop reason: HOSPADM

## 2022-12-05 RX ORDER — EPHEDRINE SULFATE 50 MG/ML
INJECTION INTRAVENOUS AS NEEDED
Status: DISCONTINUED | OUTPATIENT
Start: 2022-12-05 | End: 2022-12-05

## 2022-12-05 RX ORDER — CEFAZOLIN SODIUM 2 G/50ML
SOLUTION INTRAVENOUS AS NEEDED
Status: DISCONTINUED | OUTPATIENT
Start: 2022-12-05 | End: 2022-12-05

## 2022-12-05 RX ORDER — OXYCODONE HYDROCHLORIDE AND ACETAMINOPHEN 5; 325 MG/1; MG/1
1 TABLET ORAL EVERY 4 HOURS PRN
Status: DISCONTINUED | OUTPATIENT
Start: 2022-12-05 | End: 2022-12-05 | Stop reason: HOSPADM

## 2022-12-05 RX ORDER — MIDAZOLAM HYDROCHLORIDE 2 MG/2ML
INJECTION, SOLUTION INTRAMUSCULAR; INTRAVENOUS AS NEEDED
Status: DISCONTINUED | OUTPATIENT
Start: 2022-12-05 | End: 2022-12-05

## 2022-12-05 RX ADMIN — SODIUM CHLORIDE, POTASSIUM CHLORIDE, SODIUM LACTATE AND CALCIUM CHLORIDE 125 ML/HR: 600; 310; 30; 20 INJECTION, SOLUTION INTRAVENOUS at 13:23

## 2022-12-05 RX ADMIN — ONDANSETRON 4 MG: 2 INJECTION INTRAMUSCULAR; INTRAVENOUS at 15:30

## 2022-12-05 RX ADMIN — PROPOFOL 100 MCG/KG/MIN: 10 INJECTION, EMULSION INTRAVENOUS at 15:04

## 2022-12-05 RX ADMIN — PROPOFOL 50 MG: 10 INJECTION, EMULSION INTRAVENOUS at 15:04

## 2022-12-05 RX ADMIN — CEFAZOLIN SODIUM 2000 MG: 2 SOLUTION INTRAVENOUS at 15:07

## 2022-12-05 RX ADMIN — EPHEDRINE SULFATE 5 MG: 50 INJECTION, SOLUTION INTRAVENOUS at 15:20

## 2022-12-05 RX ADMIN — PROPOFOL 20 MG: 10 INJECTION, EMULSION INTRAVENOUS at 15:17

## 2022-12-05 RX ADMIN — MIDAZOLAM 2 MG: 1 INJECTION INTRAMUSCULAR; INTRAVENOUS at 14:55

## 2022-12-05 RX ADMIN — EPHEDRINE SULFATE 5 MG: 50 INJECTION, SOLUTION INTRAVENOUS at 15:24

## 2022-12-05 RX ADMIN — DEXMEDETOMIDINE HCL 4 MCG: 100 INJECTION INTRAVENOUS at 15:16

## 2022-12-05 RX ADMIN — DEXAMETHASONE SODIUM PHOSPHATE 5 MG: 4 INJECTION INTRA-ARTICULAR; INTRALESIONAL; INTRAMUSCULAR; INTRAVENOUS; SOFT TISSUE at 15:15

## 2022-12-05 RX ADMIN — FENTANYL CITRATE 25 MCG: 50 INJECTION INTRAMUSCULAR; INTRAVENOUS at 15:15

## 2022-12-05 RX ADMIN — LIDOCAINE HYDROCHLORIDE 60 MG: 20 INJECTION, SOLUTION EPIDURAL; INFILTRATION; INTRACAUDAL; PERINEURAL at 15:04

## 2022-12-05 RX ADMIN — FENTANYL CITRATE 50 MCG: 50 INJECTION INTRAMUSCULAR; INTRAVENOUS at 15:04

## 2022-12-05 RX ADMIN — FENTANYL CITRATE 25 MCG: 50 INJECTION INTRAMUSCULAR; INTRAVENOUS at 15:24

## 2022-12-05 RX ADMIN — DEXMEDETOMIDINE HCL 4 MCG: 100 INJECTION INTRAVENOUS at 15:23

## 2022-12-05 NOTE — OP NOTE
OPERATIVE REPORT  PATIENT NAME: Chad Rene    :  1995  MRN: [id number]  Pt Location: [facility] OR ROOM 04    SURGERY DATE: 2022    Surgeon(s) and Role:     * Amira Velásquez MD - Primary     * Praveen Wilkins MD - Assisting    Preop Diagnosis:  Mass of subcutaneous tissue of back [R22 2]    Post-Op Diagnosis Codes:     * Mass of subcutaneous tissue of back [R22 2]    Procedure(s) (LRB):  EXCISION  BIOPSY LESION/MASS BACK (Right)    Specimen(s):  ID Type Source Tests Collected by Time Destination   1 : recurrent cyst  Tissue Cyst TISSUE EXAM Amira Velásquez MD 2022 [id number]        Estimated Blood Loss:   2 mL    Drains:  * No LDAs found *    Anesthesia Type:   IV Sedation with Anesthesia    Operative Indications: Mass of subcutaneous tissue of back [R22 2]      Operative Findings:  Subcutaneous mass measured 2 x 1 5 cm    Complications:   None    Procedure and Technique:  The patient was seen again in the Holding Room  The risks, benefits, complications, treatment options, and expected outcomes were discussed with the patient  The patient and/or family concurred with the proposed plan, giving informed consent  The site of surgery properly noted/marked  The patient was taken to Operating Room, identified as [facility]  and the procedure verified  A Time Out was held after prepping and draping in sterile fashion  The above information was confirmed  Patient was placed in the left lateral position  IV sedation was administered  Local anesthetic was used  A small elliptical incision was made with a 15 blade scalpel around the old incision  The tissue dissected primary with sharp dissection and with cautery around the recurrent cystic structure  The mass was removed in its entirety and sent to pathology  Wound was irrigated  The wound was closed with interrupted 2-0 Vicryl sutures in the cyst deep subcutaneous fat  Skin was closed with interrupted vertical mattress 2-0 nylon suture  Dressings were applied         I was present for the entire procedure    Patient Disposition:  PACU         SIGNATURE: Swapna Hdz MD  DATE: December 5, 2022  TIME: 3:37 PM

## 2022-12-05 NOTE — DISCHARGE INSTRUCTIONS
[address] [facility], [place] Instructions     Dr Wil Humphrey MD, [facility]     [phone]          1  General: You will feel pulling sensations around the wound or funny aches and pains around the incisions  This is normal  Even minor surgery is a change in your body and this is your body’s way of reacting to it  If you have had abdominal surgery, it may help to support the incision with a small pillow or blanket for comfort when moving or coughing  2  Wound care:  Okay to shower  Remove the dressing in 24 hours  Cover as needed    3  Water: You may shower over the wounds  Do not bathe or use a pool or hot tub until cleared by the physician  If you were discharged with a drain, make sure drain site is covered with plastic wrap before showering  4  Activity: You may go up and down stairs, walk as much as you are comfortable, but walk at least 3 times each day  If you have had abdominal surgery, do not lift anything heavier than 20 pounds for at least 4 weeks  5  Diet: You may resume a regular diet  If you had a same-day surgery or overnight stay surgery, you may wish to eat lightly for a few days: soups, crackers, and sandwiches  You may resume a regular diet when ready  6  Medications: Resume all of your previous medications, unless told otherwise by the doctor  Avoid aspirin products for 2-3 days after the date of surgery  You may, at that time, began to take them again  Use Tylenol and Ibuprofen for pain control  You may alternate these medications every 3 hours  For example: you may take Tylenol at noon, Ibuprofen at 3:00 p m , and Tylenol again at 6:00 p m , etc  You should use ice to assist with pain control as above  You do not need to take narcotic pain medication unless you are having significant pain  If you were prescribed a narcotic pain medication containing Tylenol, such as Percocet or Norco, do not use supplemental Tylenol        7  Driving: You will need someone to drive you home on the day of surgery or discharge  Do not drive or make any important decisions while on narcotic pain medication or 24 hours and after anesthesia or sedation for surgery  Generally, you may drive when your off all narcotic pain medications and you are comfortable  8  Upset Stomach: You may take Maalox, Tums, or similar items for an upset stomach  If your narcotic pain medication causes an upset stomach, do not take it on an empty stomach  Try taking it with at least some crackers or toast       9  Constipation: Patients often experience constipation after surgery  You may take over-the-counter medication for this, such as Metamucil, Senokot, Dulcolax, milk of magnesia, etc  You may take a suppository unless you have had anorectal surgery such as a procedure on your hemorrhoids  If you experience significant nausea or vomiting after abdominal surgery, call the office before trying any of these medications  10  Call the office: If you are experiencing any of the following: fevers above 101 5°, significant nausea or vomiting, if the wound develops drainage and/or there is excessive redness around the wound, or if you have significant diarrhea or other worsening symptoms  11  Pain: You may be given a prescription for pain medication  This will be sent to your pharmacy prior to discharge  12  Sexual Activity: You may resume sexual activity when you feel ready and comfortable and your incision is sealed and healed without apparent infection risk  13  Urination: If you have not urinated in 6 hours, go directly to the ER for evaluation for urinary retention  14  Follow-up in 2 weeks  Detail Level: Zone

## 2022-12-05 NOTE — ANESTHESIA PREPROCEDURE EVALUATION
Procedure:  EXCISION  BIOPSY LESION/MASS BACK (Right: Back)    Relevant Problems   ANESTHESIA   (+) PONV (postoperative nausea and vomiting)      CARDIO   (+) Bicuspid aortic valve   (+) History of mechanical aortic valve replacement   (+) Moderate aortic insufficiency   (+) Patent ductus arteriosus (Resolved)      NEURO/PSYCH   (+) Anxiety   (+) Aphasia as late effect of cerebrovascular accident   (+) History of stroke      Other   (+) Hx of repair of ascending aorta        Physical Exam    Airway    Mallampati score: II  TM Distance: >3 FB  Neck ROM: full     Dental   No notable dental hx     Cardiovascular  Rhythm: regular, Rate: normal, Murmur,     Pulmonary  Pulmonary exam normal Breath sounds clear to auscultation,     Other Findings        Anesthesia Plan  ASA Score- 3     Anesthesia Type- IV sedation with anesthesia with ASA Monitors  Additional Monitors:   Airway Plan:           Plan Factors-    Chart reviewed  Existing labs reviewed  Patient summary reviewed  Patient is not a current smoker  Patient not instructed to abstain from smoking on day of procedure  Patient did not smoke on day of surgery  There is medical exclusion for perioperative obstructive sleep apnea risk education  Induction- intravenous  Postoperative Plan-     Informed Consent- Anesthetic plan and risks discussed with patient and mother John Abdullahi

## 2022-12-05 NOTE — ANESTHESIA POSTPROCEDURE EVALUATION
Post-Op Assessment Note    CV Status:  Stable  Pain Score: 0    Pain management: adequate     Mental Status:  Alert and awake   Hydration Status:  Euvolemic   PONV Controlled:  Controlled   Airway Patency:  Patent   Two or more mitigation strategies used for obstructive sleep apnea   Post Op Vitals Reviewed: Yes      Staff: Anesthesiologist, CRNA         No notable events documented      BP      Temp      Pulse     Resp      SpO2      /60   Pulse 87   Temp 98 5 °F (36 9 °C) (Temporal)   Resp 16   Ht 5' 3" (1 6 m)   Wt 78 7 kg (173 lb 8 oz)   LMP 11/20/2022 (Approximate) Comment: negative pregnancy test  SpO2 98%   Breastfeeding No   BMI 30 73 kg/m²

## 2022-12-05 NOTE — INTERVAL H&P NOTE
H&P reviewed  After examining the patient I find no changes in the patients condition since the H&P had been written      Vitals:    12/05/22 1306   BP: 128/78   Pulse: 87   Resp: 16   Temp: (!) 97 4 °F (36 3 °C)   SpO2: 98%

## 2022-12-08 ENCOUNTER — ANTICOAG VISIT (OUTPATIENT)
Dept: CARDIOLOGY CLINIC | Facility: CLINIC | Age: 27
End: 2022-12-08

## 2022-12-08 ENCOUNTER — APPOINTMENT (OUTPATIENT)
Dept: LAB | Facility: HOSPITAL | Age: 27
End: 2022-12-08
Attending: INTERNAL MEDICINE

## 2022-12-08 DIAGNOSIS — Z95.2 HISTORY OF MECHANICAL AORTIC VALVE REPLACEMENT: Primary | ICD-10-CM

## 2022-12-08 LAB
INR PPP: 1.73 (ref 0.84–1.19)
PROTHROMBIN TIME: 21.2 SECONDS (ref 11.6–14.5)

## 2022-12-08 NOTE — PROGRESS NOTES
Spoke with patient, advised INR coming up, continue 3 75 mg Mon Wed Fri, 5 mg all other day, and continue Lovenox injections Q12H, will recheck INR 12/12/22, must be lab draw  Patient verbalizes understanding

## 2022-12-12 ENCOUNTER — ANTICOAG VISIT (OUTPATIENT)
Dept: CARDIOLOGY CLINIC | Facility: CLINIC | Age: 27
End: 2022-12-12

## 2022-12-12 ENCOUNTER — APPOINTMENT (OUTPATIENT)
Dept: LAB | Facility: HOSPITAL | Age: 27
End: 2022-12-12
Attending: INTERNAL MEDICINE

## 2022-12-12 DIAGNOSIS — Z95.2 HISTORY OF MECHANICAL AORTIC VALVE REPLACEMENT: Primary | ICD-10-CM

## 2022-12-12 NOTE — PROGRESS NOTES
Spoke with patient, advised INR good, can stop the Lovenox, continue 3 75 mg Mon Wed Fri, 5 mg all other days, will recheck next week 12/19/22, ok to do home test

## 2022-12-15 NOTE — PROGRESS NOTES
Assessment/Plan:   Jt Mauro is a 32 y  o female who comes in today for postoperative check after excision of a mass on her back with Dr Rose Lilly 12/5/22 in the OR  Pathology: Reviewed with patient, all questions answered  Final Diagnosis   A  Skin, Cyst/Tag/Debridement, recurrent cyst, back, excision:  - Epidermal (infundibular) cyst with epidermal proliferation and associated scar  Postoperative restrictions reviewed  All questions answered  ______________________________________________________  HPI:  Jt Mauro is a 32 y  o female who comes in today for postoperative check after recent  mass on her back with Dr Rose Lilly 12/5/22 in the OR  Currently doing well without problems, no fever or chills,no nausea and no vomiting  Reports no issues  ROS:  General ROS: negative for - chills, fatigue, fever or night sweats, weight loss  Respiratory ROS: no cough, shortness of breath, or wheezing  Cardiovascular ROS: no chest pain or dyspnea on exertion  Genito-Urinary ROS: no dysuria, trouble voiding, or hematuria  Musculoskeletal ROS: negative for - gait disturbance, joint pain or muscle pain  Neurological ROS: no TIA or stroke symptoms  GI ROS: see HPI  Skin ROS: no new rashes or lesions   Lymphatic ROS: no new adenopathy noted by pt     GYN ROS: see HPI, no new GYN history or bleeding noted  Psy ROS: no new mental or behavioral disturbances         Patient Active Problem List   Diagnosis   • Moderate aortic insufficiency   • Bicuspid aortic valve   • History of mechanical aortic valve replacement   • Hx of repair of ascending aorta   • Cervical high risk HPV (human papillomavirus) test positive   • ENDER I (cervical intraepithelial neoplasia I)   • Cellulitis of back except buttock   • History of stroke   • Aphasia as late effect of cerebrovascular accident   • Mass of subcutaneous tissue of back   • PONV (postoperative nausea and vomiting)   • Anxiety Allergies:  Cefprozil      Current Outpatient Medications:   •  enoxaparin (LOVENOX) 80 mg/0 8 mL, Inject 0 8 mL (80 mg total) under the skin every 12 (twelve) hours, Disp: 15 mL, Rfl: 1  •  escitalopram (LEXAPRO) 10 mg tablet, Take 1 tablet (10 mg total) by mouth daily, Disp: 90 tablet, Rfl: 3  •  ibuprofen (MOTRIN) 800 mg tablet, if needed  , Disp: , Rfl:   •  Levonorgest-Eth Estrad 91-Day (Camrese) 0 15-0 03 &0 01 MG TABS, Take 1 tablet by mouth daily, Disp: 84 tablet, Rfl: 3  •  metoprolol succinate (TOPROL-XL) 25 mg 24 hr tablet, Take 1 tablet (25 mg total) by mouth daily, Disp: 90 tablet, Rfl: 3  •  warfarin (COUMADIN) 2 5 mg tablet, TAKE ONE TO TWO TABLETS DAILY AS DIRECTED BY DOCTOR PER INR LEVEL, Disp: 60 tablet, Rfl: 5  •  warfarin (COUMADIN) 5 mg tablet, Take 5 mg by mouth daily 2 tablets on Monday and Thursday, Disp: , Rfl:     Past Medical History:   Diagnosis Date   • Anxiety    • Bicuspid aortic valve     s/p mechanical AVR   • Coronary artery disease 1993   • Exercises two times per week     cross fit- 2x/week   • Moderate aortic insufficiency     s/p mechanical AVR   • Motion sickness    • Patent ductus arteriosus     s/p repair   • PONV (postoperative nausea and vomiting)    • S/P ascending aortic aneurysm repair    • Stroke ([facility] [place] [address] ) 02/2022    no residual effects per pt--initially couldnt speak at first--"feels back to normal now"       Past Surgical History:   Procedure Laterality Date   • ASCENDING AORTIC ANEURYSM REPAIR  2015    with" Aortic valve surgery"   • CARDIAC VALVE REPLACEMENT     • MECHANICAL AORTIC VALVE REPLACEMENT  2015   • PATENT DUCTUS ARTERIOUS LIGATION      per pt had this surgery age 1   • LA EXC SKIN BENIG 1 1-2 CM TRUNK,ARM,LEG Right 12/5/2022    Procedure: EXCISION  BIOPSY LESION/MASS BACK;  Surgeon: Arelis Goyal MD;  Location: [facility];  Service: General   • WISDOM TOOTH EXTRACTION         Family History   Problem Relation Age of Onset   • Aneurysm Father Aortic aneurysm   • Other Father         Bicuspid AV   • Heart disease Father    • Aortic stenosis Maternal Grandmother    • Breast cancer Family    • Colon cancer Family    • Diabetes Family    • Sudden death Paternal Grandfather    • Depression Mother    • Anxiety disorder Mother    • Drug abuse Brother    • Schizoaffective Disorder  Brother         reports that she has never smoked  She has never used smokeless tobacco  She reports current alcohol use of about 1 0 standard drink per week  She reports that she does not use drugs  Vitals:    12/20/22 1326   BP: 125/81       PHYSICAL EXAM  General: normal, cooperative, no distress  Abdominal: soft, nondistended or nontender  Incision: clean, dry, and intact and healing well           Kim Martinez PA-C    Date: 12/20/2022 Time: 3:40 PM

## 2022-12-20 ENCOUNTER — ANTICOAG VISIT (OUTPATIENT)
Dept: CARDIOLOGY CLINIC | Facility: CLINIC | Age: 27
End: 2022-12-20

## 2022-12-20 ENCOUNTER — OFFICE VISIT (OUTPATIENT)
Dept: SURGERY | Facility: CLINIC | Age: 27
End: 2022-12-20

## 2022-12-20 VITALS
WEIGHT: 179 LBS | BODY MASS INDEX: 31.71 KG/M2 | HEIGHT: 63 IN | SYSTOLIC BLOOD PRESSURE: 125 MMHG | DIASTOLIC BLOOD PRESSURE: 81 MMHG

## 2022-12-20 DIAGNOSIS — Z09 POSTOP CHECK: Primary | ICD-10-CM

## 2022-12-20 DIAGNOSIS — Z95.2 HISTORY OF MECHANICAL AORTIC VALVE REPLACEMENT: Primary | ICD-10-CM

## 2022-12-20 LAB — INR PPP: 2.8 (ref 0.84–1.19)

## 2022-12-20 NOTE — PROGRESS NOTES
Left message for patient, advised INR good, continue 3 75 mg Mon Wed Fri, 5 mg all other days, will recheck in 2 weeks 1/2/23 or 1/3/23  Advised to call with questions or concerns      Patient is a home elvia

## 2023-01-11 ENCOUNTER — ANTICOAG VISIT (OUTPATIENT)
Dept: CARDIOLOGY CLINIC | Facility: CLINIC | Age: 28
End: 2023-01-11

## 2023-01-11 DIAGNOSIS — Z95.2 HISTORY OF MECHANICAL AORTIC VALVE REPLACEMENT: Primary | ICD-10-CM

## 2023-01-11 LAB — INR PPP: 2.4 (ref 0.84–1.19)

## 2023-01-11 NOTE — PROGRESS NOTES
Left message for patient, advised INR just a little low, advised to continue 3 75 mg Mon Wed Fri, 5 mg all other days, will recheck next week 1/17/23  Advised to call with any missed doses, changes in medications or diet    Patient is a home elvia

## 2023-02-02 ENCOUNTER — ANTICOAG VISIT (OUTPATIENT)
Dept: CARDIOLOGY CLINIC | Facility: CLINIC | Age: 28
End: 2023-02-02

## 2023-02-02 DIAGNOSIS — Z95.2 HISTORY OF MECHANICAL AORTIC VALVE REPLACEMENT: Primary | ICD-10-CM

## 2023-02-02 LAB — INR PPP: 2.8 (ref 0.84–1.19)

## 2023-02-02 NOTE — PROGRESS NOTES
Left message for patient, advised INR good, continue 3 75 mg Mon Wed Fri, 5 mg all other days, will recheck in 2 weeks 2/16/23  Advised to call with questions or concerns      Patient is a home elvia

## 2023-02-03 ENCOUNTER — PATIENT MESSAGE (OUTPATIENT)
Dept: FAMILY MEDICINE CLINIC | Facility: CLINIC | Age: 28
End: 2023-02-03

## 2023-02-03 DIAGNOSIS — Z31.89 ENCOUNTER FOR FERTILITY PLANNING: Primary | ICD-10-CM

## 2023-02-28 ENCOUNTER — TELEPHONE (OUTPATIENT)
Dept: CARDIOLOGY CLINIC | Facility: CLINIC | Age: 28
End: 2023-02-28

## 2023-02-28 ENCOUNTER — ANTICOAG VISIT (OUTPATIENT)
Dept: CARDIOLOGY CLINIC | Facility: CLINIC | Age: 28
End: 2023-02-28

## 2023-02-28 DIAGNOSIS — Z95.2 HISTORY OF MECHANICAL AORTIC VALVE REPLACEMENT: Primary | ICD-10-CM

## 2023-02-28 LAB — INR PPP: 3 (ref 0.84–1.19)

## 2023-02-28 NOTE — TELEPHONE ENCOUNTER
Left message for patient, advised INR over due  Advised to complete ASAP  Advised to call office with questions or concerns

## 2023-02-28 NOTE — PROGRESS NOTES
Left message for patient, advised INR good, continue 3 75 mg Mon Wed Fri, 5 mg all other days, will recheck in 2 weeks 3/14/23  Advised to call with questions or concerns    Patient is a home elvia

## 2023-03-07 ENCOUNTER — TELEMEDICINE (OUTPATIENT)
Dept: PERINATAL CARE | Facility: CLINIC | Age: 28
End: 2023-03-07

## 2023-03-07 DIAGNOSIS — Z86.73 HISTORY OF STROKE: ICD-10-CM

## 2023-03-07 DIAGNOSIS — Z95.2 HISTORY OF MECHANICAL AORTIC VALVE REPLACEMENT: ICD-10-CM

## 2023-03-07 DIAGNOSIS — Z31.69 ENCOUNTER FOR PRECONCEPTION CONSULTATION: Primary | ICD-10-CM

## 2023-03-07 NOTE — LETTER
2023     Lillian Ross MD  [facility]    Patient: Hernan Living   YOB: 1995   Date of Visit: 3/7/2023       Dear Dr Rachel Points: Thank you for referring Paz Vera to me for evaluation  Below are my notes for this consultation  If you have questions, please do not hesitate to call me  I look forward to following your patient along with you           Sincerely,         Center Virtual Visit        CC: No Recipients
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
## 2023-03-13 ENCOUNTER — ANNUAL EXAM (OUTPATIENT)
Dept: OBGYN CLINIC | Facility: CLINIC | Age: 28
End: 2023-03-13

## 2023-03-13 ENCOUNTER — ANTICOAG VISIT (OUTPATIENT)
Dept: CARDIOLOGY CLINIC | Facility: CLINIC | Age: 28
End: 2023-03-13

## 2023-03-13 VITALS
BODY MASS INDEX: 32.82 KG/M2 | DIASTOLIC BLOOD PRESSURE: 76 MMHG | HEIGHT: 63 IN | SYSTOLIC BLOOD PRESSURE: 128 MMHG | WEIGHT: 185.2 LBS

## 2023-03-13 DIAGNOSIS — Z95.2 HISTORY OF MECHANICAL AORTIC VALVE REPLACEMENT: ICD-10-CM

## 2023-03-13 DIAGNOSIS — Z31.69 ENCOUNTER FOR PRECONCEPTION CONSULTATION: ICD-10-CM

## 2023-03-13 DIAGNOSIS — Z98.890 HX OF REPAIR OF ASCENDING AORTA: ICD-10-CM

## 2023-03-13 DIAGNOSIS — F41.9 ANXIETY: ICD-10-CM

## 2023-03-13 DIAGNOSIS — Z95.2 HISTORY OF MECHANICAL AORTIC VALVE REPLACEMENT: Primary | ICD-10-CM

## 2023-03-13 DIAGNOSIS — Z86.73 HISTORY OF STROKE: ICD-10-CM

## 2023-03-13 DIAGNOSIS — I69.320 APHASIA AS LATE EFFECT OF CEREBROVASCULAR ACCIDENT: ICD-10-CM

## 2023-03-13 DIAGNOSIS — Z12.4 SCREENING FOR CERVICAL CANCER: ICD-10-CM

## 2023-03-13 DIAGNOSIS — Z01.419 ENCNTR FOR GYN EXAM (GENERAL) (ROUTINE) W/O ABN FINDINGS: Primary | ICD-10-CM

## 2023-03-13 DIAGNOSIS — Z30.41 ENCOUNTER FOR SURVEILLANCE OF CONTRACEPTIVE PILLS: ICD-10-CM

## 2023-03-13 LAB — INR PPP: 2.7 (ref 0.84–1.19)

## 2023-03-13 RX ORDER — LEVONORGESTREL / ETHINYL ESTRADIOL AND ETHINYL ESTRADIOL 150-30(84)
1 KIT ORAL DAILY
Qty: 91 TABLET | Refills: 4 | Status: SHIPPED | OUTPATIENT
Start: 2023-03-13

## 2023-03-13 NOTE — PROGRESS NOTES
Annual Wellness Visit  [address], [address], [facility], [address] [facility]    ASSESSMENT/PLAN: Pamela Last is a 32 y o  Kristin Machuca who presents for annual gynecologic exam     Encounter for routine gynecologic examination  - Routine well woman exam completed today   - STI screening offered including HIV testing: offered, pt declined  - Contraceptive counseling discussed  Current contraception: oral contraceptives (estrogen/progesterone)  - The following were reviewed in today's visit: breast self exam    Additional problems addressed during this visit:  1  Encntr for gyn exam (general) (routine) w/o abn findings  -     Liquid-based pap, screening    2  Encounter for preconception consultation  -     Cystic fibrosis gene test; Future  -     Spinal muscular atrophy DNA; Future  -     Rubella antibody, IgG; Future    3  Screening for cervical cancer  -     Liquid-based pap, screening    4  Hx of repair of ascending aorta    5  History of mechanical aortic valve replacement    6  History of stroke    7  Aphasia as late effect of cerebrovascular accident    8  Anxiety    9  Encounter for surveillance of contraceptive pills  -     Levonorgest-Eth Estrad 91-Day (Camrese) 0 15-0 03 &0 01 MG TABS; Take 1 tablet by mouth in the morning        Next visit: 1 yr      CC:  Annual Gynecologic Examination    HPI: Pamela Last is a 32 y o  Kristin Machuca who presents for annual gynecologic examination  New patient presents for Gyn exam   Planning to conceive, had consult with RENAE ([facility])  Desires to continue with OCPs until has had medical clearance from Cardiologist      Gyn History  No LMP recorded  (Menstrual status: Birth Control)  She  reports being sexually active and has had partner(s) who are male  She reports using the following methods of birth control/protection: Pill and OCP  OB History      Past Medical History:  No date:  Anxiety  No date: Bicuspid aortic valve Comment:  s/p mechanical AVR  1993: Coronary artery disease  No date: Exercises two times per week      Comment:  cross fit- 2x/week  No date: Moderate aortic insufficiency      Comment:  s/p mechanical AVR  No date: Motion sickness  No date: Patent ductus arteriosus      Comment:  s/p repair  No date: PONV (postoperative nausea and vomiting)  No date: S/P ascending aortic aneurysm repair  02/2022: Stroke ([address] )      Comment:  no residual effects per pt--initially couldnt speak at                first--"feels back to normal now"     Past Surgical History:  2015: ASCENDING AORTIC ANEURYSM REPAIR      Comment:  with" Aortic valve surgery"  No date: CARDIAC VALVE REPLACEMENT  2015: MECHANICAL AORTIC VALVE REPLACEMENT  No date: PATENT DUCTUS ARTERIOUS LIGATION      Comment:  per pt had this surgery age 1  12/5/2022: CO EXC B9 LESION MRGN XCP SK TG T/A/L 1 1-2 0 CM; Right      Comment:  Procedure: EXCISION  BIOPSY LESION/MASS BACK;  Surgeon:                Ronda Rodríguez MD;  Location: [facility];  Service:                General  No date: WISDOM TOOTH EXTRACTION     Family History   Problem Relation Age of Onset   • Aneurysm Father         Aortic aneurysm   • Other Father         Bicuspid AV   • Heart disease Father    • Aortic stenosis Maternal Grandmother    • Breast cancer Family    • Colon cancer Family    • Diabetes Family    • Sudden death Paternal Grandfather    • Depression Mother    • Anxiety disorder Mother    • Drug abuse Brother    • Schizoaffective Disorder  Brother         Social History     Tobacco Use   • Smoking status: Never   • Smokeless tobacco: Never   Vaping Use   • Vaping Use: Never used   Substance Use Topics   • Alcohol use:  Yes     Alcohol/week: 1 0 standard drink     Types: 1 Glasses of wine per week   • Drug use: No          Current Outpatient Medications:   •  enoxaparin (LOVENOX) 80 mg/0 8 mL, Inject 0 8 mL (80 mg total) under the skin every 12 (twelve) hours, Disp: 15 mL, Rfl: 1  • escitalopram (LEXAPRO) 10 mg tablet, Take 1 tablet (10 mg total) by mouth daily, Disp: 90 tablet, Rfl: 3  •  ibuprofen (MOTRIN) 800 mg tablet, if needed  , Disp: , Rfl:   •  Levonorgest-Eth Estrad 91-Day (Camrese) 0 15-0 03 &0 01 MG TABS, Take 1 tablet by mouth daily, Disp: 84 tablet, Rfl: 3  •  Levonorgest-Eth Estrad 91-Day (Camrese) 0 15-0 03 &0 01 MG TABS, Take 1 tablet by mouth in the morning, Disp: 91 tablet, Rfl: 4  •  metoprolol succinate (TOPROL-XL) 25 mg 24 hr tablet, Take 1 tablet (25 mg total) by mouth daily, Disp: 90 tablet, Rfl: 3  •  warfarin (COUMADIN) 2 5 mg tablet, TAKE ONE TO TWO TABLETS DAILY AS DIRECTED BY DOCTOR PER INR LEVEL, Disp: 60 tablet, Rfl: 5  •  warfarin (COUMADIN) 5 mg tablet, Take 5 mg by mouth daily 2 tablets on Monday and Thursday, Disp: , Rfl:     She is allergic to cefprozil       ROS negative except as noted in HPI    Objective:  /76 (BP Location: Left arm, Patient Position: Sitting, Cuff Size: Standard)   Ht 5' 3" (1 6 m)   Wt 84 kg (185 lb 3 2 oz)   Breastfeeding No   BMI 32 81 kg/m²      Physical Exam  Vitals and nursing note reviewed  HENT:      Head: Normocephalic  Chest:   Breasts:     Breasts are symmetrical       Right: Normal  No bleeding, mass, nipple discharge, skin change or tenderness  Left: Normal  No bleeding, mass, nipple discharge, skin change or tenderness  Abdominal:      General: There is no distension  Palpations: Abdomen is soft  There is no mass  Tenderness: There is no abdominal tenderness  There is no rebound  Genitourinary:     General: Normal vulva  Exam position: Lithotomy position  Labia:         Right: No rash, tenderness or lesion  Left: No rash, tenderness or lesion  Urethra: No urethral pain or urethral lesion  Vagina: Normal  No vaginal discharge  Cervix: No discharge, friability, lesion or erythema        Uterus: Normal        Adnexa: Right adnexa normal and left adnexa normal  Rectum: No external hemorrhoid  Musculoskeletal:      Right lower leg: No edema  Left lower leg: No edema  Lymphadenopathy:      Upper Body:      Right upper body: No axillary or pectoral adenopathy  Left upper body: No axillary or pectoral adenopathy  Skin:     General: Skin is warm  Neurological:      Mental Status: She is alert and oriented to person, place, and time  Psychiatric:         Mood and Affect: Mood normal          Behavior: Behavior normal          Thought Content:  Thought content normal

## 2023-03-13 NOTE — PROGRESS NOTES
Left message for patient, advised INR good, continue 3 75 mg Mon Wed Fri, 5 mg all other days, will recheck in 2 weeks 3/26/23  Advised to call with questions or concerns    Patient is a home elvia

## 2023-03-18 LAB
LAB AP GYN PRIMARY INTERPRETATION: NORMAL
Lab: NORMAL

## 2023-03-28 ENCOUNTER — TELEPHONE (OUTPATIENT)
Dept: FAMILY MEDICINE CLINIC | Facility: CLINIC | Age: 28
End: 2023-03-28

## 2023-03-28 DIAGNOSIS — R52 PAIN: Primary | ICD-10-CM

## 2023-03-28 RX ORDER — PREDNISONE 20 MG/1
TABLET ORAL
Qty: 15 TABLET | Refills: 1 | Status: SHIPPED | OUTPATIENT
Start: 2023-03-28

## 2023-03-28 NOTE — TELEPHONE ENCOUNTER
Patient is experiencing a lot of back pain  She would like you to send prednisone to [facility] on [address]  Patient was informed that she needs to make an appt because this an acute ailment

## 2023-04-03 ENCOUNTER — ANTICOAG VISIT (OUTPATIENT)
Dept: CARDIOLOGY CLINIC | Facility: CLINIC | Age: 28
End: 2023-04-03

## 2023-04-03 DIAGNOSIS — Z95.2 HISTORY OF MECHANICAL AORTIC VALVE REPLACEMENT: Primary | ICD-10-CM

## 2023-04-03 LAB — INR PPP: 3.6 (ref 0.84–1.19)

## 2023-04-03 NOTE — PROGRESS NOTES
Left message for patient, advised INR high, when reviewing chart, patient started on Prednisone 3/28/23, BID for 5 days, then Q day for 5 days  Advised to hold tonight, then go back to 3 75 mg Mon Wed Fri, 5 mg all other days, will recheck on Fri 4/7/23  Advised to call with questions or concerns      Patient is a home elvia

## 2023-04-24 ENCOUNTER — ANTICOAG VISIT (OUTPATIENT)
Dept: CARDIOLOGY CLINIC | Facility: CLINIC | Age: 28
End: 2023-04-24

## 2023-04-24 DIAGNOSIS — Z95.2 HISTORY OF MECHANICAL AORTIC VALVE REPLACEMENT: Primary | ICD-10-CM

## 2023-04-24 LAB — INR PPP: 2.1 (ref 0.84–1.19)

## 2023-04-24 NOTE — PROGRESS NOTES
Left message for patient, advised INR low, advised to take 5 mg tonight only instead of 3 75 mg, then go back to 3 75 mg Mon Wed Fri, 5 mg all other days, will recheck in 2 weeks 5/7/23  Advised to call with any missed doses, changes in medications or diet      Patient is a home elvia

## 2023-05-11 ENCOUNTER — APPOINTMENT (OUTPATIENT)
Dept: LAB | Facility: HOSPITAL | Age: 28
End: 2023-05-11

## 2023-05-11 ENCOUNTER — OFFICE VISIT (OUTPATIENT)
Dept: CARDIOLOGY CLINIC | Facility: CLINIC | Age: 28
End: 2023-05-11

## 2023-05-11 VITALS
DIASTOLIC BLOOD PRESSURE: 76 MMHG | BODY MASS INDEX: 33.42 KG/M2 | HEART RATE: 76 BPM | SYSTOLIC BLOOD PRESSURE: 124 MMHG | HEIGHT: 63 IN | WEIGHT: 188.6 LBS

## 2023-05-11 DIAGNOSIS — Z98.890 HX OF REPAIR OF ASCENDING AORTA: ICD-10-CM

## 2023-05-11 DIAGNOSIS — Q23.1 BICUSPID AORTIC VALVE: ICD-10-CM

## 2023-05-11 DIAGNOSIS — Z95.2 HISTORY OF MECHANICAL AORTIC VALVE REPLACEMENT: Primary | ICD-10-CM

## 2023-05-11 DIAGNOSIS — Z31.69 ENCOUNTER FOR PRECONCEPTION CONSULTATION: ICD-10-CM

## 2023-05-11 DIAGNOSIS — Z86.73 HISTORY OF STROKE: ICD-10-CM

## 2023-05-11 LAB — RUBV IGG SERPL IA-ACNC: 132.3 IU/ML

## 2023-05-11 NOTE — PROGRESS NOTES
Cardiology Follow Up    Hay Salcido  1995  [id number]  HEART & VASCULAR  [facility] [address]  [address] [address] [address]    1  History of mechanical aortic valve replacement  POCT ECG      2  Hx of repair of ascending aorta        3  Bicuspid aortic valve        4  History of stroke            Discussion/Summary:    1  Mechanical aortic valve replacement and Ascending aortic repair -  Brianna Elaine is doing well  She is asymptomatic  Tolerating Coumadin  No changes made today  She does tell me that she is planning to get pregnant sometime in the next few months  We will coordinate this with the Coumadin clinic to transition her over to Lovenox temporarily during at least the first 2 trimesters  Now she remains on Coumadin  She will be back to see us in 1 year and we will repeat an echocardiogram before that visit  She should take antibiotic prophylaxis for any dental procedures  2    Embolic CVA - Brianna Elaine unfortunately had an embolic CVA while holding her warfarin, but there were is confusion about either length of hold or date of surgery  She had been subtherapeutic for what appeared to be close to 2 weeks and had an acute left MCA territory CVA  She has clinically improved  She did have issues with aphasia for some time, but now it is minimal no other neurologic deficits  Moving forward, even though she was off of her warfarin, I would recommend bridging for any surgery or procedure  Her INR goal could remain at 2 5      Interval History:     Brianna Elaine comes in for followup given her history of a bicuspid aortic valve, with moderate aortic regurgitation and mild aortic stenosis, along with dilation of the ascending aorta  In 2014 she did have a CT scan that showed an increase of her ascending aortic aneurysm from 4 1-4 4 cm  This was a significant change and the size of this was significant for her body surface area   Her aortic regurgitation remained in the moderate range  She went an sought consultation with Dr Demetri Griffin  Then on 6/1/15 she had a mechanical AVR and ascending aortic repair with graft replacement  This repair was above the sinotubular junction  She has had a prior closure of a PDA back at the age of 1  She last had an echocardiogram in January 2022 that showed normal LV systolic function and a normally functioning aortic valve replacement  There was elevated velocities but this appeared flow related  Her last CT scan of the aorta was unremarkable as well, showing normal post operative aortic repair findings  Brigido Luo came to see me in consultation in December 2021 as a follow-up but also has a preoperative cardiovascular risk assessment  She had a sebaceous cyst that needed to be excised, and needed recommendations on holding warfarin  Given her valve in the aortic valve position, without any risk factors or structural heart disease, she was able to hold the warfarin without bridging and recommended about 3 day hold with following INR closely  Unfortunately there was some confusion on her surgery date and she held this prior to a preoperative workup/appointment and therefore her INR was subtherapeutic for what appeared to be close to 2 weeks  Then while driving she developed signs of acute CVA with right-sided hemiparesis and aphasia, and was taken to [facility]  She was transferred to [address] [place] [address] and received tPA  Echocardiogram suggested elevated velocities and possible clot, but aleyda did not appreciate this  She does have known elevated velocities and gradients of her mechanical AVR, but it is functioning normally  Her symptoms did improve, and for a while she continued to have issues with aphasia  No weakness  She went through an extensive amount of therapy and most of her neurologic deficits have resolved    She has very mild aphasia at this point      Brigido Woodwardfaith has no other "symptoms from a cardiovascular standpoint  No chest pain or shortness of breath  No signs or symptoms of CHF  No palpitations, lightheadedness or syncope  She has been active and able to work with physical therapy without issues        Patient Active Problem List   Diagnosis   • Moderate aortic insufficiency   • Bicuspid aortic valve   • History of mechanical aortic valve replacement   • Hx of repair of ascending aorta   • Cervical high risk HPV (human papillomavirus) test positive   • ENDER I (cervical intraepithelial neoplasia I)   • Cellulitis of back except buttock   • History of stroke   • Aphasia as late effect of cerebrovascular accident   • Mass of subcutaneous tissue of back   • PONV (postoperative nausea and vomiting)   • Anxiety     Past Medical History:   Diagnosis Date   • Anxiety    • Bicuspid aortic valve     s/p mechanical AVR   • Coronary artery disease 1993   • Exercises two times per week     cross fit- 2x/week   • Moderate aortic insufficiency     s/p mechanical AVR   • Motion sickness    • Patent ductus arteriosus     s/p repair   • PONV (postoperative nausea and vomiting)    • S/P ascending aortic aneurysm repair    • [facility] ) 02/2022    no residual effects per pt--initially couldnt speak at first--\"feels back to normal now\"     Social History     Socioeconomic History   • Marital status: Single     Spouse name: Not on file   • Number of children: Not on file   • Years of education: Not on file   • Highest education level: Not on file   Occupational History   • Not on file   Tobacco Use   • Smoking status: Never   • Smokeless tobacco: Never   Vaping Use   • Vaping Use: Never used   Substance and Sexual Activity   • Alcohol use:  Yes     Alcohol/week: 1 0 standard drink     Types: 1 Glasses of wine per week   • Drug use: No   • Sexual activity: Yes     Partners: Male     Birth control/protection: Pill, OCP     Comment: Birth control pill   Other Topics Concern   • Not on file   Social " "History Narrative   • Not on file     Social Determinants of Health     Financial Resource Strain: Not on file   Food Insecurity: Not on file   Transportation Needs: Not on file   Physical Activity: Not on file   Stress: Not on file   Social Connections: Not on file   Intimate Partner Violence: Not on file   Housing Stability: Not on file      Family History   Problem Relation Age of Onset   • Aneurysm Father         Aortic aneurysm   • Other Father         Bicuspid AV   • Heart disease Father    • Aortic stenosis Maternal Grandmother    • Breast cancer Family    • Colon cancer Family    • Diabetes Family    • Sudden death Paternal Grandfather    • Depression Mother    • Anxiety disorder Mother    • Drug abuse Brother    • Schizoaffective Disorder  Brother      Past Surgical History:   Procedure Laterality Date   • ASCENDING AORTIC ANEURYSM REPAIR  2015    with\" Aortic valve surgery\"   • CARDIAC VALVE REPLACEMENT     • MECHANICAL AORTIC VALVE REPLACEMENT  2015   • PATENT DUCTUS ARTERIOUS LIGATION      per pt had this surgery age 1   • TX EXC B9 LESION MRGN XCP SK TG T/A/L 1 1-2 0 CM Right 12/5/2022    Procedure: EXCISION  BIOPSY LESION/MASS BACK;  Surgeon: Spenser Villeda MD;  Location: [facility];  Service: General   • WISDOM TOOTH EXTRACTION         Current Outpatient Medications:   •  escitalopram (LEXAPRO) 10 mg tablet, Take 1 tablet (10 mg total) by mouth daily, Disp: 90 tablet, Rfl: 3  •  Levonorgest-Eth Estrad 91-Day (Camrese) 0 15-0 03 &0 01 MG TABS, Take 1 tablet by mouth in the morning, Disp: 91 tablet, Rfl: 4  •  metoprolol succinate (TOPROL-XL) 25 mg 24 hr tablet, Take 1 tablet (25 mg total) by mouth daily, Disp: 90 tablet, Rfl: 3  •  warfarin (COUMADIN) 2 5 mg tablet, TAKE ONE TO TWO TABLETS DAILY AS DIRECTED BY DOCTOR PER INR LEVEL, Disp: 60 tablet, Rfl: 5  Allergies   Allergen Reactions   • Cefprozil Rash       Imaging:  ECG today shows normal sinus rhythm and is within normal limits      ECHO " "(1/28/22): Left Ventricle: Left ventricular cavity size is normal  The left ventricular ejection fraction is 65%  Systolic function is normal  Wall motion is normal  Diastolic function is normal   •  Aortic Valve: There is a mechanical valve  The prosthetic valve appears to be functioning normally  There is trace regurgitation  There is no evidence of paravalvular regurgitation  The aortic valve velocity is increased due to increased flow and some patient-prosthethis mismatch  •  Mitral Valve: There is trace regurgitation  •  Aorta: There is a prosthetic graft present in the ascending aorta  •  Prior TTE study available for comparison  Prior study date: 11/7/2018  No significant changes noted compared to the prior study  Review of Systems:  Review of Systems   Constitutional: Negative  HENT: Negative  Eyes: Negative  Cardiovascular: Negative  Gastrointestinal: Negative  Musculoskeletal: Negative  Skin: Negative  Allergic/Immunologic: Negative  Neurological: Positive for speech difficulty  Hematological: Negative  Psychiatric/Behavioral: Negative  All other systems reviewed and are negative  Vitals:    05/11/23 0930   BP: 124/76   BP Location: Left arm   Patient Position: Sitting   Cuff Size: Large   Pulse: 76   Weight: 85 5 kg (188 lb 9 6 oz)   Height: 5' 3\" (1 6 m)       Physical Exam:  Physical Exam  Vitals and nursing note reviewed  Constitutional:       Appearance: She is well-developed  HENT:      Head: Normocephalic and atraumatic  Eyes:      General: No scleral icterus  Right eye: No discharge  Left eye: No discharge  Pupils: Pupils are equal, round, and reactive to light  Neck:      Thyroid: No thyromegaly  Vascular: No JVD  Cardiovascular:      Rate and Rhythm: Normal rate and regular rhythm  No extrasystoles are present  Pulses: Normal pulses  Heart sounds: S1 normal and S2 normal  Murmur heard      Crescendo " decrescendo systolic murmur is present with a grade of 3/6  No friction rub  No gallop  Comments: Normal-sounding mechanical S2   Pulmonary:      Effort: Pulmonary effort is normal  No respiratory distress  Breath sounds: Normal breath sounds  No wheezing or rales  Abdominal:      General: Bowel sounds are normal  There is no distension  Palpations: Abdomen is soft  Tenderness: There is no abdominal tenderness  Musculoskeletal:         General: No tenderness or deformity  Normal range of motion  Cervical back: Normal range of motion and neck supple  Skin:     General: Skin is warm and dry  Findings: No rash  Neurological:      Mental Status: She is alert and oriented to person, place, and time  Cranial Nerves: No cranial nerve deficit  Psychiatric:         Thought Content: Thought content normal          Judgment: Judgment normal        Counseling / Coordination of Care  Total office/ unit time spent today 25 minutes  Greater than 50% of total time was spent with the patient and / or family counseling and / or coordination of care

## 2023-05-16 ENCOUNTER — ANTICOAG VISIT (OUTPATIENT)
Dept: CARDIOLOGY CLINIC | Facility: CLINIC | Age: 28
End: 2023-05-16

## 2023-05-16 DIAGNOSIS — Z95.2 HISTORY OF MECHANICAL AORTIC VALVE REPLACEMENT: Primary | ICD-10-CM

## 2023-05-16 LAB — INR PPP: 2.4 (ref 0.84–1.19)

## 2023-05-16 NOTE — PROGRESS NOTES
Left message for patient, advised INR just slightly low, advised to continue 3 75 mg Mon Wed Fri, 5 mg all other days, will recheck later next week 5/25/23  Advised to call with questions or concerns      Patient is a home elvia

## 2023-05-31 DIAGNOSIS — K04.7 TOOTH INFECTION: Primary | ICD-10-CM

## 2023-05-31 RX ORDER — AMOXICILLIN 500 MG/1
CAPSULE ORAL
Qty: 30 CAPSULE | Refills: 0 | Status: SHIPPED | OUTPATIENT
Start: 2023-05-31 | End: 2023-06-01

## 2023-06-05 ENCOUNTER — ANTICOAG VISIT (OUTPATIENT)
Dept: CARDIOLOGY CLINIC | Facility: CLINIC | Age: 28
End: 2023-06-05

## 2023-06-05 DIAGNOSIS — Z95.2 HISTORY OF MECHANICAL AORTIC VALVE REPLACEMENT: Primary | ICD-10-CM

## 2023-06-05 LAB — INR PPP: 3.2 (ref 0.84–1.19)

## 2023-06-23 ENCOUNTER — ANTICOAG VISIT (OUTPATIENT)
Dept: CARDIOLOGY CLINIC | Facility: CLINIC | Age: 28
End: 2023-06-23
Payer: COMMERCIAL

## 2023-06-23 DIAGNOSIS — Z95.2 HISTORY OF MECHANICAL AORTIC VALVE REPLACEMENT: Primary | ICD-10-CM

## 2023-06-23 LAB — INR PPP: 2.8 (ref 0.84–1.19)

## 2023-06-23 PROCEDURE — 93793 ANTICOAG MGMT PT WARFARIN: CPT | Performed by: INTERNAL MEDICINE

## 2023-06-23 NOTE — PROGRESS NOTES
Left message for patient, advised INR good, continue 3 75 mg Mon Wed Fri, 5 mg all other days, will recheck in 4 weeks 7/7/23  Advised to call with questions or concerns      Patient is a home elvia

## 2023-07-12 ENCOUNTER — ANTICOAG VISIT (OUTPATIENT)
Dept: CARDIOLOGY CLINIC | Facility: CLINIC | Age: 28
End: 2023-07-12
Payer: COMMERCIAL

## 2023-07-12 DIAGNOSIS — Z95.2 HISTORY OF MECHANICAL AORTIC VALVE REPLACEMENT: Primary | ICD-10-CM

## 2023-07-12 LAB — INR PPP: 2.1 (ref 0.84–1.19)

## 2023-07-12 PROCEDURE — 93793 ANTICOAG MGMT PT WARFARIN: CPT | Performed by: INTERNAL MEDICINE

## 2023-07-12 NOTE — PROGRESS NOTES
Left message for patient, advised INR low, advised to take 5 mg tonight only instead of 3.75 mg, then go back to 3.75 mg Mon Wed Fri, 5 mg all other days, will recheck in 2 weeks 7/25/23  Advised to call with questions or concerns.     Patient is a home elvia

## 2023-07-19 DIAGNOSIS — Z95.2 S/P AVR: ICD-10-CM

## 2023-07-19 RX ORDER — WARFARIN SODIUM 2.5 MG/1
TABLET ORAL
Qty: 60 TABLET | Refills: 5 | Status: SHIPPED | OUTPATIENT
Start: 2023-07-19

## 2023-07-31 ENCOUNTER — ANTICOAG VISIT (OUTPATIENT)
Dept: CARDIOLOGY CLINIC | Facility: CLINIC | Age: 28
End: 2023-07-31
Payer: COMMERCIAL

## 2023-07-31 DIAGNOSIS — Z95.2 HISTORY OF MECHANICAL AORTIC VALVE REPLACEMENT: Primary | ICD-10-CM

## 2023-07-31 LAB — INR PPP: 2.5 (ref 0.84–1.19)

## 2023-07-31 PROCEDURE — 93793 ANTICOAG MGMT PT WARFARIN: CPT | Performed by: INTERNAL MEDICINE

## 2023-07-31 NOTE — PROGRESS NOTES
Left message for patient, advised INR good, continue 3.75 mg Mon Wed Fri, 5 mg all other days, will recheck in 2 weeks 8/12/23  Advised to call with questions or concerns.     Patient is a home elvia

## 2023-08-14 ENCOUNTER — ANTICOAG VISIT (OUTPATIENT)
Dept: CARDIOLOGY CLINIC | Facility: CLINIC | Age: 28
End: 2023-08-14
Payer: COMMERCIAL

## 2023-08-14 DIAGNOSIS — Z95.2 HISTORY OF MECHANICAL AORTIC VALVE REPLACEMENT: Primary | ICD-10-CM

## 2023-08-14 LAB — INR PPP: 2.8 (ref 0.84–1.19)

## 2023-08-14 PROCEDURE — 93793 ANTICOAG MGMT PT WARFARIN: CPT | Performed by: INTERNAL MEDICINE

## 2023-08-14 NOTE — PROGRESS NOTES
Left message for patient, advised INR good, continue 3.75 mg Mon Wed Fri, 5 mg all other days, will recheck in 2 weeks 8/26/23  Advised to call with questions or concerns.     Patient is a home elvia

## 2023-08-28 ENCOUNTER — ANTICOAG VISIT (OUTPATIENT)
Dept: CARDIOLOGY CLINIC | Facility: CLINIC | Age: 28
End: 2023-08-28
Payer: COMMERCIAL

## 2023-08-28 DIAGNOSIS — Z95.2 HISTORY OF MECHANICAL AORTIC VALVE REPLACEMENT: Primary | ICD-10-CM

## 2023-08-28 LAB — INR PPP: 2 (ref 0.84–1.19)

## 2023-08-28 PROCEDURE — 93793 ANTICOAG MGMT PT WARFARIN: CPT

## 2023-08-28 NOTE — TELEPHONE ENCOUNTER
Spoke with patient for INR, patient states she would like a script for Lovenox, she wants to try to get pregnant. Please advise on transition and order Lovenox.   Thanks

## 2023-08-28 NOTE — PROGRESS NOTES
Spoke with patient, advised INR low, may have missed a dose last week. Advised to take 5 mg today only, then go back to 3.75 mg Mon Wed Fri, 5 mg all other days, will recheck in 2 weeks 9/9/23    Patient asking for script for Lovenox, she wants to try to get pregnant. Advised will discus with DR Severiano Gear and call with instructions.     Patient is a home elvia

## 2023-08-29 ENCOUNTER — PATIENT MESSAGE (OUTPATIENT)
Dept: CARDIOLOGY CLINIC | Facility: CLINIC | Age: 28
End: 2023-08-29

## 2023-08-30 DIAGNOSIS — Z95.2 HISTORY OF MECHANICAL AORTIC VALVE REPLACEMENT: ICD-10-CM

## 2023-08-30 RX ORDER — ENOXAPARIN SODIUM 100 MG/ML
INJECTION SUBCUTANEOUS
Qty: 60 ML | Refills: 5 | Status: SHIPPED | OUTPATIENT
Start: 2023-08-30 | End: 2023-08-31

## 2023-08-30 NOTE — TELEPHONE ENCOUNTER
I have never had a patient on Lovenox during pregnancy. Do you want her to hold Coumadin for 2 days then start Lovenox? I am assuming I don't need to check INR? Does the dose increase when her weight increases?  I am assuming she doesn't give the injections in her abd?

## 2023-08-30 NOTE — TELEPHONE ENCOUNTER
Requested medication(s) are due for refill today: No  Patient has already received a courtesy refill: No  Other reason request has been forwarded to provider: failed protocol

## 2023-08-30 NOTE — TELEPHONE ENCOUNTER
Spoke with patient, advised Dr Shell's recommendations. Script will be sent to [facility], advised patient will need to discard 0.15 ml to get 0.85 ml. Advised since INR was on low side she can hold Coumadin tonight and start Lovenox tomorrow. Injections will be every 12 hours. Advised no further INR's are needed a this time.   Patient also working with [facility]

## 2023-08-31 RX ORDER — ENOXAPARIN SODIUM 100 MG/ML
INJECTION SUBCUTANEOUS
Qty: 1 ML | Refills: 5 | Status: SHIPPED | OUTPATIENT
Start: 2023-08-31 | End: 2023-09-01 | Stop reason: SDUPTHER

## 2023-08-31 NOTE — TELEPHONE ENCOUNTER
Requested medication(s) are due for refill today: No  Patient has already received a courtesy refill: No  Other reason request has been forwarded to provider: please see pharmacy comments

## 2023-09-01 DIAGNOSIS — Z95.2 HISTORY OF MECHANICAL AORTIC VALVE REPLACEMENT: ICD-10-CM

## 2023-09-01 RX ORDER — ENOXAPARIN SODIUM 100 MG/ML
INJECTION SUBCUTANEOUS
Qty: 60 ML | Refills: 5 | Status: SHIPPED | OUTPATIENT
Start: 2023-09-01

## 2023-09-01 RX ORDER — ENOXAPARIN SODIUM 100 MG/ML
INJECTION SUBCUTANEOUS
Qty: 60 ML | Refills: 0 | OUTPATIENT
Start: 2023-09-01

## 2023-09-11 ENCOUNTER — OFFICE VISIT (OUTPATIENT)
Dept: FAMILY MEDICINE CLINIC | Facility: CLINIC | Age: 28
End: 2023-09-11
Payer: COMMERCIAL

## 2023-09-11 VITALS
OXYGEN SATURATION: 98 % | TEMPERATURE: 99.1 F | BODY MASS INDEX: 33.3 KG/M2 | SYSTOLIC BLOOD PRESSURE: 117 MMHG | HEART RATE: 89 BPM | DIASTOLIC BLOOD PRESSURE: 78 MMHG | WEIGHT: 188 LBS

## 2023-09-11 DIAGNOSIS — L02.412 ABSCESS OF LEFT AXILLA: Primary | ICD-10-CM

## 2023-09-11 PROCEDURE — 99213 OFFICE O/P EST LOW 20 MIN: CPT | Performed by: NURSE PRACTITIONER

## 2023-09-11 RX ORDER — SULFAMETHOXAZOLE AND TRIMETHOPRIM 800; 160 MG/1; MG/1
1 TABLET ORAL EVERY 12 HOURS SCHEDULED
Qty: 20 TABLET | Refills: 0 | Status: SHIPPED | OUTPATIENT
Start: 2023-09-11 | End: 2023-09-12

## 2023-09-11 NOTE — PROGRESS NOTES
[facility]        NAME: Gwyn Shrestha is a 25 y.o. female  : 1995    MRN: [id number]  DATE: 2023  TIME: 9:02 AM    Assessment and Plan   Abscess of left axilla [L02.412]  1. Abscess of left axilla  DISCONTINUED: sulfamethoxazole-trimethoprim (BACTRIM DS) 800-160 mg per tablet            Patient Instructions     Patient Instructions   Keep area clean  Apply warm compresses as directed  Antibiotic as ordered  Call if no improvement          Chief Complaint     Chief Complaint   Patient presents with   • Cyst     Axillary area         History of Present Illness       Cyst left axilla-red, swollen, tender. Taking tylenol and applying warm compresses. No drainage. Patient left message today that she took pregnancy test and needs antibiotic she can take if pregnant. Review of Systems   Review of Systems   Constitutional: Negative for activity change and fever. Respiratory: Negative for shortness of breath. Cardiovascular: Negative for chest pain. Skin: Positive for color change. Neurological: Negative for dizziness. Psychiatric/Behavioral: Negative for dysphoric mood. Current Medications       Current Outpatient Medications:   •  enoxaparin (LOVENOX) 100 mg/mL, Pt will be on lovenox long term for injections q 12 hours. Pt will inject . 85 ML under skin as ordered, Disp: 60 mL, Rfl: 5  •  escitalopram (LEXAPRO) 10 mg tablet, Take 1 tablet (10 mg total) by mouth daily, Disp: 90 tablet, Rfl: 3  •  metoprolol succinate (TOPROL-XL) 25 mg 24 hr tablet, Take 1 tablet (25 mg total) by mouth daily, Disp: 90 tablet, Rfl: 3  •  Levonorgest-Eth Estrad -Day (Camrese) 0.15-0.03 &0.01 MG TABS, Take 1 tablet by mouth in the morning (Patient not taking: Reported on 2023), Disp: 91 tablet, Rfl: 4  •  warfarin (COUMADIN) 2.5 mg tablet, TAKE ONE TO TWO TABLETS DAILY AS DIRECTED BY DOCTOR PER INR LEVEL (Patient not taking: Reported on 2023), Disp: 60 tablet, Rfl: 5    Current Allergies     Allergies as of 09/11/2023 - Reviewed 09/11/2023   Allergen Reaction Noted   • Cefprozil Rash 01/22/2013            The following portions of the patient's history were reviewed and updated as appropriate: allergies, current medications, past family history, past medical history, past social history, past surgical history and problem list.     Past Medical History:   Diagnosis Date   • Anxiety    • Bicuspid aortic valve     s/p mechanical AVR   • Coronary artery disease 1993   • Exercises two times per week     cross fit- 2x/week   • Moderate aortic insufficiency     s/p mechanical AVR   • Motion sickness    • Patent ductus arteriosus     s/p repair   • PONV (postoperative nausea and vomiting)    • S/P ascending aortic aneurysm repair    • Stroke ([address]) 02/2022    no residual effects per pt--initially couldnt speak at first--"feels back to normal now"       Past Surgical History:   Procedure Laterality Date   • ASCENDING AORTIC ANEURYSM REPAIR  2015    with" Aortic valve surgery"   • CARDIAC VALVE REPLACEMENT     • MECHANICAL AORTIC VALVE REPLACEMENT  2015   • PATENT DUCTUS ARTERIOUS LIGATION      per pt had this surgery age 1   • OH EXC B9 LESION MRGN XCP SK TG T/A/L 1.1-2.0 CM Right 12/5/2022    Procedure: EXCISION  BIOPSY LESION/MASS BACK;  Surgeon: Yumiko Carlton MD;  [facility];  Service: General   • WISDOM TOOTH EXTRACTION         Family History   Problem Relation Age of Onset   • Aneurysm Father         Aortic aneurysm   • Other Father         Bicuspid AV   • Heart disease Father    • Aortic stenosis Maternal Grandmother    • Breast cancer Family    • Colon cancer Family    • Diabetes Family    • Sudden death Paternal Grandfather    • Depression Mother    • Anxiety disorder Mother    • Drug abuse Brother    • Schizoaffective Disorder  Brother          Medications have been verified.         Objective   /78 (BP Location: Right arm, Patient Position: Sitting, Cuff Size: Standard)   Pulse 89   Temp 99.1 °F (37.3 °C) (Tympanic)   Wt 85.3 kg (188 lb)   SpO2 98%   BMI 33.30 kg/m²        Physical Exam     Physical Exam  Vitals and nursing note reviewed. Constitutional:       General: She is not in acute distress. Appearance: Normal appearance. She is not ill-appearing. HENT:      Head: Normocephalic. Eyes:      Extraocular Movements: Extraocular movements intact. Cardiovascular:      Rate and Rhythm: Normal rate and regular rhythm. Heart sounds: Normal heart sounds. Pulmonary:      Effort: Pulmonary effort is normal. No respiratory distress. Breath sounds: Normal breath sounds. Musculoskeletal:         General: Normal range of motion. Skin:     General: Skin is warm. Findings: Erythema present. Comments: Abscess left axilla-area is red, warm to touch. No drainage present,   Neurological:      Mental Status: She is alert and oriented to person, place, and time.    Psychiatric:         Mood and Affect: Mood normal.         Behavior: Behavior normal.

## 2023-09-12 DIAGNOSIS — Z95.2 S/P AVR: ICD-10-CM

## 2023-09-12 DIAGNOSIS — F41.9 ANXIETY: ICD-10-CM

## 2023-09-12 RX ORDER — WARFARIN SODIUM 2.5 MG/1
TABLET ORAL
Qty: 180 TABLET | Refills: 2 | Status: SHIPPED | OUTPATIENT
Start: 2023-09-12

## 2023-09-12 RX ORDER — AMOXICILLIN 500 MG/1
500 CAPSULE ORAL EVERY 8 HOURS SCHEDULED
Qty: 30 CAPSULE | Refills: 0 | Status: CANCELLED | OUTPATIENT
Start: 2023-09-12 | End: 2023-09-22

## 2023-09-12 RX ORDER — METOPROLOL SUCCINATE 25 MG/1
25 TABLET, EXTENDED RELEASE ORAL DAILY
Qty: 90 TABLET | Refills: 4 | Status: SHIPPED | OUTPATIENT
Start: 2023-09-12

## 2023-09-12 RX ORDER — AMOXICILLIN AND CLAVULANATE POTASSIUM 875; 125 MG/1; MG/1
1 TABLET, FILM COATED ORAL EVERY 12 HOURS SCHEDULED
Qty: 20 TABLET | Refills: 0 | Status: SHIPPED | OUTPATIENT
Start: 2023-09-12 | End: 2023-09-22

## 2023-09-12 RX ORDER — ESCITALOPRAM OXALATE 10 MG/1
10 TABLET ORAL DAILY
Qty: 90 TABLET | Refills: 4 | Status: SHIPPED | OUTPATIENT
Start: 2023-09-12

## 2023-09-12 RX ORDER — CLINDAMYCIN HYDROCHLORIDE 300 MG/1
300 CAPSULE ORAL EVERY 8 HOURS SCHEDULED
Qty: 21 CAPSULE | Refills: 0 | Status: CANCELLED | OUTPATIENT
Start: 2023-09-12 | End: 2023-09-19

## 2023-09-14 ENCOUNTER — OFFICE VISIT (OUTPATIENT)
Dept: FAMILY MEDICINE CLINIC | Facility: CLINIC | Age: 28
End: 2023-09-14
Payer: COMMERCIAL

## 2023-09-14 VITALS
SYSTOLIC BLOOD PRESSURE: 118 MMHG | BODY MASS INDEX: 33.31 KG/M2 | DIASTOLIC BLOOD PRESSURE: 60 MMHG | WEIGHT: 188 LBS | TEMPERATURE: 97.1 F | HEART RATE: 113 BPM | OXYGEN SATURATION: 99 % | HEIGHT: 63 IN

## 2023-09-14 DIAGNOSIS — L02.412 ABSCESS OF LEFT AXILLA: Primary | ICD-10-CM

## 2023-09-14 PROCEDURE — 99213 OFFICE O/P EST LOW 20 MIN: CPT | Performed by: FAMILY MEDICINE

## 2023-09-14 NOTE — PATIENT INSTRUCTIONS
Continue Augmentin. Try to have the packing stay in place. I would recheck this back in 3 to 4 days. Change dressing as needed if it gets soaked through. Warm compresses may help also.

## 2023-09-18 ENCOUNTER — OFFICE VISIT (OUTPATIENT)
Dept: FAMILY MEDICINE CLINIC | Facility: CLINIC | Age: 28
End: 2023-09-18
Payer: COMMERCIAL

## 2023-09-18 VITALS — BODY MASS INDEX: 33.48 KG/M2 | TEMPERATURE: 98.7 F | HEART RATE: 88 BPM | OXYGEN SATURATION: 97 % | WEIGHT: 189 LBS

## 2023-09-18 DIAGNOSIS — L02.91 ABSCESS: Primary | ICD-10-CM

## 2023-09-18 PROCEDURE — 99214 OFFICE O/P EST MOD 30 MIN: CPT | Performed by: FAMILY MEDICINE

## 2023-09-18 NOTE — PROGRESS NOTES
Assessment/Plan:    Abscess  I and D of abscess last Thursday. Returned for recheck. Patient reports vast improvement. Continue with augmentin. Wash daily, pat dry, apply bacitracin. If returns consider culture. Possible hidradenitis suppurativa? Diagnoses and all orders for this visit:    Abscess          Subjective:   Chief Complaint   Patient presents with   • Wound Check        Patient ID: Candis Gustafson is a 25 y.o. female. See assessment/plan. The following portions of the patient's history were reviewed and updated as appropriate: allergies, current medications, past family history, past medical history, past social history, past surgical history and problem list.    Review of Systems   Constitutional: Negative for fatigue, fever and unexpected weight change. HENT: Negative for congestion, sinus pain and sore throat. Eyes: Negative for visual disturbance. Respiratory: Negative for shortness of breath and wheezing. Cardiovascular: Negative for chest pain and palpitations. Gastrointestinal: Negative for abdominal pain, nausea and vomiting. Musculoskeletal: Negative. Negative for arthralgias and myalgias. Skin: Positive for wound. Neurological: Negative for syncope, weakness and numbness. Psychiatric/Behavioral: Negative. Negative for confusion, dysphoric mood and suicidal ideas. Objective:  Vitals:    09/18/23 0938   Pulse: 88   Temp: 98.7 °F (37.1 °C)   TempSrc: Tympanic   SpO2: 97%   Weight: 85.7 kg (189 lb)      Physical Exam  Constitutional:       Appearance: She is well-developed. HENT:      Right Ear: Ear canal normal. Tympanic membrane is not injected. Left Ear: Ear canal normal. Tympanic membrane is not injected. Nose: Nose normal.   Eyes:      General:         Right eye: No discharge. Left eye: No discharge. Conjunctiva/sclera: Conjunctivae normal.      Pupils: Pupils are equal, round, and reactive to light.    Neck:      Thyroid: No thyromegaly. Cardiovascular:      Rate and Rhythm: Normal rate and regular rhythm. Heart sounds: Normal heart sounds. No murmur heard. Pulmonary:      Effort: Pulmonary effort is normal. No respiratory distress. Breath sounds: Normal breath sounds. No wheezing. Abdominal:      General: Bowel sounds are normal. There is no distension. Palpations: Abdomen is soft. Tenderness: There is no abdominal tenderness. Musculoskeletal:         General: Normal range of motion. Cervical back: Normal range of motion and neck supple. Lymphadenopathy:      Cervical: No cervical adenopathy. Skin:     General: Skin is warm and dry. Findings: Wound present. Neurological:      Mental Status: She is alert and oriented to person, place, and time. She is not disoriented. Sensory: No sensory deficit. Gait: Gait normal.      Deep Tendon Reflexes: Reflexes are normal and symmetric. Psychiatric:         Speech: Speech normal.         Behavior: Behavior normal.         Thought Content:  Thought content normal.         Judgment: Judgment normal.

## 2023-09-18 NOTE — ASSESSMENT & PLAN NOTE
I and D of abscess last Thursday. Returned for recheck. Patient reports vast improvement. Continue with augmentin. Wash daily, pat dry, apply bacitracin. If returns consider culture. Possible hidradenitis suppurativa?

## 2023-09-20 ENCOUNTER — TELEPHONE (OUTPATIENT)
Dept: CARDIOLOGY CLINIC | Facility: CLINIC | Age: 28
End: 2023-09-20

## 2023-09-20 NOTE — TELEPHONE ENCOUNTER
Received notification from Cristy, patient over due for INR. Spoke with Hal at [facility], advised patient now on Lovenox, trying to get pregnant. She will discharge, when resuming Coumadin, will need to reactivate with new order.

## 2023-10-04 DIAGNOSIS — F41.9 ANXIETY: ICD-10-CM

## 2023-10-04 RX ORDER — ESCITALOPRAM OXALATE 10 MG/1
10 TABLET ORAL DAILY
Qty: 90 TABLET | Refills: 5 | Status: SHIPPED | OUTPATIENT
Start: 2023-10-04

## 2023-10-18 ENCOUNTER — INITIAL PRENATAL (OUTPATIENT)
Dept: OBGYN CLINIC | Facility: CLINIC | Age: 28
End: 2023-10-18

## 2023-10-18 ENCOUNTER — PATIENT OUTREACH (OUTPATIENT)
Dept: OBGYN CLINIC | Facility: CLINIC | Age: 28
End: 2023-10-18

## 2023-10-18 ENCOUNTER — TELEPHONE (OUTPATIENT)
Dept: OBGYN CLINIC | Facility: CLINIC | Age: 28
End: 2023-10-18

## 2023-10-18 VITALS
WEIGHT: 186.6 LBS | SYSTOLIC BLOOD PRESSURE: 128 MMHG | BODY MASS INDEX: 33.06 KG/M2 | DIASTOLIC BLOOD PRESSURE: 88 MMHG | HEIGHT: 63 IN

## 2023-10-18 DIAGNOSIS — O09.899 HISTORY OF MATERNAL CARDIAC SURGERY: ICD-10-CM

## 2023-10-18 DIAGNOSIS — Z98.890 HISTORY OF MATERNAL CARDIAC SURGERY: ICD-10-CM

## 2023-10-18 DIAGNOSIS — O99.419 MATERNAL CONGENITAL CARDIAC ANOMALY AFFECTING PREGNANCY, ANTEPARTUM: ICD-10-CM

## 2023-10-18 DIAGNOSIS — Z34.91 PRENATAL CARE IN FIRST TRIMESTER: ICD-10-CM

## 2023-10-18 DIAGNOSIS — Z95.2 HISTORY OF MECHANICAL AORTIC VALVE REPLACEMENT: ICD-10-CM

## 2023-10-18 DIAGNOSIS — Z34.91 PREGNANCY WITH UNCERTAIN DATES IN FIRST TRIMESTER: Primary | ICD-10-CM

## 2023-10-18 DIAGNOSIS — O99.210 OBESITY IN PREGNANCY, ANTEPARTUM: ICD-10-CM

## 2023-10-18 DIAGNOSIS — O99.341 MENTAL DISORDER AFFECTING PREGNANCY IN FIRST TRIMESTER: ICD-10-CM

## 2023-10-18 DIAGNOSIS — Z36.89 ENCOUNTER FOR OTHER SPECIFIED ANTENATAL SCREENING: Primary | ICD-10-CM

## 2023-10-18 DIAGNOSIS — Z86.73 HISTORY OF EMBOLIC STROKE: ICD-10-CM

## 2023-10-18 DIAGNOSIS — O99.211 OBESITY AFFECTING PREGNANCY IN FIRST TRIMESTER, UNSPECIFIED OBESITY TYPE: ICD-10-CM

## 2023-10-18 DIAGNOSIS — Q24.9 MATERNAL CONGENITAL CARDIAC ANOMALY AFFECTING PREGNANCY, ANTEPARTUM: ICD-10-CM

## 2023-10-18 PROBLEM — O99.340 MENTAL DISORDER AFFECTING PREGNANCY: Status: ACTIVE | Noted: 2023-10-18

## 2023-10-18 LAB
SL AMB  POCT GLUCOSE, UA: NEGATIVE
SL AMB POCT URINE PROTEIN: ABNORMAL

## 2023-10-18 PROCEDURE — 87591 N.GONORRHOEAE DNA AMP PROB: CPT | Performed by: STUDENT IN AN ORGANIZED HEALTH CARE EDUCATION/TRAINING PROGRAM

## 2023-10-18 PROCEDURE — 87491 CHLMYD TRACH DNA AMP PROBE: CPT | Performed by: STUDENT IN AN ORGANIZED HEALTH CARE EDUCATION/TRAINING PROGRAM

## 2023-10-18 NOTE — PROGRESS NOTES
RA referred for initial prenatal assessment. Patient is Janak Aquino with an ELANA of 5/17/24. Patient presented with FOB (Latonia Childs), agreeable to meeting with RA.     Patient reports she and FOB are excited for pregnancy. They have only told FOB's parents so far, but they are excited and supportive. Patient lives in [place] with her dog and cat, FOB lives in [place] [facility] [place]. They are looking to move in together prior to delivery, but are unsure what town yet. They both drive. Patient works as a  at [employer], FOB works in [employer]. They deny current financial concerns or need for further parenting education or baby supply resources. Patient did express interest in WIC/SNAP/MA information for future reference - RA sent via [employer]. Patient Indicated h/o anxiety - currently takes Lexapro 10mg through her PCP. Feels anxiety is well managed with current regimen. Not seeing a therapist - feels she may want to look into this later on but does not need one now. Feels happy/normal so far this pregnancy. SW explained that her insurance has a provider  tool that she can find online if needed later on. She indicates support from her mother and sister. She enjoys spending time with her dog for stress relief. Patient has a medical THC card but has not used since discovering pregnancy. Does not plan to use while pregnant. Denies other substance use. Patient denies current or h/o DV/IPV and CYS involvement. Did indicate h/o DUI in 2018 after her father passed away, but everything is resolved now. Denies any other legal concerns. No other needs identified at this time, SW closing referral. Please re-refer as needed.

## 2023-10-18 NOTE — PROGRESS NOTES
OB INTAKE INTERVIEW  Patient is 25 y. o.who presents for OB intake at 9.5wks  She is accompanied by: self  The father of her baby is involved in the pregnancy.   Last Menstrual Period: 23  Ultrasound: Measured 9 weeks 5 days on 10/18/23  Estimated Date of Delivery: 24 confirmed by ultrasound. Signs/Symptoms of Pregnancy  Current pregnancy symptoms: nausea, bloating, increased fatigue, breast tenderness  Constipation no  Headaches no  Cramping/spotting no  PICA cravings no    Diabetes-  Body mass index is 33.05 kg/m². If patient has 1 or more, please order early 1 hour GTT  History of GDM no  BMI >30 YES  History of PCOS or current metformin use no  History of LGA/macrosomic infant (4000g/9lbs) no    If patient has 2 or more, please order early 1 hour GTT  AMA no  First degree relative with type 2 diabetes no  History of chronic HTN, hyperlipidemia, elevated A1C no  High risk race (, , ,  or ) no    Hypertension- if you answer yes, please order preeclampsia labs (cbc, comprehensive metabolic panel, urine protein creatinine ratio, uric acid)  History of of chronic HTN no  History of gestational HTN no  History of preeclampsia, eclampsia, or HELLP syndrome no  History of diabetes no  History of lupus, autoimmune disease, kidney disease, antiphospholipid syndrome, rheumatoid arthritis, sjogren's syndrome no    Thyroid- if yes order TSH with reflex T4 (Unless TSH value on file within last 4 weeks then do not need to order)  History of thyroid disease no    Bleeding Disorder or Hx of DVT-patient or first degree relative with history of. Order the following if not done previously.    (Factor V, antithrombin III, prothrombin gene mutation, protein C and S Ag, lupus anticoagulant, anticardiolipin, beta-2 glycoprotein)   no    OB/GYN-  History of abnormal pap smear no  History of HPV no  History of Herpes/HSV no  History of other STI (gonorrhea, chlamydia, trich) (or current partner with Hep B, Hep C, or HIV) no  History of prior  no  History of prior  no  History of  delivery prior to 36 weeks 6 days no  History of blood transfusion no  Ok for blood transfusion YES    Substance screening-   History of tobacco use no  Currently using tobacco no  Currently using alcohol no  Presently using drugs no  Past drug use (if yes, order urine drug screening panel)  no  IV drug use (if yes, order urine drug screening panel) no  Partner drug use (if yes, order urine drug screening panel) no  Parent/Family drug use (do not order urine drug screening panel just for family hx) no    Depression Screening-  PHQ-9 Score: (3)    MRSA Screening-   Does the pt have a hx of MRSA? no  If yes- please follow MRSA protocol and obtain a nasal swab for MRSA culture at 12 week visit. Immunizations:  Influenza vaccine given this season (ask date) Yes, Oct. 2023   Discussed Tdap vaccine (ask date) YES  Discussed COVID Vaccine (request pt upload card or bring to next visit) 2022    Genetic/[facility]-  Do you or your partner have a history of any of the following in yourselves or first degree relatives? Cystic fibrosis no  Spinal muscular atrophy no  Hemoglobinopathy/Sickle Cell/Thalassemia no  Fragile X Intellectual Disability no    If yes, discuss carrier screening and recommend consultation with [facility]/genetic counseling. If no, discuss option for carrier screening and/or genetic testing with Nuchal Ultrasound. Patient interested YES, core panel  Appointment at [facility] made NO, patient will call to schedule appointment.      Interview education  [facility] Pregnancy Essentials Book reviewed and discussed YES      Prenatal lab work scripts YES    Extra labs ordered:  Early 1 hour gtt    The patient has a history now or in prior pregnancy notable for:  Hx of stroke in , coronary artery disease, anxiety, bicuspid aortic valve, hx of mechanical aortic valve replacement in 2015, hx of repair of ascending aorta 2015, hx of HPV +, patent ductus arterious ligation when pt was 1years old, family hx of aneurysms, heart disease, and aortic stenosis, family hx of anxiety and depression and breast cancer, motion sickness, BMI of 33.05, currently taking lexapro, metoprolol, lovenox, and PNV        Details that I feel the provider should be aware of: Hx of stroke in 2022, coronary artery disease, anxiety, bicuspid aortic valve, hx of mechanical aortic valve replacement in 2015, hx of repair of ascending aorta 2015, hx of HPV +, patent ductus arterious ligation when pt was 1years old, family hx of aneurysms, heart disease, and aortic stenosis, family hx of anxiety and depression and breast cancer, motion sickness, BMI of 33.05, currently taking lexapro, metoprolol, lovenox, and PNV    The patient was oriented to our practice, reviewed delivering physicians and [address] [place] [address] in [place] for Delivery. All questions were answered.     Interviewed by: Janeen Gomez

## 2023-10-18 NOTE — PROGRESS NOTES
[address] [place] [address], [address], [place], [address] [facility],[address]W    Assessment/Plan:  Elder Single is a 25y.o. year old who presents for evaluation of amenorrhea. Amenorrhea  - Single live IUP identified on US today. Estimated Date of Delivery: 5/17/24 based on LMP. - Recommend prenatal vitamin with folic acid  - Ultrasound completed, please see Chart Review - Ob Procedures, Ultrasound Report for Interpretation.  - Nursing notes from OB intake reviewed. - Aneuploidy screening discussed. Patient desires screening. Referred to [facility] for NT and genetic counseling.  - Routine cervical cancer screening: Pap Up to date. - Routine STI Screening: GC/Chlamydia sent today. HIV/Hep B/Syphilis ordered in prenatal panel.  - Patient Education: Patient was counseled regarding diet, exercise, weight gain, foods to avoid, vaccines in pregnancy, aneuploidy screening, travel precautions to include seat belt use and VTE risk reduction. She has been provided our pregnancy packet which includes how and when to contact providers, medication recommendations, dietary suggestions, breastfeeding information as well as websites for additional information, hospital and delivery concerns. Additional Pregnancy Problems:   1. Pregnancy with uncertain dates in first trimester  -     AMB US OB < 14 weeks single or first gestation level 1    2. History of embolic stroke    3. History of maternal cardiac surgery    4. History of mechanical aortic valve replacement  Assessment & Plan:  - Will refer case to Complex Mother meeting to review pregnancy and delivery plan. Discussed with patient that delivery will likely be recommended at [facility] due to availability of cardiac anesthesia and surgical services. - Will defer management of anticoagulation to Cardiology in conjunction with guidance from [facility]. Will message Dr. Yasmani Cali as well as Johanna Steen and [place] for coordination with Complex Mother meeting.  May need to transition back to warfarin in the coming weeks. - Briefly reviewed mode of delivery with patient. We discussed that vaginal delivery may be an option, although this needs to be discussed at length with Cardiology and Anesthesia. We also reviewed the option of elective primary  section, if this is her ultimate wish. 5. Maternal congenital cardiac anomaly affecting pregnancy, antepartum    6. Mental disorder affecting pregnancy in first trimester  Assessment & Plan:  - Continue Lexapro 10 mg daily. 7. Obesity affecting pregnancy in first trimester, unspecified obesity type        Subjective:   CC:  Amenorrhea  Rex Rush is a 25 y.o. Ellkiran Clos female who presents for amenorrhea, now with confirmed viable pregnancy. Pregnancy ROS: No leakage of fluid, pelvic pain, or vaginal bleeding.         Past Medical History:   Diagnosis Date    Anxiety     Bicuspid aortic valve     s/p mechanical AVR    Coronary artery disease 1993    Exercises two times per week     cross fit- 2x/week    Moderate aortic insufficiency     s/p mechanical AVR    Motion sickness     Patent ductus arteriosus     s/p repair    PONV (postoperative nausea and vomiting)     S/P ascending aortic aneurysm repair     Stroke ([address]) 2022    no residual effects per pt--initially couldnt speak at first--"feels back to normal now"     Past Surgical History:   Procedure Laterality Date    ASCENDING AORTIC ANEURYSM REPAIR      with" Aortic valve surgery"    MECHANICAL AORTIC VALVE REPLACEMENT      PATENT DUCTUS ARTERIOUS LIGATION      per pt had this surgery age 1    MD EXC B9 LESION MRGN XCP SK TG T/A/L 1.1-2.0 CM Right 2022    Procedure: EXCISION  BIOPSY LESION/MASS BACK;  Surgeon: Ruben Underwood MD;  Location[facility];  Service: General    THROMBECTOMY W/ EMBOLECTOMY      Percutanoeous embolectomy in setting of stroke    WISDOM TOOTH EXTRACTION       Family History   Problem Relation Age of Onset    Aneurysm Father Aortic aneurysm    Other Father         Bicuspid AV    Heart disease Father     Aortic stenosis Maternal Grandmother     Breast cancer Family     Colon cancer Family     Diabetes Family     Sudden death Paternal Grandfather     Depression Mother     Anxiety disorder Mother     Drug abuse Brother     Schizoaffective Disorder  Brother      Social History     Socioeconomic History    Marital status: Single     Spouse name: Not on file    Number of children: Not on file    Years of education: Not on file    Highest education level: Not on file   Occupational History    Not on file   Tobacco Use    Smoking status: Never    Smokeless tobacco: Never   Vaping Use    Vaping Use: Never used   Substance and Sexual Activity    Alcohol use: Not Currently     Alcohol/week: 1.0 standard drink of alcohol     Types: 1 Glasses of wine per week    Drug use: No    Sexual activity: Yes     Partners: Male     Birth control/protection: OCP     Comment: Birth control pill   Other Topics Concern    Not on file   Social History Narrative    Not on file     Social Determinants of Health     Financial Resource Strain: Not on file   Food Insecurity: Not on file   Transportation Needs: Not on file   Physical Activity: Not on file   Stress: Not on file   Social Connections: Not on file   Intimate Partner Violence: Not on file   Housing Stability: Not on file     No outpatient medications have been marked as taking for the 10/18/23 encounter (Initial Prenatal) with Dwayne Silverio MD.     Allergies   Allergen Reactions    Cefprozil Rash     FIRST TRIMESTER OBSTETRIC ULTRASOUND  Date of study: 10/18/2023  Performed by: Jose Kim MD     INDICATION: Amenorrhea, viability    COMPARISON: None. TECHNIQUE:  Transvaginal imaging was performed to assess the gestation, myometrial/endometrial architecture and ovarian parenchymal detail. The study includes volumetric sweeps and traditional still imaging technique.       FINDINGS:     A single intrauterine gestation is identified. Cardiac activity is detected at 177 bpm.      YOLK SAC:  Present and normal in size and appearance. MEAN CROWN RUMP LENGTH:  27.0 mm = 9 weeks 3 days   AMNIOTIC FLUID/SAC SHAPE:  Within expected normal range. UTERUS/ADNEXA:   No adnexal mass or pathologic cyst.  No free fluid identified. IMPRESSION:    Patient's last menstrual period was 08/11/2023 (exact date). Gestational age based on LMP: 6w9d  Gestational age based on US: 11w2d    Will assign dating based on LMP  Final Gestational age: 6w9d  Final Estimated Date of Delivery: 5/17/24   Fetal cardiac activity detected. No adnexal masses seen. Bill Mujica MD  10/18/2023 3:10 PM        Objective:     LMP 08/11/2023 (Exact Date) Comment: negative pregnancy test  Pregravid Weight/BMI: 85.3 kg (188 lb) (BMI 33.31)  Current Weight:     Total Weight Gain: -0.635 kg (-1 lb 6.4 oz)        Chaperone present? Yes: Lindsay Bianchi RN. General Appearance: alert and oriented, in no acute distress. Neck/Thyroid: No thyromegaly, no thyroid nodules. Respiratory: Unlabored breathing. Cardiovascular: Regular rate, no peripheral edema. Abdomen: Soft, non-tender, non-distended, no masses, no rebound or guarding. Breast Exam: No dimpling, nipple retraction or discharge. No lumps or masses. No axillary or supraclavicular nodes. Pelvic:       External genitalia: Normal appearance, no abnormal pigmentation, no lesions or masses. Normal [place]'s and [place]'s. Urinary system: Urethral meatus normal, bladder non-tender. Vaginal: normal mucosa without prolapse or lesions. Normal-appearing physiologic discharge. Cervix: Normal-appearing, well-epithelialized, no gross lesions or masses. No cervical motion tenderness. Adnexa: No adnexal masses or tenderness noted. Uterus: Approximately 9 week-sized, regular contour, midline, mobile, no uterine tenderness. Extremities: Normal range of motion. Warm, well-perfused, non-tender.    Skin: normal, no rash or abnormalities  Neurologic: alert, oriented x3  Psychiatric: Appropriate affect, mood stable, cooperative with exam.      Alonzo Paul MD  10/19/2023 3:00 PM

## 2023-10-18 NOTE — TELEPHONE ENCOUNTER
Patient was in for her intake / initial OB visits today (10/18/23). Patient reports she is high risk due to mechanical heart valve. I don't see anything available in the 4 week time frame for this patient. Please advise.

## 2023-10-18 NOTE — PATIENT INSTRUCTIONS
Congratulations!! Please review our Pregnancy Essential Guide for informative education and here is a link to take a [facility] tour of our [address].      [facility] Pregnancy Essentials Guide  [facility] ([email or web address])       [facility] and [address] [place] on [facility]

## 2023-10-19 PROBLEM — Z86.73 HISTORY OF EMBOLIC STROKE: Status: ACTIVE | Noted: 2023-10-19

## 2023-10-19 PROBLEM — Z3A.09 9 WEEKS GESTATION OF PREGNANCY: Status: ACTIVE | Noted: 2023-10-19

## 2023-10-19 LAB
C TRACH DNA SPEC QL NAA+PROBE: NEGATIVE
N GONORRHOEA DNA SPEC QL NAA+PROBE: NEGATIVE

## 2023-10-19 NOTE — ASSESSMENT & PLAN NOTE
- Will refer case to Complex Mother meeting to review pregnancy and delivery plan. Discussed with patient that delivery will likely be recommended at [facility] due to availability of cardiac anesthesia and surgical services. - Will defer management of anticoagulation to Cardiology in conjunction with guidance from [facility]. Will message Dr. Angi Callahan as well as Johanna Steen and Tennille for coordination with Complex Mother meeting. May need to transition back to warfarin in the coming weeks. - Briefly reviewed mode of delivery with patient. We discussed that vaginal delivery may be an option, although this needs to be discussed at length with Cardiology and Anesthesia. We also reviewed the option of elective primary  section, if this is her ultimate wish.

## 2023-10-27 ENCOUNTER — ULTRASOUND (OUTPATIENT)
Dept: PERINATAL CARE | Facility: OTHER | Age: 28
End: 2023-10-27
Payer: COMMERCIAL

## 2023-10-27 ENCOUNTER — APPOINTMENT (OUTPATIENT)
Dept: LAB | Facility: HOSPITAL | Age: 28
End: 2023-10-27
Payer: COMMERCIAL

## 2023-10-27 ENCOUNTER — PATIENT MESSAGE (OUTPATIENT)
Dept: OBGYN CLINIC | Facility: CLINIC | Age: 28
End: 2023-10-27

## 2023-10-27 VITALS
DIASTOLIC BLOOD PRESSURE: 66 MMHG | BODY MASS INDEX: 32.96 KG/M2 | HEIGHT: 63 IN | HEART RATE: 75 BPM | SYSTOLIC BLOOD PRESSURE: 108 MMHG | WEIGHT: 186 LBS

## 2023-10-27 DIAGNOSIS — Z3A.11 11 WEEKS GESTATION OF PREGNANCY: ICD-10-CM

## 2023-10-27 DIAGNOSIS — Q23.1 BICUSPID AORTIC VALVE: Primary | ICD-10-CM

## 2023-10-27 DIAGNOSIS — I69.320 APHASIA AS LATE EFFECT OF CEREBROVASCULAR ACCIDENT: ICD-10-CM

## 2023-10-27 DIAGNOSIS — Q24.9 MATERNAL CONGENITAL CARDIAC ANOMALY AFFECTING PREGNANCY, ANTEPARTUM: ICD-10-CM

## 2023-10-27 DIAGNOSIS — O99.211 OBESITY AFFECTING PREGNANCY IN FIRST TRIMESTER, UNSPECIFIED OBESITY TYPE: ICD-10-CM

## 2023-10-27 DIAGNOSIS — Z36.87 ENCOUNTER FOR ANTENATAL SCREENING FOR UNCERTAIN DATES: ICD-10-CM

## 2023-10-27 DIAGNOSIS — O99.210 OBESITY IN PREGNANCY, ANTEPARTUM: ICD-10-CM

## 2023-10-27 DIAGNOSIS — Z36.89 ENCOUNTER FOR OTHER SPECIFIED ANTENATAL SCREENING: ICD-10-CM

## 2023-10-27 DIAGNOSIS — Z95.2 HISTORY OF MECHANICAL AORTIC VALVE REPLACEMENT: ICD-10-CM

## 2023-10-27 DIAGNOSIS — O99.419 MATERNAL CONGENITAL CARDIAC ANOMALY AFFECTING PREGNANCY, ANTEPARTUM: ICD-10-CM

## 2023-10-27 LAB
ABO GROUP BLD: NORMAL
BASOPHILS # BLD AUTO: 0.03 THOUSANDS/ÂΜL (ref 0–0.1)
BASOPHILS NFR BLD AUTO: 0 % (ref 0–1)
BILIRUB UR QL STRIP: NEGATIVE
BLD GP AB SCN SERPL QL: NEGATIVE
CLARITY UR: CLEAR
COLOR UR: YELLOW
EOSINOPHIL # BLD AUTO: 0.21 THOUSAND/ÂΜL (ref 0–0.61)
EOSINOPHIL NFR BLD AUTO: 3 % (ref 0–6)
ERYTHROCYTE [DISTWIDTH] IN BLOOD BY AUTOMATED COUNT: 12.7 % (ref 11.6–15.1)
GLUCOSE UR STRIP-MCNC: NEGATIVE MG/DL
HBV SURFACE AB SER-ACNC: >500 MIU/ML
HBV SURFACE AG SER QL: NORMAL
HCT VFR BLD AUTO: 38 % (ref 34.8–46.1)
HCV AB SER QL: NORMAL
HGB BLD-MCNC: 12.7 G/DL (ref 11.5–15.4)
HGB UR QL STRIP.AUTO: NEGATIVE
HIV 1+2 AB+HIV1 P24 AG SERPL QL IA: NORMAL
HIV 2 AB SERPL QL IA: NORMAL
HIV1 AB SERPL QL IA: NORMAL
HIV1 P24 AG SERPL QL IA: NORMAL
IMM GRANULOCYTES # BLD AUTO: 0.06 THOUSAND/UL (ref 0–0.2)
IMM GRANULOCYTES NFR BLD AUTO: 1 % (ref 0–2)
KETONES UR STRIP-MCNC: NEGATIVE MG/DL
LEUKOCYTE ESTERASE UR QL STRIP: NEGATIVE
LYMPHOCYTES # BLD AUTO: 1.92 THOUSANDS/ÂΜL (ref 0.6–4.47)
LYMPHOCYTES NFR BLD AUTO: 24 % (ref 14–44)
MCH RBC QN AUTO: 29.9 PG (ref 26.8–34.3)
MCHC RBC AUTO-ENTMCNC: 33.4 G/DL (ref 31.4–37.4)
MCV RBC AUTO: 89 FL (ref 82–98)
MONOCYTES # BLD AUTO: 0.7 THOUSAND/ÂΜL (ref 0.17–1.22)
MONOCYTES NFR BLD AUTO: 9 % (ref 4–12)
NEUTROPHILS # BLD AUTO: 5.07 THOUSANDS/ÂΜL (ref 1.85–7.62)
NEUTS SEG NFR BLD AUTO: 63 % (ref 43–75)
NITRITE UR QL STRIP: NEGATIVE
NRBC BLD AUTO-RTO: 0 /100 WBCS
PH UR STRIP.AUTO: 8 [PH]
PLATELET # BLD AUTO: 297 THOUSANDS/UL (ref 149–390)
PMV BLD AUTO: 9.4 FL (ref 8.9–12.7)
PROT UR STRIP-MCNC: NEGATIVE MG/DL
RBC # BLD AUTO: 4.25 MILLION/UL (ref 3.81–5.12)
RH BLD: POSITIVE
RUBV IGG SERPL IA-ACNC: 64.7 IU/ML
SP GR UR STRIP.AUTO: 1.02 (ref 1–1.03)
SPECIMEN EXPIRATION DATE: NORMAL
TREPONEMA PALLIDUM IGG+IGM AB [PRESENCE] IN SERUM OR PLASMA BY IMMUNOASSAY: NORMAL
UROBILINOGEN UR STRIP-ACNC: <2 MG/DL
WBC # BLD AUTO: 7.99 THOUSAND/UL (ref 4.31–10.16)

## 2023-10-27 PROCEDURE — 86706 HEP B SURFACE ANTIBODY: CPT

## 2023-10-27 PROCEDURE — 87340 HEPATITIS B SURFACE AG IA: CPT

## 2023-10-27 PROCEDURE — 86803 HEPATITIS C AB TEST: CPT

## 2023-10-27 PROCEDURE — 85025 COMPLETE CBC W/AUTO DIFF WBC: CPT

## 2023-10-27 PROCEDURE — 76801 OB US < 14 WKS SINGLE FETUS: CPT | Performed by: STUDENT IN AN ORGANIZED HEALTH CARE EDUCATION/TRAINING PROGRAM

## 2023-10-27 PROCEDURE — 87086 URINE CULTURE/COLONY COUNT: CPT

## 2023-10-27 PROCEDURE — 87389 HIV-1 AG W/HIV-1&-2 AB AG IA: CPT

## 2023-10-27 PROCEDURE — 86780 TREPONEMA PALLIDUM: CPT

## 2023-10-27 PROCEDURE — 36415 COLL VENOUS BLD VENIPUNCTURE: CPT

## 2023-10-27 PROCEDURE — 86901 BLOOD TYPING SEROLOGIC RH(D): CPT

## 2023-10-27 PROCEDURE — 86762 RUBELLA ANTIBODY: CPT

## 2023-10-27 PROCEDURE — 86900 BLOOD TYPING SEROLOGIC ABO: CPT

## 2023-10-27 PROCEDURE — 86850 RBC ANTIBODY SCREEN: CPT

## 2023-10-27 PROCEDURE — 81003 URINALYSIS AUTO W/O SCOPE: CPT

## 2023-10-27 NOTE — PATIENT COMMUNICATION
Spoke with patient and informed her she can get her 1 hour glucose test done at any time and advised to avoid a diet high in sugars and carbs. Patient verbalized understanding and appreciated phone call.

## 2023-10-27 NOTE — PROGRESS NOTES
Patient chose to have [facility] Non-invasive Prenatal Screen with fetal sex (per pt request). Patient choose billed through insurance. Patient assistance program (PAP) application provided to patient no. PAP sent with specimen No.     Patient given brochure and is aware [facility] will contact their insurance and coordinate coverage. Patient made aware she will receive a text message and e-mail from [employer]. Patient informed text/phone call message will come from area code  "415". Provided [employer] # [phone] and web site at [employer].   "[place] your test online" card with barcode and test tube ID provided to patient. Reviewed [employer]'s web site states 5-7 business days for results via their portal.   [employer] message will be sent to patient when [facility] receives results /provider reviews. 2 vials of blood drawn from  right arm by KD Norris MA. Patient tolerated blood draw without difficulty. Specimens labeled with patient identifiers (name, date of birth, specimen collection date), order and specimen were verified with patient, packed and sent via [facility]. FED EX  tracking #  [id number]  Copy of lab order scanned to Epic media. Maternal Fetal Medicine will have results in approximately 7-10 business days and will call patient or notify via [address] [place] [address]. Patient aware viewing lab result online will reveal fetal sex if ordered. Patient verbalized understanding of all instructions and no questions at this time.

## 2023-10-27 NOTE — LETTER
October 27, 2023     Rey Lozada MD  [address] [place] [address]  [address] [address]    Patient: Ivan Rodriguez   YOB: 1995   Date of Visit: 10/27/2023       Dear Dr. Augusta Garland:    Thank you for referring Marquis Taylor to me for evaluation. Below are my notes for this consultation. If you have questions, please do not hesitate to call me. I look forward to following your patient along with you. Sincerely,        Reyna Westfall MD        CC: MD Mariah Thompson MD Emelie Ripper, MD  10/27/2023  3:00 PM  Sign when Signing Visit  [address] [facility]: Ms. Shane Giron was seen today for dating ultrasound. See ultrasound report under "OB Procedures" tab. Physical Exam  Constitutional:       General: She is not in acute distress. Appearance: Normal appearance. HENT:      Head: Normocephalic and atraumatic. Eyes:      Extraocular Movements: Extraocular movements intact. Cardiovascular:      Rate and Rhythm: Normal rate. Pulmonary:      Effort: Pulmonary effort is normal. No respiratory distress. Skin:     Findings: No erythema or rash. Neurological:      Mental Status: She is alert and oriented to person, place, and time. Psychiatric:         Mood and Affect: Mood normal.         Behavior: Behavior normal.         Please don't hesitate to contact our office with any concerns or questions.   -Reyna Westfall MD

## 2023-10-27 NOTE — PROGRESS NOTES
[address] [facility]: Ms. Topher Kingsley was seen today for dating ultrasound. See ultrasound report under "OB Procedures" tab. Physical Exam  Constitutional:       General: She is not in acute distress. Appearance: Normal appearance. HENT:      Head: Normocephalic and atraumatic. Eyes:      Extraocular Movements: Extraocular movements intact. Cardiovascular:      Rate and Rhythm: Normal rate. Pulmonary:      Effort: Pulmonary effort is normal. No respiratory distress. Skin:     Findings: No erythema or rash. Neurological:      Mental Status: She is alert and oriented to person, place, and time. Psychiatric:         Mood and Affect: Mood normal.         Behavior: Behavior normal.         Please don't hesitate to contact our office with any concerns or questions.   -Sharlene Zelaya MD

## 2023-10-28 LAB — BACTERIA UR CULT: NORMAL

## 2023-10-30 ENCOUNTER — APPOINTMENT (OUTPATIENT)
Dept: LAB | Facility: HOSPITAL | Age: 28
End: 2023-10-30
Payer: COMMERCIAL

## 2023-10-30 LAB — GLUCOSE 1H P 50 G GLC PO SERPL-MCNC: 89 MG/DL (ref 40–134)

## 2023-10-30 PROCEDURE — 82950 GLUCOSE TEST: CPT

## 2023-10-30 PROCEDURE — 36415 COLL VENOUS BLD VENIPUNCTURE: CPT

## 2023-11-03 ENCOUNTER — ANTICOAG VISIT (OUTPATIENT)
Dept: CARDIOLOGY CLINIC | Facility: CLINIC | Age: 28
End: 2023-11-03

## 2023-11-03 ENCOUNTER — TELEPHONE (OUTPATIENT)
Dept: CARDIOLOGY CLINIC | Facility: CLINIC | Age: 28
End: 2023-11-03

## 2023-11-03 DIAGNOSIS — Z95.2 HISTORY OF MECHANICAL AORTIC VALVE REPLACEMENT: Primary | ICD-10-CM

## 2023-11-03 NOTE — TELEPHONE ENCOUNTER
Left message for patient to call office to discuss further. Will need to advise that I will have Dr Noemí Bell sign a new [employer] order to restart home testing. Standing INR order placed for short term.

## 2023-11-03 NOTE — TELEPHONE ENCOUNTER
----- Message from Jc Lara RN sent at 11/3/2023  7:51 AM EDT -----  Regarding: FW: Pregnant with mechanical valve, lovenox to warfarin switch    ----- Message -----  From: Angela Hernandez MD  Sent: 11/3/2023   7:34 AM EDT  To: Cardiology [facility]  Subject: FW: Pregnant with mechanical valve, lovenox #        Naida Zelaya has a mechanical aortic valve replacement, and was following with our Coumadin clinic. She is on Lovenox because she is pregnant but now OB is stating that she can go back to warfarin and therefore we would need to bridge her with Lovenox to warfarin. If you guys are able to help with this it would be great. We can just start with 5 mg a day of warfarin while we follow her INRs. Thank you    ----- Message -----  From: Stefany Cornejo MD  Sent: 10/27/2023   4:51 PM EDT  To: Angela Hernandez MD; Jairon Ahuja MD  Subject: Pregnant with mechanical valve, lovenox to w#    Hi Dr. Rod Garcia,  I'm one of the perinatologists following this patient, who has a mechanical valve and is 11 weeks pregnant. I cc'd her primary OB, Dr. Norris Watson, on this message as well. After counseling, Naida Zelaya elects to switch from lovenox back to warfarin for the duration of her pregnancy starting after 12 weeks gestation. Can you advise on dosing/how to make the switch as well as re-establish her care with the coumadin clinic? Happy to discuss more via phone if easier.   Emeli Han

## 2023-11-03 NOTE — TELEPHONE ENCOUNTER
Patient returned my call  Patient will be starting Coumadin 5 mg tonight, Sat and Sun. Will continue Lovenox injections until INR at goal. Will obtain INR at [address] 11/6/23 (advised standing order in chart). Patient aware will need to reinstate with [employer].

## 2023-11-06 ENCOUNTER — APPOINTMENT (OUTPATIENT)
Dept: LAB | Facility: HOSPITAL | Age: 28
End: 2023-11-06
Payer: COMMERCIAL

## 2023-11-06 ENCOUNTER — ANTICOAG VISIT (OUTPATIENT)
Dept: CARDIOLOGY CLINIC | Facility: CLINIC | Age: 28
End: 2023-11-06

## 2023-11-06 DIAGNOSIS — Z95.2 HISTORY OF MECHANICAL AORTIC VALVE REPLACEMENT: Primary | ICD-10-CM

## 2023-11-06 LAB
INR PPP: 1.01 (ref 0.84–1.19)
PROTHROMBIN TIME: 13.7 SECONDS (ref 11.6–14.5)

## 2023-11-06 PROCEDURE — 85610 PROTHROMBIN TIME: CPT

## 2023-11-06 PROCEDURE — 36415 COLL VENOUS BLD VENIPUNCTURE: CPT

## 2023-11-06 NOTE — PROGRESS NOTES
Patient's INR is 1.01 but still on Lovenox. Per Prema Johns patient is to take 7.5 mgs today, 5 mgs on Tues, 7.5 mgs Weds, and 5 on Thurs and retest Fri. Evon Pate is going to go before work at 7 am so we have dosing for her by the evening and for the weekend.

## 2023-11-10 ENCOUNTER — APPOINTMENT (OUTPATIENT)
Dept: LAB | Facility: HOSPITAL | Age: 28
End: 2023-11-10
Payer: COMMERCIAL

## 2023-11-10 ENCOUNTER — ANTICOAG VISIT (OUTPATIENT)
Dept: CARDIOLOGY CLINIC | Facility: CLINIC | Age: 28
End: 2023-11-10

## 2023-11-10 DIAGNOSIS — Z95.2 HISTORY OF MECHANICAL AORTIC VALVE REPLACEMENT: Primary | ICD-10-CM

## 2023-11-10 NOTE — PROGRESS NOTES
Spoke with patient, advised INR still low, advised to take 7.5 mg Fri, 5 mg Sat Sun, recheck INR Mon 11/13/23  Continue Lovenox. Patient also mentioned her Prenatal Vitamin has Vit K.  She will check on options that don't have Vit K.

## 2023-11-13 ENCOUNTER — ROUTINE PRENATAL (OUTPATIENT)
Dept: PERINATAL CARE | Facility: OTHER | Age: 28
End: 2023-11-13
Payer: COMMERCIAL

## 2023-11-13 ENCOUNTER — ANTICOAG VISIT (OUTPATIENT)
Dept: CARDIOLOGY CLINIC | Facility: CLINIC | Age: 28
End: 2023-11-13

## 2023-11-13 ENCOUNTER — APPOINTMENT (OUTPATIENT)
Dept: LAB | Facility: HOSPITAL | Age: 28
End: 2023-11-13
Payer: COMMERCIAL

## 2023-11-13 VITALS
SYSTOLIC BLOOD PRESSURE: 108 MMHG | HEIGHT: 63 IN | HEART RATE: 88 BPM | WEIGHT: 182.4 LBS | BODY MASS INDEX: 32.32 KG/M2 | DIASTOLIC BLOOD PRESSURE: 78 MMHG

## 2023-11-13 DIAGNOSIS — O99.211 OBESITY AFFECTING PREGNANCY IN FIRST TRIMESTER, UNSPECIFIED OBESITY TYPE: ICD-10-CM

## 2023-11-13 DIAGNOSIS — Z95.2 HISTORY OF MECHANICAL AORTIC VALVE REPLACEMENT: ICD-10-CM

## 2023-11-13 DIAGNOSIS — Z36.82 ENCOUNTER FOR (NT) NUCHAL TRANSLUCENCY SCAN: ICD-10-CM

## 2023-11-13 DIAGNOSIS — Z36.89 ENCOUNTER FOR OTHER SPECIFIED ANTENATAL SCREENING: ICD-10-CM

## 2023-11-13 DIAGNOSIS — O99.419 MATERNAL CONGENITAL CARDIAC ANOMALY AFFECTING PREGNANCY, ANTEPARTUM: ICD-10-CM

## 2023-11-13 DIAGNOSIS — Q24.9 MATERNAL CONGENITAL CARDIAC ANOMALY AFFECTING PREGNANCY, ANTEPARTUM: ICD-10-CM

## 2023-11-13 DIAGNOSIS — Z3A.13 13 WEEKS GESTATION OF PREGNANCY: Primary | ICD-10-CM

## 2023-11-13 DIAGNOSIS — Z95.2 HISTORY OF MECHANICAL AORTIC VALVE REPLACEMENT: Primary | ICD-10-CM

## 2023-11-13 LAB
INR PPP: 2 (ref 0.84–1.19)
PROTHROMBIN TIME: 23.1 SECONDS (ref 11.6–14.5)

## 2023-11-13 PROCEDURE — 76813 OB US NUCHAL MEAS 1 GEST: CPT | Performed by: STUDENT IN AN ORGANIZED HEALTH CARE EDUCATION/TRAINING PROGRAM

## 2023-11-13 PROCEDURE — 99214 OFFICE O/P EST MOD 30 MIN: CPT | Performed by: STUDENT IN AN ORGANIZED HEALTH CARE EDUCATION/TRAINING PROGRAM

## 2023-11-13 PROCEDURE — 85610 PROTHROMBIN TIME: CPT

## 2023-11-13 PROCEDURE — 36415 COLL VENOUS BLD VENIPUNCTURE: CPT

## 2023-11-13 RX ORDER — WARFARIN SODIUM 2.5 MG/1
TABLET ORAL
COMMUNITY
Start: 2023-11-03 | End: 2023-11-15 | Stop reason: SDUPTHER

## 2023-11-13 NOTE — PROGRESS NOTES
Spoke with patient, advised INR still low, advised to take 7.5 mg tonight, then 5 mg tomorrow, INR 11/16/23  Advised to continue Lovenox.

## 2023-11-13 NOTE — PROGRESS NOTES
Patient was contacted by Maternal Fetal Medicine office explaining need for blood recollection and presents for [facility] NIPT lab redraw. Please see previous [facility] documentation related to above. New [facility] requisition printed and note added documenting original lab requisition # [id number] to ensure no additional charges incurred. 2 vials of blood drawn from  left  arm, patient tolerated blood draw without difficulty. Specimens labeled with patient identifiers (name, date of birth, specimen collection date), order and specimen was verified with patient, packed and sent via [address] [place] [address]. FED EX  tracking #  [id number]. Copy of lab order scanned to Epic media. Maternal Fetal Medicine will have results in approximately 7-10 business days and will call patient or notify via [address] [place] [address]. Patient aware viewing lab result online will reveal fetal sex (If ordered). Patient verbalized understanding of all instructions and no questions at this time.

## 2023-11-13 NOTE — PROGRESS NOTES
[address]: Ms. Antonio Novak was seen today for nuchal translucency ultrasound. See ultrasound report under "OB Procedures" tab. MDM:   I. Diagnoses/Problems addressed:  Genetic screening, history of mechanical valve  II. Data:  ordered NIPT, reviewed INR  III. Risk of morbidity: Low    Please don't hesitate to contact our office with any concerns or questions.   -Esther Garcia MD

## 2023-11-15 ENCOUNTER — ANTICOAG VISIT (OUTPATIENT)
Dept: CARDIOLOGY CLINIC | Facility: CLINIC | Age: 28
End: 2023-11-15

## 2023-11-15 ENCOUNTER — APPOINTMENT (OUTPATIENT)
Dept: LAB | Facility: HOSPITAL | Age: 28
End: 2023-11-15
Payer: COMMERCIAL

## 2023-11-15 ENCOUNTER — ROUTINE PRENATAL (OUTPATIENT)
Dept: OBGYN CLINIC | Facility: CLINIC | Age: 28
End: 2023-11-15
Payer: COMMERCIAL

## 2023-11-15 VITALS
BODY MASS INDEX: 32.07 KG/M2 | SYSTOLIC BLOOD PRESSURE: 100 MMHG | DIASTOLIC BLOOD PRESSURE: 64 MMHG | HEIGHT: 63 IN | WEIGHT: 181 LBS

## 2023-11-15 DIAGNOSIS — O09.899 HISTORY OF MATERNAL CARDIAC SURGERY: ICD-10-CM

## 2023-11-15 DIAGNOSIS — Z98.890 HISTORY OF MATERNAL CARDIAC SURGERY: ICD-10-CM

## 2023-11-15 DIAGNOSIS — O99.419 MATERNAL CONGENITAL CARDIAC ANOMALY AFFECTING PREGNANCY, ANTEPARTUM: Primary | ICD-10-CM

## 2023-11-15 DIAGNOSIS — Z95.2 HISTORY OF MECHANICAL AORTIC VALVE REPLACEMENT: ICD-10-CM

## 2023-11-15 DIAGNOSIS — Z95.2 HISTORY OF MECHANICAL AORTIC VALVE REPLACEMENT: Primary | ICD-10-CM

## 2023-11-15 DIAGNOSIS — Z36.1 NEED FOR MATERNAL SERUM ALPHA-PROTEIN (MSAFP) SCREENING: ICD-10-CM

## 2023-11-15 DIAGNOSIS — Q24.9 MATERNAL CONGENITAL CARDIAC ANOMALY AFFECTING PREGNANCY, ANTEPARTUM: Primary | ICD-10-CM

## 2023-11-15 DIAGNOSIS — Z3A.13 13 WEEKS GESTATION OF PREGNANCY: ICD-10-CM

## 2023-11-15 LAB
INR PPP: 2.39 (ref 0.84–1.19)
PROTHROMBIN TIME: 26.6 SECONDS (ref 11.6–14.5)
SL AMB  POCT GLUCOSE, UA: NORMAL
SL AMB POCT URINE PROTEIN: NORMAL

## 2023-11-15 PROCEDURE — 81002 URINALYSIS NONAUTO W/O SCOPE: CPT | Performed by: OBSTETRICS & GYNECOLOGY

## 2023-11-15 PROCEDURE — PNV: Performed by: OBSTETRICS & GYNECOLOGY

## 2023-11-15 PROCEDURE — 36415 COLL VENOUS BLD VENIPUNCTURE: CPT

## 2023-11-15 PROCEDURE — 85610 PROTHROMBIN TIME: CPT

## 2023-11-15 RX ORDER — WARFARIN SODIUM 2.5 MG/1
TABLET ORAL
Qty: 180 TABLET | Refills: 1 | Status: SHIPPED | OUTPATIENT
Start: 2023-11-15

## 2023-11-15 NOTE — PROGRESS NOTES
Spoke with patient, advised INR almost at goal, can stop Lovenox. States she did speak with her OB and on Monday she stopped taking prenatal vitamin with Vit K and is only taking folic acid, iron and Vit D. Advised to take 3.75 mg Fri, 5 mg all other days, will recheck 11/20/23, ok to use home monitor.

## 2023-11-20 NOTE — PROGRESS NOTES
Routine Prenatal Visit  [address] [place] [address], [address], [facility], [address] [facility],8W    Assessment/Plan:  John Umaña is a 25y.o. year old  at 13w5d who presents for routine prenatal visit. 1. Maternal congenital cardiac anomaly affecting pregnancy, antepartum    2. History of maternal cardiac surgery    3. History of mechanical aortic valve replacement  Assessment & Plan:  Currently transitioning to warfarin      4. 13 weeks gestation of pregnancy  -     POCT urine dip    5. Need for maternal serum alpha-protein (MSAFP) screening  -     Alpha fetoprotein, maternal; Future          Subjective:     CC: Prenatal care    Rex Rush is a 25 y.o. Ellouise Clos female who presents for routine prenatal care at 13w5d. Pregnancy ROS: no leakage of fluid, pelvic pain, or vaginal bleeding. no fetal movement. The following portions of the patient's history were reviewed and updated as appropriate: allergies, current medications, past family history, past medical history, obstetric history, gynecologic history, past social history, past surgical history and problem list.      Objective:  /64 (BP Location: Left arm, Patient Position: Sitting, Cuff Size: Standard)   Ht 5' 3" (1.6 m)   Wt 82.1 kg (181 lb)   LMP 2023 (Exact Date) Comment: negative pregnancy test  BMI 32.06 kg/m²   Pregravid Weight/BMI: 85.3 kg (188 lb) (BMI 33.31)  Current Weight: 82.1 kg (181 lb)   Total Weight Gain: -3.175 kg (-7 lb)   Pre-Chris Vitals      Flowsheet Row Most Recent Value   Prenatal Assessment    Fetal Heart Rate 155   Fundal Height (cm) 14 cm   Movement Absent   Prenatal Vitals    Blood Pressure 100/64   Weight - Scale 82.1 kg (181 lb)   Urine Albumin/Glucose    Dilation/Effacement/Station    Vaginal Drainage    Edema              General: Well appearing, no distress  Respiratory: Unlabored breathing  Cardiovascular: Regular rate.   Abdomen: Soft, gravid, nontender  Fundal Height: Appropriate for gestational age. Extremities: Warm and well perfused. Non tender.

## 2023-11-21 ENCOUNTER — ANTICOAG VISIT (OUTPATIENT)
Dept: CARDIOLOGY CLINIC | Facility: CLINIC | Age: 28
End: 2023-11-21
Payer: COMMERCIAL

## 2023-11-21 DIAGNOSIS — Z95.2 HISTORY OF MECHANICAL AORTIC VALVE REPLACEMENT: Primary | ICD-10-CM

## 2023-11-21 LAB — INR PPP: 4 (ref 0.84–1.19)

## 2023-11-21 PROCEDURE — 93793 ANTICOAG MGMT PT WARFARIN: CPT

## 2023-11-21 NOTE — PROGRESS NOTES
Spoke with patient, states she held her Coumadin last night, advised to take 3.75 mg tonight, then go back to 3.75 mg Mon Wed Fri, 5 mg all other days, will recheck next week 11/27/23  No other changes.     Patient is a home elvia

## 2023-11-29 ENCOUNTER — ANTICOAG VISIT (OUTPATIENT)
Dept: CARDIOLOGY CLINIC | Facility: CLINIC | Age: 28
End: 2023-11-29
Payer: COMMERCIAL

## 2023-11-29 DIAGNOSIS — Z95.2 HISTORY OF MECHANICAL AORTIC VALVE REPLACEMENT: Primary | ICD-10-CM

## 2023-11-29 LAB — INR PPP: 4 (ref 0.84–1.19)

## 2023-11-29 PROCEDURE — 93793 ANTICOAG MGMT PT WARFARIN: CPT | Performed by: INTERNAL MEDICINE

## 2023-11-29 NOTE — PROGRESS NOTES
Spoke with patient, advised INR still high, states nothing has changed, she did not take her dose last night.   Advised to take 2.5 mg tonight, then 3.75 mg daily, will recheck Mon 12/4/23    Patient is a home elvia

## 2023-12-04 ENCOUNTER — ROUTINE PRENATAL (OUTPATIENT)
Dept: PERINATAL CARE | Facility: OTHER | Age: 28
End: 2023-12-04
Payer: COMMERCIAL

## 2023-12-04 ENCOUNTER — APPOINTMENT (OUTPATIENT)
Dept: LAB | Facility: HOSPITAL | Age: 28
End: 2023-12-04
Payer: COMMERCIAL

## 2023-12-04 VITALS
SYSTOLIC BLOOD PRESSURE: 96 MMHG | DIASTOLIC BLOOD PRESSURE: 66 MMHG | HEART RATE: 100 BPM | BODY MASS INDEX: 32.25 KG/M2 | WEIGHT: 182 LBS | HEIGHT: 63 IN

## 2023-12-04 DIAGNOSIS — O99.419 MATERNAL CONGENITAL CARDIAC ANOMALY AFFECTING PREGNANCY, ANTEPARTUM: Primary | ICD-10-CM

## 2023-12-04 DIAGNOSIS — Z95.2 HISTORY OF MECHANICAL AORTIC VALVE REPLACEMENT: ICD-10-CM

## 2023-12-04 DIAGNOSIS — O99.211 OBESITY AFFECTING PREGNANCY IN FIRST TRIMESTER, UNSPECIFIED OBESITY TYPE: ICD-10-CM

## 2023-12-04 DIAGNOSIS — Z3A.16 16 WEEKS GESTATION OF PREGNANCY: ICD-10-CM

## 2023-12-04 DIAGNOSIS — O09.899 HISTORY OF MATERNAL CARDIAC SURGERY: ICD-10-CM

## 2023-12-04 DIAGNOSIS — Z98.890 HISTORY OF MATERNAL CARDIAC SURGERY: ICD-10-CM

## 2023-12-04 DIAGNOSIS — Z36.3 ENCOUNTER FOR ANTENATAL SCREENING FOR MALFORMATIONS: ICD-10-CM

## 2023-12-04 DIAGNOSIS — Z36.1 NEED FOR MATERNAL SERUM ALPHA-PROTEIN (MSAFP) SCREENING: ICD-10-CM

## 2023-12-04 DIAGNOSIS — Z86.73 HISTORY OF EMBOLIC STROKE: ICD-10-CM

## 2023-12-04 DIAGNOSIS — Q24.9 MATERNAL CONGENITAL CARDIAC ANOMALY AFFECTING PREGNANCY, ANTEPARTUM: Primary | ICD-10-CM

## 2023-12-04 PROCEDURE — 76805 OB US >/= 14 WKS SNGL FETUS: CPT | Performed by: OBSTETRICS & GYNECOLOGY

## 2023-12-04 PROCEDURE — 36415 COLL VENOUS BLD VENIPUNCTURE: CPT

## 2023-12-04 PROCEDURE — 82105 ALPHA-FETOPROTEIN SERUM: CPT

## 2023-12-04 PROCEDURE — 99214 OFFICE O/P EST MOD 30 MIN: CPT | Performed by: OBSTETRICS & GYNECOLOGY

## 2023-12-04 RX ORDER — ASPIRIN 81 MG/1
81 TABLET, CHEWABLE ORAL DAILY
COMMUNITY

## 2023-12-04 NOTE — PROGRESS NOTES
Laura Leyva  has no complaints today at 16w3d. She reports fetal movements and does not report any vaginal bleeding or signs of labor. Her recently completed fetal testing revealed a normal NIPT. She is here today for an ultrasound for early fetal anatomy. Problem list:  Maternal history of mechanical aortic valve and ascending aortic repair with graft placement who was transitioned to Lovenox for conception but has now transitioned back to Coumadin 2.5 -5 milligrams daily as directed by cardiology and her PT INR values which are being followed weekly for now. Per cardiology her INR goal is 2.5. Her last echo was completed 2/18/2022 and showed normal LV systolic function and a normal functioning aortic valve replacement. She is on aspirin to lower her risk for developing preeclampsia  Obesity based on a pregravid BMI of 33  History of a left MCA territory CVA requiring tPA while holding her warfarin for a sebaceous cyst surgery for close to 2 weeks. Neurologic deficits have resolved other than mild aphasia at times. Ultrasound findings: The ultrasound today shows normal interval fetal growth and fluid. No malformations are seen on today's early ultrasound but the anatomical survey is not completed secondary to early gestational age. Pregnancy ultrasound has limitations and is unable to detect all forms of fetal congenital abnormalities. Follow up recommended:   Recommend a follow-up anatomy scan at approximately 21 weeks and a fetal echo at 23 weeks. She has a maternal echo scheduled for 1/8/24. Pre visit time reviewing her records   15 minutes  Face to face time 10 minutes  Post visit time on documentation of note, updating her problem list, adding orders and prescriptions 10 minutes. Procedures that were completed today were charged separately. The level of decision making was moderate complexity.     Jalen Starr MD

## 2023-12-04 NOTE — LETTER
December 4, 2023     Ulysses Portillo PA-C  [address] [place] [id number]    Patient: Ivan Rodriguez   YOB: 1995   Date of Visit: 12/4/2023       Dear Dr. Ko Clay: Thank you for referring Marquis Taylor to me for evaluation. Below are my notes for this consultation. If you have questions, please do not hesitate to call me. I look forward to following your patient along with you. Sincerely,        Davin Del Rio MD        CC: No Recipients    Davin Del Rio MD  12/4/2023  5:05 PM  Sign when Signing Visit  Ivan Rodriguez  has no complaints today at 16w3d. She reports fetal movements and does not report any vaginal bleeding or signs of labor. Her recently completed fetal testing revealed a normal NIPT. She is here today for an ultrasound for early fetal anatomy. Problem list:  Maternal history of mechanical aortic valve and ascending aortic repair with graft placement who was transitioned to Lovenox for conception but has now transitioned back to Coumadin 2.5 -5 milligrams daily as directed by cardiology and her PT INR values which are being followed weekly for now. Per cardiology her INR goal is 2.5. Her last echo was completed 2/18/2022 and showed normal LV systolic function and a normal functioning aortic valve replacement. She is on aspirin to lower her risk for developing preeclampsia  Obesity based on a pregravid BMI of 33  History of a left MCA territory CVA requiring tPA while holding her warfarin for a sebaceous cyst surgery for close to 2 weeks. Neurologic deficits have resolved other than mild aphasia at times. Ultrasound findings: The ultrasound today shows normal interval fetal growth and fluid. No malformations are seen on today's early ultrasound but the anatomical survey is not completed secondary to early gestational age.     Pregnancy ultrasound has limitations and is unable to detect all forms of fetal congenital abnormalities. Follow up recommended:   Recommend a follow-up anatomy scan at approximately 21 weeks and a fetal echo at 23 weeks. She has a maternal echo scheduled for 1/8/24. Pre visit time reviewing her records   15 minutes  Face to face time 10 minutes  Post visit time on documentation of note, updating her problem list, adding orders and prescriptions 10 minutes. Procedures that were completed today were charged separately. The level of decision making was moderate complexity.     Contreras Simeon MD

## 2023-12-06 ENCOUNTER — ANTICOAG VISIT (OUTPATIENT)
Dept: CARDIOLOGY CLINIC | Facility: CLINIC | Age: 28
End: 2023-12-06
Payer: COMMERCIAL

## 2023-12-06 DIAGNOSIS — Z95.2 HISTORY OF MECHANICAL AORTIC VALVE REPLACEMENT: Primary | ICD-10-CM

## 2023-12-06 LAB
2ND TRIMESTER 4 SCREEN SERPL-IMP: NORMAL
AFP ADJ MOM SERPL: 2.03
AFP INTERP AMN-IMP: NORMAL
AFP INTERP SERPL-IMP: NORMAL
AFP INTERP SERPL-IMP: NORMAL
AFP SERPL-MCNC: 64.1 NG/ML
AGE AT DELIVERY: 30.8 YR
GA METHOD: NORMAL
GA: 16.4 WEEKS
IDDM PATIENT QL: NO
INR PPP: 3.1 (ref 0.84–1.19)
MULTIPLE PREGNANCY: NO
NEURAL TUBE DEFECT RISK FETUS: 754 %

## 2023-12-06 PROCEDURE — 93793 ANTICOAG MGMT PT WARFARIN: CPT | Performed by: INTERNAL MEDICINE

## 2023-12-06 NOTE — PROGRESS NOTES
Left message for patient, advised to call office to advise what dose of Coumadin she took last night. Will most likely have her take 2.5 mg one day and 3.75 mg all other days.       Spoke with patient, states she took 2.5 mg last night, advised to take 2.5 mg Tues Thurs, 3.75 mg all other days, will recheck next week 12/12/23    Patient is a home elvia

## 2023-12-12 ENCOUNTER — ROUTINE PRENATAL (OUTPATIENT)
Dept: OBGYN CLINIC | Facility: CLINIC | Age: 28
End: 2023-12-12
Payer: COMMERCIAL

## 2023-12-12 VITALS
HEIGHT: 63 IN | DIASTOLIC BLOOD PRESSURE: 64 MMHG | WEIGHT: 182 LBS | SYSTOLIC BLOOD PRESSURE: 104 MMHG | BODY MASS INDEX: 32.25 KG/M2

## 2023-12-12 DIAGNOSIS — O99.342 MENTAL DISORDER AFFECTING PREGNANCY IN SECOND TRIMESTER: ICD-10-CM

## 2023-12-12 DIAGNOSIS — O99.210 OBESITY AFFECTING PREGNANCY, ANTEPARTUM, UNSPECIFIED OBESITY TYPE: ICD-10-CM

## 2023-12-12 DIAGNOSIS — Q24.9 MATERNAL CONGENITAL CARDIAC ANOMALY AFFECTING PREGNANCY, ANTEPARTUM: Primary | ICD-10-CM

## 2023-12-12 DIAGNOSIS — O09.899 HISTORY OF MATERNAL CARDIAC SURGERY: ICD-10-CM

## 2023-12-12 DIAGNOSIS — Z3A.17 17 WEEKS GESTATION OF PREGNANCY: ICD-10-CM

## 2023-12-12 DIAGNOSIS — Z95.2 HISTORY OF MECHANICAL AORTIC VALVE REPLACEMENT: ICD-10-CM

## 2023-12-12 DIAGNOSIS — O99.419 MATERNAL CONGENITAL CARDIAC ANOMALY AFFECTING PREGNANCY, ANTEPARTUM: Primary | ICD-10-CM

## 2023-12-12 DIAGNOSIS — Z86.73 HISTORY OF EMBOLIC STROKE: ICD-10-CM

## 2023-12-12 DIAGNOSIS — Z98.890 HISTORY OF MATERNAL CARDIAC SURGERY: ICD-10-CM

## 2023-12-12 LAB
SL AMB  POCT GLUCOSE, UA: NORMAL
SL AMB POCT URINE PROTEIN: NORMAL

## 2023-12-12 PROCEDURE — PNV: Performed by: PHYSICIAN ASSISTANT

## 2023-12-12 PROCEDURE — 81002 URINALYSIS NONAUTO W/O SCOPE: CPT | Performed by: PHYSICIAN ASSISTANT

## 2023-12-12 NOTE — ASSESSMENT & PLAN NOTE
Reviewed normal AFP results. Has Level II ultrasound scheduled. Continue routine prenatal care. Return to office for ob check in 4 weeks.

## 2023-12-12 NOTE — ASSESSMENT & PLAN NOTE
On Coumadin, cardiology following INR levels. Continue to follow closely with cardiology. Has ECHO scheduled 1/8/2024. Fetal ECHO scheduled as well.

## 2023-12-12 NOTE — PROGRESS NOTES
Routine Prenatal Visit  [address]  [address] [facility], [address]    Assessment/Plan:  Eduardo Kern is a 25y.o. year old  at 17w4d who presents for routine prenatal visit. 1. Maternal congenital cardiac anomaly affecting pregnancy, antepartum    2. 17 weeks gestation of pregnancy  Assessment & Plan:  Reviewed normal AFP results. Has Level II ultrasound scheduled. Continue routine prenatal care. Return to office for ob check in 4 weeks. Orders:  -     POCT urine dip    3. History of mechanical aortic valve replacement  Assessment & Plan:  On Coumadin, cardiology following INR levels. Continue to follow closely with cardiology. Has ECHO scheduled 2024. Fetal ECHO scheduled as well. 4. Obesity affecting pregnancy, antepartum, unspecified obesity type    5. History of maternal cardiac surgery    6. Mental disorder affecting pregnancy in second trimester    7. History of embolic stroke        Next OB Visit 4 weeks. Subjective:     CC: Prenatal care    Yemi De Oliveira is a 25 y.o.  female who presents for routine prenatal care at 17w4d. Pregnancy ROS: Feeling flutters. No N/V, HA, cramping, VB, LOF, edema, domestic violence, or smoking. Tolerating PNV.         The following portions of the patient's history were reviewed and updated as appropriate: allergies, current medications, past family history, past medical history, obstetric history, gynecologic history, past social history, past surgical history and problem list.      Objective:  /64 (BP Location: Left arm, Patient Position: Sitting, Cuff Size: Standard)   Ht 5' 3" (1.6 m)   Wt 82.6 kg (182 lb)   LMP 2023 (Exact Date) Comment: negative pregnancy test  BMI 32.24 kg/m²   Pregravid Weight/BMI: 85.3 kg (188 lb) (BMI 33.31)  Current Weight: 82.6 kg (182 lb)   Total Weight Gain: -2.722 kg (-6 lb)   Pre- Vitals      Flowsheet Row Most Recent Value   Prenatal Assessment    Fetal Heart Rate 146   Fundal Height (cm) 18 cm   Movement Present   Prenatal Vitals    Blood Pressure 104/64   Weight - Scale 82.6 kg (182 lb)   Urine Albumin/Glucose    Dilation/Effacement/Station    Vaginal Drainage    Edema    LLE Edema None   RLE Edema None   Facial Edema None             General: Well appearing, no distress  Abdomen: Soft, gravid, nontender  Fundal Height: Appropriate for gestational age. Extremities: Warm and well perfused. Non tender.

## 2023-12-13 ENCOUNTER — ANTICOAG VISIT (OUTPATIENT)
Dept: CARDIOLOGY CLINIC | Facility: CLINIC | Age: 28
End: 2023-12-13
Payer: COMMERCIAL

## 2023-12-13 DIAGNOSIS — Z95.2 HISTORY OF MECHANICAL AORTIC VALVE REPLACEMENT: Primary | ICD-10-CM

## 2023-12-13 LAB — INR PPP: 3 (ref 0.84–1.19)

## 2023-12-13 PROCEDURE — 93793 ANTICOAG MGMT PT WARFARIN: CPT | Performed by: INTERNAL MEDICINE

## 2023-12-13 NOTE — PROGRESS NOTES
Left message for patient, advised INR good, will continue 2.5 mg Tues Thurs, 3.75 mg all other days, will recheck next week. Advised to call with questions or concerns.     Patient is a home elvia

## 2024-01-02 LAB — INR PPP: 3.2 (ref 0.84–1.19)

## 2024-01-03 ENCOUNTER — ANTICOAG VISIT (OUTPATIENT)
Dept: CARDIOLOGY CLINIC | Facility: CLINIC | Age: 29
End: 2024-01-03
Payer: COMMERCIAL

## 2024-01-03 DIAGNOSIS — Z95.2 HISTORY OF MECHANICAL AORTIC VALVE REPLACEMENT: Primary | ICD-10-CM

## 2024-01-03 LAB — INR PPP: 3 (ref 0.84–1.19)

## 2024-01-03 PROCEDURE — 93793 ANTICOAG MGMT PT WARFARIN: CPT | Performed by: INTERNAL MEDICINE

## 2024-01-03 NOTE — PROGRESS NOTES
Left message for patient, advised INR good, continue 2.5 mg Tues Thurs, 3.75 mg all other days, will recheck in 2 weeks 1/16/24  Advised to call with questions or concerns.    Patient is a home elvia

## 2024-01-08 ENCOUNTER — HOSPITAL ENCOUNTER (OUTPATIENT)
Dept: NON INVASIVE DIAGNOSTICS | Facility: HOSPITAL | Age: 29
Discharge: HOME | End: 2024-01-08
Attending: INTERNAL MEDICINE
Payer: COMMERCIAL

## 2024-01-08 VITALS
HEIGHT: 63 IN | BODY MASS INDEX: 32.25 KG/M2 | DIASTOLIC BLOOD PRESSURE: 64 MMHG | SYSTOLIC BLOOD PRESSURE: 104 MMHG | HEART RATE: 86 BPM | WEIGHT: 182 LBS

## 2024-01-08 DIAGNOSIS — Z95.2 HISTORY OF MECHANICAL AORTIC VALVE REPLACEMENT: ICD-10-CM

## 2024-01-08 DIAGNOSIS — Z98.890 HX OF REPAIR OF ASCENDING AORTA: ICD-10-CM

## 2024-01-08 LAB
AORTIC ROOT: 2.8 CM
AORTIC VALVE MEAN VELOCITY: 30.2 M/S
APICAL FOUR CHAMBER EJECTION FRACTION: 68 %
ASCENDING AORTA: 2.6 CM
AV AREA BY CONTINUOUS VTI: 1 CM2
AV AREA PEAK VELOCITY: 1 CM2
AV LVOT MEAN GRADIENT: 5 MMHG
AV LVOT PEAK GRADIENT: 8 MMHG
AV MEAN GRADIENT: 42 MMHG
AV PEAK GRADIENT: 72 MMHG
AV VALVE AREA: 1.01 CM2
AV VELOCITY RATIO: 0.34
DOP CALC AO PEAK VEL: 4.25 M/S
DOP CALC AO VTI: 86.76 CM
DOP CALC LVOT AREA: 2.83 CM2
DOP CALC LVOT CARDIAC INDEX: 4.23 L/MIN/M2
DOP CALC LVOT CARDIAC OUTPUT: 7.86 L/MIN
DOP CALC LVOT DIAMETER: 1.9 CM
DOP CALC LVOT PEAK VEL VTI: 30.84 CM
DOP CALC LVOT PEAK VEL: 1.45 M/S
DOP CALC LVOT STROKE INDEX: 46.2 ML/M2
DOP CALC LVOT STROKE VOLUME: 87.4
E WAVE DECELERATION TIME: 160 MS
E/A RATIO: 1.41
FRACTIONAL SHORTENING: 40 (ref 28–44)
INTERVENTRICULAR SEPTUM IN DIASTOLE (PARASTERNAL SHORT AXIS VIEW): 1.3 CM
INTERVENTRICULAR SEPTUM: 1.3 CM (ref 0.6–1.1)
LAAS-AP2: 19.6 CM2
LAAS-AP4: 22.8 CM2
LEFT ATRIUM AREA SYSTOLE SINGLE PLANE A4C: 21.5 CM2
LEFT ATRIUM SIZE: 3.8 CM
LEFT ATRIUM VOLUME (MOD BIPLANE): 58 ML
LEFT ATRIUM VOLUME INDEX (MOD BIPLANE): 31.2 ML/M2
LEFT INTERNAL DIMENSION IN SYSTOLE: 3 CM (ref 2.1–4)
LEFT VENTRICULAR INTERNAL DIMENSION IN DIASTOLE: 5 CM (ref 3.5–6)
LEFT VENTRICULAR POSTERIOR WALL IN END DIASTOLE: 0.9 CM
LEFT VENTRICULAR STROKE VOLUME: 81 ML
LVSV (TEICH): 81 ML
MV E'TISSUE VEL-SEP: 11 CM/S
MV PEAK A VEL: 0.76 M/S
MV PEAK E VEL: 107 CM/S
MV STENOSIS PRESSURE HALF TIME: 46 MS
MV VALVE AREA P 1/2 METHOD: 4.78
RIGHT ATRIUM AREA SYSTOLE A4C: 19.3 CM2
RIGHT VENTRICLE ID DIMENSION: 3.2 CM
SL CV LEFT ATRIUM LENGTH A2C: 6 CM
SL CV LV EF: 65
SL CV PED ECHO LEFT VENTRICLE DIASTOLIC VOLUME (MOD BIPLANE) 2D: 116 ML
SL CV PED ECHO LEFT VENTRICLE SYSTOLIC VOLUME (MOD BIPLANE) 2D: 35 ML
TRICUSPID ANNULAR PLANE SYSTOLIC EXCURSION: 2.4 CM

## 2024-01-08 PROCEDURE — 93306 TTE W/DOPPLER COMPLETE: CPT | Performed by: INTERNAL MEDICINE

## 2024-01-08 PROCEDURE — 93306 TTE W/DOPPLER COMPLETE: CPT

## 2024-01-09 ENCOUNTER — ROUTINE PRENATAL (OUTPATIENT)
Dept: PERINATAL CARE | Facility: OTHER | Age: 29
End: 2024-01-09
Payer: COMMERCIAL

## 2024-01-09 ENCOUNTER — ROUTINE PRENATAL (OUTPATIENT)
Dept: OBGYN CLINIC | Facility: CLINIC | Age: 29
End: 2024-01-09
Payer: COMMERCIAL

## 2024-01-09 VITALS
HEIGHT: 63 IN | BODY MASS INDEX: 32.28 KG/M2 | HEART RATE: 87 BPM | DIASTOLIC BLOOD PRESSURE: 66 MMHG | SYSTOLIC BLOOD PRESSURE: 106 MMHG | WEIGHT: 182.2 LBS

## 2024-01-09 VITALS
HEIGHT: 63 IN | DIASTOLIC BLOOD PRESSURE: 68 MMHG | WEIGHT: 180 LBS | SYSTOLIC BLOOD PRESSURE: 112 MMHG | BODY MASS INDEX: 31.89 KG/M2

## 2024-01-09 DIAGNOSIS — Z36.86 ENCOUNTER FOR ANTENATAL SCREENING FOR CERVICAL LENGTH: ICD-10-CM

## 2024-01-09 DIAGNOSIS — Q24.9 MATERNAL CONGENITAL CARDIAC ANOMALY AFFECTING PREGNANCY, ANTEPARTUM: Primary | ICD-10-CM

## 2024-01-09 DIAGNOSIS — Z98.890 HISTORY OF MATERNAL CARDIAC SURGERY: ICD-10-CM

## 2024-01-09 DIAGNOSIS — O09.899 HISTORY OF MATERNAL CARDIAC SURGERY: ICD-10-CM

## 2024-01-09 DIAGNOSIS — O35.2XX0 HEREDITARY FAMILIAL DISEASE AFFECTING MANAGEMENT OF MOTHER AND POSSIBLY AFFECTING FETUS, ANTEPARTUM, SINGLE OR UNSPECIFIED FETUS: ICD-10-CM

## 2024-01-09 DIAGNOSIS — Q23.1 BICUSPID AORTIC VALVE: ICD-10-CM

## 2024-01-09 DIAGNOSIS — Z95.2 HISTORY OF MECHANICAL AORTIC VALVE REPLACEMENT: ICD-10-CM

## 2024-01-09 DIAGNOSIS — Q24.9 MATERNAL CONGENITAL CARDIAC ANOMALY AFFECTING PREGNANCY, ANTEPARTUM: ICD-10-CM

## 2024-01-09 DIAGNOSIS — O28.0 ABNORMAL MSAFP (MATERNAL SERUM ALPHA-FETOPROTEIN), ELEVATED: ICD-10-CM

## 2024-01-09 DIAGNOSIS — O99.419 MATERNAL CONGENITAL CARDIAC ANOMALY AFFECTING PREGNANCY, ANTEPARTUM: ICD-10-CM

## 2024-01-09 DIAGNOSIS — Z3A.21 21 WEEKS GESTATION OF PREGNANCY: ICD-10-CM

## 2024-01-09 DIAGNOSIS — O99.419 MATERNAL CONGENITAL CARDIAC ANOMALY AFFECTING PREGNANCY, ANTEPARTUM: Primary | ICD-10-CM

## 2024-01-09 DIAGNOSIS — Z86.73 HISTORY OF EMBOLIC STROKE: ICD-10-CM

## 2024-01-09 DIAGNOSIS — O99.212 MATERNAL OBESITY, ANTEPARTUM, SECOND TRIMESTER: ICD-10-CM

## 2024-01-09 DIAGNOSIS — Z3A.21 21 WEEKS GESTATION OF PREGNANCY: Primary | ICD-10-CM

## 2024-01-09 LAB
SL AMB  POCT GLUCOSE, UA: NORMAL
SL AMB POCT URINE PROTEIN: NORMAL

## 2024-01-09 PROCEDURE — 99214 OFFICE O/P EST MOD 30 MIN: CPT | Performed by: OBSTETRICS & GYNECOLOGY

## 2024-01-09 PROCEDURE — 76817 TRANSVAGINAL US OBSTETRIC: CPT | Performed by: OBSTETRICS & GYNECOLOGY

## 2024-01-09 PROCEDURE — PNV: Performed by: OBSTETRICS & GYNECOLOGY

## 2024-01-09 PROCEDURE — 81002 URINALYSIS NONAUTO W/O SCOPE: CPT | Performed by: OBSTETRICS & GYNECOLOGY

## 2024-01-09 PROCEDURE — 76811 OB US DETAILED SNGL FETUS: CPT | Performed by: OBSTETRICS & GYNECOLOGY

## 2024-01-09 NOTE — PROGRESS NOTES
Please refer to the [facility] ultrasound report in Ob Procedures for additional information regarding today's visit  
Ultrasound Probe Disinfection    A transvaginal ultrasound was performed.   Prior to use, disinfection was performed with High Level Disinfection Process ([employer]Upon).  Probe serial number [id number]  was used.    Chaperone: Kady Thompson Medical Assistant  Esvin Vieira RDMS  
abdominal pain
 used

## 2024-01-09 NOTE — LETTER
January 9, 2024     Emeli Rosado MD  [address] [facility]  [address]  [facility] [address]    Patient: Suze Leigh   YOB: 1995   Date of Visit: 1/9/2024       Dear Dr. Rosado:    Thank you for referring Suze Leigh to me for evaluation. Below are my notes for this consultation.    If you have questions, please do not hesitate to call me. I look forward to following your patient along with you.         Sincerely,        Romario Baig MD        CC: No Recipients    Romario Baig MD  1/9/2024  8:24 AM  Sign when Signing Visit  Please refer to the [facility] ultrasound report in Ob Procedures for additional information regarding today's visit

## 2024-01-09 NOTE — LETTER
January 14, 2024     Emeli Rosado MD  [address] [facility]  [address]  [facility] [address]    Patient: Suze Leigh   YOB: 1995   Date of Visit: 1/9/2024       Dear Dr. Rosado:    Thank you for referring Suze Leigh to me for evaluation. Below are my notes for this consultation.    If you have questions, please do not hesitate to call me. I look forward to following your patient along with you.         Sincerely,        Romario Baig MD        CC: No Recipients    Romario Baig MD  1/14/2024 12:09 PM  Sign when Signing Visit  Please refer to the [facility] ultrasound report in Ob Procedures for additional information regarding today's visit

## 2024-01-15 PROBLEM — Z3A.21 21 WEEKS GESTATION OF PREGNANCY: Status: ACTIVE | Noted: 2023-10-19

## 2024-01-16 NOTE — PROGRESS NOTES
"Routine Prenatal Visit  [facility] [address], [address], [place] [address]    Assessment/Plan:  Suze is a 28 y.o. year old  at 21w4d who presents for routine prenatal visit.     1. Maternal congenital cardiac anomaly affecting pregnancy, antepartum  Assessment & Plan:  Fetal echo scheduled      2. History of mechanical aortic valve replacement    3. History of maternal cardiac surgery    4. History of embolic stroke    5. 21 weeks gestation of pregnancy  -     POCT urine dip          Subjective:     CC: Prenatal care    Suze Leigh is a 28 y.o.  female who presents for routine prenatal care at 21w4d.  Pregnancy ROS: no leakage of fluid, pelvic pain, or vaginal bleeding.  normal fetal movement.    The following portions of the patient's history were reviewed and updated as appropriate: allergies, current medications, past family history, past medical history, obstetric history, gynecologic history, past social history, past surgical history and problem list.      Objective:  /68 (BP Location: Left arm, Patient Position: Sitting, Cuff Size: Standard)   Ht 5' 3\" (1.6 m)   Wt 81.6 kg (180 lb)   LMP 2023 (Exact Date) Comment: negative pregnancy test  BMI 31.89 kg/m²   Pregravid Weight/BMI: 85.3 kg (188 lb) (BMI 33.31)  Current Weight: 81.6 kg (180 lb)   Total Weight Gain: -3.629 kg (-8 lb)   Pre-Chris Vitals      Flowsheet Row Most Recent Value   Prenatal Assessment    Fetal Heart Rate 140   Fundal Height (cm) 22 cm   Movement Present   Prenatal Vitals    Blood Pressure 112/68   Weight - Scale 81.6 kg (180 lb)   Urine Albumin/Glucose    Dilation/Effacement/Station    Vaginal Drainage    Edema              General: Well appearing, no distress  Respiratory: Unlabored breathing  Cardiovascular: Regular rate.  Abdomen: Soft, gravid, nontender  Fundal Height: Appropriate for gestational age.  Extremities: Warm and well perfused.  Non tender.  "

## 2024-01-19 ENCOUNTER — ANTICOAG VISIT (OUTPATIENT)
Dept: CARDIOLOGY CLINIC | Facility: CLINIC | Age: 29
End: 2024-01-19
Payer: COMMERCIAL

## 2024-01-19 DIAGNOSIS — Z95.2 HISTORY OF MECHANICAL AORTIC VALVE REPLACEMENT: Primary | ICD-10-CM

## 2024-01-19 LAB — INR PPP: 2.5 (ref 0.84–1.19)

## 2024-01-19 PROCEDURE — 93793 ANTICOAG MGMT PT WARFARIN: CPT | Performed by: INTERNAL MEDICINE

## 2024-01-19 NOTE — PROGRESS NOTES
Left message for patient, advised INR good, continue 2.5 mg Tues Thurs, 3.75 mg all other days, will recheck in 2 weeks 2/1/24  Advised to call with any questions, concerns or changes in medications or health.     Patient is a home elvia

## 2024-01-22 ENCOUNTER — ROUTINE PRENATAL (OUTPATIENT)
Dept: PERINATAL CARE | Facility: OTHER | Age: 29
End: 2024-01-22
Payer: COMMERCIAL

## 2024-01-22 VITALS
DIASTOLIC BLOOD PRESSURE: 64 MMHG | SYSTOLIC BLOOD PRESSURE: 118 MMHG | HEIGHT: 63 IN | WEIGHT: 181.8 LBS | HEART RATE: 95 BPM | BODY MASS INDEX: 32.21 KG/M2

## 2024-01-22 DIAGNOSIS — Q24.9 MATERNAL CONGENITAL CARDIAC ANOMALY AFFECTING PREGNANCY, ANTEPARTUM: Primary | ICD-10-CM

## 2024-01-22 DIAGNOSIS — O99.419 MATERNAL CONGENITAL CARDIAC ANOMALY AFFECTING PREGNANCY, ANTEPARTUM: Primary | ICD-10-CM

## 2024-01-22 DIAGNOSIS — Z3A.23 23 WEEKS GESTATION OF PREGNANCY: ICD-10-CM

## 2024-01-22 PROCEDURE — 93325 DOPPLER ECHO COLOR FLOW MAPG: CPT | Performed by: OBSTETRICS & GYNECOLOGY

## 2024-01-22 PROCEDURE — 99214 OFFICE O/P EST MOD 30 MIN: CPT | Performed by: OBSTETRICS & GYNECOLOGY

## 2024-01-22 PROCEDURE — 76825 ECHO EXAM OF FETAL HEART: CPT | Performed by: OBSTETRICS & GYNECOLOGY

## 2024-01-22 PROCEDURE — 76827 ECHO EXAM OF FETAL HEART: CPT | Performed by: OBSTETRICS & GYNECOLOGY

## 2024-01-22 NOTE — PROGRESS NOTES
"On exam today the patient appears well, in no acute distress, and denies any complaints. Her abdomen is non-tender.    The fetal anatomic survey is now complete. There is no sonographic evidence of fetal abnormalities at this time. The remainder of the survey was completed previously. Good fetal movement and tone are seen. The amniotic fluid volume appears normal. The patient was informed of today's findings and all of her questions were answered to apparent satisfaction.    There were many questions regarding anticoagulation plan and delivery plan.  The patient and her mother had very good questions regarding optimizing delivery timing, anticoagulation, and location of delivery.  Location is likely to be [place] with anticoagulation being some form of transition to fractionated and or unfractionated heparin prior to delivery with unfractionated heparin at around the time of delivery.  Anesthesia and cardiology should be available to discuss management strategies at a complex mother's meeting which will occur shortly.    We discussed follow-up in detail, and I recommend she return in 5 weeks to assess fetal growth    Thank you very much for allowing us to participate in the care of this very nice patient. Should you have any questions, please do not hesitate to contact our office.    Portions of the record may have been created with voice recognition software. Occasional wrong word or \"sound a like\" substitutions may have occurred due to the inherent limitations of voice recognition software. Read the chart carefully and recognize, using context, where substitutions have occurred.  "

## 2024-02-05 ENCOUNTER — CLINICAL SUPPORT (OUTPATIENT)
Dept: POSTPARTUM | Facility: CLINIC | Age: 29
End: 2024-02-05

## 2024-02-05 ENCOUNTER — ANTICOAG VISIT (OUTPATIENT)
Dept: CARDIOLOGY CLINIC | Facility: CLINIC | Age: 29
End: 2024-02-05

## 2024-02-05 DIAGNOSIS — Z95.2 HISTORY OF MECHANICAL AORTIC VALVE REPLACEMENT: Primary | ICD-10-CM

## 2024-02-05 DIAGNOSIS — Z32.2 ENCOUNTER FOR CHILDBIRTH INSTRUCTION: Primary | ICD-10-CM

## 2024-02-05 NOTE — PROGRESS NOTES
Patient called and I left message on answering machine to hold for today.  Her INR is 3.2 and range is set at 2.5-3.0. I am then having her resume her usual dosing and recheck in one week rather than two due to higher reading.

## 2024-02-06 ENCOUNTER — ROUTINE PRENATAL (OUTPATIENT)
Dept: OBGYN CLINIC | Facility: CLINIC | Age: 29
End: 2024-02-06
Payer: COMMERCIAL

## 2024-02-06 VITALS
WEIGHT: 183.4 LBS | BODY MASS INDEX: 32.5 KG/M2 | SYSTOLIC BLOOD PRESSURE: 106 MMHG | DIASTOLIC BLOOD PRESSURE: 64 MMHG | HEIGHT: 63 IN

## 2024-02-06 DIAGNOSIS — Q24.9 MATERNAL CONGENITAL CARDIAC ANOMALY AFFECTING PREGNANCY, ANTEPARTUM: ICD-10-CM

## 2024-02-06 DIAGNOSIS — Z98.890 HISTORY OF MATERNAL CARDIAC SURGERY: Primary | ICD-10-CM

## 2024-02-06 DIAGNOSIS — Z3A.25 25 WEEKS GESTATION OF PREGNANCY: ICD-10-CM

## 2024-02-06 DIAGNOSIS — O99.341 MENTAL DISORDER AFFECTING PREGNANCY IN FIRST TRIMESTER: ICD-10-CM

## 2024-02-06 DIAGNOSIS — Z36.89 ENCOUNTER FOR OTHER SPECIFIED ANTENATAL SCREENING: ICD-10-CM

## 2024-02-06 DIAGNOSIS — Z86.73 HISTORY OF EMBOLIC STROKE: ICD-10-CM

## 2024-02-06 DIAGNOSIS — O09.899 HISTORY OF MATERNAL CARDIAC SURGERY: Primary | ICD-10-CM

## 2024-02-06 DIAGNOSIS — Z95.2 HISTORY OF MECHANICAL AORTIC VALVE REPLACEMENT: ICD-10-CM

## 2024-02-06 DIAGNOSIS — O99.211 OBESITY AFFECTING PREGNANCY IN FIRST TRIMESTER, UNSPECIFIED OBESITY TYPE: ICD-10-CM

## 2024-02-06 DIAGNOSIS — O99.419 MATERNAL CONGENITAL CARDIAC ANOMALY AFFECTING PREGNANCY, ANTEPARTUM: ICD-10-CM

## 2024-02-06 LAB
SL AMB  POCT GLUCOSE, UA: NORMAL
SL AMB POCT URINE PROTEIN: NORMAL

## 2024-02-06 PROCEDURE — PNV: Performed by: STUDENT IN AN ORGANIZED HEALTH CARE EDUCATION/TRAINING PROGRAM

## 2024-02-06 PROCEDURE — 81002 URINALYSIS NONAUTO W/O SCOPE: CPT | Performed by: STUDENT IN AN ORGANIZED HEALTH CARE EDUCATION/TRAINING PROGRAM

## 2024-02-06 NOTE — ASSESSMENT & PLAN NOTE
- PTL/PPROM/Bleeding precautions given.   - MFM consult report from level II ultrasound reviewed. Repeat US is scheduled in 3rd trimester, per [facility] recommendations.  - 28 week labs ordered, lab requisition provided to patient.  - Problem list updated, results console reviewed and updated with pertinent prenatal labs.  - PMH, PSH, medications reviewed and updated as needed.  - Return to office in 3 wk(s) for routine prenatal care

## 2024-02-06 NOTE — PROGRESS NOTES
"Routine Prenatal Visit  [facility] - [place]  [address] [address]    Assessment/Plan:  Suze is a 28 y.o. year old  at 25w4d who presents for routine prenatal visit.     1. History of maternal cardiac surgery  Assessment & Plan:  - Interdisciplinary discussion planned tomorrow at Complex Mother Meeting. Will update patient with discussed plan.      2. Obesity affecting pregnancy in first trimester, unspecified obesity type  Assessment & Plan:  - Continue  mg PO daily for pre-eclampsia risk reduction.       3. Maternal congenital cardiac anomaly affecting pregnancy, antepartum    4. Mental disorder affecting pregnancy in first trimester  Assessment & Plan:  - Continue Lexapro      5. 25 weeks gestation of pregnancy  Assessment & Plan:  - PTL/PPROM/Bleeding precautions given.   - MFM consult report from level II ultrasound reviewed. Repeat US is scheduled in 3rd trimester, per M recommendations.  - 28 week labs ordered, lab requisition provided to patient.  - Problem list updated, results console reviewed and updated with pertinent prenatal labs.  - PMH, PSH, medications reviewed and updated as needed.  - Return to office in 3 wk(s) for routine prenatal care    Orders:  -     POCT urine dip    6. Encounter for other specified  screening  -     Glucose, 1H PG; Future  -     CBC; Future  -     RPR-Syphilis Screening (Total Syphilis IGG/IGM); Future    7. History of mechanical aortic valve replacement    8. History of embolic stroke          Subjective:   Suze Leigh is a 28 y.o.  who presents for routine prenatal care at 25w4d.  Complaints today: No  LOF: No; VB: No; Contractions: No; FM: Present and normal    Objective:  /64 (BP Location: Left arm, Patient Position: Sitting, Cuff Size: Standard)   Ht 5' 3\" (1.6 m)   Wt 83.2 kg (183 lb 6.4 oz)   LMP 2023 (Exact Date) Comment: negative pregnancy test  BMI 32.49 kg/m²     General: Well " appearing, no distress  Respiratory: Unlabored breathing  Cardiovascular: Regular rate.  Abdomen: Soft, gravid, nontender  Extremities: Warm and well perfused.  Non tender.    Pregravid Weight/BMI: 85.3 kg (188 lb) (BMI 33.31)  Current Weight: 83.2 kg (183 lb 6.4 oz)   Total Weight Gain: -2.087 kg (-4 lb 9.6 oz)     Pre- Vitals      Flowsheet Row Most Recent Value   Prenatal Assessment    Fetal Heart Rate 150   Fundal Height (cm) 25 cm   Movement Present   Prenatal Vitals    Blood Pressure 106/64   Weight - Scale 83.2 kg (183 lb 6.4 oz)   Urine Albumin/Glucose    Dilation/Effacement/Station    Vaginal Drainage    Draining Fluid No   Edema    LLE Edema None   RLE Edema None   Facial Edema None             Sanjay Thompson MD  2024 2:12 PM

## 2024-02-06 NOTE — ASSESSMENT & PLAN NOTE
- Interdisciplinary discussion planned tomorrow at Complex Mother Meeting. Will update patient with discussed plan.

## 2024-02-07 ENCOUNTER — TELEPHONE (OUTPATIENT)
Dept: OBGYN CLINIC | Facility: CLINIC | Age: 29
End: 2024-02-07

## 2024-02-07 NOTE — TELEPHONE ENCOUNTER
Called patient to update her briefly regarding discussion of care plan at today's Complex Mother Meeting. We briefly reviewed main points. Discussed that [facility] office plans to reach out to her to schedule a formal telehealth visit to review plan of care at length. I encouraged her to reach out to our office if she has any additional questions. Patient expressed appreciation for the call.     Sanjay Thompson MD  2/7/2024 5:40 PM

## 2024-02-09 ENCOUNTER — TELEPHONE (OUTPATIENT)
Dept: PERINATAL CARE | Facility: CLINIC | Age: 29
End: 2024-02-09

## 2024-02-09 NOTE — TELEPHONE ENCOUNTER
----- Message from Desi Null MD sent at 2/8/2024  7:53 AM EST -----  Regarding: telehealth visit  Hi,  Can this patient be scheduled with me for a telehealth visit to discuss her delivery planning? My availability is anytime tomorrow afternoon after 12:30, then Thursday 2/16 and Friday 2/16 either at lunch or at 3:30 PM.   Thank you!  Desi

## 2024-02-15 ENCOUNTER — ANTICOAG VISIT (OUTPATIENT)
Dept: CARDIOLOGY CLINIC | Facility: CLINIC | Age: 29
End: 2024-02-15
Payer: COMMERCIAL

## 2024-02-15 DIAGNOSIS — Z95.2 HISTORY OF MECHANICAL AORTIC VALVE REPLACEMENT: Primary | ICD-10-CM

## 2024-02-15 LAB — INR PPP: 3.3 (ref 0.84–1.19)

## 2024-02-15 PROCEDURE — 93793 ANTICOAG MGMT PT WARFARIN: CPT | Performed by: INTERNAL MEDICINE

## 2024-02-15 NOTE — PROGRESS NOTES
Left message for patient, advised INR still elevated, advised to hold tonight, then take 2.5 mg Tues Thurs Sat, 3.75 mg all other days, will recheck next week 2/21/24    Advised to call if there has been any changes in medications or health.     Patient is a home elvia

## 2024-02-19 ENCOUNTER — ULTRASOUND (OUTPATIENT)
Dept: PERINATAL CARE | Facility: OTHER | Age: 29
End: 2024-02-19
Payer: COMMERCIAL

## 2024-02-19 ENCOUNTER — CLINICAL SUPPORT (OUTPATIENT)
Dept: POSTPARTUM | Facility: CLINIC | Age: 29
End: 2024-02-19

## 2024-02-19 VITALS
WEIGHT: 183.4 LBS | SYSTOLIC BLOOD PRESSURE: 120 MMHG | HEIGHT: 63 IN | HEART RATE: 92 BPM | DIASTOLIC BLOOD PRESSURE: 64 MMHG | BODY MASS INDEX: 32.5 KG/M2

## 2024-02-19 DIAGNOSIS — O99.211 OBESITY AFFECTING PREGNANCY IN FIRST TRIMESTER, UNSPECIFIED OBESITY TYPE: ICD-10-CM

## 2024-02-19 DIAGNOSIS — Z3A.27 27 WEEKS GESTATION OF PREGNANCY: ICD-10-CM

## 2024-02-19 DIAGNOSIS — Z32.2 ENCOUNTER FOR CHILDBIRTH INSTRUCTION: Primary | ICD-10-CM

## 2024-02-19 DIAGNOSIS — O99.419 MATERNAL CONGENITAL CARDIAC ANOMALY AFFECTING PREGNANCY, ANTEPARTUM: Primary | ICD-10-CM

## 2024-02-19 DIAGNOSIS — Z36.89 ENCOUNTER FOR ULTRASOUND TO CHECK FETAL GROWTH: ICD-10-CM

## 2024-02-19 DIAGNOSIS — Q24.9 MATERNAL CONGENITAL CARDIAC ANOMALY AFFECTING PREGNANCY, ANTEPARTUM: Primary | ICD-10-CM

## 2024-02-19 PROCEDURE — 76816 OB US FOLLOW-UP PER FETUS: CPT | Performed by: OBSTETRICS & GYNECOLOGY

## 2024-02-19 PROCEDURE — 99214 OFFICE O/P EST MOD 30 MIN: CPT | Performed by: OBSTETRICS & GYNECOLOGY

## 2024-02-19 NOTE — PROGRESS NOTES
"[facility]: Suze Leigh was seen today for fetal growth assessment ultrasound.  See ultrasound report under \"OB Procedures\" tab.   The time spent on this established patient on the encounter date included 10 minutes previsit service time reviewing records and precharting, 15 minutes face-to-face service time counseling regarding results and coordinating care, and  10 minutes charting, totalling 35 minutes.  Please don't hesitate to contact our office with any concerns or questions.  -Rosita Steen MD    " Yes

## 2024-02-21 ENCOUNTER — DOCUMENTATION (OUTPATIENT)
Facility: HOSPITAL | Age: 29
End: 2024-02-21

## 2024-02-21 NOTE — PROGRESS NOTES
"Complex Mother Meeting:  Suze Leigh was discussed in a multi-disciplinary conference on 24 with specialties in attendance including obstetrics, maternal-fetal medicine, anesthesiology, nursing, and neonatology. We discussed her antepartum, delivery, and postpartum care in detail. Recommendations are documented in her problem list under \"maternal congenital cardiac anomaly affecting pregnancy.\"    Suze was born with a bicuspid valve and resultant aortic aneurysm. In  she underwent mechanical aortic valve replacement and ascending aorta repair with graft. She suffered an embolic stroke when holding anticoagulation for a surgical procedure. She has tolerated pregnancy well thus far and is on a stable dose of warfarin.    Cardiologist: Dr. Shell    In summary, recommendations are as follows:  Antepartum:  -Continue warfarin with home INR monitoring and titration per cardiology  -Continue prenatal care with [facility] but plan for delivery at [facility]  -At 35 weeks (or 2 weeks prior to scheduled delivery), transition to adjusted dose Lovenox (1mg/kg BID) with monitoring of both peak and trough anti-Xa levels. Peak target anti-Xa is 0.8-1.2 and trough target anti-Xa is >0.6. Levels should be performed weekly.  -Anesthesia consult needed    Delivery:  -Delivery to be planned at [id number] gestation  -Admit to 48 hours prior to planned delivery  -Alert cardiology on admission  -Initiate heparin drip on admission (12 hours after last Lovenox dose)  -Titrate heparin drip at 6-hour intervals with goal aPTT twice baseline or anti-Xa 0.7-1.0.  -Delivery via  section per Suze's request  -Discontinue heparin drip 6 hours prior to scheduled surgery and neuraxial anesthesia  -Confirm normal aPTT prior to neuraxial analgesia  -Ensure placement of 2 IVs, availability of blood products, and uterotonics at delivery. Avoid TXA  -Endocarditis ppx needed (routine surgical ppx 1g ancef " sufficient)    Postpartum:  -Restart heparin drip as early as 4-6 hours after delivery and >1 hour after removal of neuraxial catheter.  When deciding when to restart heparin drip, please confirm timing with anesthesia as well as assess hemostasis and bleeding risk  -Begin concomitant warfarin with heparin drip (dosing per cardiology) with goal INR 2.5    Contingency planning:  -If Suze arrives prior to scheduled admission (ie in spontaneous labor), the plan is as follows:  -If any warfarin within the past 2 weeks, delivery recommended via  section; route of anesthesia at discretion of anesthesiologist   -If she arrives while on therapeutic lovenox, vaginal delivery is preferred; route of anesthesia at discretion of anesthesiologist   -She is aware she will likely be unable to get neuraxial analgesia in either situation  -Reversal of anticoagulation is extremely controversial in mechanical valve patients. Warfarin may be reversed with prothrombin complex with or without vitamin K. Heparin may be reversed with protamine. Decision to reverse should be made in multidisciplinary fashion involving OB, MFM, anesthesiology, and cardiology    If suspect valve thrombosis:  -Clinical signs include dyspnea, orthopnea, and chest pain about consequences include myocardial infarction, congestive heart failure, and embolic phenomenon including stroke  -If suspected, please alert cardiology and plan for echocardiogram STAT    Desi Null MD  Maternal-Fetal Medicine

## 2024-02-22 ENCOUNTER — TELEPHONE (OUTPATIENT)
Dept: OBGYN CLINIC | Facility: CLINIC | Age: 29
End: 2024-02-22

## 2024-02-22 NOTE — TELEPHONE ENCOUNTER
Received [employer] breast pump order. Faxed order & received fax confirmation to [phone]. Had forms scanned into pts records via [employer].

## 2024-02-26 ENCOUNTER — APPOINTMENT (OUTPATIENT)
Dept: LAB | Facility: HOSPITAL | Age: 29
End: 2024-02-26
Payer: COMMERCIAL

## 2024-02-26 ENCOUNTER — TELEPHONE (OUTPATIENT)
Dept: OBGYN CLINIC | Facility: CLINIC | Age: 29
End: 2024-02-26

## 2024-02-26 ENCOUNTER — ROUTINE PRENATAL (OUTPATIENT)
Dept: OBGYN CLINIC | Facility: CLINIC | Age: 29
End: 2024-02-26
Payer: COMMERCIAL

## 2024-02-26 VITALS — HEIGHT: 63 IN | BODY MASS INDEX: 32.07 KG/M2 | WEIGHT: 181 LBS

## 2024-02-26 DIAGNOSIS — Z95.2 HISTORY OF MECHANICAL AORTIC VALVE REPLACEMENT: ICD-10-CM

## 2024-02-26 DIAGNOSIS — O99.419 MATERNAL CONGENITAL CARDIAC ANOMALY AFFECTING PREGNANCY, ANTEPARTUM: ICD-10-CM

## 2024-02-26 DIAGNOSIS — O99.810 ABNORMAL GLUCOSE TOLERANCE TEST IN PREGNANCY: Primary | ICD-10-CM

## 2024-02-26 DIAGNOSIS — Z3A.28 28 WEEKS GESTATION OF PREGNANCY: Primary | ICD-10-CM

## 2024-02-26 DIAGNOSIS — Z23 NEED FOR TDAP VACCINATION: ICD-10-CM

## 2024-02-26 DIAGNOSIS — O99.340 MENTAL DISORDER AFFECTING PREGNANCY, ANTEPARTUM: ICD-10-CM

## 2024-02-26 DIAGNOSIS — O09.899 HISTORY OF MATERNAL CARDIAC SURGERY: ICD-10-CM

## 2024-02-26 DIAGNOSIS — O99.210 OBESITY AFFECTING PREGNANCY, ANTEPARTUM, UNSPECIFIED OBESITY TYPE: ICD-10-CM

## 2024-02-26 DIAGNOSIS — Z98.890 HISTORY OF MATERNAL CARDIAC SURGERY: ICD-10-CM

## 2024-02-26 DIAGNOSIS — Z86.73 HISTORY OF EMBOLIC STROKE: ICD-10-CM

## 2024-02-26 DIAGNOSIS — Z36.89 ENCOUNTER FOR OTHER SPECIFIED ANTENATAL SCREENING: ICD-10-CM

## 2024-02-26 DIAGNOSIS — Q24.9 MATERNAL CONGENITAL CARDIAC ANOMALY AFFECTING PREGNANCY, ANTEPARTUM: ICD-10-CM

## 2024-02-26 LAB
ERYTHROCYTE [DISTWIDTH] IN BLOOD BY AUTOMATED COUNT: 13.2 % (ref 11.6–15.1)
GLUCOSE 1H P 50 G GLC PO SERPL-MCNC: 169 MG/DL (ref 40–134)
HCT VFR BLD AUTO: 32.8 % (ref 34.8–46.1)
HGB BLD-MCNC: 10.9 G/DL (ref 11.5–15.4)
MCH RBC QN AUTO: 30.1 PG (ref 26.8–34.3)
MCHC RBC AUTO-ENTMCNC: 33.2 G/DL (ref 31.4–37.4)
MCV RBC AUTO: 91 FL (ref 82–98)
PLATELET # BLD AUTO: 263 THOUSANDS/UL (ref 149–390)
PMV BLD AUTO: 9.4 FL (ref 8.9–12.7)
RBC # BLD AUTO: 3.62 MILLION/UL (ref 3.81–5.12)
SL AMB  POCT GLUCOSE, UA: NORMAL
SL AMB POCT URINE PROTEIN: NORMAL
WBC # BLD AUTO: 12.78 THOUSAND/UL (ref 4.31–10.16)

## 2024-02-26 PROCEDURE — 81002 URINALYSIS NONAUTO W/O SCOPE: CPT | Performed by: OBSTETRICS & GYNECOLOGY

## 2024-02-26 PROCEDURE — 90715 TDAP VACCINE 7 YRS/> IM: CPT | Performed by: OBSTETRICS & GYNECOLOGY

## 2024-02-26 PROCEDURE — 82950 GLUCOSE TEST: CPT

## 2024-02-26 PROCEDURE — 85027 COMPLETE CBC AUTOMATED: CPT

## 2024-02-26 PROCEDURE — 90471 IMMUNIZATION ADMIN: CPT | Performed by: OBSTETRICS & GYNECOLOGY

## 2024-02-26 PROCEDURE — 36415 COLL VENOUS BLD VENIPUNCTURE: CPT

## 2024-02-26 PROCEDURE — 86780 TREPONEMA PALLIDUM: CPT

## 2024-02-26 PROCEDURE — PNV: Performed by: OBSTETRICS & GYNECOLOGY

## 2024-02-26 NOTE — TELEPHONE ENCOUNTER
Recalled [facility] (Natasha) - changed patient's C/S date to 5/2/2024 @ 8:00 am with admission date 4/30/2024.

## 2024-02-26 NOTE — TELEPHONE ENCOUNTER
Called patient to review labs and discuss scheduling of  section. Reviewed mild anemia. Recommend starting oral iron now. Reviewed elevated 1 hour GTT. Completion of 3 hour GTT recommended. Order placed.     Reviewed logistics of delivery scheduling. Reviewed schedule with surgical scheduling. Offered , , or . She elects . Will have surgical scheduler schedule. Reviewed that patient will come to hospital 48 hours prior for admission for transition to IV UFH. She agrees.     Sanjay Thompson MD  2024 3:45 PM

## 2024-02-26 NOTE — TELEPHONE ENCOUNTER
Patient is scheduled for C/S 5/1/2024 @ 1:00 pm [facility] with SOD provider (spoke with Natasha).  Patient to be admitted 4/29/2024 (patient to register @ [facility] 2nd floor).  Patient informed - was not given specific time to report to hospital but informed patient to plan to report in am.

## 2024-02-26 NOTE — ADDENDUM NOTE
Addended by: ALICIA RUIZ on: 2/26/2024 03:49 PM     Modules accepted: Orders     Colchicine Counseling:  Patient counseled regarding adverse effects including but not limited to stomach upset (nausea, vomiting, stomach pain, or diarrhea).  Patient instructed to limit alcohol consumption while taking this medication.  Colchicine may reduce blood counts especially with prolonged use.  The patient understands that monitoring of kidney function and blood counts may be required, especially at baseline. The patient verbalized understanding of the proper use and possible adverse effects of colchicine.  All of the patient's questions and concerns were addressed.

## 2024-02-26 NOTE — PROGRESS NOTES
"Routine Prenatal Visit  [facility] - [address], [address], [place] [address]    Assessment/Plan:  Suze is a 28 y.o. year old  at 28w3d who presents for routine prenatal visit.     1. 28 weeks gestation of pregnancy  -     POCT urine dip        -     planning to have labs done today    2. History of mechanical aortic valve replacement    3. Obesity affecting pregnancy, antepartum, unspecified obesity type    4. Maternal congenital cardiac anomaly affecting pregnancy, antepartum        -  Desires primary  on May 2, 2024.  Aware she will be admitted to [facility]) 48 hours prior to scheduled     5. History of maternal cardiac surgery    6. Mental disorder affecting pregnancy, antepartum    7. History of embolic stroke    8. Need for Tdap vaccination  -     TDAP VACCINE GREATER THAN OR EQUAL TO 6YO IM    Next OB Visit 2 weeks.    Subjective:     CC: Prenatal care    Suze Leigh is a 28 y.o.  female who presents for routine prenatal care at 28w3d.  Pregnancy ROS: no leakage of fluid, pelvic pain, or vaginal bleeding.  normal fetal movement.    The following portions of the patient's history were reviewed and updated as appropriate: allergies, current medications, past family history, past medical history, obstetric history, gynecologic history, past social history, past surgical history and problem list.      Objective:  Ht 5' 3\" (1.6 m)   Wt 82.1 kg (181 lb)   LMP 2023 (Exact Date) Comment: negative pregnancy test  BMI 32.06 kg/m²   Pregravid Weight/BMI: 85.3 kg (188 lb) (BMI 33.31)  Current Weight: 82.1 kg (181 lb)   Total Weight Gain: -3.175 kg (-7 lb)   Pre- Vitals      Flowsheet Row Most Recent Value   Prenatal Assessment    Fetal Heart Rate 150   Fundal Height (cm) 28 cm   Movement Present   Prenatal Vitals    Weight - Scale 82.1 kg (181 lb)   Urine Albumin/Glucose    Dilation/Effacement/Station    Vaginal Drainage    Draining Fluid No "   Edema              General: Well appearing, no distress  Abdomen: Soft, gravid, nontender  Extremities: Non tender.

## 2024-02-27 LAB — TREPONEMA PALLIDUM IGG+IGM AB [PRESENCE] IN SERUM OR PLASMA BY IMMUNOASSAY: NORMAL

## 2024-03-04 ENCOUNTER — APPOINTMENT (OUTPATIENT)
Dept: LAB | Facility: HOSPITAL | Age: 29
End: 2024-03-04
Payer: COMMERCIAL

## 2024-03-04 ENCOUNTER — ANTICOAG VISIT (OUTPATIENT)
Dept: CARDIOLOGY CLINIC | Facility: CLINIC | Age: 29
End: 2024-03-04

## 2024-03-04 DIAGNOSIS — Z95.2 HISTORY OF MECHANICAL AORTIC VALVE REPLACEMENT: Primary | ICD-10-CM

## 2024-03-04 DIAGNOSIS — O99.810 ABNORMAL GLUCOSE TOLERANCE TEST IN PREGNANCY: ICD-10-CM

## 2024-03-04 LAB
GLUCOSE 1H P 100 G GLC PO SERPL-MCNC: 159 MG/DL (ref 65–179)
GLUCOSE 2H P 100 G GLC PO SERPL-MCNC: 145 MG/DL (ref 65–154)
GLUCOSE 3H P 100 G GLC PO SERPL-MCNC: 131 MG/DL (ref 65–139)
GLUCOSE P FAST SERPL-MCNC: 76 MG/DL (ref 65–94)
INR PPP: 2.5 (ref 0.84–1.19)

## 2024-03-04 PROCEDURE — 82952 GTT-ADDED SAMPLES: CPT

## 2024-03-04 PROCEDURE — 36415 COLL VENOUS BLD VENIPUNCTURE: CPT

## 2024-03-04 PROCEDURE — 82951 GLUCOSE TOLERANCE TEST (GTT): CPT

## 2024-03-04 NOTE — PROGRESS NOTES
Left message for patient, advised INR good, advised to continue 2.5 mg Tues Thurs Sat, 3.75 mg all other days, will recheck in 2 weeks 3/16/24  Advised to call with questions or concerns.     Patient is a home elvia

## 2024-03-11 PROBLEM — O99.013 ANEMIA COMPLICATING PREGNANCY IN THIRD TRIMESTER: Status: ACTIVE | Noted: 2024-03-11

## 2024-03-11 PROBLEM — Z3A.30 30 WEEKS GESTATION OF PREGNANCY: Status: ACTIVE | Noted: 2023-10-19

## 2024-03-11 NOTE — PATIENT INSTRUCTIONS
- Continue prenatal vitamins and all prescribed medications.    - Try to drink 64 oz of water or other non-caffeinated fluids daily.   - Fetal kick counts (to be done daily or if note a decrease in fetal movement):  in a quiet place, after a meal or drinking something sweet, lie on your side and count movements.  Should get 10 movements in 2 hours.  Call office if less than this.  - Call office if leaking of fluid, bleeding, contractions >5-6/hour which don't decrease with rest, oral hydration, or emptying bladder, or decreased fetal movement.       Magnesium 400mg daily - for constipation or  Colace 100mg 1-2x day

## 2024-03-12 ENCOUNTER — ROUTINE PRENATAL (OUTPATIENT)
Dept: OBGYN CLINIC | Facility: CLINIC | Age: 29
End: 2024-03-12
Payer: COMMERCIAL

## 2024-03-12 VITALS
WEIGHT: 183.8 LBS | DIASTOLIC BLOOD PRESSURE: 68 MMHG | SYSTOLIC BLOOD PRESSURE: 102 MMHG | HEIGHT: 63 IN | BODY MASS INDEX: 32.57 KG/M2

## 2024-03-12 DIAGNOSIS — O99.419 MATERNAL CONGENITAL CARDIAC ANOMALY AFFECTING PREGNANCY, ANTEPARTUM: Primary | ICD-10-CM

## 2024-03-12 DIAGNOSIS — Z3A.30 30 WEEKS GESTATION OF PREGNANCY: ICD-10-CM

## 2024-03-12 DIAGNOSIS — Q24.9 MATERNAL CONGENITAL CARDIAC ANOMALY AFFECTING PREGNANCY, ANTEPARTUM: Primary | ICD-10-CM

## 2024-03-12 DIAGNOSIS — O09.899 HISTORY OF MATERNAL CARDIAC SURGERY: ICD-10-CM

## 2024-03-12 DIAGNOSIS — O99.343 MENTAL DISORDER AFFECTING PREGNANCY IN THIRD TRIMESTER: ICD-10-CM

## 2024-03-12 DIAGNOSIS — O99.013 ANEMIA COMPLICATING PREGNANCY IN THIRD TRIMESTER: ICD-10-CM

## 2024-03-12 DIAGNOSIS — O99.213 OBESITY AFFECTING PREGNANCY IN THIRD TRIMESTER, UNSPECIFIED OBESITY TYPE: ICD-10-CM

## 2024-03-12 DIAGNOSIS — Z86.73 HISTORY OF EMBOLIC STROKE: ICD-10-CM

## 2024-03-12 DIAGNOSIS — Z98.890 HISTORY OF MATERNAL CARDIAC SURGERY: ICD-10-CM

## 2024-03-12 LAB
SL AMB  POCT GLUCOSE, UA: NEGATIVE
SL AMB POCT URINE PROTEIN: NEGATIVE

## 2024-03-12 PROCEDURE — PNV: Performed by: OBSTETRICS & GYNECOLOGY

## 2024-03-12 PROCEDURE — 81002 URINALYSIS NONAUTO W/O SCOPE: CPT | Performed by: OBSTETRICS & GYNECOLOGY

## 2024-03-12 RX ORDER — LANOLIN ALCOHOL/MO/W.PET/CERES
325 CREAM (GRAM) TOPICAL
COMMUNITY

## 2024-03-12 NOTE — ASSESSMENT & PLAN NOTE
Plan for  for delivery 2024 at [place] with admission a couple days prior to bridge anticoagulation.  See Complex Mother notes and Overview for details.  Continue Warfarin currently per plan.

## 2024-03-21 ENCOUNTER — TELEPHONE (OUTPATIENT)
Dept: OBGYN CLINIC | Facility: CLINIC | Age: 29
End: 2024-03-21

## 2024-03-21 NOTE — TELEPHONE ENCOUNTER
Suze requesting to have FMLA form corrected with actual admission date which is  with  Delivery scheduled  and estimated incapacitated until . Standard 8 weeks postpartum from actual delivery would be  and that is the date indicated on the form.

## 2024-03-21 NOTE — TELEPHONE ENCOUNTER
Suze requesting to revise LA dates using admission date of  with  delivery on . For incapacitated  time beginning on  until , however standard postpartum is 8 weeks from the time of delivery which would be a date of .

## 2024-03-25 ENCOUNTER — ANTICOAG VISIT (OUTPATIENT)
Dept: CARDIOLOGY CLINIC | Facility: CLINIC | Age: 29
End: 2024-03-25

## 2024-03-25 DIAGNOSIS — Z95.2 HISTORY OF MECHANICAL AORTIC VALVE REPLACEMENT: Primary | ICD-10-CM

## 2024-03-25 PROBLEM — Z3A.32 32 WEEKS GESTATION OF PREGNANCY: Status: ACTIVE | Noted: 2023-10-19

## 2024-03-25 PROBLEM — Z3A.33 33 WEEKS GESTATION OF PREGNANCY: Status: ACTIVE | Noted: 2024-03-25

## 2024-03-25 LAB — INR PPP: 2.5 (ref 0.84–1.19)

## 2024-03-25 NOTE — PROGRESS NOTES
Left message, advised INR good, continue 2.5 mg Tues Thurs Sat, 3.75 mg all other days, will recheck in 2 weeks 4/7/24  Advised to call with questions or concerns.    Patient is a home elvia

## 2024-03-26 ENCOUNTER — ROUTINE PRENATAL (OUTPATIENT)
Dept: OBGYN CLINIC | Facility: CLINIC | Age: 29
End: 2024-03-26
Payer: COMMERCIAL

## 2024-03-26 VITALS
WEIGHT: 185.2 LBS | SYSTOLIC BLOOD PRESSURE: 102 MMHG | HEIGHT: 63 IN | BODY MASS INDEX: 32.82 KG/M2 | DIASTOLIC BLOOD PRESSURE: 60 MMHG

## 2024-03-26 DIAGNOSIS — Z95.2 HISTORY OF MECHANICAL AORTIC VALVE REPLACEMENT: ICD-10-CM

## 2024-03-26 DIAGNOSIS — Q24.9 MATERNAL CONGENITAL CARDIAC ANOMALY AFFECTING PREGNANCY, ANTEPARTUM: Primary | ICD-10-CM

## 2024-03-26 DIAGNOSIS — O99.419 MATERNAL CONGENITAL CARDIAC ANOMALY AFFECTING PREGNANCY, ANTEPARTUM: Primary | ICD-10-CM

## 2024-03-26 DIAGNOSIS — O99.343 MENTAL DISORDER AFFECTING PREGNANCY IN THIRD TRIMESTER: ICD-10-CM

## 2024-03-26 DIAGNOSIS — Z3A.32 32 WEEKS GESTATION OF PREGNANCY: ICD-10-CM

## 2024-03-26 DIAGNOSIS — O99.013 ANEMIA COMPLICATING PREGNANCY IN THIRD TRIMESTER: ICD-10-CM

## 2024-03-26 DIAGNOSIS — O99.213 OBESITY AFFECTING PREGNANCY IN THIRD TRIMESTER, UNSPECIFIED OBESITY TYPE: ICD-10-CM

## 2024-03-26 LAB
SL AMB  POCT GLUCOSE, UA: NEGATIVE
SL AMB POCT URINE PROTEIN: NORMAL

## 2024-03-26 PROCEDURE — PNV: Performed by: OBSTETRICS & GYNECOLOGY

## 2024-03-26 PROCEDURE — 81002 URINALYSIS NONAUTO W/O SCOPE: CPT | Performed by: OBSTETRICS & GYNECOLOGY

## 2024-03-26 NOTE — PROGRESS NOTES
"Routine Prenatal Visit  [facility] - [address], [address], [place] [address]    Assessment/Plan:  Suze is a 28 y.o. year old  at 32w4d who presents for routine prenatal visit.     1. Maternal congenital cardiac anomaly affecting pregnancy, antepartum  Assessment & Plan:  Plan for  for delivery 2024 at [place] with admission a couple days prior to bridge anticoagulation.  See Complex Mother notes and Overview for details.  Continue Warfarin currently per plan.  Reviewed with Suze will likely do GBS next visit (34 weeks).      2. History of mechanical aortic valve replacement    3. Anemia complicating pregnancy in third trimester  Assessment & Plan:  Po iron until delivery.      4. Obesity affecting pregnancy in third trimester, unspecified obesity type  Assessment & Plan:  Continue ASA until 36 weeks.  Growth u/s 2024      5. Mental disorder affecting pregnancy in third trimester  Assessment & Plan:  Doing well on Lexapro and metoprolol.      6. 32 weeks gestation of pregnancy  Assessment & Plan:  Patient asking about RSV vaccine today.  Reviewed not recommended due to timing of RSV season - not necessary.    Orders:  -     POCT urine dip        Next OB Visit 2 weeks.    Subjective:     CC: Prenatal care    Suze Leigh is a 28 y.o.  female who presents for routine prenatal care at 32w4d.  Pregnancy ROS: no leakage of fluid, pelvic pain, or vaginal bleeding.  normal fetal movement.    The following portions of the patient's history were reviewed and updated as appropriate: allergies, current medications, past family history, past medical history, obstetric history, gynecologic history, past social history, past surgical history and problem list.      Objective:  /60   Ht 5' 3\" (1.6 m)   Wt 84 kg (185 lb 3.2 oz)   LMP 2023 (Exact Date) Comment: negative pregnancy test  BMI 32.81 kg/m²   Pregravid Weight/BMI: 85.3 kg (188 lb) (BMI " 33.31)  Current Weight: 84 kg (185 lb 3.2 oz)   Total Weight Gain: -1.27 kg (-2 lb 12.8 oz)   Pre- Vitals      Flowsheet Row Most Recent Value   Prenatal Assessment    Fetal Heart Rate 150   Fundal Height (cm) 31 cm   Movement Present   Prenatal Vitals    Blood Pressure 102/60   Weight - Scale 84 kg (185 lb 3.2 oz)   Urine Albumin/Glucose    Dilation/Effacement/Station    Vaginal Drainage    Edema    LLE Edema None   RLE Edema None   Facial Edema None             General: Well appearing, no distress  Abdomen: Soft, gravid, nontender  Extremities: Non tender.

## 2024-03-26 NOTE — ASSESSMENT & PLAN NOTE
Plan for  for delivery 2024 at [place] with admission a couple days prior to bridge anticoagulation.  See Complex Mother notes and Overview for details.  Continue Warfarin currently per plan.  Reviewed with Suze will likely do GBS next visit (34 weeks).

## 2024-03-26 NOTE — ASSESSMENT & PLAN NOTE
Patient asking about RSV vaccine today.  Reviewed not recommended due to timing of RSV season - not necessary.

## 2024-04-01 ENCOUNTER — ULTRASOUND (OUTPATIENT)
Dept: PERINATAL CARE | Facility: OTHER | Age: 29
End: 2024-04-01
Payer: COMMERCIAL

## 2024-04-01 ENCOUNTER — PATIENT MESSAGE (OUTPATIENT)
Dept: OBGYN CLINIC | Facility: CLINIC | Age: 29
End: 2024-04-01

## 2024-04-01 VITALS
HEART RATE: 87 BPM | SYSTOLIC BLOOD PRESSURE: 122 MMHG | DIASTOLIC BLOOD PRESSURE: 70 MMHG | WEIGHT: 185.2 LBS | HEIGHT: 63 IN | BODY MASS INDEX: 32.82 KG/M2

## 2024-04-01 DIAGNOSIS — O99.213 OBESITY AFFECTING PREGNANCY IN THIRD TRIMESTER, UNSPECIFIED OBESITY TYPE: ICD-10-CM

## 2024-04-01 DIAGNOSIS — Z86.73 HISTORY OF EMBOLIC STROKE: ICD-10-CM

## 2024-04-01 DIAGNOSIS — Z3A.33 33 WEEKS GESTATION OF PREGNANCY: ICD-10-CM

## 2024-04-01 DIAGNOSIS — Q24.9 MATERNAL CONGENITAL CARDIAC ANOMALY AFFECTING PREGNANCY, ANTEPARTUM: Primary | ICD-10-CM

## 2024-04-01 DIAGNOSIS — Z36.89 ENCOUNTER FOR ULTRASOUND TO ASSESS FETAL GROWTH: ICD-10-CM

## 2024-04-01 DIAGNOSIS — Z95.2 HISTORY OF MECHANICAL AORTIC VALVE REPLACEMENT: ICD-10-CM

## 2024-04-01 DIAGNOSIS — O99.419 MATERNAL CONGENITAL CARDIAC ANOMALY AFFECTING PREGNANCY, ANTEPARTUM: Primary | ICD-10-CM

## 2024-04-01 PROCEDURE — 99215 OFFICE O/P EST HI 40 MIN: CPT | Performed by: OBSTETRICS & GYNECOLOGY

## 2024-04-01 PROCEDURE — 76816 OB US FOLLOW-UP PER FETUS: CPT | Performed by: OBSTETRICS & GYNECOLOGY

## 2024-04-01 RX ORDER — ENOXAPARIN SODIUM 100 MG/ML
1 INJECTION SUBCUTANEOUS EVERY 12 HOURS
Qty: 22.4 ML | Refills: 0 | Status: SHIPPED | OUTPATIENT
Start: 2024-04-01 | End: 2024-04-03

## 2024-04-01 NOTE — PROGRESS NOTES
"[facility]: Suze Leigh was seen today at 33w3d for fetal growth assessment ultrasound.  See ultrasound report under \"OB Procedures\" tab.  Please don't hesitate to contact our office with any concerns or questions.  -Steph Hawkins MD    "

## 2024-04-01 NOTE — LETTER
"2024     Sanjay Thompson MD  [address]  [address]  [place] [address]    Patient: Suze Leigh   YOB: 1995   Date of Visit: 2024       Good morning,    Dr. Grissom and Dr. Thompson, I ordered Bryon Lovenox to begin at 35 weeks. She will need anti Xa levels checked by your office prior to stopping Coumadin. This can be supervised by your office per Dr. Null's Complex Mother Team note. Dr. Thompson was going to arrange weekly follow-up with [facility] until delivery.    Dr. Howe, patient opts for inpatient anesthesia consult, if you could please let your team know. Dr. Steen and Dr. Belcher will be the [facility] team when she is admitted 24 for anticoagulation transition. I instructed her to stop aspirin at 36 weeks.      Thank you all,  MD Steph Escobar MD        CC: DO Huber Valdez MD Odessa P Hamidi, MD Kenzie Lynn Glassburn, MD Hannah Brotzman Anastasio, MD  2024 10:13 AM  Sign when Signing Visit  [facility]: Suze Leigh was seen today at 33w3d for fetal growth assessment ultrasound.  See ultrasound report under \"OB Procedures\" tab.  Please don't hesitate to contact our office with any concerns or questions.  -Steph Hawkins MD    "

## 2024-04-02 ENCOUNTER — NURSE TRIAGE (OUTPATIENT)
Dept: OTHER | Facility: OTHER | Age: 29
End: 2024-04-02

## 2024-04-02 ENCOUNTER — APPOINTMENT (EMERGENCY)
Dept: RADIOLOGY | Facility: HOSPITAL | Age: 29
DRG: 833 | End: 2024-04-02
Payer: COMMERCIAL

## 2024-04-02 ENCOUNTER — HOSPITAL ENCOUNTER (INPATIENT)
Facility: HOSPITAL | Age: 29
LOS: 1 days | Discharge: HOME | DRG: 833 | End: 2024-04-03
Attending: STUDENT IN AN ORGANIZED HEALTH CARE EDUCATION/TRAINING PROGRAM | Admitting: STUDENT IN AN ORGANIZED HEALTH CARE EDUCATION/TRAINING PROGRAM
Payer: COMMERCIAL

## 2024-04-02 DIAGNOSIS — O9A.219 TRAUMA DURING PREGNANCY: ICD-10-CM

## 2024-04-02 DIAGNOSIS — Z3A.33 33 WEEKS GESTATION OF PREGNANCY: Primary | ICD-10-CM

## 2024-04-02 DIAGNOSIS — Z98.890 HX OF REPAIR OF ASCENDING AORTA: ICD-10-CM

## 2024-04-02 DIAGNOSIS — Z95.2 HISTORY OF MECHANICAL AORTIC VALVE REPLACEMENT: ICD-10-CM

## 2024-04-02 LAB
ABO GROUP BLD: NORMAL
APTT PPP: 31 SECONDS (ref 23–37)
BASE EXCESS BLDA CALC-SCNC: -2 MMOL/L (ref -2–3)
BASOPHILS # BLD AUTO: 0.03 THOUSANDS/ÂΜL (ref 0–0.1)
BASOPHILS NFR BLD AUTO: 0 % (ref 0–1)
BLD GP AB SCN SERPL QL: NEGATIVE
CA-I BLD-SCNC: 1.19 MMOL/L (ref 1.12–1.32)
EOSINOPHIL # BLD AUTO: 0.23 THOUSAND/ÂΜL (ref 0–0.61)
EOSINOPHIL NFR BLD AUTO: 2 % (ref 0–6)
ERYTHROCYTE [DISTWIDTH] IN BLOOD BY AUTOMATED COUNT: 13.3 % (ref 11.6–15.1)
FIBRINOGEN PPP-MCNC: 593 MG/DL (ref 207–520)
GLUCOSE SERPL-MCNC: 80 MG/DL (ref 65–140)
HCO3 BLDA-SCNC: 21.6 MMOL/L (ref 24–30)
HCT VFR BLD AUTO: 36 % (ref 34.8–46.1)
HCT VFR BLD CALC: 35 % (ref 34.8–46.1)
HGB BLD-MCNC: 11.9 G/DL (ref 11.5–15.4)
HGB BLDA-MCNC: 11.9 G/DL (ref 11.5–15.4)
IMM GRANULOCYTES # BLD AUTO: 0.15 THOUSAND/UL (ref 0–0.2)
IMM GRANULOCYTES NFR BLD AUTO: 1 % (ref 0–2)
INR PPP: 1.22 (ref 0.84–1.19)
LYMPHOCYTES # BLD AUTO: 1.81 THOUSANDS/ÂΜL (ref 0.6–4.47)
LYMPHOCYTES NFR BLD AUTO: 15 % (ref 14–44)
MCH RBC QN AUTO: 29.8 PG (ref 26.8–34.3)
MCHC RBC AUTO-ENTMCNC: 33.1 G/DL (ref 31.4–37.4)
MCV RBC AUTO: 90 FL (ref 82–98)
MONOCYTES # BLD AUTO: 1.11 THOUSAND/ÂΜL (ref 0.17–1.22)
MONOCYTES NFR BLD AUTO: 9 % (ref 4–12)
NEUTROPHILS # BLD AUTO: 8.98 THOUSANDS/ÂΜL (ref 1.85–7.62)
NEUTS SEG NFR BLD AUTO: 73 % (ref 43–75)
NRBC BLD AUTO-RTO: 0 /100 WBCS
PCO2 BLD: 23 MMOL/L (ref 21–32)
PCO2 BLD: 31.2 MM HG (ref 42–50)
PH BLD: 7.45 [PH] (ref 7.3–7.4)
PLATELET # BLD AUTO: 251 THOUSANDS/UL (ref 149–390)
PMV BLD AUTO: 9.5 FL (ref 8.9–12.7)
PO2 BLD: 45 MM HG (ref 35–45)
POTASSIUM BLD-SCNC: 3.8 MMOL/L (ref 3.5–5.3)
PROTHROMBIN TIME: 16 SECONDS (ref 11.6–14.5)
RBC # BLD AUTO: 4 MILLION/UL (ref 3.81–5.12)
RH BLD: POSITIVE
SAO2 % BLD FROM PO2: 83 % (ref 60–85)
SODIUM BLD-SCNC: 136 MMOL/L (ref 136–145)
SPECIMEN EXPIRATION DATE: NORMAL
SPECIMEN SOURCE: ABNORMAL
WBC # BLD AUTO: 12.31 THOUSAND/UL (ref 4.31–10.16)

## 2024-04-02 PROCEDURE — 76604 US EXAM CHEST: CPT | Performed by: NURSE PRACTITIONER

## 2024-04-02 PROCEDURE — 85460 HEMOGLOBIN FETAL: CPT

## 2024-04-02 PROCEDURE — 86901 BLOOD TYPING SEROLOGIC RH(D): CPT

## 2024-04-02 PROCEDURE — 99284 EMERGENCY DEPT VISIT MOD MDM: CPT

## 2024-04-02 PROCEDURE — 85014 HEMATOCRIT: CPT

## 2024-04-02 PROCEDURE — 82947 ASSAY GLUCOSE BLOOD QUANT: CPT

## 2024-04-02 PROCEDURE — 86900 BLOOD TYPING SEROLOGIC ABO: CPT

## 2024-04-02 PROCEDURE — 85730 THROMBOPLASTIN TIME PARTIAL: CPT

## 2024-04-02 PROCEDURE — 99222 1ST HOSP IP/OBS MODERATE 55: CPT | Performed by: STUDENT IN AN ORGANIZED HEALTH CARE EDUCATION/TRAINING PROGRAM

## 2024-04-02 PROCEDURE — 85025 COMPLETE CBC W/AUTO DIFF WBC: CPT

## 2024-04-02 PROCEDURE — 82330 ASSAY OF CALCIUM: CPT

## 2024-04-02 PROCEDURE — 99222 1ST HOSP IP/OBS MODERATE 55: CPT | Performed by: OBSTETRICS & GYNECOLOGY

## 2024-04-02 PROCEDURE — 84295 ASSAY OF SERUM SODIUM: CPT

## 2024-04-02 PROCEDURE — 82803 BLOOD GASES ANY COMBINATION: CPT

## 2024-04-02 PROCEDURE — 99205 OFFICE O/P NEW HI 60 MIN: CPT

## 2024-04-02 PROCEDURE — 84132 ASSAY OF SERUM POTASSIUM: CPT

## 2024-04-02 PROCEDURE — 85610 PROTHROMBIN TIME: CPT

## 2024-04-02 PROCEDURE — 99204 OFFICE O/P NEW MOD 45 MIN: CPT | Performed by: SURGERY

## 2024-04-02 PROCEDURE — 93308 TTE F-UP OR LMTD: CPT | Performed by: NURSE PRACTITIONER

## 2024-04-02 PROCEDURE — 86850 RBC ANTIBODY SCREEN: CPT

## 2024-04-02 PROCEDURE — 76705 ECHO EXAM OF ABDOMEN: CPT | Performed by: NURSE PRACTITIONER

## 2024-04-02 PROCEDURE — 85384 FIBRINOGEN ACTIVITY: CPT

## 2024-04-02 RX ORDER — WARFARIN SODIUM 7.5 MG/1
3.75 TABLET ORAL
Status: DISCONTINUED | OUTPATIENT
Start: 2024-04-03 | End: 2024-04-02

## 2024-04-02 RX ORDER — WARFARIN SODIUM 2.5 MG/1
2.5 TABLET ORAL
Status: DISCONTINUED | OUTPATIENT
Start: 2024-04-02 | End: 2024-04-03

## 2024-04-02 RX ORDER — ASPIRIN 81 MG/1
81 TABLET, CHEWABLE ORAL DAILY
Status: DISCONTINUED | OUTPATIENT
Start: 2024-04-03 | End: 2024-04-02

## 2024-04-02 RX ORDER — WARFARIN SODIUM 2.5 MG/1
2.5 TABLET ORAL
Status: DISCONTINUED | OUTPATIENT
Start: 2024-04-04 | End: 2024-04-02

## 2024-04-02 RX ORDER — METOPROLOL SUCCINATE 25 MG/1
25 TABLET, EXTENDED RELEASE ORAL DAILY
Status: DISCONTINUED | OUTPATIENT
Start: 2024-04-02 | End: 2024-04-03 | Stop reason: HOSPADM

## 2024-04-02 RX ORDER — METOPROLOL SUCCINATE 25 MG/1
25 TABLET, EXTENDED RELEASE ORAL DAILY
Status: DISCONTINUED | OUTPATIENT
Start: 2024-04-03 | End: 2024-04-02

## 2024-04-02 RX ORDER — WARFARIN SODIUM 7.5 MG/1
3.75 TABLET ORAL
Status: DISCONTINUED | OUTPATIENT
Start: 2024-04-03 | End: 2024-04-03

## 2024-04-02 RX ORDER — ASPIRIN 81 MG/1
81 TABLET, CHEWABLE ORAL DAILY
Status: DISCONTINUED | OUTPATIENT
Start: 2024-04-02 | End: 2024-04-03 | Stop reason: HOSPADM

## 2024-04-02 RX ORDER — ESCITALOPRAM OXALATE 10 MG/1
10 TABLET ORAL DAILY
Status: DISCONTINUED | OUTPATIENT
Start: 2024-04-02 | End: 2024-04-03 | Stop reason: HOSPADM

## 2024-04-02 RX ORDER — ESCITALOPRAM OXALATE 10 MG/1
10 TABLET ORAL DAILY
Status: DISCONTINUED | OUTPATIENT
Start: 2024-04-03 | End: 2024-04-02

## 2024-04-02 RX ADMIN — ESCITALOPRAM OXALATE 10 MG: 10 TABLET ORAL at 22:12

## 2024-04-02 RX ADMIN — ASPIRIN 81 MG 81 MG: 81 TABLET ORAL at 22:12

## 2024-04-02 RX ADMIN — WARFARIN SODIUM 2.5 MG: 2.5 TABLET ORAL at 22:12

## 2024-04-02 RX ADMIN — METOPROLOL SUCCINATE 25 MG: 25 TABLET, EXTENDED RELEASE ORAL at 22:12

## 2024-04-02 RX ADMIN — SODIUM CHLORIDE, SODIUM LACTATE, POTASSIUM CHLORIDE, AND CALCIUM CHLORIDE 1000 ML: .6; .31; .03; .02 INJECTION, SOLUTION INTRAVENOUS at 20:15

## 2024-04-02 NOTE — PROCEDURES
POC FAST US    Date/Time: 4/2/2024 7:46 PM    Performed by: WANDA Xiong  Authorized by: WANDA Xiong    Patient location:  Trauma  Procedure details:     Exam Type:  Diagnostic    Indications: blunt abdominal trauma and blunt chest trauma      Assess for:  Intra-abdominal fluid and pericardial effusion    Technique: FAST      Views obtained:  Heart - Pericardial sac, LUQ - Splenorenal space, Suprapubic - Pouch of Wesley and RUQ - Berry's Pouch    Image quality: diagnostic      Image availability:  Images available in PACS and video obtained  FAST Findings:     RUQ (Hepatorenal) free fluid: absent      LUQ (Splenorenal) free fluid: absent      Suprapubic free fluid: absent      Cardiac wall motion: identified      Pericardial effusion: absent    extended FAST (Pulmonary) findings:     Left lung sliding: Present      Right lung sliding: Present      Left pleural effusion: Absent      Right pleural effusion: Absent    Interpretation:     Impressions: negative

## 2024-04-02 NOTE — TELEPHONE ENCOUNTER
"Regardin wks pregnant / on warfarin / fell down steps  ----- Message from Venita Dimas sent at 2024  6:18 PM EDT -----  \"My daughter is 30 weeks pregnant and she is on warfarin and she fell down a half a flight of steps\"    "

## 2024-04-02 NOTE — TELEPHONE ENCOUNTER
"Reason for Disposition   [1] Pregnant 23 or more weeks AND [2] no fetal movements or decreased fetal movements (e.g., kick count < 5 in 1 hour or < 10 in 2 hours)    Answer Assessment - Initial Assessment Questions  1. MECHANISM: \"How did the fall happen?\"      Fell down 6-7 flights of steps    2. DOMESTIC VIOLENCE SCREENING: \"Did you fall because someone pushed you or tried to hurt you?\"      Denies    3. ONSET: \"When did the fall happen?\" (e.g., minutes, hours, or days ago)      Just now     4. LOCATION: \"What part of the body hit the ground?\" (e.g., back, buttocks, head, hips, knees, hands, head, stomach)      Buttocks     5. INJURY: \"Did you get hurt when you fell? Are there any obvious injuries?\" If Yes, ask: \"What does the injury look like?\"      Right arm    6. PAIN: \"Is there any pain?\" If Yes, ask: \"How bad is the pain?\" (e.g., Scale 1-10; or mild,   moderate, severe)    - NONE (0): no pain    - MILD (1-3): doesn't interfere with normal activities     - MODERATE (4-7): interferes with normal activities or awakens from sleep     - SEVERE (8-10): excruciating pain, unable to do any normal activities   Mild to moderate     7. SIZE: For cuts, bruises, or swelling, ask: \"How large is it?\" (e.g., inches or centimeters)      Little red     8. ELANA: \"What date are you expecting to deliver?\"      5/17/24-c section on 5/2/24    9. FETAL MOVEMENT: \"Has the baby's movement decreased or changed significantly from   normal?\"      Last felt the baby move this afternoon, but mostly feels the baby when lying down     10. OTHER SYMPTOMS: \"Do you have any other symptoms?\" (e.g., stomach pain, contractions, vaginal bleeding, leaking of fluid from vagina)        Denies    Protocols used: Pregnancy - Fall-ADULT-AH    "

## 2024-04-02 NOTE — PATIENT COMMUNICATION
A user error has taken place: encounter opened in error, closed for administrative reasons.     Separate triage encounter opened.

## 2024-04-02 NOTE — H&P
"H&P - Trauma   Suze Leigh 28 y.o. female MRN: [id number]  Unit/Bed#: KARRIE Encounter: [id number]    Trauma Alert: Level B   Model of Arrival: Self    Trauma Team: Attending Rodrick and YAHAIRA Bruce  Consultants: OB / GYN     Assessment/Plan   Active Problems / Assessment:   Fall down stairs at 33 weeks pregnant     Plan:   TO labor and Delivery for monitoring    History of Present Illness     Chief Complaint: soreness on lateral aspect of right arm from falling agains the stairs.  FROM, no redness or heamatoma  Mechanism:Fall     HPI:    Suze Leigh is a 28 y.o. female who presents after a fall at home, believed to be mehanical on the stairs.  No head strike, no complaints of pain, has a heart valve and on Coumadin..    Review of Systems   Constitutional: Negative.    HENT: Negative.     Eyes: Negative.    Respiratory: Negative.     Cardiovascular: Negative.    Gastrointestinal: Negative.    Endocrine: Negative.    Genitourinary: Negative.    Musculoskeletal: Negative.    Skin: Negative.    Allergic/Immunologic: Negative.    Neurological: Negative.    Hematological: Negative.    Psychiatric/Behavioral: Negative.       12-point, complete review of systems was reviewed and negative except as stated above.     Historical Information     Past Medical History:   Diagnosis Date    Anxiety     Bicuspid aortic valve     s/p mechanical AVR    Coronary artery disease 1993    Exercises two times per week     cross fit- 2x/week    Moderate aortic insufficiency     s/p mechanical AVR    Motion sickness     Patent ductus arteriosus     s/p repair    PONV (postoperative nausea and vomiting)     S/P ascending aortic aneurysm repair     Stroke ([facility]) 02/2022    no residual effects per pt--initially couldnt speak at first--\"feels back to normal now\"     Past Surgical History:   Procedure Laterality Date    ASCENDING AORTIC ANEURYSM REPAIR  2015    with\" Aortic valve surgery\"    MECHANICAL AORTIC VALVE REPLACEMENT  2015    " PATENT DUCTUS ARTERIOUS LIGATION      per pt had this surgery age 1    FL EXC B9 LESION MRGN XCP SK TG T/A/L 1.1-2.0 CM Right 12/05/2022    Procedure: EXCISION  BIOPSY LESION/MASS BACK;  Surgeon: David Cloud MD;  Location: [facility];  Service: General    THROMBECTOMY W/ EMBOLECTOMY  2022    Percutanoeous embolectomy in setting of stroke    WISDOM TOOTH EXTRACTION          Social History     Tobacco Use    Smoking status: Never     Passive exposure: Never    Smokeless tobacco: Never   Vaping Use    Vaping status: Never Used   Substance Use Topics    Alcohol use: Not Currently     Alcohol/week: 1.0 standard drink of alcohol     Types: 1 Glasses of wine per week    Drug use: No     Immunization History   Administered Date(s) Administered    COVID-19 MODERNA VACC 0.5 ML IM 02/04/2021, 03/04/2021    COVID-19 PFIZER VACCINE 0.3 ML IM 11/23/2021    COVID-19 Pfizer mRNA vacc PF sacha-sucrose 12 yr and older (Comirnaty) 12/10/2023    INFLUENZA 09/26/2020, 10/03/2023    Influenza, injectable, quadrivalent, preservative free 0.5 mL 12/01/2021, 10/17/2022    Meningococcal, Unknown Serogroups 07/16/2012    Tdap 04/02/2009, 01/07/2018, 02/26/2024     Last Tetanus: unknown  Family History: Non-contributory     Meds/Allergies   all current active meds have been reviewed     Allergies   Allergen Reactions    Cefprozil Rash       Objective   Initial Vitals:   Temperature: 97.6 °F (36.4 °C) (04/02/24 1916)  Pulse: 76 (04/02/24 1916)  Respirations: 18 (04/02/24 1916)  Blood Pressure: 131/78 (04/02/24 1916)    Primary Survey:   Airway:        Status: patent;        Pre-hospital Interventions: none        Hospital Interventions: none  Breathing:        Pre-hospital Interventions: none       Effort: normal       Right breath sounds: normal       Left breath sounds: normal  Circulation:        Rhythm: regular       Rate: regular   Right Pulses Left Pulses    R radial: 2+  R femoral: 2+  R pedal: 2+     L radial: 2+  L femoral: 2+  L  pedal: 2+       Disability:        GCS:        Right Pupil:       Left Pupil:     R Motor Strength L Motor Strength               Exposure:           Secondary Survey:  Physical Exam  HENT:      Right Ear: External ear normal.      Left Ear: External ear normal.      Nose: Nose normal.      Mouth/Throat:      Pharynx: Oropharynx is clear.   Eyes:      Extraocular Movements: Extraocular movements intact.      Conjunctiva/sclera: Conjunctivae normal.      Pupils: Pupils are equal, round, and reactive to light.   Cardiovascular:      Rate and Rhythm: Normal rate and regular rhythm.   Pulmonary:      Effort: Pulmonary effort is normal.      Breath sounds: Normal breath sounds.   Abdominal:      Comments: 33  weeks pregnant   Genitourinary:     Comments: Perineum clear  Musculoskeletal:         General: Normal range of motion.      Cervical back: Normal range of motion and neck supple.   Skin:     General: Skin is warm and dry.      Capillary Refill: Capillary refill takes less than 2 seconds.   Neurological:      General: No focal deficit present.      Mental Status: She is alert and oriented to person, place, and time.   Psychiatric:         Mood and Affect: Mood normal.         Behavior: Behavior normal.         Thought Content: Thought content normal.         Invasive Devices       Peripheral Intravenous Line  Duration             Peripheral IV 04/02/24 Left Antecubital <1 day                  Lab Results: I have personally reviewed all pertinent laboratory/test results 04/02/24 and in the preceding 24 hours.  Recent Labs     04/02/24  1938   HGB 11.9   HCT 35   CO2 23   CAIONIZED 1.19       Imaging Results: I have personally reviewed pertinent images saved in PACS. CT scan findings (and other pertinent positive findings on images) were discussed with radiology. My interpretation of the images/reports are as follows:  Chest Xray(s): none   FAST exam(s): neg   CT Scan(s): none   Additional Xray(s): none     Other  Studies: none    Code Status: No Order  Advance Directive and Living Will:      Power of :    POLST:    I have spent 25 minutes with Patient all clear from trauma, sent to maternity, labor and delivery for continued monitoring..

## 2024-04-03 VITALS
TEMPERATURE: 97.5 F | HEART RATE: 83 BPM | WEIGHT: 194 LBS | RESPIRATION RATE: 20 BRPM | BODY MASS INDEX: 34.38 KG/M2 | HEIGHT: 63 IN | SYSTOLIC BLOOD PRESSURE: 120 MMHG | OXYGEN SATURATION: 98 % | DIASTOLIC BLOOD PRESSURE: 76 MMHG

## 2024-04-03 LAB
APTT PPP: 32 SECONDS (ref 23–37)
BASOPHILS # BLD AUTO: 0.03 THOUSANDS/ÂΜL (ref 0–0.1)
BASOPHILS NFR BLD AUTO: 0 % (ref 0–1)
EOSINOPHIL # BLD AUTO: 0.22 THOUSAND/ÂΜL (ref 0–0.61)
EOSINOPHIL NFR BLD AUTO: 2 % (ref 0–6)
ERYTHROCYTE [DISTWIDTH] IN BLOOD BY AUTOMATED COUNT: 13.3 % (ref 11.6–15.1)
FETAL RBC/1000 RBC BLD KLEIH BETKE-RTO: 0 % (ref 0–1)
FIBRINOGEN PPP-MCNC: 508 MG/DL (ref 207–520)
HCT VFR BLD AUTO: 31.8 % (ref 34.8–46.1)
HGB BLD-MCNC: 10.5 G/DL (ref 11.5–15.4)
IMM GRANULOCYTES # BLD AUTO: 0.13 THOUSAND/UL (ref 0–0.2)
IMM GRANULOCYTES NFR BLD AUTO: 1 % (ref 0–2)
INR PPP: 1.36 (ref 0.84–1.19)
LYMPHOCYTES # BLD AUTO: 2.01 THOUSANDS/ÂΜL (ref 0.6–4.47)
LYMPHOCYTES NFR BLD AUTO: 18 % (ref 14–44)
MCH RBC QN AUTO: 30.1 PG (ref 26.8–34.3)
MCHC RBC AUTO-ENTMCNC: 33 G/DL (ref 31.4–37.4)
MCV RBC AUTO: 91 FL (ref 82–98)
MONOCYTES # BLD AUTO: 1.05 THOUSAND/ÂΜL (ref 0.17–1.22)
MONOCYTES NFR BLD AUTO: 9 % (ref 4–12)
NEUTROPHILS # BLD AUTO: 7.94 THOUSANDS/ÂΜL (ref 1.85–7.62)
NEUTS SEG NFR BLD AUTO: 70 % (ref 43–75)
NRBC BLD AUTO-RTO: 0 /100 WBCS
PLATELET # BLD AUTO: 213 THOUSANDS/UL (ref 149–390)
PMV BLD AUTO: 9.8 FL (ref 8.9–12.7)
PROTHROMBIN TIME: 17.5 SECONDS (ref 11.6–14.5)
RBC # BLD AUTO: 3.49 MILLION/UL (ref 3.81–5.12)
WBC # BLD AUTO: 11.38 THOUSAND/UL (ref 4.31–10.16)

## 2024-04-03 PROCEDURE — 59025 FETAL NON-STRESS TEST: CPT | Performed by: OBSTETRICS & GYNECOLOGY

## 2024-04-03 PROCEDURE — 99232 SBSQ HOSP IP/OBS MODERATE 35: CPT | Performed by: OBSTETRICS & GYNECOLOGY

## 2024-04-03 PROCEDURE — 85730 THROMBOPLASTIN TIME PARTIAL: CPT

## 2024-04-03 PROCEDURE — 85384 FIBRINOGEN ACTIVITY: CPT

## 2024-04-03 PROCEDURE — 99232 SBSQ HOSP IP/OBS MODERATE 35: CPT | Performed by: SURGERY

## 2024-04-03 PROCEDURE — 85025 COMPLETE CBC W/AUTO DIFF WBC: CPT

## 2024-04-03 PROCEDURE — NC001 PR NO CHARGE: Performed by: OBSTETRICS & GYNECOLOGY

## 2024-04-03 PROCEDURE — 85610 PROTHROMBIN TIME: CPT

## 2024-04-03 RX ORDER — ENOXAPARIN SODIUM 100 MG/ML
90 INJECTION SUBCUTANEOUS EVERY 12 HOURS SCHEDULED
Status: DISCONTINUED | OUTPATIENT
Start: 2024-04-03 | End: 2024-04-03 | Stop reason: HOSPADM

## 2024-04-03 RX ORDER — METOPROLOL SUCCINATE 25 MG/1
25 TABLET, EXTENDED RELEASE ORAL DAILY
Start: 2024-04-03

## 2024-04-03 RX ORDER — ENOXAPARIN SODIUM 100 MG/ML
90 INJECTION SUBCUTANEOUS EVERY 12 HOURS SCHEDULED
Start: 2024-04-03

## 2024-04-03 RX ADMIN — ENOXAPARIN SODIUM 90 MG: 100 INJECTION SUBCUTANEOUS at 08:27

## 2024-04-03 NOTE — ASSESSMENT & PLAN NOTE
Admitted for extended fetal monitoring in the setting of fall   Admission labs: CBC/Type and Screen/Syphilis Screen  FEN: Regular diet   Monitoring: Continuous  Cephalic on TAUS   Last Growth 4/1/24 EFW 2182 grams, AC 60%tile, BPD 74%tile

## 2024-04-03 NOTE — ASSESSMENT & PLAN NOTE
Reports fall down 6-7 stairs, did not hit abdomen   Denies any contractions, vaginal bleeding, LOF, endorses normal fetal movement   Fibrinogen, PT, PTT ordered   On tocometer Q2-4 minute contractions are noted, will administer 1L LR bolus   SVE 0/0/-5, will recheck is contractions worsen or become more frequent   TAUS with no signs of abruption, excellent fetal movement appreciated   Discussed with patient that abruption is a clinical diagnosis that cannot be made based on ultrasound or laboratory examination but that at this time FHR is reassuring. We discussed the plan for overnight admission with continuous monitoring. We discussed that at this time, given reassuring fetal monitoring and SVE of 0/0/-5, we will forego BMZ however should contractions persist or worsen we would consider BMZ administration for fetal lung maturity

## 2024-04-03 NOTE — PLAN OF CARE
Problem: ANTEPARTUM  Goal: Maintain pregnancy as long as maternal and/or fetal condition is stable  Description: INTERVENTIONS:  - Maternal surveillance  - Fetal surveillance  - Monitor uterine activity  - Medications as ordered  - Bedrest  Outcome: Progressing     Problem: PAIN - ADULT  Goal: Verbalizes/displays adequate comfort level or baseline comfort level  Description: Interventions:  - Encourage patient to monitor pain and request assistance  - Assess pain using appropriate pain scale  - Administer analgesics based on type and severity of pain and evaluate response  - Implement non-pharmacological measures as appropriate and evaluate response  - Consider cultural and social influences on pain and pain management  - Notify physician/advanced practitioner if interventions unsuccessful or patient reports new pain  Outcome: Progressing     Problem: INFECTION - ADULT  Goal: Absence or prevention of progression during hospitalization  Description: INTERVENTIONS:  - Assess and monitor for signs and symptoms of infection  - Monitor lab/diagnostic results  - Monitor all insertion sites, i.e. indwelling lines, tubes, and drains  - Monitor endotracheal if appropriate and nasal secretions for changes in amount and color  - [place] appropriate cooling/warming therapies per order  - Administer medications as ordered  - Instruct and encourage patient and family to use good hand hygiene technique  - Identify and instruct in appropriate isolation precautions for identified infection/condition  Outcome: Progressing  Goal: Absence of fever/infection during neutropenic period  Description: INTERVENTIONS:  - Monitor WBC    Outcome: Progressing     Problem: SAFETY ADULT  Goal: Patient will remain free of falls  Description: INTERVENTIONS:  - Educate patient/family on patient safety including physical limitations  - Instruct patient to call for assistance with activity   - Consult OT/PT to assist with strengthening/mobility   -  Keep Call bell within reach  - Keep bed low and locked with side rails adjusted as appropriate  - Keep care items and personal belongings within reach  - Initiate and maintain comfort rounds  - Make Fall Risk Sign visible to staff  - Offer Toileting every  Hours, in advance of need  - Initiate/Maintain alarm  - Obtain necessary fall risk management equipment:   - Apply yellow socks and bracelet for high fall risk patients  - Consider moving patient to room near nurses station  Outcome: Progressing  Goal: Maintain or return to baseline ADL function  Description: INTERVENTIONS:  -  Assess patient's ability to carry out ADLs; assess patient's baseline for ADL function and identify physical deficits which impact ability to perform ADLs (bathing, care of mouth/teeth, toileting, grooming, dressing, etc.)  - Assess/evaluate cause of self-care deficits   - Assess range of motion  - Assess patient's mobility; develop plan if impaired  - Assess patient's need for assistive devices and provide as appropriate  - Encourage maximum independence but intervene and supervise when necessary  - Involve family in performance of ADLs  - Assess for home care needs following discharge   - Consider OT consult to assist with ADL evaluation and planning for discharge  - Provide patient education as appropriate  Outcome: Progressing  Goal: Maintains/Returns to pre admission functional level  Description: INTERVENTIONS:  - Perform AM-PAC 6 Click Basic Mobility/ Daily Activity assessment daily.  - Set and communicate daily mobility goal to care team and patient/family/caregiver.   - Collaborate with rehabilitation services on mobility goals if consulted  - Perform Range of Motion times a day.  - Reposition patient every  hours.  - Dangle patient  times a day  - Stand patient  times a day  - Ambulate patient times a day  - Out of bed to chair  times a day   - Out of bed for meal times a day  - Out of bed for toileting  - Record patient progress  and toleration of activity level   Outcome: Progressing     Problem: Knowledge Deficit  Goal: Patient/family/caregiver demonstrates understanding of disease process, treatment plan, medications, and discharge instructions  Description: Complete learning assessment and assess knowledge base.  Interventions:  - Provide teaching at level of understanding  - Provide teaching via preferred learning methods  Outcome: Progressing     Problem: DISCHARGE PLANNING  Goal: Discharge to home or other facility with appropriate resources  Description: INTERVENTIONS:  - Identify barriers to discharge w/patient and caregiver  - Arrange for needed discharge resources and transportation as appropriate  - Identify discharge learning needs (meds, wound care, etc.)  - Arrange for interpretive services to assist at discharge as needed  - Refer to Case Management Department for coordinating discharge planning if the patient needs post-hospital services based on physician/advanced practitioner order or complex needs related to functional status, cognitive ability, or social support system  Outcome: Progressing

## 2024-04-03 NOTE — H&P
H & P- Obstetrics   Suze Leigh 28 y.o. female MRN: [id number]  Unit/Bed#: -01 Encounter: [id number]    Assessment: 28 y.o.  at 33w4d admitted for extended fetal monitoring.      Plan:   33 weeks gestation of pregnancy  Assessment & Plan  Admitted for extended fetal monitoring in the setting of fall   Admission labs: CBC/Type and Screen/Syphilis Screen  FEN: Regular diet   MFM consult in the setting of fetal prematurity and complex maternal medical history   Cephalic on TAUS     Anemia complicating pregnancy in third trimester  Assessment & Plan  Hgb on admission 11.9  Continue PO iron supplementation     History of stroke  Assessment & Plan  History of thromboembolic stroke in     History of mechanical aortic valve replacement  Assessment & Plan  Assessment:   Underwent mechanical aortic valve placement with repair of ascending aortic aneurysm in  in the setting of bicuspid aortic valve with aortic stenosis and expanding ascending aortic aneurysm.  On lifelong anticoagulation as managed by [facility] (Dr. Shell). Transitioned from warfarin to Lovenox prior to 6 weeks GA.  Maternal echocardiogram from 2022 revealed normal sized left ventricle, left ventricular ejection fraction of 65%, and normal systolic function.  The mechanical AV valve was functioning normally. Trace valvular regurgitation was noted. No significant change from echocardiography about 15 months prior was noted.  Her most recent CT scan of the chest was performed in , with no change from prior evaluation in 2016.   Goal INR during pregnancy in the setting of mechanical valve 2.5     Plan:   Continue warfarin 2.5 mg T, Th, Sat; 3.75 mg , , Fri, Sun as instructed by hematology at this time   INR 1.22 - Consider hematology consult for dose adjustment at this time in the setting of INR below goal of 2.5 however this may be secondary to patient missing doses   Plan for transition to Lovenox at 35  "weeks and then inpatient admission 48 hours prior to  section for transition to unfractionated heparin   No large hematomas appreciated on admission - continue to closely monitor in the setting of therapeutic anticoagulation and fall   Previous plan was for anesthesia consult during preop admission, consider consult while inpatient to optimize hospital course during next admission     * Trauma during pregnancy  Assessment & Plan  Reports fall down 6-7 stairs, did not hit abdomen   Denies any contractions, vaginal bleeding, LOF, endorses normal fetal movement   Fibrinogen, PT, PTT ordered   On tocometer Q2-4 minute contractions are noted, will administer 1L LR bolus   SVE 0/0/-5, will recheck is contractions worsen or become more frequent   TAUS with no signs of abruption, excellent fetal movement appreciated   Discussed with patient that abruption is a clinical diagnosis that cannot be made based on ultrasound or laboratory examination but that at this time FHR is reassuring. We discussed the plan for overnight admission with continuous monitoring. We discussed that at this time, given reassuring fetal monitoring and SVE of 0/0/-5, we will forego BMZ however should contractions persist or worsen we would consider BMZ administration for fetal lung maturity           Discussed case and plan w/ Dr. Santoyo       Chief Complaint: \"I fell down the stairs\"     HPI: Suze Leigh is a 28 y.o.  with an ELANA of 2024, by Last Menstrual Period at 33w4d who is being admitted for monitoring in the setting of a fall down 6-7 stairs.  She states she fell on her behind and did not hit her abdomen.  She denies any vaginal bleeding or leakage of fluid.  She endorses normal fetal movement.  She states that she has chronic back pain throughout this pregnancy but denies any changes or contractions.  She states that she has been maintained on warfarin throughout this pregnancy in the setting of her prosthetic aortic " "valve but at times misses doses    Patient Active Problem List   Diagnosis    Moderate aortic insufficiency    Bicuspid aortic valve    History of mechanical aortic valve replacement    Hx of repair of ascending aorta    Cervical high risk HPV (human papillomavirus) test positive    ENDER I (cervical intraepithelial neoplasia I)    Cellulitis of back except buttock    History of stroke    Aphasia as late effect of cerebrovascular accident    Mass of subcutaneous tissue of back    PONV (postoperative nausea and vomiting)    Anxiety    Abscess    Obesity affecting pregnancy in third trimester    Maternal congenital cardiac anomaly affecting pregnancy, antepartum    History of maternal cardiac surgery    Mental disorder affecting pregnancy    History of embolic stroke    32 weeks gestation of pregnancy    Abnormal glucose tolerance test in pregnancy    Anemia complicating pregnancy in third trimester    33 weeks gestation of pregnancy    Trauma during pregnancy     Review of Systems   Musculoskeletal:  Positive for back pain.   All other systems reviewed and are negative.      OB Hx:  OB History    Para Term  AB Living   1 0 0 0 0 0   SAB IAB Ectopic Multiple Live Births   0 0 0 0 0      # Outcome Date GA Lbr Joe/2nd Weight Sex Delivery Anes PTL Lv   1 Current                Past Medical Hx:  Past Medical History:   Diagnosis Date    Anxiety     Bicuspid aortic valve     s/p mechanical AVR    Coronary artery disease 1993    Exercises two times per week     cross fit- 2x/week    Moderate aortic insufficiency     s/p mechanical AVR    Motion sickness     Patent ductus arteriosus     s/p repair    PONV (postoperative nausea and vomiting)     S/P ascending aortic aneurysm repair     Stroke ([facility]) 2022    no residual effects per pt--initially couldnt speak at first--\"feels back to normal now\"    Varicella     hx as child, and vaccine       Past Surgical hx:  Past Surgical History:   Procedure Laterality " "Date    ASCENDING AORTIC ANEURYSM REPAIR  2015    with\" Aortic valve surgery\"    MECHANICAL AORTIC VALVE REPLACEMENT  2015    PATENT DUCTUS ARTERIOUS LIGATION      per pt had this surgery age 1    NE EXC B9 LESION MRGN XCP SK TG T/A/L 1.1-2.0 CM Right 12/05/2022    Procedure: EXCISION  BIOPSY LESION/MASS BACK;  Surgeon: David Cloud MD;  Location: [facility];  Service: General    THROMBECTOMY W/ EMBOLECTOMY  2022    Percutanoeous embolectomy in setting of stroke    WISDOM TOOTH EXTRACTION       Allergies   Allergen Reactions    Cefprozil Rash       Medications Prior to Admission   Medication    aspirin 81 mg chewable tablet    escitalopram (LEXAPRO) 10 mg tablet    ferrous sulfate 325 (65 FE) MG EC tablet    metoprolol succinate (TOPROL-XL) 25 mg 24 hr tablet    Prenatal Vit-Fe Fumarate-FA (PRENATAL VITAMIN PO)    warfarin (Coumadin) 2.5 mg tablet    enoxaparin (LOVENOX) 80 mg/0.8 mL       Objective:  Temp:  [97.6 °F (36.4 °C)-98.9 °F (37.2 °C)] 98.7 °F (37.1 °C)  HR:  [76-92] 92  Resp:  [18-20] 19  BP: (120-139)/(72-83) 125/83  Body mass index is 34.37 kg/m².     Physical Exam:  Physical Exam  Constitutional:       General: She is not in acute distress.     Appearance: Normal appearance.   HENT:      Head: Normocephalic and atraumatic.   Cardiovascular:      Rate and Rhythm: Normal rate.   Pulmonary:      Effort: Pulmonary effort is normal.   Abdominal:      Palpations: Abdomen is soft.      Tenderness: There is no abdominal tenderness.      Comments: Gravid   Musculoskeletal:      Thoracic back: No lacerations or spasms.      Lumbar back: No lacerations or tenderness.      Comments: No bruising appreciated on the back or buttocks     Neurological:      General: No focal deficit present.      Mental Status: She is alert.   Skin:     General: Skin is warm and dry.   Psychiatric:         Mood and Affect: Mood normal.            FHT:  Baseline 140 bpm  Variability: Moderate 6-25 bpm  Accelerations: Present " 15x15  Decelerations: none    TOCO:   Q2-4 min     Lab Results   Component Value Date    WBC 12.31 (H) 04/02/2024    HGB 11.9 04/02/2024    HCT 36.0 04/02/2024     04/02/2024     Lab Results   Component Value Date     (L) 06/03/2015    K 4.1 02/25/2022     02/25/2022    CO2 23 04/02/2024    BUN 7 (L) 02/25/2022    CREATININE 0.64 02/25/2022    GLUCOSE 80 04/02/2024     Prenatal Labs: Reviewed      Blood type: O positive   Antibody: negative  GBS: not yet completed   HIV: non-reactive  Rubella: immune  Syphilis IgM/IgG: non-reactive  HBsAg: non-reactive  HCAb: non-reactive  Chlamydia: negative  Gonorrhea: negative  Diabetes 1 hour screen: 169  3 hour glucose: 76/159/145/131  Platelets: 251k  Hgb: 11.9  <2 Midnights  OBSERVATION    Signature/Title: Sujatha Belcher MD  Date: 4/2/2024  Time: 11:31 PM

## 2024-04-03 NOTE — ASSESSMENT & PLAN NOTE
Assessment:   Underwent mechanical aortic valve placement with repair of ascending aortic aneurysm in  in the setting of bicuspid aortic valve with aortic stenosis and expanding ascending aortic aneurysm.  On lifelong anticoagulation as managed by  [facility] (Dr. Shell). Transitioned from warfarin to Lovenox prior to 6 weeks GA.  Maternal echocardiogram from 2022 revealed normal sized left ventricle, left ventricular ejection fraction of 65%, and normal systolic function.  The mechanical AV valve was functioning normally. Trace valvular regurgitation was noted. No significant change from echocardiography about 15 months prior was noted.  Her most recent CT scan of the chest was performed in , with no change from prior evaluation in 2016.   Goal INR during pregnancy in the setting of mechanical valve 2.5     Plan:   Continue warfarin 2.5 mg T, , Sat; 3.75 mg , , Fri, Sun as instructed by hematology at this time   INR 1.22 - Consider hematology consult for dose adjustment at this time in the setting of INR below goal of 2.5 however this may be secondary to patient missing doses   AM INR is 1.36  Plan for transition to Lovenox at 35 weeks and then inpatient admission 48 hours prior to  section for transition to unfractionated heparin   No large hematomas appreciated on admission - continue to closely monitor in the setting of therapeutic anticoagulation and fall   Previous plan was for anesthesia consult during preop admission, consider consult while inpatient

## 2024-04-03 NOTE — PROGRESS NOTES
[facility]  Progress Note  Name: Suze Leigh I  MRN: [id number]  Unit/Bed#: -01 I Date of Admission: 4/2/2024   Date of Service: 4/3/2024 I Hospital Day: 1    Assessment/Plan   33 weeks gestation of pregnancy  Assessment & Plan  Fetal monitoring  Mother offers no complaints of discomfort or injury    History of mechanical aortic valve replacement  Assessment & Plan  On anticoagulant    * Trauma during pregnancy  Assessment & Plan  On OB/GYN floor for fetal monitoring             TRAUMA TERTIARY SURVEY NOTE    VTE Prophylaxis:Sequential compression device (Venodyne)      Disposition: olivia to be determined by OB/GYN    Code status:  Level 1 - Full Code    Consultants: IP CONSULT TO ANESTHESIOLOGY  IP CONSULT TO PERINATOLOGY    Subjective   Transfer from: home    Mechanism of Injury:Fall     Chief Complaint: none    HPI/Last 24 hour events: level B trauma after a fall, placed in OB/GYN for fetal monitoring  After Tertiary and sequential assessment no traumatic injuries.     Objective   Vitals:   Temp:  [97.6 °F (36.4 °C)-98.9 °F (37.2 °C)] 98.5 °F (36.9 °C)  HR:  [76-92] 89  Resp:  [18-20] 18  BP: (112-139)/(70-83) 112/70    I/O       None             Physical Exam:   GENERAL APPEARANCE: comfortable  NEURO: GCS - 15  HEENT: EO's intact  CV: RRR, no complaints of chest pain  LUNGS: CTAA bilaterally  GI: tolerated a diet  : voiding  MSK: moving extremities  SKIN: warm and dry    Invasive Devices       Peripheral Intravenous Line  Duration             Peripheral IV 04/02/24 Left Antecubital <1 day                            Lab Results:    Latest Reference Range & Units 04/02/24 19:38   Glucose, i-STAT 65 - 140 mg/dl 80   ph, Rudy ISTAT 7.300 - 7.400  7.449 (H)   pCO2, Rudy i-STAT 42.0 - 50.0 mm HG 31.2 (L)   pO2, Rudy i-STAT 35.0 - 45.0 mm HG 45.0   HCO3, Rudy i-STAT 24.0 - 30.0 mmol/L 21.6 (L)   Base Exc -2 - 3 mmol/L -2   O2 Sat, Istat 60 - 85 % 83   SODIUM, I-STAT 136 - 145 mmol/l  136   POTASSIUM,I-STAT 3.5 - 5.3 mmol/L 3.8   CO2, i-STAT 21 - 32 mmol/L 23   CALCIUM, IONIZED ISTAT 1.12 - 1.32 mmol/L 1.19   Hemoglobin, Istat 11.5 - 15.4 g/dl 11.9   SPECIMEN TYPE  VENOUS   (H): Data is abnormally high  (L): Data is abnormally low    Imaging Results:   Chest Xray(s): no   FAST exam(s): negative   CT Scan(s): no   Additional Xray(s): none     Other Studies: none

## 2024-04-03 NOTE — PROGRESS NOTES
Progress Note - Maternal Fetal Medicine   Suze Leigh 28 y.o. female MRN: [id number]  Unit/Bed#: -01 Encounter: [id number]  Admit date: 2024.  Today's date: 24    Assessment/Plan     Ms. Leigh is a 28 y.o.  at 33w5d, Hospital Day: 2, admitted after a fall down stairs in the setting of warfarin use for mechanical heart valve.  By issue:    33 weeks gestation of pregnancy  Assessment & Plan  Admitted for extended fetal monitoring in the setting of fall   Admission labs: CBC/Type and Screen/Syphilis Screen  FEN: Regular diet   Monitoring: Continuous  Cephalic on TAUS   Last Growth 24 EFW 2182 grams, AC 60%tile, BPD 74%tile     Anemia complicating pregnancy in third trimester  Assessment & Plan  Hgb on admission 11.9  Continue PO iron supplementation     Abnormal glucose tolerance test in pregnancy  Assessment & Plan  1hr GTT elevated at 169  3hr GTT completed 3/4/2024 and noted to be within normal limits     Mental disorder affecting pregnancy  Assessment & Plan  Continue home lexapro and metoprolol     History of stroke  Assessment & Plan  History of thromboembolic stroke in     History of mechanical aortic valve replacement  Assessment & Plan  Assessment:   Underwent mechanical aortic valve placement with repair of ascending aortic aneurysm in  in the setting of bicuspid aortic valve with aortic stenosis and expanding ascending aortic aneurysm.  On lifelong anticoagulation as managed by [facility] (Dr. Shell). Transitioned from warfarin to Lovenox prior to 6 weeks GA.  Maternal echocardiogram from 2022 revealed normal sized left ventricle, left ventricular ejection fraction of 65%, and normal systolic function.  The mechanical AV valve was functioning normally. Trace valvular regurgitation was noted. No significant change from echocardiography about 15 months prior was noted.  Her most recent CT scan of the chest was performed in 2018, with no change  from prior evaluation in 2016.   Goal INR during pregnancy in the setting of mechanical valve 2.5     Plan:   Continue warfarin 2.5 mg T, , Sat; 3.75 mg , , Fri, Sun as instructed by hematology at this time   INR 1.22 - Consider hematology consult for dose adjustment at this time in the setting of INR below goal of 2.5 however this may be secondary to patient missing doses   AM INR is 1.36  Plan for transition to Lovenox at 35 weeks and then inpatient admission 48 hours prior to  section for transition to unfractionated heparin   No large hematomas appreciated on admission - continue to closely monitor in the setting of therapeutic anticoagulation and fall   Previous plan was for anesthesia consult during preop admission, consider consult while inpatient     * Trauma during pregnancy  Assessment & Plan  Reports fall down 6-7 stairs, did not hit abdomen   Denies any contractions, vaginal bleeding, LOF, endorses normal fetal movement   Fibrinogen wnl, PT, PTT elevated as anticipated in the setting of anticoagulation    On tocometer Q2-4 minute contractions are noted, will administer 1L LR bolus   SVE 0/0/-5, will recheck is contractions worsen or become more frequent   TAUS with no signs of abruption, excellent fetal movement appreciated   Discussed with patient that abruption is a clinical diagnosis that cannot be made based on ultrasound or laboratory examination but that at this time FHR is reassuring. We discussed the plan for overnight admission with continuous monitoring. We discussed that at this time, given reassuring fetal monitoring and SVE of 0/0/-5, we will forego BMZ however should contractions persist or worsen we would consider BMZ administration for fetal lung maturity              Subjective    Contractions: no  Loss of fluid: no  Vaginal bleeding: no  Fetal movement: yes    Pain: no  Headaches: no  Visual changes: no  Chest pain: no  Shortness of breath: no  Nausea: no  Vomiting/Diarrhea:  "no  Dysuria: no  Leg pain: no          Objective      Patient Vitals for the past 24 hrs:   BP Temp Temp src Pulse Resp SpO2 Height Weight   04/03/24 0004 112/70 98.5 °F (36.9 °C) Oral 89 18 97 % -- --   04/02/24 2302 115/71 98.7 °F (37.1 °C) Oral 78 18 97 % -- --   04/02/24 2200 125/83 98.7 °F (37.1 °C) Oral 92 19 96 % -- --   04/02/24 2116 131/79 -- -- 87 -- 95 % -- --   04/02/24 2010 120/78 98.9 °F (37.2 °C) Oral 88 20 96 % 5' 3\" (1.6 m) 88 kg (194 lb)   04/02/24 1931 139/72 97.9 °F (36.6 °C) Oral 76 18 98 % -- 88.1 kg (194 lb 3.6 oz)   04/02/24 1916 131/78 97.6 °F (36.4 °C) Oral 76 18 98 % -- --       Physical Exam  Vitals reviewed.   Constitutional:       Appearance: Normal appearance.   HENT:      Head: Normocephalic and atraumatic.   Eyes:      Extraocular Movements: Extraocular movements intact.   Cardiovascular:      Rate and Rhythm: Normal rate.      Pulses: Normal pulses.   Pulmonary:      Effort: Pulmonary effort is normal. No respiratory distress.   Abdominal:      Palpations: Abdomen is soft.      Tenderness: There is no abdominal tenderness.      Comments: Gravid uterus   Musculoskeletal:         General: Normal range of motion.      Cervical back: Normal range of motion.   Skin:     General: Skin is warm and dry.   Neurological:      Mental Status: She is alert.   Psychiatric:         Mood and Affect: Mood normal.         Behavior: Behavior normal.         I/O       None            Invasive Devices       Peripheral Intravenous Line  Duration             Peripheral IV 04/02/24 Left Antecubital <1 day                    Results from last 7 days   Lab Units 04/03/24  0553 04/02/24  2018 04/02/24  1938   WBC Thousand/uL 11.38* 12.31*  --    NEUTROS ABS Thousands/µL 7.94* 8.98*  --    HEMOGLOBIN g/dL 10.5* 11.9  --    I STAT HEMOGLOBIN g/dl  --   --  11.9   MCV fL 91 90  --    PLATELETS Thousands/uL 213 251  --      Results from last 7 days   Lab Units 04/02/24 1938   CO2, I-STAT mmol/L 23       " "  Results from last 7 days   Lab Units 24  0553 24  2018   PLATELETS Thousands/uL 213 251   INR   --  1.22*   PROTIME seconds  --  16.0*   PTT seconds  --  31   FIBRINOGEN mg/dL  --  593*                           Brief review of pertinent history:  Past Medical History:   Diagnosis Date    Anxiety     Bicuspid aortic valve     s/p mechanical AVR    Coronary artery disease 1993    Exercises two times per week     cross fit- 2x/week    Moderate aortic insufficiency     s/p mechanical AVR    Motion sickness     Patent ductus arteriosus     s/p repair    PONV (postoperative nausea and vomiting)     S/P ascending aortic aneurysm repair     Stroke (HCC) 2022    no residual effects per pt--initially couldnt speak at first--\"feels back to normal now\"    Varicella     hx as child, and vaccine     Past Surgical History:   Procedure Laterality Date    ASCENDING AORTIC ANEURYSM REPAIR      with\" Aortic valve surgery\"    MECHANICAL AORTIC VALVE REPLACEMENT      PATENT DUCTUS ARTERIOUS LIGATION      per pt had this surgery age 1    VA EXC B9 LESION MRGN XCP SK TG T/A/L 1.1-2.0 CM Right 2022    Procedure: EXCISION  BIOPSY LESION/MASS BACK;  Surgeon: David Cloud MD;  Location: [facility];  Service: General    THROMBECTOMY W/ EMBOLECTOMY      Percutanoeous embolectomy in setting of stroke    WISDOM TOOTH EXTRACTION       OB History    Para Term  AB Living   1 0 0 0 0 0   SAB IAB Ectopic Multiple Live Births   0 0 0 0 0      # Outcome Date GA Lbr Joe/2nd Weight Sex Delivery Anes PTL Lv   1 Current                Fetal data:  Nonstress test: date 24 Continuous  Baseline:  140 bpm  Variability: moderate  Accelerations: present, 15x15  Decelerations: absent  Contractions: present  Assessment: reactive  Plan:  continuous      MFM ultrasound report key findings:     24  MEASUREMENTS (* Included In Average GA)     AC             29.73 cm        33 weeks 5 days* (60%)  BPD  "            8.54 cm        34 weeks 3 days* (74%)  HC             31.08 cm        34 weeks 5 days* (47%)  Femur           6.16 cm        32 weeks 0 days* (9%)     HC/AC           1.05 [0.96 - 1.11]                 (47%)  FL/AC             21 [20 - 24]  FL/BPD            72 [71 - 87]  EFW Hadlock 4   2182 grams - 4 lbs 13 oz                 (41%)    MEDS:   Medication Administration - last 24 hours from 04/02/2024 0613 to 04/03/2024 0613         Date/Time Order Dose Route Action Action by     04/02/2024 2130 EDT lactated ringers bolus 1,000 mL 0 mL [facility]venous [facility]     04/02/2024 2015 EDT lactated ringers bolus 1,000 mL 1,000 mL Intravenous New Bag Kaitlynn Santiago, EMY     04/02/2024 2212 EDT escitalopram (LEXAPRO) tablet 10 mg 10 mg Oral Given Miya Venkata     04/02/2024 2212 EDT metoprolol succinate (TOPROL-XL) 24 hr tablet 25 mg 25 mg Oral Given Miya Venkata     04/02/2024 2212 EDT warfarin (COUMADIN) tablet 2.5 mg 2.5 mg Oral Given [facility]     04/02/2024 2212 EDT aspirin chewable tablet 81 mg 81 mg Oral Given Miya Venkata          Current Facility-Administered Medications   Medication Dose Route Frequency    aspirin chewable tablet 81 mg  81 mg Oral Daily    escitalopram (LEXAPRO) tablet 10 mg  10 mg Oral Daily    metoprolol succinate (TOPROL-XL) 24 hr tablet 25 mg  25 mg Oral Daily    warfarin (COUMADIN) tablet 2.5 mg  2.5 mg Oral Once per day on Tuesday Thursday Saturday    warfarin (COUMADIN) tablet 3.75 mg  3.75 mg Oral Once per day on Sunday Monday Wednesday Friday       Sindy Hogan MD  OBYONAS, PGY-3  4/3/2024  6:13 AM

## 2024-04-03 NOTE — ASSESSMENT & PLAN NOTE
Reports fall down 6-7 stairs, did not hit abdomen   Denies any contractions, vaginal bleeding, LOF, endorses normal fetal movement   Fibrinogen wnl, PT, PTT elevated as anticipated in the setting of anticoagulation    On tocometer Q2-4 minute contractions are noted, will administer 1L LR bolus   SVE 0/0/-5, will recheck is contractions worsen or become more frequent   TAUS with no signs of abruption, excellent fetal movement appreciated   Discussed with patient that abruption is a clinical diagnosis that cannot be made based on ultrasound or laboratory examination but that at this time FHR is reassuring. We discussed the plan for overnight admission with continuous monitoring. We discussed that at this time, given reassuring fetal monitoring and SVE of 0/0/-5, we will forego BMZ however should contractions persist or worsen we would consider BMZ administration for fetal lung maturity

## 2024-04-03 NOTE — ASSESSMENT & PLAN NOTE
Admitted for extended fetal monitoring in the setting of fall   Admission labs: CBC/Type and Screen/Syphilis Screen  FEN: Regular diet   MFM consult in the setting of fetal prematurity and complex maternal medical history   Cephalic on TAUS

## 2024-04-03 NOTE — UTILIZATION REVIEW
"Initial Clinical Review    Admission: Date/Time/Statement:   Admission Orders (From admission, onward)       Ordered        04/02/24 2040  Inpatient Admission  Once                          Orders Placed This Encounter   Procedures    Inpatient Admission     Standing Status:   Standing     Number of Occurrences:   1     Order Specific Question:   Level of Care     Answer:   Med Surg [16]     Order Specific Question:   Estimated length of stay     Answer:   More than 2 Midnights     Order Specific Question:   Certification     Answer:   I certify that inpatient services are medically necessary for this patient for a duration of greater than two midnights. See H&P and MD Progress Notes for additional information about the patient's course of treatment.     ED Arrival Information       Expected   -    Arrival   4/2/2024 19:05    Acuity   Urgent              Means of arrival   Walk-In    Escorted by   Self    Service   OB/GYN    Admission type   Emergency              Arrival complaint   33 wks preg on Warfarin  Fall down stairs on buttocks  No head strike             Chief Complaint   Patient presents with    Fall     Pt is 33 weeks pregnant, Pt slipped top of stairs and fell down sliding on buttocks a \"few stairs\" denies hitting abdomen or head.  Pt denies any injuries, reports pregnancy is considered high risk, sees [facility] +Warfarin for valve replacement  Fall happen approx at 1800 4/2/24       Initial Presentation: 28 y.o. female presented to ED as inpatient admission for falling down the stairs. Underwent mechanical aortic valve placement with repair of ascending aortic aneurysm in 2015 in the setting of bicuspid aortic valve with aortic stenosis and expanding ascending aortic aneurysm. Continue warfarin 2.5 mg T, Th, Sat; 3.75 mg M, W, Fri, Sun as instructed by hematology at this time  INR 1.22 - Consider hematology consult for dose adjustment at this time in the setting of INR below goal of 2.5 however this may be " secondary to patient missing doses  Plan for transition to Lovenox at 35 weeks and then inpatient admission 48 hours prior to  section for transition to unfractionated heparin.  Reports fall down 6-7 stairs, did not hit abdomen . We discussed that at this time, given reassuring fetal monitoring and SVE of 0/0/-5, we will forego BMZ however should contractions persist or worsen we would consider BMZ administration for fetal lung maturity. No bruising appreciated on the back or buttocks  Plan continuous external  Monitoring and continue to monitor INR.  Denies any contractions, vaginal bleeding, LOF, endorses normal fetal movement. On tocometer Q2-4 minute contractions are noted, will administer 1L LR bolus   Fetal data:  Nonstress test: date 24 Continuous  Baseline:  140 bpm  Variability: moderate  Accelerations: present, 15x15  Decelerations: absent  Contractions: present  Assessment: reactive  Plan:  continuous        Date: 24   Day 2: Continuous external monitoring Admitted for extended fetal monitoring in the setting of fall Admission labs: CBC/Type and Screen/Syphilis Screen FEN: Regular diet  Monitoring: Continuous  Cephalic on TAUS  Last Growth 24 EFW 2182 grams, AC 60%tile, BPD 74%tile        ED Triage Vitals   Temperature Pulse Respirations Blood Pressure SpO2   24   97.6 °F (36.4 °C) 76 18 131/78 98 %      Temp Source Heart Rate Source Patient Position - Orthostatic VS BP Location FiO2 (%)   24 --   Oral Monitor Sitting Right arm       Pain Score       24       No Pain          Wt Readings from Last 1 Encounters:   24 88 kg (194 lb)     Additional Vital Signs:   Date/Time Temp Pulse Resp BP MAP (mmHg) SpO2 O2 Device Cardiac (WDL) Patient Position - Orthostatic VS   24 0830 -- -- -- -- -- -- None (Room air) X --   Comment rows:   OBSERV: pt  denies leaking of fluid or VB. Has intermittent cramping and reports + FM at 04/03/24 0830   04/03/24 0809 97.5 °F (36.4 °C) 83 20 120/76 -- 98 % -- -- --   04/03/24 0557 -- -- -- -- -- -- -- -- --   Comment rows:   OBSERV: Pt up to bathroom at 04/03/24 0557   04/03/24 0004 98.5 °F (36.9 °C) 89 18 112/70 -- 97 % -- -- Lying   04/02/24 2302 98.7 °F (37.1 °C) 78 18 115/71 -- 97 % -- -- Sitting   Comment rows:   OBSERV: pt out of bed to use bathroom at 04/02/24 2302   04/02/24 2200 98.7 °F (37.1 °C) 92 19 125/83 -- 96 % None (Room air) -- Sitting   04/02/24 2116 -- 87 -- 131/79 -- 95 % -- -- --   04/02/24 2028 -- -- -- -- -- -- -- -- --   Comment rows:   OBSERV: Dr. Santoyo and Dr. Belcher at bedside for BONI and SVE at 04/02/24 2028 04/02/24 2010 98.9 °F (37.2 °C) 88 20 120/78 -- 96 % -- -- Sitting   04/02/24 19:31:50 97.9 °F (36.6 °C) 76 18 139/72 -- 98 % None (Room air) -- Lying           Pertinent Labs/Diagnostic Test Results:   No orders to display         Results from last 7 days   Lab Units 04/03/24  0553 04/02/24 2018 04/02/24 1938   WBC Thousand/uL 11.38* 12.31*  --    HEMOGLOBIN g/dL 10.5* 11.9  --    I STAT HEMOGLOBIN g/dl  --   --  11.9   HEMATOCRIT % 31.8* 36.0  --    HEMATOCRIT, ISTAT %  --   --  35   PLATELETS Thousands/uL 213 251  --    NEUTROS ABS Thousands/µL 7.94* 8.98*  --          Results from last 7 days   Lab Units 04/02/24 1938   CO2, I-STAT mmol/L 23   CALCIUM, IONIZED, ISTAT mmol/L 1.19     Results from last 7 days   Lab Units 04/02/24 1938   PH, LALY I-STAT  7.449*   PCO2, LALY ISTAT mm HG 31.2*   PO2, LALY ISTAT mm HG 45.0   HCO3, LALY ISTAT mmol/L 21.6*   I STAT BASE EXC mmol/L -2   I STAT O2 SAT % 83       Results from last 7 days   Lab Units 04/03/24  0553 04/02/24 2018   PROTIME seconds 17.5* 16.0*   INR  1.36* 1.22*   PTT seconds 32 31         Past Medical History:   Diagnosis Date    Anxiety     Bicuspid aortic valve     s/p mechanical AVR    Coronary artery disease 1993     "Exercises two times per week     cross fit- 2x/week    Moderate aortic insufficiency     s/p mechanical AVR    Motion sickness     Patent ductus arteriosus     s/p repair    PONV (postoperative nausea and vomiting)     S/P ascending aortic aneurysm repair     Stroke (HCC) 02/2022    no residual effects per pt--initially couldnt speak at first--\"feels back to normal now\"    Varicella     hx as child, and vaccine     Present on Admission:   Trauma during pregnancy   Anemia complicating pregnancy in third trimester   Abnormal glucose tolerance test in pregnancy   Mental disorder affecting pregnancy      Admitting Diagnosis: 33 weeks gestation of pregnancy [Z3A.33]  Encounter for post fall examination [Z04.3]  Age/Sex: 28 y.o. female    Admission Orders:  Continuous external monitoring         Scheduled Medications:  aspirin, 81 mg, Oral, Daily  enoxaparin, 90 mg, Subcutaneous, Q12H JAMES  escitalopram, 10 mg, Oral, Daily  metoprolol succinate, 25 mg, Oral, Daily  warfarin, 2.5 mg, Oral, Once per day on Tuesday Thursday Saturday  warfarin, 3.75 mg, Oral, Once per day on Sunday Monday Wednesday Friday      Continuous IV Infusions:     PRN Meds:       IP CONSULT TO ANESTHESIOLOGY  IP CONSULT TO PERINATOLOGY    Network Utilization Review Department  ATTENTION: Please call with any questions or concerns to [phone] and carefully listen to the prompts so that you are directed to the right person. All voicemails are confidential.   For Discharge needs, contact Care Management DC Support Team at [phone] opt. 2  Send all requests for admission clinical reviews, approved or denied determinations and any other requests to dedicated fax number below belonging to the campus where the patient is receiving treatment. List of dedicated fax numbers for the Facilities:  FACILITY NAME UR FAX NUMBER   ADMISSION DENIALS (Administrative/Medical Necessity) [phone]   DISCHARGE SUPPORT TEAM (NETWORK) [phone]   PARENT CHILD " [facility] (Maternity/NICU/Pediatrics) [phone]   [facility] [phone]   [facility] [phone]   [facility] [phone]   [facility] [phone]   [facility] [phone]   [facility] [phone]   [facility] [phone]   [facility] [phone]   [facility] [phone]   [facility] [phone]   [facility] [phone]   [facility] [phone]

## 2024-04-03 NOTE — PROGRESS NOTES
Present at the bedside to discharge patient. She has no complaints. Extended fetal monitoring showed irregular contractions q3-6mins, but patient denies feeling them. Reviewed patient's lovenox 90mg BID dosing with goal INR >2.5. Instructed patient to have q48h INR draws and once it is above 2.5 to stop her Lovenox.     Sindy Hogan MD  OBGYN PGY-3  04/03/24 1:42 PM

## 2024-04-03 NOTE — CONSULTS
Consultation - Maternal Fetal Medicine   Suze Leigh 28 y.o. female MRN: [id number]  Unit/Bed#: -01 Encounter: [id number]  Admit date: 2024.  Today's date: 24    Assessment/Plan   Ms. Leigh is a 28 y.o. year-old  at 33w4d, Hospital Day: 1, admitted for extended fetal monitoring after fall, pregnancy complicated by maternal anticoagulation due to mechanical aortic valve .  By issue:    33 weeks gestation of pregnancy  Assessment & Plan  Admitted for extended fetal monitoring in the setting of fall   Admission labs: CBC/Type and Screen/Syphilis Screen  FEN: Regular diet   Monitoring: Continuous  Cephalic on TAUS   Last Growth 24 EFW 2182 grams, AC 60%tile, BPD 74%tile     Anemia complicating pregnancy in third trimester  Assessment & Plan  Hgb on admission 11.9  Continue PO iron supplementation     Abnormal glucose tolerance test in pregnancy  Assessment & Plan  1hr GTT elevated at 169  3hr GTT completed 3/4/2024 and noted to be within normal limits     Mental disorder affecting pregnancy  Assessment & Plan  Continue home lexapro and metoprolol     History of stroke  Assessment & Plan  History of thromboembolic stroke in     History of mechanical aortic valve replacement  Assessment & Plan  Assessment:   Underwent mechanical aortic valve placement with repair of ascending aortic aneurysm in  in the setting of bicuspid aortic valve with aortic stenosis and expanding ascending aortic aneurysm.  On lifelong anticoagulation as managed by [facility] (Dr. Shell). Transitioned from warfarin to Lovenox prior to 6 weeks GA.  Maternal echocardiogram from 2022 revealed normal sized left ventricle, left ventricular ejection fraction of 65%, and normal systolic function.  The mechanical AV valve was functioning normally. Trace valvular regurgitation was noted. No significant change from echocardiography about 15 months prior was noted.  Her most recent CT scan of the  "chest was performed in 2018, with no change from prior evaluation in 2016.   Goal INR during pregnancy in the setting of mechanical valve 2.5     Plan:   Continue warfarin 2.5 mg T, , Sat; 3.75 mg , , Fri, Sun as instructed by hematology at this time   INR 1.22 - Consider hematology consult for dose adjustment at this time in the setting of INR below goal of 2.5 however this may be secondary to patient missing doses   Plan for transition to Lovenox at 35 weeks and then inpatient admission 48 hours prior to  section for transition to unfractionated heparin   No large hematomas appreciated on admission - continue to closely monitor in the setting of therapeutic anticoagulation and fall   Previous plan was for anesthesia consult during preop admission, consider consult while inpatient     * Trauma during pregnancy  Assessment & Plan  Reports fall down 6-7 stairs, did not hit abdomen   Denies any contractions, vaginal bleeding, LOF, endorses normal fetal movement   Fibrinogen wnl, PT, PTT elevated as anticipated in the setting of anticoagulation    On tocometer Q2-4 minute contractions are noted, will administer 1L LR bolus   SVE 0/0/-5, will recheck is contractions worsen or become more frequent   TAUS with no signs of abruption, excellent fetal movement appreciated   Discussed with patient that abruption is a clinical diagnosis that cannot be made based on ultrasound or laboratory examination but that at this time FHR is reassuring. We discussed the plan for overnight admission with continuous monitoring. We discussed that at this time, given reassuring fetal monitoring and SVE of 0/0/-5, we will forego BMZ however should contractions persist or worsen we would consider BMZ administration for fetal lung maturity            Chief Complaint   Patient presents with    Fall     Pt is 33 weeks pregnant, Pt slipped top of stairs and fell down sliding on buttocks a \"few stairs\" denies hitting abdomen or head.  Pt " "denies any injuries, reports pregnancy is considered high risk, sees M +Warfarin for valve replacement  Fall happen approx at 1800 24       Physician Requesting Consult: Sanjay Thompson MD  Reason for Consult / Principal Problem: Observation for extended fetal monitoring  Subspeciality: Perinatology    Dear Dr. Santoyo,    Thank you for referring patient Suze Leigh for Maternal-Fetal Medicine consultation.  HPI: As you know, Ms. Leigh is a 28 y.o. year-old  with an ELANA of Estimated Date of Delivery: 24 at 33w4d, Hospital Day: 1, admitted for Observation for extended fetal monitoring after fall.  Her current pregnancy is complicated by a maternal history of anticoagulation in the setting of prosthetic valve. She is currently maintained of warfarin with plans to transition to lovenox at 35 weeks and then 48 hours prior to , transition to unfractionated heparin during inpatient     Review of Systems   Musculoskeletal:  Positive for back pain.       Other history is as follows:    Historical Information   OB History    Para Term  AB Living   1 0 0 0 0 0   SAB IAB Ectopic Multiple Live Births   0 0 0 0 0      # Outcome Date GA Lbr Joe/2nd Weight Sex Delivery Anes PTL Lv   1 Current              Gynecologic history: Patient's last menstrual period was 2023 (exact date).     Past Medical History:   Diagnosis Date    Anxiety     Bicuspid aortic valve     s/p mechanical AVR    Coronary artery disease 1993    Exercises two times per week     cross fit- 2x/week    Moderate aortic insufficiency     s/p mechanical AVR    Motion sickness     Patent ductus arteriosus     s/p repair    PONV (postoperative nausea and vomiting)     S/P ascending aortic aneurysm repair     Stroke (HCC) 2022    no residual effects per pt--initially couldnt speak at first--\"feels back to normal now\"    Varicella     hx as child, and vaccine     Past Surgical History:   Procedure Laterality " "Date    ASCENDING AORTIC ANEURYSM REPAIR  2015    with\" Aortic valve surgery\"    MECHANICAL AORTIC VALVE REPLACEMENT  2015    PATENT DUCTUS ARTERIOUS LIGATION      per pt had this surgery age 1    MS EXC B9 LESION MRGN XCP SK TG T/A/L 1.1-2.0 CM Right 12/05/2022    Procedure: EXCISION  BIOPSY LESION/MASS BACK;  Surgeon: David Cloud MD;  Location: [facility];  Service: General    THROMBECTOMY W/ EMBOLECTOMY  2022    Percutanoeous embolectomy in setting of stroke    WISDOM TOOTH EXTRACTION       Social History   Social History     Substance and Sexual Activity   Alcohol Use Not Currently    Alcohol/week: 1.0 standard drink of alcohol    Types: 1 Glasses of wine per week     Social History     Substance and Sexual Activity   Drug Use No     Social History     Tobacco Use   Smoking Status Never    Passive exposure: Never   Smokeless Tobacco Never         Meds/Allergies   Prior to Admission Medications   Prescriptions Last Dose Informant Patient Reported? Taking?   Prenatal Vit-Fe Fumarate-FA (PRENATAL VITAMIN PO) 4/1/2024 at 2100 Self Yes Yes   Sig: Take by mouth   aspirin 81 mg chewable tablet 4/1/2024 at 2100  Yes Yes   Sig: Chew 81 mg daily   enoxaparin (LOVENOX) 80 mg/0.8 mL   No No   Sig: Inject 0.8 mL (80 mg total) under the skin every 12 (twelve) hours for 14 days Begin at 35 weeks gestation.   escitalopram (LEXAPRO) 10 mg tablet 4/1/2024 at 2100 Self No Yes   Sig: TAKE 1 TABLET BY MOUTH EVERY DAY   ferrous sulfate 325 (65 FE) MG EC tablet 4/1/2024 at 2100  Yes Yes   Sig: Take 325 mg by mouth daily with breakfast   metoprolol succinate (TOPROL-XL) 25 mg 24 hr tablet 4/1/2024 at 2100 Self No Yes   Sig: TAKE 1 TABLET (25 MG TOTAL) BY MOUTH DAILY.   warfarin (Coumadin) 2.5 mg tablet 4/1/2024 at 2100  No Yes   Sig: Take 1-2 tablets daily as directed by MD      Facility-Administered Medications: None     Medication Administration - last 24 hours from 04/02/2024 0247 to 04/03/2024 0247         Date/Time Order Dose " "Route Action Action by     04/02/2024 2130 EDT lactated ringers bolus 1,000 mL 0 mL Intravenous Stopped Miya Hastings     04/02/2024 2015 EDT lactated ringers bolus 1,000 mL 1,000 mL Intravenous New Bag Kaitlynn Santiago, EMY     04/02/2024 2212 EDT escitalopram (LEXAPRO) tablet 10 mg 10 mg Oral Given Miya Hastings     04/02/2024 2212 EDT metoprolol succinate (TOPROL-XL) 24 hr tablet 25 mg 25 mg Oral Given Miya Hastings     04/02/2024 2212 EDT warfarin (COUMADIN) tablet 2.5 mg 2.5 mg Oral Given Miya Hastings     04/02/2024 2212 EDT aspirin chewable tablet 81 mg 81 mg Oral Given Miya Hastings          Current Facility-Administered Medications   Medication Dose Route Frequency    aspirin chewable tablet 81 mg  81 mg Oral Daily    escitalopram (LEXAPRO) tablet 10 mg  10 mg Oral Daily    metoprolol succinate (TOPROL-XL) 24 hr tablet 25 mg  25 mg Oral Daily    warfarin (COUMADIN) tablet 2.5 mg  2.5 mg Oral Once per day on Tuesday Thursday Saturday    warfarin (COUMADIN) tablet 3.75 mg  3.75 mg Oral Once per day on Sunday Monday Wednesday Friday     Allergies   Allergen Reactions    Cefprozil Rash       Objective    Patient Vitals for the past 24 hrs:   BP Temp Temp src Pulse Resp SpO2 Height Weight   04/03/24 0004 112/70 98.5 °F (36.9 °C) Oral 89 18 97 % -- --   04/02/24 2302 115/71 98.7 °F (37.1 °C) Oral 78 18 97 % -- --   04/02/24 2200 125/83 98.7 °F (37.1 °C) Oral 92 19 96 % -- --   04/02/24 2116 131/79 -- -- 87 -- 95 % -- --   04/02/24 2010 120/78 98.9 °F (37.2 °C) Oral 88 20 96 % 5' 3\" (1.6 m) 88 kg (194 lb)   04/02/24 1931 139/72 97.9 °F (36.6 °C) Oral 76 18 98 % -- 88.1 kg (194 lb 3.6 oz)   04/02/24 1916 131/78 97.6 °F (36.4 °C) Oral 76 18 98 % -- --     Vitals: Blood pressure 112/70, pulse 89, temperature 98.5 °F (36.9 °C), temperature source Oral, resp. rate 18, height 5' 3\" (1.6 m), weight 88 kg (194 lb), last menstrual period 08/11/2023, SpO2 97%, not currently " "breastfeeding.Body mass index is 34.37 kg/m².    Physical Exam  Constitutional:       General: She is not in acute distress.     Appearance: Normal appearance.   Cardiovascular:      Rate and Rhythm: Normal rate.   Pulmonary:      Effort: Pulmonary effort is normal.   Abdominal:      Palpations: Abdomen is soft.      Tenderness: There is no abdominal tenderness.      Comments: Gravid   Musculoskeletal:      Thoracic back: No tenderness.      Lumbar back: No tenderness.      Comments: No bruising appreciated on back/buttocks   Neurological:      General: No focal deficit present.      Mental Status: She is alert.   Skin:     General: Skin is warm and dry.   Psychiatric:         Mood and Affect: Affect is tearful.          Dilation: 0cm, Effacement:  0%, Station:  -5, Consistency: firm, and Position:  posterior    Prenatal Labs:    Lab Results   Component Value Date    ABO O 04/02/2024     , D (Rh type):   Lab Results   Component Value Date    RH Positive 04/02/2024     , Antibody Screen: No results found for: \"ANTIBODYSCR\" , HCT/HGB:   Lab Results   Component Value Date    HCT 36.0 04/02/2024    HCT 35 04/02/2024    HGB 11.9 04/02/2024    HGB 11.9 04/02/2024    ,   1 hour Glucola: 169  3 hour GTT: 76/159/145/131  Rubella: immune  VDRL/RPR: non-reactive  Hep B: non-reactive   HIV: non-reactive     Results from last 7 days   Lab Units 04/02/24 2018 04/02/24 1938   WBC Thousand/uL 12.31*  --    NEUTROS ABS Thousands/µL 8.98*  --    HEMOGLOBIN g/dL 11.9  --    I STAT HEMOGLOBIN g/dl  --  11.9   MCV fL 90  --    PLATELETS Thousands/uL 251  --      Results from last 7 days   Lab Units 04/02/24 2018   NEUTROS PCT % 73   MONOS PCT % 9   EOS PCT % 2     Results from last 7 days   Lab Units 04/02/24 1938   CO2, I-STAT mmol/L 23         Sujatha Belcher MD  4/3/2024  2:47 AM          "

## 2024-04-03 NOTE — UTILIZATION REVIEW
NOTIFICATION OF INPATIENT ADMISSION   [facility] MATERNITY AUTHORIZATION REQUEST   SERVICING FACILITY:   [facility]  Parent Child Health - L&D, [place], [place] [address]  Tax ID: [id number]  NPI: [id number]   ATTENDING PROVIDER:  Attending Name and NPI#: Sanjay Thompson Md [[id number]]  Address: [address] [place] [address] [place] [address]  Phone: [phone]     ADMISSION INFORMATION:  Place of Service: Inpatient [facility]  Place of Service Code: 21  Inpatient Admission Date/Time: 24  8:40 PM  Discharge Date/Time: No discharge date for patient encounter.  Admitting Diagnosis Code/Description:  33 weeks gestation of pregnancy [Z3A.33]  Encounter for post fall examination [Z04.3]     UTILIZATION REVIEW CONTACT:  Lexi Dodson Utilization   Network Utilization Review Department  Phone: [phone]  Fax [phone]  Email: [email or web address]  Contact for approvals/pending authorizations, clinical reviews, and discharge.     PHYSICIAN ADVISORY SERVICES:  Medical Necessity Denial & [employer] Review  Phone: [phone]  Fax: [phone]  Email: [email or web address]     DISCHARGE SUPPORT TEAM:  For Patients Discharge Needs & Updates  Phone: [phone] opt. 2 Fax: [phone]  Email: [email or web address]

## 2024-04-03 NOTE — ASSESSMENT & PLAN NOTE
Assessment:   Underwent mechanical aortic valve placement with repair of ascending aortic aneurysm in  in the setting of bicuspid aortic valve with aortic stenosis and expanding ascending aortic aneurysm.  On lifelong anticoagulation as managed by  .[place] (Dr. Shell). Transitioned from warfarin to Lovenox prior to 6 weeks GA.  Maternal echocardiogram from 2022 revealed normal sized left ventricle, left ventricular ejection fraction of 65%, and normal systolic function.  The mechanical AV valve was functioning normally. Trace valvular regurgitation was noted. No significant change from echocardiography about 15 months prior was noted.  Her most recent CT scan of the chest was performed in , with no change from prior evaluation in 2016.   Goal INR during pregnancy in the setting of mechanical valve 2.5     Plan:   Continue warfarin 2.5 mg T, , Sat; 3.75 mg , , Fri, Sun as instructed by hematology at this time   INR 1.22 - Consider hematology consult for dose adjustment at this time in the setting of INR below goal of 2.5 however this may be secondary to patient missing doses   Plan for transition to Lovenox at 35 weeks and then inpatient admission 48 hours prior to  section for transition to unfractionated heparin   No large hematomas appreciated on admission - continue to closely monitor in the setting of therapeutic anticoagulation and fall   Previous plan was for anesthesia consult during preop admission, consider consult while inpatient to optimize hospital course during next admission

## 2024-04-03 NOTE — QUICK NOTE
Again stopped to assess patient.  Tolerated some food, no complaints of any discomfor.  Resting comfortably in bed watching T.V.  Continues to be monitored by OB/GYN.

## 2024-04-04 ENCOUNTER — ANTICOAG VISIT (OUTPATIENT)
Dept: CARDIOLOGY CLINIC | Facility: CLINIC | Age: 29
End: 2024-04-04

## 2024-04-04 DIAGNOSIS — Z95.2 HISTORY OF MECHANICAL AORTIC VALVE REPLACEMENT: Primary | ICD-10-CM

## 2024-04-04 LAB — INR PPP: 2 (ref 0.84–1.19)

## 2024-04-04 NOTE — PROGRESS NOTES
Reviewed chart, patient was in patient last 2 days for fall.  Looks like they will be transitioning patient to Lovenox 2 weeks prior to admission for C section    Attempted to contact patient, left message to call office to discuss further.    1003~spoke with patient, advised INR low, states she did miss 2 doses, did take her dose Tues and Wed. Advised to take 3.75 mg tonight only, then go back to 2.5 mg Tues Thurs Sat, 3.75 mg all other days.  Will recheck next week 4/10/24  Patient will be transitioning to Lovenox prior to C section. GYN handling, script written.     Patient is a home elvia

## 2024-04-05 NOTE — UTILIZATION REVIEW
Continued Stay Review    Date: 4/3                          Current Patient Class: inpatient     Current Level of Care: L&D med surg     HPI:28 y.o. female initially admitted on 4/2 33w4d who is being admitted for monitoring in the setting of a fall down 6-7 stairs     Assessment/Plan: 4/3 RENAE Feels well this morning without complaints.  She denies any contractions, vaginal bleeding, or decreased fetal movement.  Nonstress test reactive and reassuring for gestational age without evidence of decelerations.           Lab Results   Component Value Date     INR 1.36 (H) 04/03/2024     INR 1.22 (H) 04/02/2024     INR 2.50 (A) 03/24/2024     PROTIME 17.5 (H) 04/03/2024     PROTIME 16.0 (H) 04/02/2024     PROTIME 26.6 (H) 11/15/2023      Patient's most recent 2 INR's are significantly subtherapeutic.  I recommended and placed an order for Lovenox 90 mg twice a day to ensure she is therapeutically anticoagulated.  I have reached out to hematology and cardiology to get their opinion on how long patient would require Lovenox.  Patient will be candidate for outpatient management later on this afternoon after anesthesia consultation    TRAUMA  No further trauma interventions req    Vital Signs:   Date/Time Temp Pulse Resp BP MAP (mmHg) SpO2 O2 Device Cardiac (WDL) Patient Position - Orthostatic VS   04/03/24 1135 -- -- -- -- -- -- -- -- --   Comment rows:   OBSERV: Dr. Anamaria Fallon will come up to see patient at 04/03/24 1135   04/03/24 0830 -- -- -- -- -- -- None (Room air) X --   Comment rows:   OBSERV: pt denies leaking of fluid or VB. Has intermittent cramping and       Pertinent Labs/Diagnostic Results:       Results from last 7 days   Lab Units 04/03/24  0553 04/02/24 2018 04/02/24  1938   WBC Thousand/uL 11.38* 12.31*  --    HEMOGLOBIN g/dL 10.5* 11.9  --    I STAT HEMOGLOBIN g/dl  --   --  11.9   HEMATOCRIT % 31.8* 36.0  --    HEMATOCRIT, ISTAT %  --   --  35   PLATELETS Thousands/uL 213 251  --    NEUTROS ABS  "Thousands/µL 7.94* 8.98*  --          Results from last 7 days   Lab Units 04/02/24  1938   CO2, I-STAT mmol/L 23   CALCIUM, IONIZED, ISTAT mmol/L 1.19                         No results found for: \"BETA-HYDROXYBUTYRATE\"           Results from last 7 days   Lab Units 04/02/24  1938   PH, LALY I-STAT  7.449*   PCO2, LALY ISTAT mm HG 31.2*   PO2, LALY ISTAT mm HG 45.0   HCO3, LALY ISTAT mmol/L 21.6*   I STAT BASE EXC mmol/L -2   I STAT O2 SAT % 83                 Results from last 7 days   Lab Units 04/04/24  0000 04/03/24  0553 04/02/24  2018   PROTIME seconds  --  17.5* 16.0*   INR  2.00* 1.36* 1.22*   PTT seconds  --  32 31                     Medications:   Scheduled Medications:  4/3=  enoxaparin (LOVENOX) subcutaneous injection 90 mg  Dose: 90 mg  Freq: Every 12 hours scheduled Route: SC  Start: 04/03/24 0900 End: 04/03/24 1738     Continuous IV Infusions:  No current facility-administered medications for this encounter.    PRN Meds:  No current facility-administered medications for this encounter.      Discharge Plan: DC 4/3    Network Utilization Review Department  ATTENTION: Please call with any questions or concerns to [phone] and carefully listen to the prompts so that you are directed to the right person. All voicemails are confidential.   For Discharge needs, contact Care Management DC Support Team at [phone] opt. 2  Send all requests for admission clinical reviews, approved or denied determinations and any other requests to dedicated fax number below belonging to the campus where the patient is receiving treatment. List of dedicated fax numbers for the Facilities:  FACILITY NAME UR FAX NUMBER   ADMISSION DENIALS (Administrative/Medical Necessity) [phone]   DISCHARGE SUPPORT TEAM (NETWORK) [phone]   PARENT CHILD HEALTH (Maternity/NICU/Pediatrics) [phone]   [facility] [phone]   [facility] [phone]   [facility]" [facility] [phone]   [facility] [phone]   [facility] [phone]   [facility] [phone]   [facility] [phone]   [facility] [phone]   [facility] [phone]   [facility] [phone]   [facility] [phone]   [facility] [phone]

## 2024-04-08 ENCOUNTER — APPOINTMENT (OUTPATIENT)
Dept: LAB | Facility: HOSPITAL | Age: 29
End: 2024-04-08
Payer: COMMERCIAL

## 2024-04-08 ENCOUNTER — ROUTINE PRENATAL (OUTPATIENT)
Dept: OBGYN CLINIC | Facility: CLINIC | Age: 29
End: 2024-04-08
Payer: COMMERCIAL

## 2024-04-08 VITALS
HEIGHT: 63 IN | BODY MASS INDEX: 32.78 KG/M2 | WEIGHT: 185 LBS | DIASTOLIC BLOOD PRESSURE: 70 MMHG | SYSTOLIC BLOOD PRESSURE: 120 MMHG

## 2024-04-08 DIAGNOSIS — O99.419 MATERNAL CONGENITAL CARDIAC ANOMALY AFFECTING PREGNANCY, ANTEPARTUM: ICD-10-CM

## 2024-04-08 DIAGNOSIS — Q24.9 MATERNAL CONGENITAL CARDIAC ANOMALY AFFECTING PREGNANCY, ANTEPARTUM: ICD-10-CM

## 2024-04-08 DIAGNOSIS — O99.343 MENTAL DISORDER AFFECTING PREGNANCY IN THIRD TRIMESTER: ICD-10-CM

## 2024-04-08 DIAGNOSIS — Z3A.34 34 WEEKS GESTATION OF PREGNANCY: ICD-10-CM

## 2024-04-08 DIAGNOSIS — Z95.2 HISTORY OF MECHANICAL AORTIC VALVE REPLACEMENT: Primary | ICD-10-CM

## 2024-04-08 DIAGNOSIS — O99.213 OBESITY AFFECTING PREGNANCY IN THIRD TRIMESTER, UNSPECIFIED OBESITY TYPE: ICD-10-CM

## 2024-04-08 DIAGNOSIS — O09.899 HISTORY OF MATERNAL CARDIAC SURGERY: ICD-10-CM

## 2024-04-08 DIAGNOSIS — O99.013 ANEMIA COMPLICATING PREGNANCY IN THIRD TRIMESTER: ICD-10-CM

## 2024-04-08 DIAGNOSIS — Z98.890 HISTORY OF MATERNAL CARDIAC SURGERY: ICD-10-CM

## 2024-04-08 DIAGNOSIS — Z86.73 HISTORY OF EMBOLIC STROKE: ICD-10-CM

## 2024-04-08 DIAGNOSIS — Z36.85 ENCOUNTER FOR ANTENATAL SCREENING FOR STREPTOCOCCUS B: ICD-10-CM

## 2024-04-08 LAB
HEPARIN CF II AG PPP IA-MCNC: <0.1 IU/ML
SL AMB  POCT GLUCOSE, UA: NORMAL
SL AMB POCT URINE PROTEIN: NORMAL

## 2024-04-08 PROCEDURE — 87150 DNA/RNA AMPLIFIED PROBE: CPT | Performed by: OBSTETRICS & GYNECOLOGY

## 2024-04-08 PROCEDURE — 85520 HEPARIN ASSAY: CPT

## 2024-04-08 PROCEDURE — PNV: Performed by: OBSTETRICS & GYNECOLOGY

## 2024-04-08 PROCEDURE — 81002 URINALYSIS NONAUTO W/O SCOPE: CPT | Performed by: OBSTETRICS & GYNECOLOGY

## 2024-04-08 PROCEDURE — 36415 COLL VENOUS BLD VENIPUNCTURE: CPT

## 2024-04-08 NOTE — ASSESSMENT & PLAN NOTE
Plan for  for delivery 2024 at [place] with admission a couple days prior to bridge anticoagulation.  See Complex Mother notes and Overview for details.  Continue Warfarin currently per plan, transition to lovenox at 35 weeks  GBS done

## 2024-04-08 NOTE — PROGRESS NOTES
Routine Prenatal Visit  [facility] - [address], [address], [place] [address]    Assessment/Plan:  Suze is a 28 y.o. year old  at 34w3d who presents for routine prenatal visit.     1. History of mechanical aortic valve replacement  Assessment & Plan:  Anti Xa levels ordered, pt to transition to lovenox. Has rx already. Stop baby ASA at 36 weeks      2. Obesity affecting pregnancy in third trimester, unspecified obesity type    3. Maternal congenital cardiac anomaly affecting pregnancy, antepartum  Assessment & Plan:  Plan for  for delivery 2024 at [place] with admission a couple days prior to bridge anticoagulation.  See Complex Mother notes and Overview for details.  Continue Warfarin currently per plan, transition to lovenox at 35 weeks  GBS done       Orders:  -     Heparin level; Future    4. History of maternal cardiac surgery    5. Mental disorder affecting pregnancy in third trimester  Assessment & Plan:  Cont current meds, doing ok.       6. History of embolic stroke    7. Anemia complicating pregnancy in third trimester    8. 34 weeks gestation of pregnancy  Assessment & Plan:  - Labor/Bleeding/ROM precautions given. Kick counts reviewed  - GBS swab collected today.  - Delivery consent reviewed and signed today. Risks of delivery reviewed, including bleeding, infection, damage to surrounding organs/structures (specifically bowel, bladder, vessels, nerves, ureters), hysterectomy, need for blood transfusion, need for further surgery.   - Problem list updated, results console reviewed and updated with pertinent prenatal labs.  - PMH, PSH, medications reviewed and updated as needed  - Return to office in 1 wk(s) for routine prenatal care     Orders:  -     POCT urine dip    9. Encounter for  screening for Streptococcus B  -     Strep B DNA probe, amplification          Subjective:   Suze Leigh is a 28 y.o.  who presents for routine prenatal  "care at 34w3d.  Complaints today: Denies, however recently admitted to [facility] after fall. No complaints today.  LOF: -; VB: -; Contractions: -; FM: +    Objective:  /70 (BP Location: Right arm, Patient Position: Sitting, Cuff Size: Standard)   Ht 5' 3\" (1.6 m)   Wt 83.9 kg (185 lb)   LMP 2023 (Exact Date) Comment: negative pregnancy test  BMI 32.77 kg/m²     General: Well appearing, no distress  Respiratory: Unlabored breathing  Abdomen: Soft, gravid, nontender  Extremities: Warm and well perfused.  Non tender.    Pregravid Weight/BMI: 85.3 kg (188 lb) (BMI 33.31)  Current Weight: 83.9 kg (185 lb)   Total Weight Gain: -1.361 kg (-3 lb)     Pre- Vitals      Flowsheet Row Most Recent Value   Prenatal Assessment    Fetal Heart Rate 150   Fundal Height (cm) 34 cm   Movement Present   Prenatal Vitals    Blood Pressure 120/70   Weight - Scale 83.9 kg (185 lb)   Urine Albumin/Glucose    Dilation/Effacement/Station    Vaginal Drainage    Edema              Dora Grissom DO  2024 9:16 AM    "

## 2024-04-08 NOTE — ASSESSMENT & PLAN NOTE
- Labor/Bleeding/ROM precautions given. Kick counts reviewed  - GBS swab collected today.  - Delivery consent reviewed and signed today. Risks of delivery reviewed, including bleeding, infection, damage to surrounding organs/structures (specifically bowel, bladder, vessels, nerves, ureters), hysterectomy, need for blood transfusion, need for further surgery.   - Problem list updated, results console reviewed and updated with pertinent prenatal labs.  - PMH, PSH, medications reviewed and updated as needed  - Return to office in 1 wk(s) for routine prenatal care

## 2024-04-08 NOTE — UTILIZATION REVIEW
"Initial Clinical Review    Admission: Date/Time/Statement:   Admission Orders (From admission, onward)       Ordered        04/02/24 2040  Inpatient Admission  Once                          Orders Placed This Encounter   Procedures    Inpatient Admission     Standing Status:   Standing     Number of Occurrences:   1     Order Specific Question:   Level of Care     Answer:   Med Surg [16]     Order Specific Question:   Estimated length of stay     Answer:   More than 2 Midnights     Order Specific Question:   Certification     Answer:   I certify that inpatient services are medically necessary for this patient for a duration of greater than two midnights. See H&P and MD Progress Notes for additional information about the patient's course of treatment.     ED Arrival Information       Expected   -    Arrival   4/2/2024 19:05    Acuity   Urgent              Means of arrival   Walk-In    Escorted by   Self    Service   OB/GYN    Admission type   Emergency              Arrival complaint   33 wks preg on Warfarin  Fall down stairs on buttocks  No head strike             Chief Complaint   Patient presents with    Fall     Pt is 33 weeks pregnant, Pt slipped top of stairs and fell down sliding on buttocks a \"few stairs\" denies hitting abdomen or head.  Pt denies any injuries, reports pregnancy is considered high risk, sees [facility] +Warfarin for valve replacement  Fall happen approx at 1800 4/2/24       Initial Presentation: 28 y.o. female presented to ED as inpatient admission for falling down the stairs. Underwent mechanical aortic valve placement with repair of ascending aortic aneurysm in 2015 in the setting of bicuspid aortic valve with aortic stenosis and expanding ascending aortic aneurysm. Continue warfarin 2.5 mg T, Th, Sat; 3.75 mg M, W, Fri, Sun as instructed by hematology at this time  INR 1.22 - Consider hematology consult for dose adjustment at this time in the setting of INR below goal of 2.5 however this may be " secondary to patient missing doses  Plan for transition to Lovenox at 35 weeks and then inpatient admission 48 hours prior to  section for transition to unfractionated heparin.  Reports fall down 6-7 stairs, did not hit abdomen . We discussed that at this time, given reassuring fetal monitoring and SVE of 0/0/-5, we will forego BMZ however should contractions persist or worsen we would consider BMZ administration for fetal lung maturity. No bruising appreciated on the back or buttocks  Plan continuous external  Monitoring and continue to monitor INR.  Denies any contractions, vaginal bleeding, LOF, endorses normal fetal movement. On tocometer Q2-4 minute contractions are noted, will administer 1L LR bolus   Fetal data:  Nonstress test: date 24 Continuous  Baseline:  140 bpm  Variability: moderate  Accelerations: present, 15x15  Decelerations: absent  Contractions: present  Assessment: reactive  Plan:  continuous        Date: 24   Day 2: Continuous external monitoring Admitted for extended fetal monitoring in the setting of fall Admission labs: CBC/Type and Screen/Syphilis Screen FEN: Regular diet  Monitoring: Continuous  Cephalic on TAUS  Last Growth 24 EFW 2182 grams, AC 60%tile, BPD 74%tile        ED Triage Vitals   Temperature Pulse Respirations Blood Pressure SpO2   24   97.6 °F (36.4 °C) 76 18 131/78 98 %      Temp Source Heart Rate Source Patient Position - Orthostatic VS BP Location FiO2 (%)   24 --   Oral Monitor Sitting Right arm       Pain Score       24       No Pain          Wt Readings from Last 1 Encounters:   24 83.9 kg (185 lb)     Additional Vital Signs:   Date/Time Temp Pulse Resp BP MAP (mmHg) SpO2 O2 Device Cardiac (WDL) Patient Position - Orthostatic VS   24 0830 -- -- -- -- -- -- None (Room air) X --   Comment rows:   OBSERV: pt  denies leaking of fluid or VB. Has intermittent cramping and reports + FM at 04/03/24 0830   04/03/24 0809 97.5 °F (36.4 °C) 83 20 120/76 -- 98 % -- -- --   04/03/24 0557 -- -- -- -- -- -- -- -- --   Comment rows:   OBSERV: Pt up to bathroom at 04/03/24 0557   04/03/24 0004 98.5 °F (36.9 °C) 89 18 112/70 -- 97 % -- -- Lying   04/02/24 2302 98.7 °F (37.1 °C) 78 18 115/71 -- 97 % -- -- Sitting   Comment rows:   OBSERV: pt out of bed to use bathroom at 04/02/24 2302   04/02/24 2200 98.7 °F (37.1 °C) 92 19 125/83 -- 96 % None (Room air) -- Sitting   04/02/24 2116 -- 87 -- 131/79 -- 95 % -- -- --   04/02/24 2028 -- -- -- -- -- -- -- -- --   Comment rows:   OBSERV: Dr. Santoyo and Dr. Belcher at bedside for BONI and SVE at 04/02/24 2028 04/02/24 2010 98.9 °F (37.2 °C) 88 20 120/78 -- 96 % -- -- Sitting   04/02/24 19:31:50 97.9 °F (36.6 °C) 76 18 139/72 -- 98 % None (Room air) -- Lying           Pertinent Labs/Diagnostic Test Results:   No orders to display         Results from last 7 days   Lab Units 04/03/24  0553 04/02/24 2018 04/02/24 1938   WBC Thousand/uL 11.38* 12.31*  --    HEMOGLOBIN g/dL 10.5* 11.9  --    I STAT HEMOGLOBIN g/dl  --   --  11.9   HEMATOCRIT % 31.8* 36.0  --    HEMATOCRIT, ISTAT %  --   --  35   PLATELETS Thousands/uL 213 251  --    NEUTROS ABS Thousands/µL 7.94* 8.98*  --          Results from last 7 days   Lab Units 04/02/24 1938   CO2, I-STAT mmol/L 23   CALCIUM, IONIZED, ISTAT mmol/L 1.19     Results from last 7 days   Lab Units 04/02/24 1938   PH, LALY I-STAT  7.449*   PCO2, LALY ISTAT mm HG 31.2*   PO2, LALY ISTAT mm HG 45.0   HCO3, LALY ISTAT mmol/L 21.6*   I STAT BASE EXC mmol/L -2   I STAT O2 SAT % 83       Results from last 7 days   Lab Units 04/04/24  0000 04/03/24  0553 04/02/24 2018   PROTIME seconds  --  17.5* 16.0*   INR  2.00* 1.36* 1.22*   PTT seconds  --  32 31         Past Medical History:   Diagnosis Date    Anxiety     Bicuspid aortic valve     s/p mechanical AVR     "Coronary artery disease 1993    Exercises two times per week     cross fit- 2x/week    Moderate aortic insufficiency     s/p mechanical AVR    Motion sickness     Patent ductus arteriosus     s/p repair    PONV (postoperative nausea and vomiting)     S/P ascending aortic aneurysm repair     Stroke (HCC) 02/2022    no residual effects per pt--initially couldnt speak at first--\"feels back to normal now\"    Varicella     hx as child, and vaccine     Present on Admission:   Trauma during pregnancy   Anemia complicating pregnancy in third trimester   Abnormal glucose tolerance test in pregnancy   Mental disorder affecting pregnancy      Admitting Diagnosis: 33 weeks gestation of pregnancy [Z3A.33]  Encounter for post fall examination [Z04.3]  Age/Sex: 28 y.o. female    Admission Orders:  Continuous external monitoring         Scheduled Medications:  No current facility-administered medications for this encounter.    Continuous IV Infusions:  No current facility-administered medications for this encounter.    PRN Meds:  No current facility-administered medications for this encounter.      None    Network Utilization Review Department  ATTENTION: Please call with any questions or concerns to [phone] and carefully listen to the prompts so that you are directed to the right person. All voicemails are confidential.   For Discharge needs, contact Care Management DC Support Team at [phone] opt. 2  Send all requests for admission clinical reviews, approved or denied determinations and any other requests to dedicated fax number below belonging to the campus where the patient is receiving treatment. List of dedicated fax numbers for the Facilities:  FACILITY NAME UR FAX NUMBER   ADMISSION DENIALS (Administrative/Medical Necessity) [phone]   DISCHARGE SUPPORT TEAM (NETWORK) [phone]   PARENT CHILD HEALTH (Maternity/NICU/Pediatrics) [phone]   [facility] [phone]   [facility] " [facility] [phone]   [facility] [phone]   [facility] [phone]   [facility] [phone]   [facility] [phone]   [facility] [phone]   [facility] [phone]   [facility] [phone]   [facility] [phone]   [facility] [phone]   [facility] [phone]       Continued Stay Review    Date: 4/3                          Current Patient Class: inpatient     Current Level of Care: L&D med surg     HPI:28 y.o. female initially admitted on 4/2 33w4d who is being admitted for monitoring in the setting of a fall down 6-7 stairs     Assessment/Plan: 4/3 MFM Feels well this morning without complaints.  She denies any contractions, vaginal bleeding, or decreased fetal movement.  Nonstress test reactive and reassuring for gestational age without evidence of decelerations.           Lab Results   Component Value Date     INR 1.36 (H) 04/03/2024     INR 1.22 (H) 04/02/2024     INR 2.50 (A) 03/24/2024     PROTIME 17.5 (H) 04/03/2024     PROTIME 16.0 (H) 04/02/2024     PROTIME 26.6 (H) 11/15/2023      Patient's most recent 2 INR's are significantly subtherapeutic.  I recommended and placed an order for Lovenox 90 mg twice a day to ensure she is therapeutically anticoagulated.  I have reached out to hematology and cardiology to get their opinion on how long patient would require Lovenox.  Patient will be candidate for outpatient management later on this afternoon after anesthesia consultation    TRAUMA  No further trauma interventions req    Vital Signs:   Date/Time Temp Pulse Resp BP MAP (mmHg) SpO2 O2 Device Cardiac (WDL) Patient Position - Orthostatic VS   04/03/24 2825 -- -- -- -- -- -- --  "-- --   Comment rows:   OBSERV: Dr. Anamaria Fallon will come up to see patient at 04/03/24 1135   04/03/24 0830 -- -- -- -- -- -- None (Room air) X --   Comment rows:   OBSERV: pt denies leaking of fluid or VB. Has intermittent cramping and       Pertinent Labs/Diagnostic Results:       Results from last 7 days   Lab Units 04/03/24  0553 04/02/24 2018 04/02/24  1938   WBC Thousand/uL 11.38* 12.31*  --    HEMOGLOBIN g/dL 10.5* 11.9  --    I STAT HEMOGLOBIN g/dl  --   --  11.9   HEMATOCRIT % 31.8* 36.0  --    HEMATOCRIT, ISTAT %  --   --  35   PLATELETS Thousands/uL 213 251  --    NEUTROS ABS Thousands/µL 7.94* 8.98*  --          Results from last 7 days   Lab Units 04/02/24 1938   CO2, I-STAT mmol/L 23   CALCIUM, IONIZED, ISTAT mmol/L 1.19                         No results found for: \"BETA-HYDROXYBUTYRATE\"           Results from last 7 days   Lab Units 04/02/24 1938   PH, LALY I-STAT  7.449*   PCO2, LALY ISTAT mm HG 31.2*   PO2, LALY ISTAT mm HG 45.0   HCO3, LALY ISTAT mmol/L 21.6*   I STAT BASE EXC mmol/L -2   I STAT O2 SAT % 83                 Results from last 7 days   Lab Units 04/04/24  0000 04/03/24 0553 04/02/24 2018   PROTIME seconds  --  17.5* 16.0*   INR  2.00* 1.36* 1.22*   PTT seconds  --  32 31                     Medications:   Scheduled Medications:  4/3=  enoxaparin (LOVENOX) subcutaneous injection 90 mg  Dose: 90 mg  Freq: Every 12 hours scheduled Route: SC  Start: 04/03/24 0900 End: 04/03/24 1738     Continuous IV Infusions:  No current facility-administered medications for this encounter.    PRN Meds:  No current facility-administered medications for this encounter.      Discharge Plan: DC 4/3    Network Utilization Review Department  ATTENTION: Please call with any questions or concerns to [phone] and carefully listen to the prompts so that you are directed to the right person. All voicemails are confidential.   For Discharge needs, contact Care Management DC Support Team at " [phone] opt. 2  Send all requests for admission clinical reviews, approved or denied determinations and any other requests to dedicated fax number below belonging to the campus where the patient is receiving treatment. List of dedicated fax numbers for the Facilities:  FACILITY NAME UR FAX NUMBER   ADMISSION DENIALS (Administrative/Medical Necessity) [phone]   DISCHARGE SUPPORT TEAM ([facility]) [phone]   PARENT CHILD HEALTH (Maternity/NICU/Pediatrics) [phone]   [facility] [phone]   [facility] [phone]   [facility] [phone]   [facility] [phone]   [facility] [phone]   [facility] [phone]   [facility] [phone]   [facility] [phone]   [facility] [phone]   [facility] [phone]   [facility] [phone]   [facility] [phone]     NOTIFICATION OF ADMISSION DISCHARGE   This is a Notification of Discharge from [facility]. Please be advised that this patient has been discharge from our facility. Below you will find the admission and discharge date and time including the patient’s disposition.   UTILIZATION REVIEW CONTACT:  Lexi Dodson  Utilization   Network Utilization Review Department  Phone: [phone] x carefully listen to the prompts. All voicemails are confidential.  Email: [email or web address]     ADMISSION INFORMATION  PRESENTATION DATE: 4/2/2024  7:26 PM  OBERVATION ADMISSION DATE:   INPATIENT ADMISSION DATE: 4/2/24  8:40 PM   DISCHARGE DATE: 4/3/2024  2:10 PM   DISPOSITION:PRA - Home    Network Utilization Review Department  ATTENTION: Please call with any  questions or concerns to [phone] and carefully listen to the prompts so that you are directed to the right person. All voicemails are confidential.   For Discharge needs, contact Care Management DC Support Team at [phone] opt. 2  Send all requests for admission clinical reviews, approved or denied determinations and any other requests to dedicated fax number below belonging to the [place] where the patient is receiving treatment. List of dedicated fax numbers for the Facilities:  FACILITY NAME UR FAX NUMBER   ADMISSION DENIALS (Administrative/Medical Necessity) [phone]   DISCHARGE SUPPORT TEAM (NETWORK) [phone]   PARENT CHILD HEALTH (Maternity/NICU/Pediatrics) [phone]   [facility] [phone]   [facility] [phone]   [facility] [phone]   [facility] [phone]   [facility] [phone]   [facility] [phone]   [facility] [phone]   [facility] [phone]   [facility] [phone]   [facility] [phone]   [facility] [phone]   [facility] [phone]

## 2024-04-09 ENCOUNTER — CLINICAL SUPPORT (OUTPATIENT)
Dept: POSTPARTUM | Facility: CLINIC | Age: 29
End: 2024-04-09

## 2024-04-09 DIAGNOSIS — Z32.2 ENCOUNTER FOR CHILDBIRTH INSTRUCTION: Primary | ICD-10-CM

## 2024-04-10 DIAGNOSIS — Z95.2 HISTORY OF MECHANICAL AORTIC VALVE REPLACEMENT: Primary | ICD-10-CM

## 2024-04-10 LAB — GP B STREP DNA SPEC QL NAA+PROBE: NEGATIVE

## 2024-04-11 ENCOUNTER — ANTICOAG VISIT (OUTPATIENT)
Dept: CARDIOLOGY CLINIC | Facility: CLINIC | Age: 29
End: 2024-04-11

## 2024-04-11 DIAGNOSIS — Z95.2 HISTORY OF MECHANICAL AORTIC VALVE REPLACEMENT: Primary | ICD-10-CM

## 2024-04-11 LAB — INR PPP: 2.8 (ref 0.84–1.19)

## 2024-04-11 NOTE — PROGRESS NOTES
Left message for patient, advised INR good, did see that OB is transitioning her from Coumadin to Lovenox, will let them handle for now.  Advised to call when we are to resume monitoring INR.    Dr BIANCA Shell aware of transitioning.

## 2024-04-16 ENCOUNTER — ROUTINE PRENATAL (OUTPATIENT)
Dept: OBGYN CLINIC | Facility: CLINIC | Age: 29
End: 2024-04-16
Payer: COMMERCIAL

## 2024-04-16 VITALS
DIASTOLIC BLOOD PRESSURE: 80 MMHG | SYSTOLIC BLOOD PRESSURE: 104 MMHG | WEIGHT: 186 LBS | HEIGHT: 63 IN | BODY MASS INDEX: 32.96 KG/M2

## 2024-04-16 DIAGNOSIS — Z86.73 HISTORY OF EMBOLIC STROKE: ICD-10-CM

## 2024-04-16 DIAGNOSIS — O09.899 HISTORY OF MATERNAL CARDIAC SURGERY: ICD-10-CM

## 2024-04-16 DIAGNOSIS — Z95.2 HISTORY OF MECHANICAL AORTIC VALVE REPLACEMENT: ICD-10-CM

## 2024-04-16 DIAGNOSIS — O99.343 MENTAL DISORDER AFFECTING PREGNANCY IN THIRD TRIMESTER: ICD-10-CM

## 2024-04-16 DIAGNOSIS — Z3A.35 35 WEEKS GESTATION OF PREGNANCY: ICD-10-CM

## 2024-04-16 DIAGNOSIS — O99.213 OBESITY AFFECTING PREGNANCY IN THIRD TRIMESTER, UNSPECIFIED OBESITY TYPE: ICD-10-CM

## 2024-04-16 DIAGNOSIS — O99.013 ANEMIA COMPLICATING PREGNANCY IN THIRD TRIMESTER: ICD-10-CM

## 2024-04-16 DIAGNOSIS — Q24.9 MATERNAL CONGENITAL CARDIAC ANOMALY AFFECTING PREGNANCY, ANTEPARTUM: Primary | ICD-10-CM

## 2024-04-16 DIAGNOSIS — Z98.890 HISTORY OF MATERNAL CARDIAC SURGERY: ICD-10-CM

## 2024-04-16 DIAGNOSIS — O99.419 MATERNAL CONGENITAL CARDIAC ANOMALY AFFECTING PREGNANCY, ANTEPARTUM: Primary | ICD-10-CM

## 2024-04-16 LAB
SL AMB  POCT GLUCOSE, UA: NORMAL
SL AMB POCT URINE PROTEIN: NORMAL

## 2024-04-16 PROCEDURE — 81002 URINALYSIS NONAUTO W/O SCOPE: CPT | Performed by: STUDENT IN AN ORGANIZED HEALTH CARE EDUCATION/TRAINING PROGRAM

## 2024-04-16 PROCEDURE — PNV: Performed by: STUDENT IN AN ORGANIZED HEALTH CARE EDUCATION/TRAINING PROGRAM

## 2024-04-16 NOTE — PROGRESS NOTES
"Routine Prenatal Visit  [facility] [place]  [address], [place] [address]    Assessment/Plan:  Suze is a 28 y.o. year old  at 35w4d who presents for routine prenatal visit.     1. Maternal congenital cardiac anomaly affecting pregnancy, antepartum  Assessment & Plan:  - Continue Lovenox and Warfarin until anti-Xa levels are at goal. She is planning to have labs drawn . If at goal, will stop Warfarin.   - Delivery planned at 37 weeks via primary  section.       2. Mental disorder affecting pregnancy in third trimester  Assessment & Plan:  - Continue Lexapro.       3. Obesity affecting pregnancy in third trimester, unspecified obesity type    4. Anemia complicating pregnancy in third trimester  Assessment & Plan:  - Improved on oral iron supplementation. Will continue.       5. History of maternal cardiac surgery    6. 35 weeks gestation of pregnancy  Assessment & Plan:  - PTL/PPROM/Bleeding precautions given. Kick counts reviewed  - Problem list updated, results console reviewed and updated with pertinent prenatal labs.  - PMH, PSH, medications reviewed and updated as needed  - Return to office in 1 wk(s) for routine prenatal care      Orders:  -     POCT urine dip    7. History of mechanical aortic valve replacement    8. History of embolic stroke          Subjective:   Suze Leigh is a 28 y.o.  who presents for routine prenatal care at 35w4d.  Complaints today: No  LOF: No; VB: No; Contractions: No; FM: Present and normal.    Objective:  /80 (BP Location: Left arm, Patient Position: Sitting, Cuff Size: Standard)   Ht 5' 3\" (1.6 m)   Wt 84.4 kg (186 lb)   LMP 2023 (Exact Date) Comment: negative pregnancy test  BMI 32.95 kg/m²     General: Well appearing, no distress  Respiratory: Unlabored breathing  Cardiovascular: Regular rate.  Abdomen: Soft, gravid, nontender  Extremities: Warm and well perfused.  Non tender.    Pregravid Weight/BMI: 85.3 kg (188 " lb) (BMI 33.31)  Current Weight: 84.4 kg (186 lb)   Total Weight Gain: -0.907 kg (-2 lb)     Pre-Chris Vitals      Flowsheet Row Most Recent Value   Prenatal Assessment    Fetal Heart Rate 125   Fundal Height (cm) 35 cm   Movement Present   Prenatal Vitals    Blood Pressure 104/80   Weight - Scale 84.4 kg (186 lb)   Urine Albumin/Glucose    Dilation/Effacement/Station    Vaginal Drainage    Draining Fluid No   Edema    LLE Edema None   RLE Edema None   Facial Edema None             Sanjay Thompson MD  2024 1:37 PM

## 2024-04-16 NOTE — ASSESSMENT & PLAN NOTE
- Continue Lovenox and Warfarin until anti-Xa levels are at goal. She is planning to have labs drawn . If at goal, will stop Warfarin.   - Delivery planned at 37 weeks via primary  section.

## 2024-04-16 NOTE — ASSESSMENT & PLAN NOTE
- PTL/PPROM/Bleeding precautions given. Kick counts reviewed  - Problem list updated, results console reviewed and updated with pertinent prenatal labs.  - PMH, PSH, medications reviewed and updated as needed  - Return to office in 1 wk(s) for routine prenatal care

## 2024-04-22 ENCOUNTER — APPOINTMENT (OUTPATIENT)
Dept: LAB | Facility: HOSPITAL | Age: 29
End: 2024-04-22
Payer: COMMERCIAL

## 2024-04-22 ENCOUNTER — ROUTINE PRENATAL (OUTPATIENT)
Dept: OBGYN CLINIC | Facility: CLINIC | Age: 29
End: 2024-04-22
Payer: COMMERCIAL

## 2024-04-22 VITALS
HEIGHT: 63 IN | DIASTOLIC BLOOD PRESSURE: 74 MMHG | BODY MASS INDEX: 33.35 KG/M2 | WEIGHT: 188.2 LBS | SYSTOLIC BLOOD PRESSURE: 126 MMHG

## 2024-04-22 DIAGNOSIS — O09.899 HISTORY OF MATERNAL CARDIAC SURGERY: ICD-10-CM

## 2024-04-22 DIAGNOSIS — O99.013 ANEMIA COMPLICATING PREGNANCY IN THIRD TRIMESTER: ICD-10-CM

## 2024-04-22 DIAGNOSIS — O99.343 MENTAL DISORDER AFFECTING PREGNANCY IN THIRD TRIMESTER: ICD-10-CM

## 2024-04-22 DIAGNOSIS — Z3A.36 36 WEEKS GESTATION OF PREGNANCY: ICD-10-CM

## 2024-04-22 DIAGNOSIS — Z95.2 HISTORY OF MECHANICAL AORTIC VALVE REPLACEMENT: ICD-10-CM

## 2024-04-22 DIAGNOSIS — O99.213 OBESITY AFFECTING PREGNANCY IN THIRD TRIMESTER, UNSPECIFIED OBESITY TYPE: ICD-10-CM

## 2024-04-22 DIAGNOSIS — Z98.890 HISTORY OF MATERNAL CARDIAC SURGERY: ICD-10-CM

## 2024-04-22 DIAGNOSIS — O99.419 MATERNAL CONGENITAL CARDIAC ANOMALY AFFECTING PREGNANCY, ANTEPARTUM: Primary | ICD-10-CM

## 2024-04-22 DIAGNOSIS — F41.9 ANXIETY: ICD-10-CM

## 2024-04-22 DIAGNOSIS — Q24.9 MATERNAL CONGENITAL CARDIAC ANOMALY AFFECTING PREGNANCY, ANTEPARTUM: Primary | ICD-10-CM

## 2024-04-22 DIAGNOSIS — Z86.73 HISTORY OF EMBOLIC STROKE: ICD-10-CM

## 2024-04-22 LAB
HEPARIN CF II AG PPP IA-MCNC: 1.15 IU/ML
SL AMB  POCT GLUCOSE, UA: NORMAL
SL AMB POCT URINE PROTEIN: NORMAL

## 2024-04-22 PROCEDURE — 36415 COLL VENOUS BLD VENIPUNCTURE: CPT

## 2024-04-22 PROCEDURE — 85520 HEPARIN ASSAY: CPT

## 2024-04-22 PROCEDURE — PNV: Performed by: STUDENT IN AN ORGANIZED HEALTH CARE EDUCATION/TRAINING PROGRAM

## 2024-04-22 PROCEDURE — 81002 URINALYSIS NONAUTO W/O SCOPE: CPT | Performed by: STUDENT IN AN ORGANIZED HEALTH CARE EDUCATION/TRAINING PROGRAM

## 2024-04-22 RX ORDER — ESCITALOPRAM OXALATE 10 MG/1
20 TABLET ORAL DAILY
Qty: 90 TABLET | Refills: 0 | Status: SHIPPED | OUTPATIENT
Start: 2024-04-22

## 2024-04-22 NOTE — PROGRESS NOTES
"Routine Prenatal Visit  [facility] - [place]  [address], [place] [address]    Assessment/Plan:  Suez is a 28 y.o. year old  at 36w3d who presents for routine prenatal visit.     1. Maternal congenital cardiac anomaly affecting pregnancy, antepartum  Assessment & Plan:  - Had anti-Xa trough drawn this morning. Returning to lab after visit for peak level to be drawn. Will await results to determine if safe to stop Coumadin.   - Plan to repeat labs in 1 week.    - Planned admission  for transition to heparin drip with delivery on .      2. Obesity affecting pregnancy in third trimester, unspecified obesity type    3. History of mechanical aortic valve replacement    4. Mental disorder affecting pregnancy in third trimester  Assessment & Plan:  - Will increase Lexapro to 20 mg PO daily due to worsening depressive symptoms. Rx provided today.       5. Anemia complicating pregnancy in third trimester    6. 36 weeks gestation of pregnancy  Assessment & Plan:  - Labor/Bleeding/ROM precautions given. Kick counts reviewed.   - Problem list updated, results console reviewed and updated with pertinent prenatal labs.  - PMH, PSH, medications reviewed and updated as needed  - Return to office in 1 wk(s) for routine prenatal care      Orders:  -     POCT urine dip    7. History of maternal cardiac surgery    8. History of embolic stroke    9. Anxiety  -     escitalopram (LEXAPRO) 10 mg tablet; Take 2 tablets (20 mg total) by mouth daily          Subjective:   Suze Leigh is a 28 y.o.  who presents for routine prenatal care at 36w3d.  Complaints today: Discomforts of pregnancy  LOF: No; VB: No; Contractions: No; FM: Present and normal    Objective:  /74 (BP Location: Right arm, Patient Position: Sitting, Cuff Size: Standard)   Ht 5' 3\" (1.6 m)   Wt 85.4 kg (188 lb 3.2 oz)   LMP 2023 (Exact Date) Comment: negative pregnancy test  BMI 33.34 kg/m²     General: Well appearing, " no distress  Respiratory: Unlabored breathing  Cardiovascular: Regular rate.  Abdomen: Soft, gravid, nontender  Extremities: Warm and well perfused.  Non tender.    Pregravid Weight/BMI: 85.3 kg (188 lb) (BMI 33.31)  Current Weight: 85.4 kg (188 lb 3.2 oz)   Total Weight Gain: 0.091 kg (3.2 oz)     Pre-Chris Vitals      Flowsheet Row Most Recent Value   Prenatal Assessment    Fetal Heart Rate 140   Fundal Height (cm) 36 cm   Movement Present   Presentation Vertex   Prenatal Vitals    Blood Pressure 126/74   Weight - Scale 85.4 kg (188 lb 3.2 oz)   Urine Albumin/Glucose    Dilation/Effacement/Station    Vaginal Drainage    Draining Fluid No   Edema    LLE Edema None   RLE Edema None   Facial Edema None             Sanjay Thompson MD  2024 10:58 AM

## 2024-04-22 NOTE — ASSESSMENT & PLAN NOTE
- Had anti-Xa trough drawn this morning. Returning to lab after visit for peak level to be drawn. Will await results to determine if safe to stop Coumadin.   - Plan to repeat labs in 1 week.    - Planned admission 4/30 for transition to heparin drip with delivery on 5/2.   yes

## 2024-04-22 NOTE — ASSESSMENT & PLAN NOTE
- Labor/Bleeding/ROM precautions given. Kick counts reviewed.   - Problem list updated, results console reviewed and updated with pertinent prenatal labs.  - PMH, PSH, medications reviewed and updated as needed  - Return to office in 1 wk(s) for routine prenatal care

## 2024-04-23 ENCOUNTER — TELEPHONE (OUTPATIENT)
Dept: OBGYN CLINIC | Facility: CLINIC | Age: 29
End: 2024-04-23

## 2024-04-23 DIAGNOSIS — Q24.9 MATERNAL CONGENITAL CARDIAC ANOMALY AFFECTING PREGNANCY, ANTEPARTUM: ICD-10-CM

## 2024-04-23 DIAGNOSIS — Z95.2 HISTORY OF MECHANICAL AORTIC VALVE REPLACEMENT: Primary | ICD-10-CM

## 2024-04-23 DIAGNOSIS — O99.419 MATERNAL CONGENITAL CARDIAC ANOMALY AFFECTING PREGNANCY, ANTEPARTUM: ICD-10-CM

## 2024-04-23 NOTE — TELEPHONE ENCOUNTER
"Called patient to review anti-Xa values. Unfortunately, only a single result was received from the lab. In discussion with the performing lab ([facility]), we cannot confidently know whether the \"trough\" specimen or the \"peak\" specimen was used. Repeating the test using the same specimen may not result in a reliable and accurate result. At this point, patient has already taken her morning dose of Lovenox this morning. She stopped her warfarin, as planned. I therefore recommend that she return to the lab first thing tomorrow morning to repeat both a trough and a peak anti-Xa level to ensure that she is safely in the therapeutic range. She agrees to this. I apologized for the error and assured her that measures are being taken to prevent recurrence of the error.     Sanjay Thompson MD  4/23/2024 10:27 AM    "

## 2024-04-24 ENCOUNTER — APPOINTMENT (OUTPATIENT)
Dept: LAB | Facility: HOSPITAL | Age: 29
End: 2024-04-24
Payer: COMMERCIAL

## 2024-04-24 DIAGNOSIS — O99.419 MATERNAL CONGENITAL CARDIAC ANOMALY AFFECTING PREGNANCY, ANTEPARTUM: ICD-10-CM

## 2024-04-24 DIAGNOSIS — Z95.2 HISTORY OF MECHANICAL AORTIC VALVE REPLACEMENT: ICD-10-CM

## 2024-04-24 DIAGNOSIS — Q24.9 MATERNAL CONGENITAL CARDIAC ANOMALY AFFECTING PREGNANCY, ANTEPARTUM: ICD-10-CM

## 2024-04-24 LAB
HEPARIN CF II AG PPP IA-MCNC: 0.66 IU/ML
HEPARIN CF II AG PPP IA-MCNC: 0.98 IU/ML

## 2024-04-24 PROCEDURE — 85520 HEPARIN ASSAY: CPT

## 2024-04-24 PROCEDURE — 36415 COLL VENOUS BLD VENIPUNCTURE: CPT

## 2024-04-29 ENCOUNTER — ROUTINE PRENATAL (OUTPATIENT)
Dept: OBGYN CLINIC | Facility: CLINIC | Age: 29
End: 2024-04-29
Payer: COMMERCIAL

## 2024-04-29 VITALS
BODY MASS INDEX: 32.74 KG/M2 | WEIGHT: 184.8 LBS | DIASTOLIC BLOOD PRESSURE: 84 MMHG | SYSTOLIC BLOOD PRESSURE: 122 MMHG | HEIGHT: 63 IN

## 2024-04-29 DIAGNOSIS — Z95.2 HISTORY OF MECHANICAL AORTIC VALVE REPLACEMENT: ICD-10-CM

## 2024-04-29 DIAGNOSIS — Z98.890 HISTORY OF MATERNAL CARDIAC SURGERY: ICD-10-CM

## 2024-04-29 DIAGNOSIS — O99.213 OBESITY AFFECTING PREGNANCY IN THIRD TRIMESTER, UNSPECIFIED OBESITY TYPE: ICD-10-CM

## 2024-04-29 DIAGNOSIS — O09.899 HISTORY OF MATERNAL CARDIAC SURGERY: ICD-10-CM

## 2024-04-29 DIAGNOSIS — Z86.73 HISTORY OF EMBOLIC STROKE: ICD-10-CM

## 2024-04-29 DIAGNOSIS — Z3A.37 37 WEEKS GESTATION OF PREGNANCY: ICD-10-CM

## 2024-04-29 DIAGNOSIS — O99.013 ANEMIA COMPLICATING PREGNANCY IN THIRD TRIMESTER: ICD-10-CM

## 2024-04-29 DIAGNOSIS — Q24.9 MATERNAL CONGENITAL CARDIAC ANOMALY AFFECTING PREGNANCY, ANTEPARTUM: Primary | ICD-10-CM

## 2024-04-29 DIAGNOSIS — O99.419 MATERNAL CONGENITAL CARDIAC ANOMALY AFFECTING PREGNANCY, ANTEPARTUM: Primary | ICD-10-CM

## 2024-04-29 DIAGNOSIS — O99.343 MENTAL DISORDER AFFECTING PREGNANCY IN THIRD TRIMESTER: ICD-10-CM

## 2024-04-29 LAB
SL AMB  POCT GLUCOSE, UA: NORMAL
SL AMB POCT URINE PROTEIN: NORMAL

## 2024-04-29 PROCEDURE — 81002 URINALYSIS NONAUTO W/O SCOPE: CPT | Performed by: STUDENT IN AN ORGANIZED HEALTH CARE EDUCATION/TRAINING PROGRAM

## 2024-04-29 PROCEDURE — PNV: Performed by: STUDENT IN AN ORGANIZED HEALTH CARE EDUCATION/TRAINING PROGRAM

## 2024-04-29 NOTE — ASSESSMENT & PLAN NOTE
- Anti-Xa trough and peak at goal this week. Continue Lovenox 1 mg/kg/day.   - Planned admission tomorrow, which is 48 hours prior to scheduled delivery on Thursday via primary  section upon maternal request. Per discussion with MFM resident today, will plan for arrival at [facility] at 06:00.

## 2024-04-29 NOTE — PROGRESS NOTES
"Routine Prenatal Visit  [facility]  [address], [place] [address]    Assessment/Plan:  Suze is a 28 y.o. year old  at 37w3d who presents for routine prenatal visit.     1. Maternal congenital cardiac anomaly affecting pregnancy, antepartum  Assessment & Plan:  - Anti-Xa trough and peak at goal this week. Continue Lovenox 1 mg/kg/day.   - Planned admission tomorrow, which is 48 hours prior to scheduled delivery on Thursday via primary  section upon maternal request. Per discussion with [facility] resident today, will plan for arrival at [facility] at 06:00.       2. History of maternal cardiac surgery    3. Mental disorder affecting pregnancy in third trimester    4. Obesity affecting pregnancy in third trimester, unspecified obesity type    5. Anemia complicating pregnancy in third trimester    6. 37 weeks gestation of pregnancy  Assessment & Plan:  - Labor/Bleeding/ROM precautions given. Kick counts reviewed.  - GBS negative result reviewed.   - Problem list updated, results console reviewed and updated with pertinent prenatal labs.  - PMH, PSH, medications reviewed and updated as needed  - Return to office 1 week postpartum and then 3 weeks postpartum for routine follow up.     Orders:  -     POCT urine dip    7. History of mechanical aortic valve replacement    8. History of embolic stroke          Subjective:   Suze Leigh is a 28 y.o.  who presents for routine prenatal care at 37w3d.  Complaints today: No  LOF: No; VB: No; Contractions: Irregular; FM: Present and normal    Objective:  /84 (BP Location: Left arm, Patient Position: Sitting, Cuff Size: Standard)   Ht 5' 3\" (1.6 m)   Wt 83.8 kg (184 lb 12.8 oz)   LMP 2023 (Exact Date) Comment: negative pregnancy test  BMI 32.74 kg/m²     General: Well appearing, no distress  Respiratory: Unlabored breathing  Cardiovascular: Regular rate.  Abdomen: Soft, gravid, nontender  Extremities: Warm and well " perfused.  Non tender.    Pregravid Weight/BMI: 85.3 kg (188 lb) (BMI 33.31)  Current Weight: 83.8 kg (184 lb 12.8 oz)   Total Weight Gain: -1.452 kg (-3 lb 3.2 oz)     Pre- Vitals      Flowsheet Row Most Recent Value   Prenatal Assessment    Fetal Heart Rate 150   Fundal Height (cm) 36 cm   Movement Present   Prenatal Vitals    Blood Pressure 122/84   Weight - Scale 83.8 kg (184 lb 12.8 oz)   Urine Albumin/Glucose    Dilation/Effacement/Station    Vaginal Drainage    Draining Fluid No   Edema    LLE Edema None   RLE Edema None   Facial Edema None             Sanjay Thompson MD  2024 11:01 AM

## 2024-04-29 NOTE — ASSESSMENT & PLAN NOTE
- Labor/Bleeding/ROM precautions given. Kick counts reviewed.  - GBS negative result reviewed.   - Problem list updated, results console reviewed and updated with pertinent prenatal labs.  - PMH, PSH, medications reviewed and updated as needed  - Return to office 1 week postpartum and then 3 weeks postpartum for routine follow up.

## 2024-04-30 ENCOUNTER — HOSPITAL ENCOUNTER (INPATIENT)
Facility: HOSPITAL | Age: 29
LOS: 4 days | Discharge: HOME | End: 2024-05-04
Attending: STUDENT IN AN ORGANIZED HEALTH CARE EDUCATION/TRAINING PROGRAM | Admitting: STUDENT IN AN ORGANIZED HEALTH CARE EDUCATION/TRAINING PROGRAM
Payer: COMMERCIAL

## 2024-04-30 DIAGNOSIS — Z3A.37 37 WEEKS GESTATION OF PREGNANCY: ICD-10-CM

## 2024-04-30 DIAGNOSIS — Z98.891 STATUS POST PRIMARY LOW TRANSVERSE CESAREAN SECTION: Primary | ICD-10-CM

## 2024-04-30 DIAGNOSIS — Z95.2 HISTORY OF MECHANICAL AORTIC VALVE REPLACEMENT: ICD-10-CM

## 2024-04-30 DIAGNOSIS — Q21.10 CONGENITAL ATRIAL SEPTAL DEFECT: ICD-10-CM

## 2024-04-30 DIAGNOSIS — Z86.73 HISTORY OF EMBOLIC STROKE: ICD-10-CM

## 2024-04-30 DIAGNOSIS — Z98.890 HX OF REPAIR OF ASCENDING AORTA: ICD-10-CM

## 2024-04-30 LAB
ABO GROUP BLD: NORMAL
APTT PPP: 34 SECONDS (ref 23–37)
APTT PPP: 63 SECONDS (ref 23–37)
APTT PPP: 67 SECONDS (ref 23–37)
BASOPHILS # BLD AUTO: 0.04 THOUSANDS/ÂΜL (ref 0–0.1)
BASOPHILS NFR BLD AUTO: 0 % (ref 0–1)
BLD GP AB SCN SERPL QL: NEGATIVE
EOSINOPHIL # BLD AUTO: 0.19 THOUSAND/ÂΜL (ref 0–0.61)
EOSINOPHIL NFR BLD AUTO: 2 % (ref 0–6)
ERYTHROCYTE [DISTWIDTH] IN BLOOD BY AUTOMATED COUNT: 13.1 % (ref 11.6–15.1)
HCT VFR BLD AUTO: 33.3 % (ref 34.8–46.1)
HEPARIN CF II AG PPP IA-MCNC: 0.64 IU/ML
HGB BLD-MCNC: 11.3 G/DL (ref 11.5–15.4)
HOLD SPECIMEN: NORMAL
IMM GRANULOCYTES # BLD AUTO: 0.13 THOUSAND/UL (ref 0–0.2)
IMM GRANULOCYTES NFR BLD AUTO: 1 % (ref 0–2)
INR PPP: 0.87 (ref 0.84–1.19)
LYMPHOCYTES # BLD AUTO: 1.9 THOUSANDS/ÂΜL (ref 0.6–4.47)
LYMPHOCYTES NFR BLD AUTO: 18 % (ref 14–44)
MCH RBC QN AUTO: 30 PG (ref 26.8–34.3)
MCHC RBC AUTO-ENTMCNC: 33.9 G/DL (ref 31.4–37.4)
MCV RBC AUTO: 88 FL (ref 82–98)
MONOCYTES # BLD AUTO: 0.98 THOUSAND/ÂΜL (ref 0.17–1.22)
MONOCYTES NFR BLD AUTO: 9 % (ref 4–12)
NEUTROPHILS # BLD AUTO: 7.2 THOUSANDS/ÂΜL (ref 1.85–7.62)
NEUTS SEG NFR BLD AUTO: 70 % (ref 43–75)
NRBC BLD AUTO-RTO: 0 /100 WBCS
PLATELET # BLD AUTO: 256 THOUSANDS/UL (ref 149–390)
PMV BLD AUTO: 10.1 FL (ref 8.9–12.7)
PROTHROMBIN TIME: 12.4 SECONDS (ref 11.6–14.5)
RBC # BLD AUTO: 3.77 MILLION/UL (ref 3.81–5.12)
RH BLD: POSITIVE
SPECIMEN EXPIRATION DATE: NORMAL
TREPONEMA PALLIDUM IGG+IGM AB [PRESENCE] IN SERUM OR PLASMA BY IMMUNOASSAY: NORMAL
WBC # BLD AUTO: 10.44 THOUSAND/UL (ref 4.31–10.16)

## 2024-04-30 PROCEDURE — NC001 PR NO CHARGE: Performed by: OBSTETRICS & GYNECOLOGY

## 2024-04-30 PROCEDURE — 99223 1ST HOSP IP/OBS HIGH 75: CPT | Performed by: STUDENT IN AN ORGANIZED HEALTH CARE EDUCATION/TRAINING PROGRAM

## 2024-04-30 PROCEDURE — 85730 THROMBOPLASTIN TIME PARTIAL: CPT

## 2024-04-30 PROCEDURE — 86850 RBC ANTIBODY SCREEN: CPT

## 2024-04-30 PROCEDURE — 86901 BLOOD TYPING SEROLOGIC RH(D): CPT

## 2024-04-30 PROCEDURE — 86900 BLOOD TYPING SEROLOGIC ABO: CPT

## 2024-04-30 PROCEDURE — 85520 HEPARIN ASSAY: CPT

## 2024-04-30 PROCEDURE — 85610 PROTHROMBIN TIME: CPT

## 2024-04-30 PROCEDURE — 59025 FETAL NON-STRESS TEST: CPT | Performed by: OBSTETRICS & GYNECOLOGY

## 2024-04-30 PROCEDURE — 99222 1ST HOSP IP/OBS MODERATE 55: CPT | Performed by: OBSTETRICS & GYNECOLOGY

## 2024-04-30 PROCEDURE — 86923 COMPATIBILITY TEST ELECTRIC: CPT

## 2024-04-30 PROCEDURE — 86780 TREPONEMA PALLIDUM: CPT

## 2024-04-30 PROCEDURE — 85025 COMPLETE CBC W/AUTO DIFF WBC: CPT

## 2024-04-30 RX ORDER — HEPARIN SODIUM 1000 [USP'U]/ML
3200 INJECTION, SOLUTION INTRAVENOUS; SUBCUTANEOUS EVERY 6 HOURS PRN
Status: DISCONTINUED | OUTPATIENT
Start: 2024-04-30 | End: 2024-05-02

## 2024-04-30 RX ORDER — METOPROLOL SUCCINATE 25 MG/1
25 TABLET, EXTENDED RELEASE ORAL DAILY
Status: DISCONTINUED | OUTPATIENT
Start: 2024-04-30 | End: 2024-05-04 | Stop reason: HOSPADM

## 2024-04-30 RX ORDER — FERROUS SULFATE 325(65) MG
325 TABLET ORAL
Status: DISCONTINUED | OUTPATIENT
Start: 2024-05-01 | End: 2024-05-02

## 2024-04-30 RX ORDER — ESCITALOPRAM OXALATE 10 MG/1
20 TABLET ORAL DAILY
Status: DISCONTINUED | OUTPATIENT
Start: 2024-04-30 | End: 2024-05-04 | Stop reason: HOSPADM

## 2024-04-30 RX ORDER — FERROUS SULFATE 325(65) MG
325 TABLET ORAL
Status: DISCONTINUED | OUTPATIENT
Start: 2024-04-30 | End: 2024-04-30

## 2024-04-30 RX ORDER — HEPARIN SODIUM 10000 [USP'U]/100ML
3-30 INJECTION, SOLUTION INTRAVENOUS
Status: DISCONTINUED | OUTPATIENT
Start: 2024-04-30 | End: 2024-05-02

## 2024-04-30 RX ORDER — HEPARIN SODIUM 1000 [USP'U]/ML
6400 INJECTION, SOLUTION INTRAVENOUS; SUBCUTANEOUS EVERY 6 HOURS PRN
Status: DISCONTINUED | OUTPATIENT
Start: 2024-04-30 | End: 2024-05-02

## 2024-04-30 RX ADMIN — HEPARIN SODIUM 18 UNITS/KG/HR: 10000 INJECTION, SOLUTION INTRAVENOUS at 08:23

## 2024-04-30 RX ADMIN — METOPROLOL SUCCINATE 25 MG: 25 TABLET, EXTENDED RELEASE ORAL at 21:23

## 2024-04-30 RX ADMIN — ESCITALOPRAM OXALATE 20 MG: 20 TABLET ORAL at 21:23

## 2024-04-30 NOTE — ASSESSMENT & PLAN NOTE
Lochia wnl. Fundus firm at umbilicus  Incision clean, dry and intact  Pain regimen: s/p Duramorph; viral Tylenol, viral Toradol->Motrin; PRN Oxycodone  Bowel regimen: Colace & Senna   Voiding s/p removal of patton catheter  Encourage ambulation and breastfeeding/pumping  Anticipate d/c home POD2

## 2024-04-30 NOTE — CONSULTS
Consultation - Maternal Fetal Medicine   Suze Leigh 28 y.o. female MRN: [id number]  Unit/Bed#: LD TRIAGE 2- Encounter: [id number]  Admit date: 2024.  Today's date: 24    Assessment/Plan   Ms. Leigh is a 28 y.o. year-old  at 37w4d, Hospital Day: 1, admitted for transition of her anticoagulation in the setting of mechanical heart valve prior to 1LTCS. .  By issue:    Anemia complicating pregnancy in third trimester  Assessment & Plan  Last Hgb 10.5  F/u admission Hgb, continue oral Fe     37 weeks gestation of pregnancy  Assessment & Plan  FEN: regular diet  Monitoring: NST daily   Admission labs: CBC/Type and Screen/Syphilis Screen     History of mechanical aortic valve replacement  Assessment & Plan  Assessment:    In  she underwent mechanical aortic valve replacement and ascending aorta repair with graft. She suffered an embolic stroke when holding anticoagulation for a surgical procedure.   Last took lovenox  at 1930  Most Recent heparin level  0.98   Plan:   Heparin gtt ordered, Titrate heparin drip at 6-hour intervals with goal aPTT twice baseline or anti-Xa 0.7-1.0.  Plan to discontinue heparin drip at 0200 on  (6 hours prior to scheduled c/s) to allow for neuraxial analgesia, plan to recheck aPTT prior to procedure   Will plan to notify cardiology of patient's admission   Anesthesia consult ordered   Plan for delivery via 1LTCS on 24 with ancef for surgical and endocarditis prophylaxis   Ensure placement of 2 IVs, availability of blood products, and uterotonics at delivery. Avoid TXA           Chief Complaint   Patient presents with    Scheduled      C/S  0800  needs heparin drip       Physician Requesting Consult: Sanjay Thompson MD  Subspeciality: Perinatology    Dear Dr. Thompson,    Thank you for referring patient Suze Leigh for Maternal-Fetal Medicine consultation.  HPI: As you know, Ms. Leigh is a 28 y.o. year-old  with an ELANA of  "Estimated Date of Delivery: 24 at 37w4d, Hospital Day: 1, admitted for transition of her anticoagulation in the setting of mechanical heart valve and planned 1LTCS . She has been maintained on lovenox throughout this pregnancy and last took her dose at 1930 last night.     Other obstetric review of symptoms:  Contractions: None.    Leakage of fluid: None.    Bleeding: None.    Fetal movement: present.      Review of Systems   All other systems reviewed and are negative.      Other history is as follows:    Historical Information   OB History    Para Term  AB Living   1 0 0 0 0 0   SAB IAB Ectopic Multiple Live Births   0 0 0 0 0      # Outcome Date GA Lbr Joe/2nd Weight Sex Delivery Anes PTL Lv   1 Current              Gynecologic history: Patient's last menstrual period was 2023 (exact date).     Past Medical History:   Diagnosis Date    Anxiety     Bicuspid aortic valve     s/p mechanical AVR    Coronary artery disease 1993    Exercises two times per week     cross fit- 2x/week    Moderate aortic insufficiency     s/p mechanical AVR    Motion sickness     Patent ductus arteriosus     s/p repair    PONV (postoperative nausea and vomiting)     S/P ascending aortic aneurysm repair     Stroke (HCC) 2022    no residual effects per pt--initially couldnt speak at first--\"feels back to normal now\"    Varicella     hx as child, and vaccine     Past Surgical History:   Procedure Laterality Date    ASCENDING AORTIC ANEURYSM REPAIR      with\" Aortic valve surgery\"    MECHANICAL AORTIC VALVE REPLACEMENT      PATENT DUCTUS ARTERIOUS LIGATION      per pt had this surgery age 1    DE EXC B9 LESION MRGN XCP SK TG T/A/L 1.1-2.0 CM Right 2022    Procedure: EXCISION  BIOPSY LESION/MASS BACK;  Surgeon: David Cloud MD;  Location: [facility];  Service: General    THROMBECTOMY W/ EMBOLECTOMY      Percutanoeous embolectomy in setting of stroke    WISDOM TOOTH EXTRACTION       Social " "History   Social History     Substance and Sexual Activity   Alcohol Use Not Currently    Alcohol/week: 1.0 standard drink of alcohol    Types: 1 Glasses of wine per week     Social History     Substance and Sexual Activity   Drug Use No     Social History     Tobacco Use   Smoking Status Never    Passive exposure: Never   Smokeless Tobacco Never         Meds/Allergies   Prior to Admission Medications   Prescriptions Last Dose Informant Patient Reported? Taking?   Prenatal Vit-Fe Fumarate-FA (PRENATAL VITAMIN PO) 4/29/2024 Self Yes Yes   Sig: Take by mouth   enoxaparin (LOVENOX) 100 mg/mL 4/29/2024 at 1930  No Yes   Sig: Inject 0.9 mL (90 mg total) under the skin every 12 (twelve) hours   escitalopram (LEXAPRO) 10 mg tablet 4/29/2024 at 2100  No Yes   Sig: Take 2 tablets (20 mg total) by mouth daily   ferrous sulfate 325 (65 FE) MG EC tablet 4/29/2024  Yes Yes   Sig: Take 325 mg by mouth daily with breakfast   metoprolol succinate (TOPROL-XL) 25 mg 24 hr tablet 4/29/2024 at 2100  No Yes   Sig: Take 1 tablet (25 mg total) by mouth daily      Facility-Administered Medications: None       Current Facility-Administered Medications   Medication Dose Route Frequency    escitalopram (LEXAPRO) tablet 20 mg  20 mg Oral Daily    ferrous sulfate tablet 325 mg  325 mg Oral Every Other Day with Breakfast    heparin (porcine) 25,000 units in 0.45% NaCl 250 mL infusion (premix)  3-30 Units/kg/hr (Order-Specific) Intravenous Titrated    heparin (porcine) injection 3,200 Units  3,200 Units Intravenous Q6H PRN    heparin (porcine) injection 6,400 Units  6,400 Units Intravenous Q6H PRN    metoprolol succinate (TOPROL-XL) 24 hr tablet 25 mg  25 mg Oral Daily     Allergies   Allergen Reactions    Cefprozil Rash       Objective    Patient Vitals for the past 24 hrs:   BP Temp Temp src Pulse Resp SpO2 Height Weight   04/30/24 0635 122/73 98.2 °F (36.8 °C) Oral 92 18 96 % 5' 3\" (1.6 m) 83.5 kg (184 lb)     Vitals: Blood pressure 122/73, " "pulse 92, temperature 98.2 °F (36.8 °C), temperature source Oral, resp. rate 18, height 5' 3\" (1.6 m), weight 83.5 kg (184 lb), last menstrual period 08/11/2023, SpO2 96%, not currently breastfeeding.Body mass index is 32.59 kg/m².    Physical Exam  Constitutional:       General: She is not in acute distress.     Appearance: Normal appearance.   HENT:      Head: Normocephalic and atraumatic.   Cardiovascular:      Rate and Rhythm: Normal rate.      Heart sounds: Murmur heard.      Systolic murmur is present.   Pulmonary:      Effort: Pulmonary effort is normal.      Breath sounds: Normal breath sounds.   Abdominal:      Palpations: Abdomen is soft.      Tenderness: There is no abdominal tenderness.      Comments: Gravid   Neurological:      General: No focal deficit present.      Mental Status: She is alert.   Psychiatric:         Mood and Affect: Mood normal.           Prenatal Labs:   Blood type: O positive  Antibody: negative  GBS: negative  HIV: non-reactive  Rubella: immune  Syphilis IgM/IgG: non-reactive  HBsAg: non-reactive  HCAb: non-reactive  Chlamydia: negative  Gonorrhea: negative  Diabetes 1 hour screen: 169  3 hour glucose: 76, 159, 145, 131  Platelets: 213k  Hgb: 10.5    Fetal data:  FHT:  Baseline 130 bpm   Variability: Moderate 6-25 bpm   Accelerations: Present 15x15   Decelerations: none      TOCO:   Uterine irritability noted     [facility] ultrasound report key findings: EFW 4/1/24 at 33w3d 41%rojelio Belcher MD  4/30/2024  7:03 AM          "

## 2024-04-30 NOTE — H&P
H & P- Obstetrics   Suze Leigh 28 y.o. female MRN: [id number]  Unit/Bed#: LD TRIAGE 2- Encounter: [id number]    Assessment: 28 y.o.  at 37w4d admitted for transition of her anticoagulation in the setting of mechanical heart valve prior to 1LTCS.    Plan:     Anemia complicating pregnancy in third trimester  Assessment & Plan  Last Hgb 10.5  F/u admission Hgb, continue oral Fe     37 weeks gestation of pregnancy  Assessment & Plan  Admit to OBGYN  Perinatology consult in the setting of mechanical heart valve/anticoagulation   FEN: regular diet  Monitoring: NST daily   Admission labs: CBC/Type and Screen/Syphilis Screen     History of mechanical aortic valve replacement  Assessment & Plan  Assessment:    In  she underwent mechanical aortic valve replacement and ascending aorta repair with graft. She suffered an embolic stroke when holding anticoagulation for a surgical procedure.   Last took lovenox  at 1930  Most Recent heparin level  0.98   Plan:   Heparin gtt ordered, Titrate heparin drip at 6-hour intervals with goal aPTT twice baseline or anti-Xa 0.7-1.0.  Plan to discontinue heparin drip at 0200 on  (6 hours prior to scheduled c/s) to allow for neuraxial analgesia, plan to recheck aPTT prior to procedure   Will plan to notify cardiology of patient's admission   Anesthesia consult ordered   Plan for delivery via 1LTCS on 24 with ancef for surgical and endocarditis prophylaxis   Ensure placement of 2 IVs, availability of blood products, and uterotonics at delivery. Avoid TXA          Discussed case and plan w/ Dr. Thompson       Chief Complaint: I am here to prepare for my csection    HPI: Suze Leigh is a 28 y.o.  with an ELANA of 2024, by Last Menstrual Period at 37w4d who is being admitted for transition of her anticoagulation in the setting of mechanical heart valve in preparation for planned 1LTCS. She denies having uterine contractions, has no LOF, and reports no  "VB. She states she has felt good FM. She reports that she last took her lovenox last night at 1930    Patient Active Problem List   Diagnosis    Moderate aortic insufficiency    Bicuspid aortic valve    History of mechanical aortic valve replacement    Hx of repair of ascending aorta    Cervical high risk HPV (human papillomavirus) test positive    ENDER I (cervical intraepithelial neoplasia I)    Cellulitis of back except buttock    History of stroke    Aphasia as late effect of cerebrovascular accident    Mass of subcutaneous tissue of back    PONV (postoperative nausea and vomiting)    Anxiety    Abscess    Obesity affecting pregnancy in third trimester    Maternal congenital cardiac anomaly affecting pregnancy, antepartum    History of maternal cardiac surgery    Mental disorder affecting pregnancy    History of embolic stroke    37 weeks gestation of pregnancy    Abnormal glucose tolerance test in pregnancy    Anemia complicating pregnancy in third trimester    33 weeks gestation of pregnancy    Trauma during pregnancy       Baby complications/comments: EFW 24 at 33w3d 41%tile     Review of Systems   All other systems reviewed and are negative.      OB Hx:  OB History    Para Term  AB Living   1 0 0 0 0 0   SAB IAB Ectopic Multiple Live Births   0 0 0 0 0      # Outcome Date GA Lbr Joe/2nd Weight Sex Delivery Anes PTL Lv   1 Current                Past Medical Hx:  Past Medical History:   Diagnosis Date    Anxiety     Bicuspid aortic valve     s/p mechanical AVR    Coronary artery disease 1993    Exercises two times per week     cross fit- 2x/week    Moderate aortic insufficiency     s/p mechanical AVR    Motion sickness     Patent ductus arteriosus     s/p repair    PONV (postoperative nausea and vomiting)     S/P ascending aortic aneurysm repair     Stroke (HCC) 2022    no residual effects per pt--initially couldnt speak at first--\"feels back to normal now\"    Varicella     hx as " "child, and vaccine       Past Surgical hx:  Past Surgical History:   Procedure Laterality Date    ASCENDING AORTIC ANEURYSM REPAIR  2015    with\" Aortic valve surgery\"    MECHANICAL AORTIC VALVE REPLACEMENT  2015    PATENT DUCTUS ARTERIOUS LIGATION      per pt had this surgery age 1    MO EXC B9 LESION MRGN XCP SK TG T/A/L 1.1-2.0 CM Right 12/05/2022    Procedure: EXCISION  BIOPSY LESION/MASS BACK;  Surgeon: David Cloud MD;  Location: [facility];  Service: General    THROMBECTOMY W/ EMBOLECTOMY  2022    Percutanoeous embolectomy in setting of stroke    WISDOM TOOTH EXTRACTION           Allergies   Allergen Reactions    Cefprozil Rash       Medications Prior to Admission   Medication    enoxaparin (LOVENOX) 100 mg/mL    escitalopram (LEXAPRO) 10 mg tablet    ferrous sulfate 325 (65 FE) MG EC tablet    metoprolol succinate (TOPROL-XL) 25 mg 24 hr tablet    Prenatal Vit-Fe Fumarate-FA (PRENATAL VITAMIN PO)       Objective:  Temp:  [98.2 °F (36.8 °C)] 98.2 °F (36.8 °C)  HR:  [92] 92  Resp:  [18] 18  BP: (122)/(73-84) 122/73  Body mass index is 32.59 kg/m².     Physical Exam:  Physical Exam  Constitutional:       General: She is not in acute distress.     Appearance: Normal appearance.   HENT:      Head: Normocephalic and atraumatic.   Cardiovascular:      Rate and Rhythm: Normal rate.      Heart sounds: Murmur heard.      Systolic murmur is present.   Pulmonary:      Effort: Pulmonary effort is normal.      Breath sounds: Normal breath sounds.   Abdominal:      Palpations: Abdomen is soft.      Tenderness: There is no abdominal tenderness.      Comments: gravid   Neurological:      General: No focal deficit present.      Mental Status: She is alert.   Skin:     General: Skin is warm and dry.   Psychiatric:         Mood and Affect: Mood normal.            FHT:  Baseline 130 bpm   Variability: Moderate 6-25 bpm   Accelerations: Present 15x15   Decelerations: none     TOCO:   Uterine irritability noted     Lab Results "   Component Value Date    WBC 11.38 (H) 04/03/2024    HGB 10.5 (L) 04/03/2024    HCT 31.8 (L) 04/03/2024     04/03/2024     Lab Results   Component Value Date     (L) 06/03/2015    K 4.1 02/25/2022     02/25/2022    CO2 23 04/02/2024    BUN 7 (L) 02/25/2022    CREATININE 0.64 02/25/2022    GLUCOSE 80 04/02/2024    AST 18 02/25/2022    ALT 18 02/25/2022     Prenatal Labs: Reviewed      Blood type: O positive  Antibody: negative  GBS: negative  HIV: non-reactive  Rubella: immune  Syphilis IgM/IgG: non-reactive  HBsAg: non-reactive  HCAb: non-reactive  Chlamydia: negative  Gonorrhea: negative  Diabetes 1 hour screen: 169  3 hour glucose: 76, 159, 145, 131  Platelets: 213k  Hgb: 10.5  >2 Midnights  INPATIENT     Signature/Title: Sujatha Belcher MD  Date: 4/30/2024  Time: 7:00 AM

## 2024-04-30 NOTE — ASSESSMENT & PLAN NOTE
S/p Cardiology consultation 5/3/24  S/p Heparin drip. Continue Warfarin 5mg QHS and Lovenox 1mg/kg BID until INR at goal (>2)  Continue Metoprolol XL 25mg daily

## 2024-04-30 NOTE — ASSESSMENT & PLAN NOTE
Assessment:    In 2015 she underwent mechanical aortic valve replacement and ascending aorta repair with graft. She suffered an embolic stroke when holding anticoagulation for a surgical procedure.   Last took lovenox 4/29 at 1930  Most Recent heparin level 4/24 0.98   Now s/p ~ 24 hours of heparin gtt, last 2 aPTT therapeutic, AM PTT pending   Plan:   Conitnue Heparin gtt ordered, f/u AM aPTT  Plan to discontinue heparin drip at 0200 on 4/24 (6 hours prior to scheduled c/s) to allow for neuraxial analgesia, plan to recheck aPTT prior to procedure   Cardiology aware of patients admission, will plan for formal consult postoperatively to aid in warfarin dosing  Anesthesia consult ordered   Plan for delivery via 1LTCS on 4/2/24 with ancef for surgical and endocarditis prophylaxis   Ensure placement of 2 IVs, availability of blood products, and uterotonics at delivery. Avoid TXA

## 2024-04-30 NOTE — PLAN OF CARE
Problem: ANTEPARTUM  Goal: Maintain pregnancy as long as maternal and/or fetal condition is stable  Description: INTERVENTIONS:  - Maternal surveillance  - Fetal surveillance  - Monitor uterine activity  - Medications as ordered  - Bedrest  2024 1443 by Dori Reyes RN  Outcome: Progressing  2024 1441 by Dori Reyes RN  Outcome: Progressing     Problem: BIRTH - VAGINAL/ SECTION  Goal: Fetal and maternal status remain reassuring during the birth process  Description: INTERVENTIONS:  - Monitor vital signs  - Monitor fetal heart rate  - Monitor uterine activity  - Monitor labor progression (vaginal delivery)  - DVT prophylaxis  - Antibiotic prophylaxis  Outcome: Progressing  Goal: Emotionally satisfying birthing experience for mother/fetus  Description: Interventions:  - Assess, plan, implement and evaluate the nursing care given to the patient in labor  - Advocate the philosophy that each childbirth experience is a unique experience and support the family's chosen level of involvement and control during the labor process   - Actively participate in both the patient's and family's teaching of the birth process  - Consider cultural, Taoism and age-specific factors and plan care for the patient in labor  Outcome: Progressing     Problem: POSTPARTUM  Goal: Experiences normal postpartum course  Description: INTERVENTIONS:  - Monitor maternal vital signs  - Assess uterine involution and lochia  Outcome: Progressing  Goal: Appropriate maternal -  bonding  Description: INTERVENTIONS:  - Identify family support  - Assess for appropriate maternal/infant bonding   -Encourage maternal/infant bonding opportunities  - Referral to  or  as needed  Outcome: Progressing  Goal: Establishment of infant feeding pattern  Description: INTERVENTIONS:  - Assess breast/bottle feeding  - Refer to lactation as needed  Outcome: Progressing  Goal: Incision(s),  wounds(s) or drain site(s) healing without S/S of infection  Description: INTERVENTIONS  - Assess and document dressing, incision, wound bed, drain sites and surrounding tissue  - Provide patient and family education  Outcome: Progressing     Problem: PAIN - ADULT  Goal: Verbalizes/displays adequate comfort level or baseline comfort level  Description: Interventions:  - Encourage patient to monitor pain and request assistance  - Assess pain using appropriate pain scale  - Administer analgesics based on type and severity of pain and evaluate response  - Implement non-pharmacological measures as appropriate and evaluate response  - Consider cultural and social influences on pain and pain management  - Notify physician/advanced practitioner if interventions unsuccessful or patient reports new pain  4/30/2024 1443 by Dori Reyes RN  Outcome: Progressing  4/30/2024 1442 by Dori Reyes RN  Outcome: Progressing  4/30/2024 1441 by Dori Reyes RN  Outcome: Progressing     Problem: INFECTION - ADULT  Goal: Absence or prevention of progression during hospitalization  Description: INTERVENTIONS:  - Assess and monitor for signs and symptoms of infection  - Monitor lab/diagnostic results  - Monitor all insertion sites, i.e. indwelling lines, tubes, and drains  - Monitor endotracheal if appropriate and nasal secretions for changes in amount and color  - Ida appropriate cooling/warming therapies per order  - Administer medications as ordered  - Instruct and encourage patient and family to use good hand hygiene technique  - Identify and instruct in appropriate isolation precautions for identified infection/condition  4/30/2024 1443 by Dori Reyes RN  Outcome: Progressing  4/30/2024 1442 by Dori Reyes RN  Outcome: Progressing  4/30/2024 1441 by Dori Reyes RN  Outcome: Progressing  Goal: Absence of fever/infection during neutropenic  period  Description: INTERVENTIONS:  - Monitor WBC    4/30/2024 1443 by Dori Reyes RN  Outcome: Progressing  4/30/2024 1442 by Dori Reyes RN  Outcome: Progressing  4/30/2024 1441 by Dori Reyes RN  Outcome: Progressing     Problem: SAFETY ADULT  Goal: Patient will remain free of falls  Description: INTERVENTIONS:  - Educate patient/family on patient safety including physical limitations  - Instruct patient to call for assistance with activity   - Consult OT/PT to assist with strengthening/mobility   - Keep Call bell within reach  - Keep bed low and locked with side rails adjusted as appropriate  - Keep care items and personal belongings within reach  - Initiate and maintain comfort rounds  4/30/2024 1443 by Dori Reyes RN  Outcome: Progressing  4/30/2024 1442 by Dori Reyes RN  Outcome: Progressing  4/30/2024 1441 by Dori Reyes RN  Outcome: Progressing  Goal: Maintain or return to baseline ADL function  Description: INTERVENTIONS:  -  Assess patient's ability to carry out ADLs; assess patient's baseline for ADL function and identify physical deficits which impact ability to perform ADLs (bathing, care of mouth/teeth, toileting, grooming, dressing, etc.)  - Assess/evaluate cause of self-care deficits   - Assess range of motion  - Assess patient's mobility; develop plan if impaired  - Assess patient's need for assistive devices and provide as appropriate  - Encourage maximum independence but intervene and supervise when necessary  - Involve family in performance of ADLs  - Assess for home care needs following discharge   - Consider OT consult to assist with ADL evaluation and planning for discharge  - Provide patient education as appropriate  4/30/2024 1443 by Dori Reyes RN  Outcome: Progressing  4/30/2024 1442 by Dori Reyes RN  Outcome: Progressing  4/30/2024 1441 by Dori Reyes RN  Outcome:  Progressing  Goal: Maintains/Returns to pre admission functional level  Description: INTERVENTIONS:  - Perform AM-PAC 6 Click Basic Mobility/ Daily Activity assessment daily.  - Set and communicate daily mobility goal to care team and patient/family/caregiver.   - Out of bed for toileting  - Record patient progress and toleration of activity level   4/30/2024 1443 by Dori Reyes RN  Outcome: Progressing  4/30/2024 1441 by Dori Reyes RN  Outcome: Progressing     Problem: Knowledge Deficit  Goal: Patient/family/caregiver demonstrates understanding of disease process, treatment plan, medications, and discharge instructions  Description: Complete learning assessment and assess knowledge base.  Interventions:  - Provide teaching at level of understanding  - Provide teaching via preferred learning methods  4/30/2024 1443 by Dori Reyes RN  Outcome: Progressing  4/30/2024 1441 by Dori Reyes RN  Outcome: Progressing     Problem: DISCHARGE PLANNING  Goal: Discharge to home or other facility with appropriate resources  Description: INTERVENTIONS:  - Identify barriers to discharge w/patient and caregiver  - Arrange for needed discharge resources and transportation as appropriate  - Identify discharge learning needs (meds, wound care, etc.)  - Arrange for interpretive services to assist at discharge as needed  - Refer to Case Management Department for coordinating discharge planning if the patient needs post-hospital services based on physician/advanced practitioner order or complex needs related to functional status, cognitive ability, or social support system  4/30/2024 1443 by Dori Reyes RN  Outcome: Progressing  4/30/2024 1441 by Dori Reyes RN  Outcome: Progressing

## 2024-04-30 NOTE — DISCHARGE SUMMARY
Obstetrics Discharge Summary  Suze Leigh 28 y.o. female MRN: [id number]  Unit/Bed#: -01 Encounter: [id number]    Admission Date: 2024     Discharge Date: 2024    Primary Obstetrician: Juani CABRERA   Admitting/Delivering Attending: Sanjay Thompson MD   Discharging Attending: Tabby Payne MD    Admitting Diagnoses:   Pregnancy at 37 weeks of gestation   History of mechanical aortic valve replacement   History of cerebrovascular accident   Hx of repair of ascending aorta   Mild anemia complicating pregnancy     Discharge Diagnoses:   Same, delivered    Consultant(s):   Maternal Fetal Medicine   Cardiology  Anesthesiology    Hospital course: Suze Leigh is now a 28 y.o.  who was initially admitted at 37w4d for planned transition of anticoagulation due to mechanical aortic valve before planned primary  section. In the weeks leading up to admission she was on Lovenox. On admission she was stared on a heparin drip and this as discontinued 7hrs pre-delivery per Anesthesia. Repeat aPTT 1-hr before her surgery time was appropriate for regional anesthesia.     She underwent an uncomplicated primary, low transverse  delivery on 2024. APGARS were 7, 9 at 1 and 5 minutes, respectively. 's birth weight was 6lb 4oz. Placenta delivered without difficulty. Blood loss at delivery was 291mL.   was admitted to the  nursery. Patient tolerated the procedure well and was transferred to recovery in stable condition.     Heparin drip was restarted at 6hrs post delivery and Warfarin was started. Heparin was ultimately discontinued, and she was discharged home on Lovenox with plan to follow up for INR check.    She was diagnosed with gestational hypertension pre-delivery. There was no laboratory evidence of preeclampsia and blood pressures were at goal post delivery without antihypertensive therapy.    On day of discharge, patient was instructed to follow up with her  OBGYN as an outpatient and was given appropriate precautions for which to call her obstetric provider or return to the hospital.    Complications: none apparent    Condition at discharge: good     Provisions for Follow-Up Care:  Please see after visit summary for information related to follow-up care and any pertinent home health orders.      Disposition: Home    Planned Readmission: no    Discharge Medications:   Please see AVS for a complete list of discharge medications.    Discharge instructions :   Please see AVS for complete discharge instructions.    Ambar Horan MD  PGY-1 OBGYN

## 2024-05-01 ENCOUNTER — ANESTHESIA EVENT (INPATIENT)
Dept: LABOR AND DELIVERY | Facility: HOSPITAL | Age: 29
End: 2024-05-01
Payer: COMMERCIAL

## 2024-05-01 LAB
APTT PPP: 69 SECONDS (ref 23–37)
APTT PPP: 71 SECONDS (ref 23–37)

## 2024-05-01 PROCEDURE — 85730 THROMBOPLASTIN TIME PARTIAL: CPT

## 2024-05-01 PROCEDURE — 99232 SBSQ HOSP IP/OBS MODERATE 35: CPT | Performed by: OBSTETRICS & GYNECOLOGY

## 2024-05-01 PROCEDURE — NC001 PR NO CHARGE: Performed by: OBSTETRICS & GYNECOLOGY

## 2024-05-01 PROCEDURE — 59025 FETAL NON-STRESS TEST: CPT | Performed by: OBSTETRICS & GYNECOLOGY

## 2024-05-01 RX ORDER — CEFAZOLIN SODIUM 2 G/50ML
2000 SOLUTION INTRAVENOUS ONCE
Status: COMPLETED | OUTPATIENT
Start: 2024-05-02 | End: 2024-05-02

## 2024-05-01 RX ADMIN — ESCITALOPRAM OXALATE 20 MG: 20 TABLET ORAL at 21:07

## 2024-05-01 RX ADMIN — FERROUS SULFATE TAB 325 MG (65 MG ELEMENTAL FE) 325 MG: 325 (65 FE) TAB at 08:02

## 2024-05-01 RX ADMIN — HEPARIN SODIUM 18 UNITS/KG/HR: 10000 INJECTION, SOLUTION INTRAVENOUS at 01:32

## 2024-05-01 RX ADMIN — METOPROLOL SUCCINATE 25 MG: 25 TABLET, EXTENDED RELEASE ORAL at 21:07

## 2024-05-01 RX ADMIN — HEPARIN SODIUM 18 UNITS/KG/HR: 10000 INJECTION, SOLUTION INTRAVENOUS at 18:06

## 2024-05-01 NOTE — ANESTHESIA PREPROCEDURE EVALUATION
Procedure:   SECTION () (Uterus)    Relevant Problems   ANESTHESIA   (+) PONV (postoperative nausea and vomiting)      CARDIO   (+) Bicuspid aortic valve   (+) Moderate aortic insufficiency      ENDO (within normal limits)      GI/HEPATIC (within normal limits)      /RENAL (within normal limits)      HEMATOLOGY   (+) Anemia complicating pregnancy in third trimester      MUSCULOSKELETAL (within normal limits)      NEURO/PSYCH   (+) Anxiety   (+) Aphasia as late effect of cerebrovascular accident      PULMONARY (within normal limits)      Obstetrics/Gynecology   (+) Maternal congenital cardiac anomaly affecting pregnancy, antepartum   (+) Obesity affecting pregnancy in third trimester      Neurology/Sleep   (+) History of embolic stroke   (+) History of stroke      Surgery/Wound/Pain   (+) History of maternal cardiac surgery   (+) History of mechanical aortic valve replacement   (+) Hx of repair of ascending aorta    Congenital bicuspid aortic valve, aortic aneurysmal s/p AVR with mechanical valve  on warfarin. Patient had held warfarin for procedure for multiple days in past and developed an embolic stroke that required intervention. She previously had speech changes and right sided weakness that has since resolved. She states that when she is tired that she sometimes still has some speech changes and issues. She is otherwise very active and walks regularly with her dog without restriction; last walk was over 2 miles without symptoms.     I discussed the risks, benefits, and alternatives of anesthesia with this patient and she voiced agreement with anesthesia. All questions at this time have been answered, and she was encouraged to ask further questions prior to her procedure tomorrow.     Physical Exam    Airway    Mallampati score: I  TM Distance: >3 FB  Neck ROM: full     Dental   No notable dental hx     Cardiovascular  Rhythm: regular, Rate: normal, Cardiovascular exam  normal    Pulmonary  Pulmonary exam normal Breath sounds clear to auscultation    Other Findings  post-pubertal.      Anesthesia Plan  ASA Score- 3     Anesthesia Type- spinal with ASA Monitors.         Additional Monitors:     Airway Plan:            Plan Factors-Exercise tolerance (METS): >4 METS.    Chart reviewed. EKG reviewed.  Existing labs reviewed. Patient summary reviewed.    Patient is not a current smoker.  Patient did not smoke on day of surgery.    Obstructive sleep apnea risk education given perioperatively.        Induction-     Postoperative Plan- Plan for postoperative opioid use.     Informed Consent- Anesthetic plan and risks discussed with patient and sibling.  I personally reviewed this patient with the CRNA. Discussed and agreed on the Anesthesia Plan with the CRNA..            Recent labs personally reviewed:  Lab Results   Component Value Date    WBC 10.44 (H) 04/30/2024    HGB 11.3 (L) 04/30/2024     04/30/2024     Lab Results   Component Value Date     (L) 06/03/2015    K 4.1 02/25/2022    BUN 7 (L) 02/25/2022    CREATININE 0.64 02/25/2022    GLUCOSE 80 04/02/2024     Lab Results   Component Value Date    PTT 71 (H) 05/01/2024      Lab Results   Component Value Date    INR 0.87 04/30/2024       Blood type O    Lab Results   Component Value Date    HGBA1C 5.2 02/16/2022       I, Huber Howe MD, have personally seen and evaluated the patient prior to anesthetic care.  I have reviewed the pre-anesthetic record, and other medical records if appropriate to the anesthetic care.  If a CRNA is involved in the case, I have reviewed the CRNA assessment, if present, and agree. Risks/benefits and alternatives discussed with patient including possible PONV, sore throat, and possibility of rare anesthetic and surgical emergencies.

## 2024-05-01 NOTE — PROGRESS NOTES
Progress Note - Maternal Fetal Medicine   Suze Leigh 28 y.o. female MRN: [id number]  Unit/Bed#: -01 Encounter: [id number]  Admit date: 2024.  Today's date: 24    Assessment/Plan     Ms. Leigh is a 28 y.o.  at 37w5d, Hospital Day: 2,  admitted for transition of her anticoagulation in the setting of mechanical heart valve prior to 1LTCS .  By issue:    * History of mechanical aortic valve replacement  Assessment & Plan  Assessment:    In  she underwent mechanical aortic valve replacement and ascending aorta repair with graft. She suffered an embolic stroke when holding anticoagulation for a surgical procedure.   Last took lovenox  at 1930  Most Recent heparin level  0.98   Now s/p ~ 24 hours of heparin gtt, last 2 aPTT therapeutic, AM PTT pending   Plan:   Conitnue Heparin gtt ordered, f/u AM aPTT  Plan to discontinue heparin drip at 0200 on  (6 hours prior to scheduled c/s) to allow for neuraxial analgesia, plan to recheck aPTT prior to procedure   Cardiology aware of patients admission, will plan for formal consult postoperatively to aid in warfarin dosing  Anesthesia consult ordered   Plan for delivery via 1LTCS on 24 with ancef for surgical and endocarditis prophylaxis   Ensure placement of 2 IVs, availability of blood products, and uterotonics at delivery. Avoid TXA    37 weeks gestation of pregnancy  Assessment & Plan  FEN: regular diet  Monitoring: NST daily     Anemia complicating pregnancy in third trimester  Assessment & Plan  Hgb 11.3 on admission  Continue Fe supplementation              Subjective    Suze reports that she feels well this. She denies any obstetrical complaints at this time.    Contractions: no  Loss of fluid: no  Vaginal bleeding: no  Fetal movement: yes    Pain: no  Headaches: no  Visual changes: no  Chest pain: no  Shortness of breath: no  Nausea: no  Vomiting/Diarrhea: no  Dysuria: no  Leg pain: no          Objective      Patient  "Vitals for the past 24 hrs:   BP Temp Temp src Pulse Resp SpO2 Height Weight   05/01/24 0430 145/84 98.2 °F (36.8 °C) Oral 71 18 96 % -- --   05/01/24 0034 125/83 98.3 °F (36.8 °C) Oral 105 18 96 % -- --   04/30/24 2123 -- -- -- 87 -- -- -- --   04/30/24 1959 124/77 97.5 °F (36.4 °C) Oral 77 18 98 % -- --   04/30/24 1554 116/79 97.5 °F (36.4 °C) Axillary 76 16 97 % -- --   04/30/24 1150 120/75 98 °F (36.7 °C) Oral 71 18 95 % -- --   04/30/24 0909 128/80 98.3 °F (36.8 °C) Oral 83 18 97 % -- --   04/30/24 0635 122/73 98.2 °F (36.8 °C) Oral 92 18 96 % 5' 3\" (1.6 m) 83.5 kg (184 lb)       Physical Exam  Constitutional:       General: She is not in acute distress.     Appearance: Normal appearance.   HENT:      Head: Normocephalic and atraumatic.   Cardiovascular:      Rate and Rhythm: Normal rate.      Heart sounds: Murmur heard.      Systolic murmur is present.   Pulmonary:      Effort: Pulmonary effort is normal.   Abdominal:      Palpations: Abdomen is soft.      Tenderness: There is no abdominal tenderness.      Comments: Gravid   Musculoskeletal:      Right lower leg: No tenderness.      Left lower leg: No tenderness.   Skin:     General: Skin is warm and dry.   Neurological:      General: No focal deficit present.      Mental Status: She is alert.   Psychiatric:         Mood and Affect: Mood normal.         I/O       None            Invasive Devices       Peripheral Intravenous Line  Duration             Peripheral IV 04/30/24 Distal;Dorsal (posterior);Right Forearm <1 day    Peripheral IV 04/30/24 Left;Lower;Dorsal (posterior) Forearm <1 day                    Results from last 7 days   Lab Units 04/30/24  0700   WBC Thousand/uL 10.44*   TOTAL NEUT ABS Thousands/µL 7.20   HEMOGLOBIN g/dL 11.3*   MCV fL 88   PLATELETS Thousands/uL 256           Invalid input(s): \"GLU\", \"CA\"      Results from last 7 days   Lab Units 04/30/24 2037 04/30/24  1435 04/30/24  0700   PLATELETS Thousands/uL  --   --  256   INR   --   --  " "0.87   PROTIME seconds  --   --  12.4   PTT seconds 67* 63* 34                           Brief review of pertinent history:  Past Medical History:   Diagnosis Date    Anxiety     Bicuspid aortic valve     s/p mechanical AVR    Coronary artery disease 1993    Exercises two times per week     cross fit- 2x/week    Moderate aortic insufficiency     s/p mechanical AVR    Motion sickness     Patent ductus arteriosus     s/p repair    PONV (postoperative nausea and vomiting)     S/P ascending aortic aneurysm repair     Stroke (HCC) 2022    no residual effects per pt--initially couldnt speak at first--\"feels back to normal now\"    Varicella     hx as child, and vaccine     Past Surgical History:   Procedure Laterality Date    ASCENDING AORTIC ANEURYSM REPAIR      with\" Aortic valve surgery\"    MECHANICAL AORTIC VALVE REPLACEMENT      PATENT DUCTUS ARTERIOUS LIGATION      per pt had this surgery age 1    AK EXC B9 LESION MRGN XCP SK TG T/A/L 1.1-2.0 CM Right 2022    Procedure: EXCISION  BIOPSY LESION/MASS BACK;  Surgeon: David Cloud MD;  Location: [facility];  Service: General    THROMBECTOMY W/ EMBOLECTOMY      Percutanoeous embolectomy in setting of stroke    WISDOM TOOTH EXTRACTION       OB History    Para Term  AB Living   1 0 0 0 0 0   SAB IAB Ectopic Multiple Live Births   0 0 0 0 0      # Outcome Date GA Lbr Joe/2nd Weight Sex Delivery Anes PTL Lv   1 Current                Fetal data:  Nonstress test: date 24 Time: 557 - 06  Baseline:  120  Variability: moderate  Accelerations: present, 15x15  Decelerations: absent  Contractions: absent  Assessment: reactive  Plan:  continue daily NST       MFM ultrasound report key findings: EFW 24 at 33w 4lb 13oz, 2182 grams     MEDS:   Medication Administration - last 24 hours from 2024 0622 to 2024 0622         Date/Time Order Dose Route Action Action by     2024 EDT escitalopram (LEXAPRO) tablet 20 " mg 20 mg Oral Given Tom Cuevas RN     04/30/2024 2123 EDT metoprolol succinate (TOPROL-XL) 24 hr tablet 25 mg 25 mg Oral Given Tom Cuevas RN     05/01/2024 0132 EDT heparin (porcine) 25,000 units in 0.45% NaCl 250 mL infusion (premix) 18 Units/kg/hr Intravenous New Bag Tom Cuevas RN     05/01/2024 0131 EDT heparin (porcine) 25,000 units in 0.45% NaCl 250 mL infusion (premix) 0 Units/kg/hr Intravenous Stopped Tom Cuevas RN     04/30/2024 0823 EDT heparin (porcine) 25,000 units in 0.45% NaCl 250 mL infusion (premix) 18 Units/kg/hr Intravenous New Bag Cherie Palacio RN          Current Facility-Administered Medications   Medication Dose Route Frequency    escitalopram (LEXAPRO) tablet 20 mg  20 mg Oral Daily    ferrous sulfate tablet 325 mg  325 mg Oral Every Other Day with Breakfast    heparin (porcine) 25,000 units in 0.45% NaCl 250 mL infusion (premix)  3-30 Units/kg/hr (Order-Specific) Intravenous Titrated    heparin (porcine) injection 3,200 Units  3,200 Units Intravenous Q6H PRN    heparin (porcine) injection 6,400 Units  6,400 Units Intravenous Q6H PRN    metoprolol succinate (TOPROL-XL) 24 hr tablet 25 mg  25 mg Oral Daily            Sujatha Belcher MD  OBGYN, PGY-3  5/1/2024  6:22 AM

## 2024-05-01 NOTE — PLAN OF CARE
Problem: ANTEPARTUM  Goal: Maintain pregnancy as long as maternal and/or fetal condition is stable  Description: INTERVENTIONS:  - Maternal surveillance  - Fetal surveillance  - Monitor uterine activity  - Medications as ordered  - Bedrest  Outcome: Progressing     Problem: BIRTH - VAGINAL/ SECTION  Goal: Fetal and maternal status remain reassuring during the birth process  Description: INTERVENTIONS:  - Monitor vital signs  - Monitor fetal heart rate  - Monitor uterine activity  - Monitor labor progression (vaginal delivery)  - DVT prophylaxis  - Antibiotic prophylaxis  Outcome: Progressing  Goal: Emotionally satisfying birthing experience for mother/fetus  Description: Interventions:  - Assess, plan, implement and evaluate the nursing care given to the patient in labor  - Advocate the philosophy that each childbirth experience is a unique experience and support the family's chosen level of involvement and control during the labor process   - Actively participate in both the patient's and family's teaching of the birth process  - Consider cultural, [place] and age-specific factors and plan care for the patient in labor  Outcome: Progressing     Problem: POSTPARTUM  Goal: Experiences normal postpartum course  Description: INTERVENTIONS:  - Monitor maternal vital signs  - Assess uterine involution and lochia  Outcome: Progressing  Goal: Appropriate maternal -  bonding  Description: INTERVENTIONS:  - Identify family support  - Assess for appropriate maternal/infant bonding   -Encourage maternal/infant bonding opportunities  - Referral to  or  as needed  Outcome: Progressing  Goal: Establishment of infant feeding pattern  Description: INTERVENTIONS:  - Assess breast/bottle feeding  - Refer to lactation as needed  Outcome: Progressing  Goal: Incision(s), wounds(s) or drain site(s) healing without S/S of infection  Description: INTERVENTIONS  - Assess and document dressing,  incision, wound bed, drain sites and surrounding tissue  - Provide patient and family education  - Perform skin care/dressing changes every   Outcome: Progressing     Problem: PAIN - ADULT  Goal: Verbalizes/displays adequate comfort level or baseline comfort level  Description: Interventions:  - Encourage patient to monitor pain and request assistance  - Assess pain using appropriate pain scale  - Administer analgesics based on type and severity of pain and evaluate response  - Implement non-pharmacological measures as appropriate and evaluate response  - Consider cultural and social influences on pain and pain management  - Notify physician/advanced practitioner if interventions unsuccessful or patient reports new pain  Outcome: Progressing     Problem: INFECTION - ADULT  Goal: Absence or prevention of progression during hospitalization  Description: INTERVENTIONS:  - Assess and monitor for signs and symptoms of infection  - Monitor lab/diagnostic results  - Monitor all insertion sites, i.e. indwelling lines, tubes, and drains  - Monitor endotracheal if appropriate and nasal secretions for changes in amount and color  - [place] appropriate cooling/warming therapies per order  - Administer medications as ordered  - Instruct and encourage patient and family to use good hand hygiene technique  - Identify and instruct in appropriate isolation precautions for identified infection/condition  Outcome: Progressing  Goal: Absence of fever/infection during neutropenic period  Description: INTERVENTIONS:  - Monitor WBC    Outcome: Progressing     Problem: SAFETY ADULT  Goal: Patient will remain free of falls  Description: INTERVENTIONS:  - Educate patient/family on patient safety including physical limitations  - Instruct patient to call for assistance with activity   - Consult OT/PT to assist with strengthening/mobility   - Keep Call bell within reach  - Keep bed low and locked with side rails adjusted as appropriate  -  Keep care items and personal belongings within reach  - Initiate and maintain comfort rounds  Outcome: Progressing  Goal: Maintain or return to baseline ADL function  Description: INTERVENTIONS:  -  Assess patient's ability to carry out ADLs; assess patient's baseline for ADL function and identify physical deficits which impact ability to perform ADLs (bathing, care of mouth/teeth, toileting, grooming, dressing, etc.)  - Assess/evaluate cause of self-care deficits   - Assess range of motion  - Assess patient's mobility; develop plan if impaired  - Assess patient's need for assistive devices and provide as appropriate  - Encourage maximum independence but intervene and supervise when necessary  - Involve family in performance of ADLs  - Assess for home care needs following discharge   - Consider OT consult to assist with ADL evaluation and planning for discharge  - Provide patient education as appropriate  Outcome: Progressing  Goal: Maintains/Returns to pre admission functional level  Description: INTERVENTIONS:  - Perform AM-PAC 6 Click Basic Mobility/ Daily Activity assessment daily.  - Set and communicate daily mobility goal to care team and patient/family/caregiver.   - Out of bed for toileting  - Record patient progress and toleration of activity level   Outcome: Progressing     Problem: Knowledge Deficit  Goal: Patient/family/caregiver demonstrates understanding of disease process, treatment plan, medications, and discharge instructions  Description: Complete learning assessment and assess knowledge base.  Interventions:  - Provide teaching at level of understanding  - Provide teaching via preferred learning methods  Outcome: Progressing     Problem: DISCHARGE PLANNING  Goal: Discharge to home or other facility with appropriate resources  Description: INTERVENTIONS:  - Identify barriers to discharge w/patient and caregiver  - Arrange for needed discharge resources and transportation as appropriate  - Identify  discharge learning needs (meds, wound care, etc.)  - Arrange for interpretive services to assist at discharge as needed  - Refer to Case Management Department for coordinating discharge planning if the patient needs post-hospital services based on physician/advanced practitioner order or complex needs related to functional status, cognitive ability, or social support system  Outcome: Progressing

## 2024-05-01 NOTE — PLAN OF CARE
Problem: ANTEPARTUM  Goal: Maintain pregnancy as long as maternal and/or fetal condition is stable  Description: INTERVENTIONS:  - Maternal surveillance  - Fetal surveillance  - Monitor uterine activity  - Medications as ordered  - Bedrest  Outcome: Progressing     Problem: BIRTH - VAGINAL/ SECTION  Goal: Fetal and maternal status remain reassuring during the birth process  Description: INTERVENTIONS:  - Monitor vital signs  - Monitor fetal heart rate  - Monitor uterine activity  - Monitor labor progression (vaginal delivery)  - DVT prophylaxis  - Antibiotic prophylaxis  Outcome: Progressing  Goal: Emotionally satisfying birthing experience for mother/fetus  Description: Interventions:  - Assess, plan, implement and evaluate the nursing care given to the patient in labor  - Advocate the philosophy that each childbirth experience is a unique experience and support the family's chosen level of involvement and control during the labor process   - Actively participate in both the patient's and family's teaching of the birth process  - Consider cultural, Islam and age-specific factors and plan care for the patient in labor  Outcome: Progressing     Problem: POSTPARTUM  Goal: Experiences normal postpartum course  Description: INTERVENTIONS:  - Monitor maternal vital signs  - Assess uterine involution and lochia  Outcome: Progressing  Goal: Appropriate maternal -  bonding  Description: INTERVENTIONS:  - Identify family support  - Assess for appropriate maternal/infant bonding   -Encourage maternal/infant bonding opportunities  - Referral to  or  as needed  Outcome: Progressing  Goal: Establishment of infant feeding pattern  Description: INTERVENTIONS:  - Assess breast/bottle feeding  - Refer to lactation as needed  Outcome: Progressing  Goal: Incision(s), wounds(s) or drain site(s) healing without S/S of infection  Description: INTERVENTIONS  - Assess and document dressing,  incision, wound bed, drain sites and surrounding tissue  - Provide patient and family education  - Perform skin care/dressing changes every  Outcome: Progressing     Problem: PAIN - ADULT  Goal: Verbalizes/displays adequate comfort level or baseline comfort level  Description: Interventions:  - Encourage patient to monitor pain and request assistance  - Assess pain using appropriate pain scale  - Administer analgesics based on type and severity of pain and evaluate response  - Implement non-pharmacological measures as appropriate and evaluate response  - Consider cultural and social influences on pain and pain management  - Notify physician/advanced practitioner if interventions unsuccessful or patient reports new pain  Outcome: Progressing     Problem: INFECTION - ADULT  Goal: Absence or prevention of progression during hospitalization  Description: INTERVENTIONS:  - Assess and monitor for signs and symptoms of infection  - Monitor lab/diagnostic results  - Monitor all insertion sites, i.e. indwelling lines, tubes, and drains  - Monitor endotracheal if appropriate and nasal secretions for changes in amount and color  - [place] appropriate cooling/warming therapies per order  - Administer medications as ordered  - Instruct and encourage patient and family to use good hand hygiene technique  - Identify and instruct in appropriate isolation precautions for identified infection/condition  Outcome: Progressing  Goal: Absence of fever/infection during neutropenic period  Description: INTERVENTIONS:  - Monitor WBC    Outcome: Progressing     Problem: SAFETY ADULT  Goal: Patient will remain free of falls  Description: INTERVENTIONS:  - Educate patient/family on patient safety including physical limitations  - Instruct patient to call for assistance with activity   - Consult OT/PT to assist with strengthening/mobility   - Keep Call bell within reach  - Keep bed low and locked with side rails adjusted as appropriate  -  Keep care items and personal belongings within reach  - Initiate and maintain comfort rounds  Outcome: Progressing  Goal: Maintain or return to baseline ADL function  Description: INTERVENTIONS:  -  Assess patient's ability to carry out ADLs; assess patient's baseline for ADL function and identify physical deficits which impact ability to perform ADLs (bathing, care of mouth/teeth, toileting, grooming, dressing, etc.)  - Assess/evaluate cause of self-care deficits   - Assess range of motion  - Assess patient's mobility; develop plan if impaired  - Assess patient's need for assistive devices and provide as appropriate  - Encourage maximum independence but intervene and supervise when necessary  - Involve family in performance of ADLs  - Assess for home care needs following discharge   - Consider OT consult to assist with ADL evaluation and planning for discharge  - Provide patient education as appropriate  Outcome: Progressing  Goal: Maintains/Returns to pre admission functional level  Description: INTERVENTIONS:  - Perform AM-PAC 6 Click Basic Mobility/ Daily Activity assessment daily.  - Set and communicate daily mobility goal to care team and patient/family/caregiver.   - Out of bed for toileting  - Record patient progress and toleration of activity level   Outcome: Progressing     Problem: Knowledge Deficit  Goal: Patient/family/caregiver demonstrates understanding of disease process, treatment plan, medications, and discharge instructions  Description: Complete learning assessment and assess knowledge base.  Interventions:  - Provide teaching at level of understanding  - Provide teaching via preferred learning methods  Outcome: Progressing     Problem: DISCHARGE PLANNING  Goal: Discharge to home or other facility with appropriate resources  Description: INTERVENTIONS:  - Identify barriers to discharge w/patient and caregiver  - Arrange for needed discharge resources and transportation as appropriate  - Identify  discharge learning needs (meds, wound care, etc.)  - Arrange for interpretive services to assist at discharge as needed  - Refer to Case Management Department for coordinating discharge planning if the patient needs post-hospital services based on physician/advanced practitioner order or complex needs related to functional status, cognitive ability, or social support system  Outcome: Progressing

## 2024-05-01 NOTE — UTILIZATION REVIEW
NOTIFICATION OF INPATIENT ADMISSION   [facility] MATERNITY AUTHORIZATION REQUEST   SERVICING FACILITY:   [facility]  Parent Child Health - L&D, [place], NICU   [facility] [place] [address]  Tax ID: [id number]  NPI: [id number]   ATTENDING PROVIDER:  Attending Name and NPI#: Sanjay Thompson Md [[id number]]  Address: [address] [place] [address]. [place] [address]  Phone: [phone]     ADMISSION INFORMATION:  Place of Service: Inpatient [facility]  Place of Service Code: 21  Inpatient Admission Date/Time: 24  6:25 AM  Discharge Date/Time: No discharge date for patient encounter.  Admitting Diagnosis Code/Description:  Congenital atrial septal defect [Q21.10]  History of mechanical aortic valve replacement [Z95.2]  Pregnant [Z34.90]  37 weeks gestation of pregnancy [Z3A.37]     UTILIZATION REVIEW CONTACT:  Lexi Dodson, Utilization   Network Utilization Review Department  Phone: [phone]  Fax [phone]  Email: [email or web address]  Contact for approvals/pending authorizations, clinical reviews, and discharge.     PHYSICIAN ADVISORY SERVICES:  Medical Necessity Denial & Ncpn-mm-Aige Review  Phone: [phone]  Fax: [phone]  Email: [email or web address]     DISCHARGE SUPPORT TEAM:  For Patients Discharge Needs & Updates  Phone: [phone] opt. 2 Fax: [phone]  Email: [email or web address]

## 2024-05-02 ENCOUNTER — ANESTHESIA (INPATIENT)
Dept: LABOR AND DELIVERY | Facility: HOSPITAL | Age: 29
End: 2024-05-02
Payer: COMMERCIAL

## 2024-05-02 PROBLEM — O14.90 PREECLAMPSIA: Status: ACTIVE | Noted: 2024-05-02

## 2024-05-02 PROBLEM — O13.9 GESTATIONAL HYPERTENSION: Status: ACTIVE | Noted: 2024-05-02

## 2024-05-02 PROBLEM — Z98.891 STATUS POST PRIMARY LOW TRANSVERSE CESAREAN SECTION: Status: ACTIVE | Noted: 2023-10-19

## 2024-05-02 LAB
APTT PPP: 30 SECONDS (ref 23–37)
APTT PPP: 32 SECONDS (ref 23–37)
APTT PPP: 99 SECONDS (ref 23–37)
BASE EXCESS BLDCOA CALC-SCNC: -10.6 MMOL/L (ref 3–11)
BASE EXCESS BLDCOV CALC-SCNC: -9.2 MMOL/L (ref 1–9)
BASOPHILS # BLD AUTO: 0.02 THOUSANDS/ÂΜL (ref 0–0.1)
BASOPHILS NFR BLD AUTO: 0 % (ref 0–1)
CREAT UR-MCNC: 115 MG/DL
EOSINOPHIL # BLD AUTO: 0.13 THOUSAND/ÂΜL (ref 0–0.61)
EOSINOPHIL NFR BLD AUTO: 1 % (ref 0–6)
ERYTHROCYTE [DISTWIDTH] IN BLOOD BY AUTOMATED COUNT: 12.5 % (ref 11.6–15.1)
ERYTHROCYTE [DISTWIDTH] IN BLOOD BY AUTOMATED COUNT: 13.1 % (ref 11.6–15.1)
HCO3 BLDCOA-SCNC: 19.4 MMOL/L (ref 17.3–27.3)
HCO3 BLDCOV-SCNC: 19.1 MMOL/L (ref 12.2–28.6)
HCT VFR BLD AUTO: 31 % (ref 34.8–46.1)
HCT VFR BLD AUTO: 34.3 % (ref 34.8–46.1)
HGB BLD-MCNC: 10.4 G/DL (ref 11.5–15.4)
HGB BLD-MCNC: 11.4 G/DL (ref 11.5–15.4)
IMM GRANULOCYTES # BLD AUTO: 0.11 THOUSAND/UL (ref 0–0.2)
IMM GRANULOCYTES NFR BLD AUTO: 1 % (ref 0–2)
LYMPHOCYTES # BLD AUTO: 1.68 THOUSANDS/ÂΜL (ref 0.6–4.47)
LYMPHOCYTES NFR BLD AUTO: 18 % (ref 14–44)
MCH RBC QN AUTO: 29.4 PG (ref 26.8–34.3)
MCH RBC QN AUTO: 29.7 PG (ref 26.8–34.3)
MCHC RBC AUTO-ENTMCNC: 33.2 G/DL (ref 31.4–37.4)
MCHC RBC AUTO-ENTMCNC: 33.5 G/DL (ref 31.4–37.4)
MCV RBC AUTO: 88 FL (ref 82–98)
MCV RBC AUTO: 89 FL (ref 82–98)
MONOCYTES # BLD AUTO: 0.87 THOUSAND/ÂΜL (ref 0.17–1.22)
MONOCYTES NFR BLD AUTO: 9 % (ref 4–12)
NEUTROPHILS # BLD AUTO: 6.75 THOUSANDS/ÂΜL (ref 1.85–7.62)
NEUTS SEG NFR BLD AUTO: 71 % (ref 43–75)
NRBC BLD AUTO-RTO: 0 /100 WBCS
O2 CT VFR BLDCOA CALC: 7 ML/DL
OXYHGB MFR BLDCOA: 38.2 %
OXYHGB MFR BLDCOV: 50.5 %
PCO2 BLDCOA: 62.2 MM[HG] (ref 30–60)
PCO2 BLDCOV: 50.6 MM HG (ref 27–43)
PH BLDCOA: 7.11 [PH] (ref 7.23–7.43)
PH BLDCOV: 7.19 [PH] (ref 7.19–7.49)
PLATELET # BLD AUTO: 236 THOUSANDS/UL (ref 149–390)
PLATELET # BLD AUTO: 250 THOUSANDS/UL (ref 149–390)
PMV BLD AUTO: 10.1 FL (ref 8.9–12.7)
PMV BLD AUTO: 10.3 FL (ref 8.9–12.7)
PO2 BLDCOA: 21.1 MM HG (ref 5–25)
PO2 BLDCOV: 24.8 MM HG (ref 15–45)
PROT UR-MCNC: 122 MG/DL
PROT/CREAT UR: 1.06 MG/G{CREAT} (ref 0–0.1)
RBC # BLD AUTO: 3.54 MILLION/UL (ref 3.81–5.12)
RBC # BLD AUTO: 3.84 MILLION/UL (ref 3.81–5.12)
SAO2 % BLDCOV: 9.8 ML/DL
WBC # BLD AUTO: 17.66 THOUSAND/UL (ref 4.31–10.16)
WBC # BLD AUTO: 9.56 THOUSAND/UL (ref 4.31–10.16)

## 2024-05-02 PROCEDURE — 80053 COMPREHEN METABOLIC PANEL: CPT | Performed by: OBSTETRICS & GYNECOLOGY

## 2024-05-02 PROCEDURE — 59510 CESAREAN DELIVERY: CPT | Performed by: STUDENT IN AN ORGANIZED HEALTH CARE EDUCATION/TRAINING PROGRAM

## 2024-05-02 PROCEDURE — 85025 COMPLETE CBC W/AUTO DIFF WBC: CPT | Performed by: OBSTETRICS & GYNECOLOGY

## 2024-05-02 PROCEDURE — 82805 BLOOD GASES W/O2 SATURATION: CPT | Performed by: STUDENT IN AN ORGANIZED HEALTH CARE EDUCATION/TRAINING PROGRAM

## 2024-05-02 PROCEDURE — NC001 PR NO CHARGE: Performed by: OBSTETRICS & GYNECOLOGY

## 2024-05-02 PROCEDURE — 85027 COMPLETE CBC AUTOMATED: CPT | Performed by: OBSTETRICS & GYNECOLOGY

## 2024-05-02 PROCEDURE — 84156 ASSAY OF PROTEIN URINE: CPT | Performed by: OBSTETRICS & GYNECOLOGY

## 2024-05-02 PROCEDURE — NC001 PR NO CHARGE: Performed by: STUDENT IN AN ORGANIZED HEALTH CARE EDUCATION/TRAINING PROGRAM

## 2024-05-02 PROCEDURE — 85730 THROMBOPLASTIN TIME PARTIAL: CPT | Performed by: STUDENT IN AN ORGANIZED HEALTH CARE EDUCATION/TRAINING PROGRAM

## 2024-05-02 PROCEDURE — 82570 ASSAY OF URINE CREATININE: CPT | Performed by: OBSTETRICS & GYNECOLOGY

## 2024-05-02 PROCEDURE — 85730 THROMBOPLASTIN TIME PARTIAL: CPT | Performed by: OBSTETRICS & GYNECOLOGY

## 2024-05-02 RX ORDER — BUPIVACAINE HYDROCHLORIDE 7.5 MG/ML
INJECTION, SOLUTION INTRASPINAL AS NEEDED
Status: DISCONTINUED | OUTPATIENT
Start: 2024-05-02 | End: 2024-05-02

## 2024-05-02 RX ORDER — OXYTOCIN/RINGER'S LACTATE 30/500 ML
PLASTIC BAG, INJECTION (ML) INTRAVENOUS CONTINUOUS PRN
Status: DISCONTINUED | OUTPATIENT
Start: 2024-05-02 | End: 2024-05-02

## 2024-05-02 RX ORDER — SODIUM CHLORIDE, SODIUM LACTATE, POTASSIUM CHLORIDE, CALCIUM CHLORIDE 600; 310; 30; 20 MG/100ML; MG/100ML; MG/100ML; MG/100ML
INJECTION, SOLUTION INTRAVENOUS CONTINUOUS PRN
Status: DISCONTINUED | OUTPATIENT
Start: 2024-05-02 | End: 2024-05-02

## 2024-05-02 RX ORDER — OXYTOCIN/RINGER'S LACTATE 30/500 ML
62.5 PLASTIC BAG, INJECTION (ML) INTRAVENOUS ONCE
Status: COMPLETED | OUTPATIENT
Start: 2024-05-02 | End: 2024-05-02

## 2024-05-02 RX ORDER — EPHEDRINE SULFATE 50 MG/ML
INJECTION INTRAVENOUS AS NEEDED
Status: DISCONTINUED | OUTPATIENT
Start: 2024-05-02 | End: 2024-05-02

## 2024-05-02 RX ORDER — SENNOSIDES 8.6 MG
1 TABLET ORAL DAILY
Status: DISCONTINUED | OUTPATIENT
Start: 2024-05-02 | End: 2024-05-04 | Stop reason: HOSPADM

## 2024-05-02 RX ORDER — HYDROMORPHONE HCL/PF 1 MG/ML
0.5 SYRINGE (ML) INJECTION EVERY 2 HOUR PRN
Status: DISCONTINUED | OUTPATIENT
Start: 2024-05-02 | End: 2024-05-04 | Stop reason: HOSPADM

## 2024-05-02 RX ORDER — ONDANSETRON 2 MG/ML
INJECTION INTRAMUSCULAR; INTRAVENOUS AS NEEDED
Status: DISCONTINUED | OUTPATIENT
Start: 2024-05-02 | End: 2024-05-02

## 2024-05-02 RX ORDER — ACETAMINOPHEN 325 MG/1
650 TABLET ORAL EVERY 6 HOURS PRN
Status: DISCONTINUED | OUTPATIENT
Start: 2024-05-02 | End: 2024-05-02

## 2024-05-02 RX ORDER — DEXAMETHASONE SODIUM PHOSPHATE 10 MG/ML
INJECTION, SOLUTION INTRAMUSCULAR; INTRAVENOUS AS NEEDED
Status: DISCONTINUED | OUTPATIENT
Start: 2024-05-02 | End: 2024-05-02

## 2024-05-02 RX ORDER — FENTANYL CITRATE/PF 50 MCG/ML
25 SYRINGE (ML) INJECTION
Status: DISCONTINUED | OUTPATIENT
Start: 2024-05-02 | End: 2024-05-02

## 2024-05-02 RX ORDER — DOCUSATE SODIUM 100 MG/1
100 CAPSULE, LIQUID FILLED ORAL 2 TIMES DAILY
Status: DISCONTINUED | OUTPATIENT
Start: 2024-05-02 | End: 2024-05-03

## 2024-05-02 RX ORDER — CALCIUM CARBONATE 500 MG/1
1000 TABLET, CHEWABLE ORAL DAILY PRN
Status: DISCONTINUED | OUTPATIENT
Start: 2024-05-02 | End: 2024-05-04 | Stop reason: HOSPADM

## 2024-05-02 RX ORDER — ACETAMINOPHEN 325 MG/1
650 TABLET ORAL EVERY 6 HOURS SCHEDULED
Status: DISCONTINUED | OUTPATIENT
Start: 2024-05-02 | End: 2024-05-04 | Stop reason: HOSPADM

## 2024-05-02 RX ORDER — HEPARIN SODIUM 1000 [USP'U]/ML
3200 INJECTION, SOLUTION INTRAVENOUS; SUBCUTANEOUS EVERY 6 HOURS PRN
Status: DISCONTINUED | OUTPATIENT
Start: 2024-05-02 | End: 2024-05-03

## 2024-05-02 RX ORDER — SODIUM CHLORIDE 9 MG/ML
100 INJECTION, SOLUTION INTRAVENOUS CONTINUOUS
Status: DISCONTINUED | OUTPATIENT
Start: 2024-05-02 | End: 2024-05-02

## 2024-05-02 RX ORDER — NALBUPHINE HYDROCHLORIDE 10 MG/ML
5 INJECTION, SOLUTION INTRAMUSCULAR; INTRAVENOUS; SUBCUTANEOUS
Status: ACTIVE | OUTPATIENT
Start: 2024-05-02 | End: 2024-05-03

## 2024-05-02 RX ORDER — OXYCODONE HYDROCHLORIDE 5 MG/1
5 TABLET ORAL EVERY 4 HOURS PRN
Status: DISCONTINUED | OUTPATIENT
Start: 2024-05-03 | End: 2024-05-04 | Stop reason: HOSPADM

## 2024-05-02 RX ORDER — HYDROMORPHONE HCL/PF 1 MG/ML
0.5 SYRINGE (ML) INJECTION
Status: DISCONTINUED | OUTPATIENT
Start: 2024-05-02 | End: 2024-05-02

## 2024-05-02 RX ORDER — KETOROLAC TROMETHAMINE 30 MG/ML
INJECTION, SOLUTION INTRAMUSCULAR; INTRAVENOUS AS NEEDED
Status: DISCONTINUED | OUTPATIENT
Start: 2024-05-02 | End: 2024-05-02

## 2024-05-02 RX ORDER — PHENYLEPHRINE HCL IN 0.9% NACL 1 MG/10 ML
SYRINGE (ML) INTRAVENOUS AS NEEDED
Status: DISCONTINUED | OUTPATIENT
Start: 2024-05-02 | End: 2024-05-02

## 2024-05-02 RX ORDER — SODIUM CHLORIDE, SODIUM LACTATE, POTASSIUM CHLORIDE, CALCIUM CHLORIDE 600; 310; 30; 20 MG/100ML; MG/100ML; MG/100ML; MG/100ML
125 INJECTION, SOLUTION INTRAVENOUS CONTINUOUS
Status: DISCONTINUED | OUTPATIENT
Start: 2024-05-02 | End: 2024-05-03

## 2024-05-02 RX ORDER — GLYCOPYRROLATE 0.2 MG/ML
INJECTION INTRAMUSCULAR; INTRAVENOUS AS NEEDED
Status: DISCONTINUED | OUTPATIENT
Start: 2024-05-02 | End: 2024-05-02

## 2024-05-02 RX ORDER — ONDANSETRON 2 MG/ML
4 INJECTION INTRAMUSCULAR; INTRAVENOUS EVERY 4 HOURS PRN
Status: DISCONTINUED | OUTPATIENT
Start: 2024-05-02 | End: 2024-05-02

## 2024-05-02 RX ORDER — WARFARIN SODIUM 5 MG/1
5 TABLET ORAL
Status: DISCONTINUED | OUTPATIENT
Start: 2024-05-02 | End: 2024-05-04 | Stop reason: HOSPADM

## 2024-05-02 RX ORDER — HEPARIN SODIUM 10000 [USP'U]/100ML
3-30 INJECTION, SOLUTION INTRAVENOUS
Status: DISCONTINUED | OUTPATIENT
Start: 2024-05-02 | End: 2024-05-03

## 2024-05-02 RX ORDER — METOCLOPRAMIDE HYDROCHLORIDE 5 MG/ML
10 INJECTION INTRAMUSCULAR; INTRAVENOUS EVERY 6 HOURS PRN
Status: DISCONTINUED | OUTPATIENT
Start: 2024-05-02 | End: 2024-05-04 | Stop reason: HOSPADM

## 2024-05-02 RX ORDER — DIPHENHYDRAMINE HCL 25 MG
25 TABLET ORAL EVERY 6 HOURS PRN
Status: DISCONTINUED | OUTPATIENT
Start: 2024-05-02 | End: 2024-05-04 | Stop reason: HOSPADM

## 2024-05-02 RX ORDER — IBUPROFEN 600 MG/1
600 TABLET ORAL EVERY 6 HOURS SCHEDULED
Status: DISCONTINUED | OUTPATIENT
Start: 2024-05-03 | End: 2024-05-04 | Stop reason: HOSPADM

## 2024-05-02 RX ORDER — KETOROLAC TROMETHAMINE 30 MG/ML
15 INJECTION, SOLUTION INTRAMUSCULAR; INTRAVENOUS EVERY 6 HOURS SCHEDULED
Status: COMPLETED | OUTPATIENT
Start: 2024-05-02 | End: 2024-05-03

## 2024-05-02 RX ORDER — MORPHINE SULFATE 0.5 MG/ML
INJECTION, SOLUTION EPIDURAL; INTRATHECAL; INTRAVENOUS AS NEEDED
Status: DISCONTINUED | OUTPATIENT
Start: 2024-05-02 | End: 2024-05-02

## 2024-05-02 RX ORDER — NALOXONE HYDROCHLORIDE 0.4 MG/ML
0.1 INJECTION, SOLUTION INTRAMUSCULAR; INTRAVENOUS; SUBCUTANEOUS
Status: ACTIVE | OUTPATIENT
Start: 2024-05-02 | End: 2024-05-03

## 2024-05-02 RX ORDER — HEPARIN SODIUM 1000 [USP'U]/ML
6400 INJECTION, SOLUTION INTRAVENOUS; SUBCUTANEOUS ONCE
Status: COMPLETED | OUTPATIENT
Start: 2024-05-02 | End: 2024-05-02

## 2024-05-02 RX ORDER — HEPARIN SODIUM 1000 [USP'U]/ML
6400 INJECTION, SOLUTION INTRAVENOUS; SUBCUTANEOUS EVERY 6 HOURS PRN
Status: DISCONTINUED | OUTPATIENT
Start: 2024-05-02 | End: 2024-05-03

## 2024-05-02 RX ORDER — FENTANYL CITRATE 50 UG/ML
INJECTION, SOLUTION INTRAMUSCULAR; INTRAVENOUS AS NEEDED
Status: DISCONTINUED | OUTPATIENT
Start: 2024-05-02 | End: 2024-05-02

## 2024-05-02 RX ORDER — OXYCODONE HYDROCHLORIDE 10 MG/1
10 TABLET ORAL EVERY 4 HOURS PRN
Status: DISCONTINUED | OUTPATIENT
Start: 2024-05-03 | End: 2024-05-04 | Stop reason: HOSPADM

## 2024-05-02 RX ADMIN — GLYCOPYRROLATE 0.2 MG: 0.2 INJECTION INTRAMUSCULAR; INTRAVENOUS at 08:15

## 2024-05-02 RX ADMIN — SODIUM CHLORIDE, SODIUM LACTATE, POTASSIUM CHLORIDE, AND CALCIUM CHLORIDE: .6; .31; .03; .02 INJECTION, SOLUTION INTRAVENOUS at 07:24

## 2024-05-02 RX ADMIN — PHENYLEPHRINE HYDROCHLORIDE 50 MCG/MIN: 50 INJECTION INTRAVENOUS at 08:08

## 2024-05-02 RX ADMIN — ACETAMINOPHEN 650 MG: 325 TABLET, FILM COATED ORAL at 15:59

## 2024-05-02 RX ADMIN — CEFAZOLIN SODIUM 2000 MG: 2 SOLUTION INTRAVENOUS at 08:08

## 2024-05-02 RX ADMIN — ACETAMINOPHEN 650 MG: 325 TABLET, FILM COATED ORAL at 22:08

## 2024-05-02 RX ADMIN — WARFARIN SODIUM 5 MG: 5 TABLET ORAL at 22:09

## 2024-05-02 RX ADMIN — HEPARIN SODIUM 6400 UNITS: 1000 INJECTION INTRAVENOUS; SUBCUTANEOUS at 14:48

## 2024-05-02 RX ADMIN — KETOROLAC TROMETHAMINE 15 MG: 30 INJECTION, SOLUTION INTRAMUSCULAR; INTRAVENOUS at 08:46

## 2024-05-02 RX ADMIN — KETOROLAC TROMETHAMINE 15 MG: 30 INJECTION, SOLUTION INTRAMUSCULAR; INTRAVENOUS at 23:56

## 2024-05-02 RX ADMIN — Medication 250 MILLI-UNITS/MIN: at 08:28

## 2024-05-02 RX ADMIN — KETOROLAC TROMETHAMINE 15 MG: 30 INJECTION, SOLUTION INTRAMUSCULAR; INTRAVENOUS at 15:59

## 2024-05-02 RX ADMIN — DEXAMETHASONE SODIUM PHOSPHATE 10 MG: 10 INJECTION INTRAMUSCULAR; INTRAVENOUS at 08:10

## 2024-05-02 RX ADMIN — METOPROLOL SUCCINATE 25 MG: 25 TABLET, EXTENDED RELEASE ORAL at 21:05

## 2024-05-02 RX ADMIN — ESCITALOPRAM OXALATE 20 MG: 20 TABLET ORAL at 21:04

## 2024-05-02 RX ADMIN — HEPARIN SODIUM 18 UNITS/KG/HR: 10000 INJECTION, SOLUTION INTRAVENOUS at 14:46

## 2024-05-02 RX ADMIN — ONDANSETRON 4 MG: 2 INJECTION INTRAMUSCULAR; INTRAVENOUS at 08:10

## 2024-05-02 RX ADMIN — SODIUM CHLORIDE 100 ML/HR: 0.9 INJECTION, SOLUTION INTRAVENOUS at 01:17

## 2024-05-02 RX ADMIN — Medication 400 MCG: at 08:12

## 2024-05-02 RX ADMIN — MORPHINE SULFATE 0.1 MG: 0.5 INJECTION, SOLUTION EPIDURAL; INTRATHECAL; INTRAVENOUS at 08:07

## 2024-05-02 RX ADMIN — FENTANYL CITRATE 10 MCG: 50 INJECTION INTRAMUSCULAR; INTRAVENOUS at 08:07

## 2024-05-02 RX ADMIN — SODIUM CHLORIDE, SODIUM LACTATE, POTASSIUM CHLORIDE, AND CALCIUM CHLORIDE 125 ML/HR: .6; .31; .03; .02 INJECTION, SOLUTION INTRAVENOUS at 16:12

## 2024-05-02 RX ADMIN — SODIUM CHLORIDE, SODIUM LACTATE, POTASSIUM CHLORIDE, AND CALCIUM CHLORIDE: .6; .31; .03; .02 INJECTION, SOLUTION INTRAVENOUS at 08:30

## 2024-05-02 RX ADMIN — OXYTOCIN 62.5 MILLI-UNITS/MIN: 10 INJECTION INTRAVENOUS at 10:41

## 2024-05-02 RX ADMIN — BUPIVACAINE HYDROCHLORIDE IN DEXTROSE 1.6 ML: 7.5 INJECTION, SOLUTION SUBARACHNOID at 08:07

## 2024-05-02 RX ADMIN — EPHEDRINE SULFATE 5 MG: 50 INJECTION INTRAVENOUS at 08:12

## 2024-05-02 NOTE — PROGRESS NOTES
OBGYN Progress Note - Antepartum  Suze Leigh 28 y.o. female MRN: [id number]  Unit/Bed#: LD PACU-01 Encounter: [id number]    Assessment/Plan:  28 y.o.  with mechanical aortic valve now at 37w6d initially admitted for transition of anticoagulation. By problem:    * History of mechanical aortic valve replacement  Assessment & Plan  S/p Heparin drip at 0100 this morning  Repeat aPTT pending   Crossmatched x2 units packed red blood cells  Will have 2 peripheral IV and uterotonics at delivery. Avoid TXA  Will restart Heparin drip 4-6hrs post delivery with Warfarin under Cardiology guidance    37 weeks gestation of pregnancy  Assessment & Plan  For delivery by primary  section today  NST this morning reactive    Gestational hypertension  Assessment & Plan  Newly diagnosed this admission; no severe range blood pressures   Follow up repeat CBC, CMP and urine P/C once patton is placed in OR    Anemia complicating pregnancy in third trimester  Assessment & Plan  Admission Hgb was 11.3g/dL  Repeat CBC to be done this morning        Subjective:   Suze is feeling well this morning. She is a little nervous about her upcoming procedure. No OB complaints this morning: denies contractions, loss of fluid, vaginal bleeding. She feels fetal movement. She showered with her Chlorhexidine wash and has been NPO since midnight.    Objective:   Vitals:   Temp:  [97.7 °F (36.5 °C)-98 °F (36.7 °C)] 97.9 °F (36.6 °C)  HR:  [66-85] 74  Resp:  [17-22] 22  BP: (119-141)/(65-85) 125/85     Physical Exam  Vitals reviewed.   Constitutional:       General: She is not in acute distress.     Appearance: Normal appearance. She is not ill-appearing or toxic-appearing.   Cardiovascular:      Heart sounds: Murmur heard.   Pulmonary:      Effort: Pulmonary effort is normal. No respiratory distress.      Breath sounds: No wheezing, rhonchi or rales.   Abdominal:      Palpations: Abdomen is soft.      Tenderness: There is no abdominal  tenderness. There is no guarding.      Comments: Gravid   Musculoskeletal:         General: No swelling or tenderness.   Skin:     General: Skin is warm and dry.   Neurological:      General: No focal deficit present.      Mental Status: She is alert and oriented to person, place, and time.   Psychiatric:         Mood and Affect: Mood normal.         Behavior: Behavior normal.         Fetal Assessment:  FHT: 120/moderate variability/15x15 accelerations/no decelerations; reactive  [place]: irregular contractions     Lab, Imaging and other studies: I have personally reviewed pertinent reports.        Madeline Roberson MD  PGY-IV, OB/GYN  5/2/2024, 7:29 AM

## 2024-05-02 NOTE — ASSESSMENT & PLAN NOTE
Asymptomatic  BP since delivery:   Systolic (22hrs), Av , Min:113 , Max:138    Diastolic (22hrs), Av, Min:63, Max:88  Continue routine BP monitoring

## 2024-05-02 NOTE — UTILIZATION REVIEW
Initial Clinical Review    Admission: Date/Time/Statement:   Admission Orders (From admission, onward)       Ordered        24 0639  Inpatient Admission  Once                          Orders Placed This Encounter   Procedures    Inpatient Admission     Standing Status:   Standing     Number of Occurrences:   1     Order Specific Question:   Level of Care     Answer:   Med Surg [16]     Order Specific Question:   Estimated length of stay     Answer:   More than 2 Midnights     Order Specific Question:   Certification     Answer:   I certify that inpatient services are medically necessary for this patient for a duration of greater than two midnights. See H&P and MD Progress Notes for additional information about the patient's course of treatment.     ED Arrival Information       Patient not seen in ED                       Chief Complaint   Patient presents with    Scheduled      C/S  0800  needs heparin drip       Initial Presentation: 28 y.o. female presented to L&D as inpatient admission for transition of her anticoagulation in the setting of mechanical heart valve prior to 1LTCS.   In  she underwent mechanical aortic valve replacement and ascending aorta repair with graft. She suffered an embolic stroke when holding anticoagulation for a surgical procedure.  Last took lovenox  at 1930 Most Recent heparin level  0.98 Plan: Heparin gtt ordered, Titrate heparin drip at 6-hour intervals with goal aPTT twice baseline or anti-Xa 0.7-1.0.  Plan to discontinue heparin drip at 0200 on  (6 hours prior to scheduled c/s) to allow for neuraxial analgesia, plan to recheck aPTT prior to procedure Will plan to notify cardiology of patient's admission Anesthesia consult ordered  Plan for delivery via 1LTCS on 24 with ancef for surgical and endocarditis prophylaxis  Ensure placement of 2 IVs, availability of blood products, and uterotonics at delivery. Avoid TXA  FHT:  Baseline 130 bpm    Variability: Moderate 6-25 bpm   Accelerations: Present 15x15   Decelerations: none      TOCO:   Uterine irritability noted     Date: 23   Day 2: Patient continue on IV Heparin infusion  Heparin gtt management: Suze continues to remain therapeutic on her heparin drip. Will continue q6 hour PTT until pausing drip at 6 hours pre op (~2 am) with plan to draw PTT at 7 or 715 am, to ensure availability of level prior to OR. Will discuss with anesthesia team as well. Ensure placement of 2 IVs, availability of blood products, and uterotonics at delivery. Avoid TXA. Endocarditis ppx needed (routine surgical ppx 1g ancef sufficient)  - echocardiogram to screen for bicuspid aortic valve discussed with patient, defer to pediatrics on  recommendations/timing. Cardiology is aware of patient being admitted and will help with transition to warfarin postop.     24   37 6/7 WEEKS  FEMALE  APGAR 7  9   WT 2845  Infant dried suctioned stimulated and warmed. Responded well and taken to NBN         ED Triage Vitals   Temperature Pulse Respirations Blood Pressure SpO2   24 0635 24 0635 24 0635 24 0635 24 0635   98.2 °F (36.8 °C) 92 18 122/73 96 %      Temp Source Heart Rate Source Patient Position - Orthostatic VS BP Location FiO2 (%)   24 0635 24 0635 24 0909 24 0635 --   Oral Monitor Sitting Right arm       Pain Score       24 0635       No Pain          Wt Readings from Last 1 Encounters:   24 83.5 kg (184 lb)     Additional Vital Signs:   ate/Time Temp Pulse Resp BP MAP (mmHg) SpO2 O2 Device Cardiac (WDL) Patient Position - Orthostatic VS   24 1223 97.6 °F (36.4 °C) 79 18 132/88 -- 97 % -- -- --   24 1139 97.8 °F (36.6 °C) 78 18 125/82 -- 95 % -- -- --   24 1130 -- -- -- -- -- -- -- X --   Comment rows:   OBSERV: Pt recieved and oriented to room 313. at 24 1130   24 1100 97.9 °F (36.6 °C) 76 -- --  -- 98 % -- -- --   05/02/24 1054 97.3 °F (36.3 °C) Abnormal  74 20 119/76 94 98 % -- -- --   05/02/24 1045 -- 74 -- 119/76 94 97 % -- -- --   05/02/24 1030 -- 77 -- 124/78 95 98 % -- -- --   05/02/24 1016 -- 76 -- -- -- 96 % -- -- --   05/02/24 1015 -- 74 -- 115/69 87 96 % -- -- --   05/02/24 1014 -- 72 -- -- -- 96 % -- -- --   05/02/24 1013 -- 76 -- -- -- 96 % -- -- --   05/02/24 1012 -- 78 -- -- -- 96 % -- -- --   05/02/24 1011 -- 78 -- -- -- 96 % -- -- --   05/02/24 1010 -- 77 -- -- -- 96 % -- -- --   05/02/24 1009 -- 73 -- -- -- 96 % -- -- --   05/02/24 1008 -- 74 -- -- -- 96 % -- -- --   05/02/24 1007 -- 72 -- -- -- 96 % -- -- --   05/02/24 1006 -- 71 -- -- -- 96 % -- -- --   05/02/24 1005 -- 72 -- -- -- 96 % -- -- --   05/02/24 1004 -- 69 -- -- -- 95 % -- -- --   05/02/24 1003 -- 72 -- -- -- 96 % -- -- --   05/02/24 1002 -- 72 -- -- -- 96 % -- -- --   05/02/24 1001 -- 77 -- -- -- 96 % -- -- --   05/02/24 1000 -- 72 -- 113/65 84 96 % -- -- --   05/02/24 0959 -- 74 -- -- -- 96 % -- -- --   05/02/24 0958 -- 73 -- -- -- 96 % -- -- --   05/02/24 0957 -- 73 -- -- -- 95 % -- -- --   05/02/24 0956 -- 72 -- -- -- 96 % -- -- --   05/02/24 0955 -- 81 -- -- -- 97 % -- -- --   05/02/24 0954 -- 73 -- -- -- 96 % -- -- --   05/02/24 0953 -- 74 -- -- -- 96 % -- -- --   05/02/24 0952 -- 78 -- -- -- 96 % -- -- --   05/02/24 0951 -- 75 -- -- -- 96 % -- -- --   05/02/24 0950 -- 75 -- -- -- 96 % -- -- --   Comment rows:   OBSERV: ice water provided at 05/02/24 0950   05/02/24 0949 -- 73 -- -- -- 96 % -- -- --   05/02/24 0948 -- 74 -- -- -- 96 % -- -- --   05/02/24 0947 -- 76 -- -- -- 96 % -- -- --   05/02/24 0946 -- 76 -- -- -- 96 % -- -- --   05/02/24 0945 -- 74 -- 114/63 83 96 % -- -- --   05/02/24 0944 -- 73 -- -- -- 96 % -- -- --   05/02/24 0943 -- 75 -- -- -- 96 % -- -- --   05/02/24 0942 -- 75 -- -- -- 96 % -- -- --   05/02/24 0941 -- 76 -- -- -- 96 % -- -- --   05/02/24 0940 -- 78 -- -- -- 96 % -- -- --   05/02/24 0939 -- 76  -- -- -- 96 % -- -- --   05/02/24 0938 -- 76 -- -- -- 95 % -- -- --   05/02/24 0937 -- 79 -- -- -- 96 % -- -- --   05/02/24 0936 -- 78 -- -- -- 96 % -- -- --   05/02/24 0935 -- 76 -- -- -- 96 % -- -- --   05/02/24 0934 -- 74 -- -- -- 96 % -- -- --   05/02/24 0933 -- 78 -- -- -- 97 % -- -- --   05/02/24 0932 -- 76 -- -- -- 96 % -- -- --   05/02/24 0931 -- 76 -- -- -- 97 % -- -- --   05/02/24 0930 -- 76 -- 108/61 80 97 % -- -- --   05/02/24 0929 -- 80 -- -- -- 97 % -- -- --   05/02/24 0928 -- 75 -- -- -- 96 % -- -- --   05/02/24 0927 -- 73 -- -- -- 96 % -- -- --   05/02/24 0926 -- 72 -- -- -- 96 % -- -- --   05/02/24 0925 -- 75 -- -- -- 97 % -- -- --   05/02/24 0924 -- 74 -- -- -- 95 % -- -- --   05/02/24 0923 -- 75 -- -- -- 97 % -- -- --   05/02/24 0922 -- 77 -- -- -- 97 % -- -- --   05/02/24 0921 -- 78 -- -- -- 96 % -- -- --   05/02/24 0920 -- 78 -- -- -- 96 % -- -- --   05/02/24 0919 -- 74 -- -- -- 97 % -- -- --   05/02/24 0918 -- 75 -- -- -- 97 % -- -- --   05/02/24 0917 -- 83 -- -- -- 98 % -- -- --   05/02/24 0916 -- 75 -- -- -- 96 % -- -- --   05/02/24 0915 -- 72  -- 110/64 82 96 % -- -- --   Pulse: Simultaneous filing. User may not have seen previous data. at 05/02/24 0915 05/02/24 0914 -- 73 -- -- -- 96 % -- -- --   05/02/24 0913 -- 70 -- -- -- 95 % -- -- --   05/02/24 0912 -- 73 -- -- -- 96 % -- -- --   05/02/24 0911 -- 74 -- -- -- 96 % -- -- --   05/02/24 0910 -- 75 -- -- -- 96 % -- -- --   05/02/24 0909 -- 77 -- -- -- 97 % -- -- --   05/02/24 0908 -- 75 -- -- -- 96 % -- -- --   05/02/24 0907 -- 79 -- -- -- 96 % -- -- --   05/02/24 0906 -- 76  -- -- -- 96 %  -- -- --   Pulse: Simultaneous filing. User may not have seen previous data. at 05/02/24 0906   SpO2: Simultaneous filing. User may not have seen previous data. at 05/02/24 0906 05/02/24 0905 -- 75 -- -- -- -- -- -- --   05/02/24 0904 -- 74 -- -- -- 96 % -- -- --   05/02/24 0903 -- 74 -- -- -- 96 % -- -- --   05/02/24 0902 -- 76 -- -- -- 96 % -- --  --   05/02/24 0901 -- 79 -- -- -- 96 % -- -- --   05/02/24 0900 -- 77 -- 111/61 81 96 % -- -- --   05/02/24 0856 97.6 °F (36.4 °C) 75 18 110/56 77 92 % None (Room air) X Lying   05/02/24 0729 -- 73 -- 125/80 -- -- -- -- --   05/02/24 0413 97.9 °F (36.6 °C) 74 22 125/85 -- 98 % None (Room air) -- Sitting   05/01/24 2349 98 °F (36.7 °C) 72 18 129/83 -- 97 % None (Room air) -- Lying   05/01/24 2130 -- -- -- -- -- -- -- X --   05/01/24 2100 -- -- -- -- -- -- -- -- --   Comment rows:   OBSERV: Pt instructed to clean abdomen with chlorehexidine wipes at 05/01/24 2100 05/01/24 2015 97.8 °F (36.6 °C) 66 17 141/83  -- 99 % None (Room air) -- Sitting   BP: rn aware at 05/01/24 2015 05/01/24 1614 97.8 °F (36.6 °C) 80 18 128/78 -- 98 % None (Room air) -- Sitting   05/01/24 1224 97.7 °F (36.5 °C) 84 18 121/73 -- 97 % -- -- --   05/01/24 0821 97.9 °F (36.6 °C) 85 18 119/65 -- 97 % None (Room air) -- --   05/01/24 0430 98.2 °F (36.8 °C) 71 18 145/84 -- 96 % -- -- Sitting   05/01/24 0034 98.3 °F (36.8 °C) 105 18 125/83 -- 96 % -- -- --   04/30/24 2123 -- 87 -- -- -- -- -- -- --   04/30/24 2100 -- -- -- -- -- -- None (Room air) X --   04/30/24 1959 97.5 °F (36.4 °C) 77 18 124/77 -- 98 % -- -- Sitting   04/30/24 1554 97.5 °F (36.4 °C) 76 16 116/79 -- 97 % -- -- Sitting   04/30/24 1150 98 °F (36.7 °C) 71 18 120/75 -- 95 % None (Room air) -- Sitting   04/30/24 0909 98.3 °F (36.8 °C) 83 18 128/80 -- 97 % None (Room air) -- Sitting   Comment rows:                       Pertinent Labs/Diagnostic Test Results:   No orders to display         Results from last 7 days   Lab Units 05/02/24  0702 04/30/24  0700   WBC Thousand/uL 9.56 10.44*   HEMOGLOBIN g/dL 11.4* 11.3*   HEMATOCRIT % 34.3* 33.3*   PLATELETS Thousands/uL 236 256   TOTAL NEUT ABS Thousands/µL 6.75 7.20         Results from last 7 days   Lab Units 05/02/24  0702   SODIUM mmol/L 134*   POTASSIUM mmol/L 3.8   CHLORIDE mmol/L 106   CO2 mmol/L 21   ANION GAP mmol/L 7   BUN mg/dL  "4*   CREATININE mg/dL 0.50*   EGFR ml/min/1.73sq m 130   CALCIUM mg/dL 8.5     Results from last 7 days   Lab Units 24  0702   AST U/L 12*   ALT U/L 6*   ALK PHOS U/L 155*   TOTAL PROTEIN g/dL 6.1*   ALBUMIN g/dL 3.1*   TOTAL BILIRUBIN mg/dL 0.98         Results from last 7 days   Lab Units 24  0702   GLUCOSE RANDOM mg/dL 73           Results from last 7 days   Lab Units 24  0702 24  1250 24  0555 24  1435 24  0700   PROTIME seconds  --   --   --   --  12.4   INR   --   --   --   --  0.87   PTT seconds 32 71* 69*   < > 34    < > = values in this interval not displayed.     Results from last 7 days   Lab Units 24  0730   UNIT PRODUCT CODE  [id number]  [id number]   UNIT NUMBER  [id number]  [id number]   UNITABO  O  O   UNITRH  POS  POS   CROSSMATCH  Compatible  Compatible   UNIT DISPENSE STATUS  Crossmatched  Crossmatched   UNIT PRODUCT VOL ml 300  275     Results from last 7 days   Lab Units 24  1018 24  0939   GLUCOSE UA   --  neg   PROTEIN UA   --  neg   CREATININE UR mg/dL 115.0  --    PROTEIN UR mg/dL 122  --    PROT/CREAT RATIO UR  1.06*  --            Past Medical History:   Diagnosis Date    Anxiety     Bicuspid aortic valve     s/p mechanical AVR    Coronary artery disease 1993    Moderate aortic insufficiency     s/p mechanical AVR    Motion sickness     Patent ductus arteriosus     s/p repair    PONV (postoperative nausea and vomiting)     S/P ascending aortic aneurysm repair     Stroke ([facility]) 2022    no residual effects per pt--initially couldnt speak at first--\"feels back to normal now\"    Varicella     hx as child, and vaccine     Present on Admission:   Anemia complicating pregnancy in third trimester      Admitting Diagnosis: Congenital atrial septal defect [Q21.10]  History of mechanical aortic valve replacement [Z95.2]  Pregnant [Z34.90]  37 weeks gestation of pregnancy [Z3A.37]  Encounter for  delivery without " indication [O82]  Age/Sex: 28 y.o. female  Admission Orders:  Scheduled Medications:  acetaminophen, 650 mg, Oral, Q6H JAMES  docusate sodium, 100 mg, Oral, BID  escitalopram, 20 mg, Oral, Daily  heparin (porcine), 6,400 Units, Intravenous, Once  ketorolac, 15 mg, Intravenous, Q6H JAMES   Followed by  [START ON 5/3/2024] ibuprofen, 600 mg, Oral, Q6H JAMES  metoprolol succinate, 25 mg, Oral, Daily  senna, 1 tablet, Oral, Daily  warfarin, 5 mg, Oral, HS      Continuous IV Infusions:  heparin (porcine), 3-30 Units/kg/hr (Order-Specific), Intravenous, Titrated  lactated ringers, 125 mL/hr, Intravenous, Continuous      PRN Meds:  acetaminophen, 650 mg, Oral, Q6H PRN  calcium carbonate, 1,000 mg, Oral, Daily PRN  diphenhydrAMINE, 25 mg, Oral, Q6H PRN  fentaNYL, 25 mcg, Intravenous, Q5 Min PRN  heparin (porcine), 3,200 Units, Intravenous, Q6H PRN  heparin (porcine), 6,400 Units, Intravenous, Q6H PRN  HYDROmorphone, 0.5 mg, Intravenous, Q5 Min PRN  HYDROmorphone, 0.5 mg, Intravenous, Q2H PRN  metoclopramide, 10 mg, Intravenous, Q6H PRN  nalbuphine, 5 mg, Intravenous, Q3H PRN  naloxone, 0.1 mg, Intravenous, Q3 min PRN  ondansetron, 4 mg, Intravenous, Q4H PRN        IP CONSULT TO PERINATOLOGY  IP CONSULT TO CARDIOLOGY  IP CONSULT TO ANESTHESIOLOGY    Network Utilization Review Department  ATTENTION: Please call with any questions or concerns to [phone] and carefully listen to the prompts so that you are directed to the right person. All voicemails are confidential.   For Discharge needs, contact Care Management DC Support Team at [phone] opt. [phone]  Send all requests for admission clinical reviews, approved or denied determinations and any other requests to dedicated fax number below belonging to the campus where the patient is receiving treatment. List of dedicated fax numbers for the Facilities:  FACILITY NAME UR FAX NUMBER   ADMISSION DENIALS (Administrative/Medical Necessity) [phone]   DISCHARGE SUPPORT TEAM  (NETWORK) [phone]   PARENT CHILD HEALTH (Maternity/NICU/Pediatrics) [phone]   [facility] [phone]   [facility] [phone]   [facility] [phone]   [facility] [phone]   Critical access hospital [phone]   [facility] [phone]   [facility] [phone]   [facility] [phone]   [facility] [phone]   [facility] [phone]   [facility] [phone]   [facility] [phone]

## 2024-05-02 NOTE — LACTATION NOTE
"This note was copied from a baby's chart.  CONSULT - LACTATION  Baby Girl Leigh (Olivia) 0 days female MRN: [id number]    [facility] NURSERY Room / Bed: (N)/(N) Encounter: [id number]    Maternal Information     MOTHER:  Suze Leigh  Maternal Age: 28 y.o.   OB History: # 1 - Date: 24, Sex: Female, Weight: 2845 g (6 lb 4.4 oz), GA: 37w6d, Delivery: , Low Transverse, Apgar1: 7, Apgar5: 9, Living: Living, Birth Comments: None   Previouse breast reduction surgery? No    Lactation history:   Has patient previously breast fed: No   How long had patient previously breast fed:     Previous breast feeding complications:       Past Surgical History:   Procedure Laterality Date    ASCENDING AORTIC ANEURYSM REPAIR      with\" Aortic valve surgery\"    MECHANICAL AORTIC VALVE REPLACEMENT      PATENT DUCTUS ARTERIOUS LIGATION      per pt had this surgery age 1    LA EXC B9 LESION MRGN XCP SK TG T/A/L 1.1-2.0 CM Right 2022    Procedure: EXCISION  BIOPSY LESION/MASS BACK;  Surgeon: David Cloud MD;  Location: [facility];  Service: General    THROMBECTOMY W/ EMBOLECTOMY      Percutanoeous embolectomy in setting of stroke    WISDOM TOOTH EXTRACTION          Birth information:  YOB: 2026   Time of birth: 8:27 AM   Sex: female   Delivery type: , Low Transverse   Birth Weight: 2845 g (6 lb 4.4 oz)   Percent of Weight Change: 0%     Gestational Age: 37w6d   [unfilled]    Assessment     Breast and nipple assessment:  pendulous breasts with nipples facing downward; dark areolas; micro nipples bilaterally, round in shape; Visible wounds on the areolas bilaterally due to latch attempts    [place] Assessment:  sleepy after c/s; narrow gape; keeps tongue elevated    Feeding assessment:  latched with lactation support; enc. Compression of breast; enc. Watching hands for signs of satiation; enc. Both breasts; wet wound " care  LATCH:  Latch: Repeated attempts, hold nipple in mouth, stimulate to suck   Audible Swallowing: A few with stimulation   Type of Nipple: Everted (After stimulation)   Comfort (Breast/Nipple): Filling, red/small blisters/bruises, mild/moderate discomfort   Hold (Positioning): Partial assist, teach one side, mother does other, staff holds   LATCH Score: 6          Feeding recommendations:  breast feed on demand. Mom is demonstrating a flat affect and states baby was not placed s2s or latched in the PACU. Mom states no hand expression demonstrated.     Maternal grandmother attempted to latch baby in PP unit. Wounds on areolas bilaterally from latch attempts noted.    Ed. On wet wound care    Demonstrated HE with teach back. Visible glistening on the nipple face.    Brought Tahira up to the right breast in football hold. Demonstration of pillows to lift the breast and the baby. Demonstration and teach back of U shape hold of the breast. Alignment of baby to the breast. Wider gape with deep latch achieved. After a 3-5 suck burst, baby loses the latch. Demonstration with minimal teach-back of breast compression to entice baby to suck again. Mom begins to get weepy with latch. Asked if any pain, uncomfortable latch, mom denies. Mom is overcome with emotion.    Reviewed signs of satiation and timing of feeds    After approx. 10 min, mom states baby unlatched from the right breast. Mom wants to latch in left breast in cross cradle hold. Enc. Support of lower jaw. More NNS noted.     Enc. S2s and discussed how baby will present first three days.    Mom has willow pump at home    RSB/DC reviewed with painful nipples handout    Enc. To call lactation    Education on positioning and alignment. Mom is encouraged to:     - Bring baby up to the breast (use of pillows to elevate so baby's torso is against mom's breasts)   - Skin to skin for feedings with top hand exposed to show signs of satiation   - Chin deep into breast  tissue (make baby look up to the nipple)   - nose aligned to the nipple   -Wait for wide gape, drag chin on the breast so nipple is aimed at the upper, back palate  - Cheek should be touching breast   - Deep, firm hold of baby with ear, shoulder, hip alignment    Demonstrated with teach back breast compressions during a feeding to increase milk transfer and stimulate suckling after a breathing/muscle break.     Information on hand expression given. Discussed benefits of knowing how to manually express breast including stimulating milk supply, softening nipple for latch and evacuating breast in the event of engorgement.    Mom is encouraged to place baby skin to skin for feedings. Skin to skin education provided for baby placement on mother's chest, baby only in diaper, blankets below shoulders on baby's back. Skin to skin is encouraged to continue at home for feedings and between feedings.    Worked on positioning infant up at chest level and starting to feed infant with nose arriving at the nipple. Then, using areolar compression to achieve a deep latch that is comfortable and exchanges optimum amounts of milk.     - Start feedings on breast that last feeding ended   - allow no more than 3 hours between breast feeding sessions   - time between feedings is counted from the beginning of the first feed to the beginning of the next feeding session    Reviewed early signs of hunger, including tensing of hands and shoulders - no need to wait for open eyes.  Crying is a late hunger sign.  If baby is crying, soothe baby first and then attempt to latch.  Reviewed normal sucking patterns: transition from stimulation to nutritive to release or non-nutritive. The goal is to see and hear lots of swallowing.    Reviewed normal nursing pattern: infant could latch on one breast up to 30 minutes or until releases on own. Signs of satiation is open hand with fingers that do not grab your finger.  Discussed difference in sensation of  non-nutritive v nutritive sucking    Met with mother. Provided mother with Ready, Set, Baby booklet.    Discussed Skin to Skin contact an benefits to mom and baby.  Talked about the delay of the first bath until baby has adjusted. Spoke about the benefits of rooming in. Feeding on cue and what that means for recognizing infant's hunger. Avoidance of pacifiers for the first month discussed. Talked about exclusive breastfeeding for the first 6 months.    Positioning and latch reviewed as well as showing images of other feeding positions.  Discussed the properties of a good latch in any position. Reviewed hand/manual expression.  Discussed s/s that baby is getting enough milk and some s/s that breastfeeding dyad may need further help.    Gave information on common concerns, what to expect the first few weeks after delivery, preparing for other caregivers, and how partners can help. Resources for support also provided.    Encouraged parents to call for assistance, questions, and concerns about breastfeeding.  Extension provided.    Provided education on growth spurts, when to introduce bottles; paced bottle feeding, and non-nutritive suck at the breast. Provided education on Signs of satiation. Encouraged to call lactation to observe a latch prior to discharge for reassurance. Encouraged to call baby and me with any questions and closely monitor output.      Christy Hume, MA 5/2/2024 1:14 PM

## 2024-05-02 NOTE — PLAN OF CARE
Problem: ANTEPARTUM  Goal: Maintain pregnancy as long as maternal and/or fetal condition is stable  Description: INTERVENTIONS:  - Maternal surveillance  - Fetal surveillance  - Monitor uterine activity  - Medications as ordered  - Bedrest  Outcome: Completed     Problem: BIRTH - VAGINAL/ SECTION  Goal: Fetal and maternal status remain reassuring during the birth process  Description: INTERVENTIONS:  - Monitor vital signs  - Monitor fetal heart rate  - Monitor uterine activity  - Monitor labor progression (vaginal delivery)  - DVT prophylaxis  - Antibiotic prophylaxis  Outcome: Completed  Goal: Emotionally satisfying birthing experience for mother/fetus  Description: Interventions:  - Assess, plan, implement and evaluate the nursing care given to the patient in labor  - Advocate the philosophy that each childbirth experience is a unique experience and support the family's chosen level of involvement and control during the labor process   - Actively participate in both the patient's and family's teaching of the birth process  - Consider cultural, Mormon and age-specific factors and plan care for the patient in labor  Outcome: Completed     Problem: POSTPARTUM  Goal: Experiences normal postpartum course  Description: INTERVENTIONS:  - Monitor maternal vital signs  - Assess uterine involution and lochia  Outcome: Progressing  Goal: Appropriate maternal -  bonding  Description: INTERVENTIONS:  - Identify family support  - Assess for appropriate maternal/infant bonding   -Encourage maternal/infant bonding opportunities  - Referral to  or  as needed  Outcome: Progressing  Goal: Establishment of infant feeding pattern  Description: INTERVENTIONS:  - Assess breast/bottle feeding  - Refer to lactation as needed  Outcome: Progressing  Goal: Incision(s), wounds(s) or drain site(s) healing without S/S of infection  Description: INTERVENTIONS  - Assess and document dressing, incision,  wound bed, drain sites and surrounding tissue  - Provide patient and family education    Outcome: Progressing     Problem: PAIN - ADULT  Goal: Verbalizes/displays adequate comfort level or baseline comfort level  Description: Interventions:  - Encourage patient to monitor pain and request assistance  - Assess pain using appropriate pain scale  - Administer analgesics based on type and severity of pain and evaluate response  - Implement non-pharmacological measures as appropriate and evaluate response  - Consider cultural and social influences on pain and pain management  - Notify physician/advanced practitioner if interventions unsuccessful or patient reports new pain  Outcome: Progressing     Problem: INFECTION - ADULT  Goal: Absence or prevention of progression during hospitalization  Description: INTERVENTIONS:  - Assess and monitor for signs and symptoms of infection  - Monitor lab/diagnostic results  - Monitor all insertion sites, i.e. indwelling lines, tubes, and drains  - Monitor endotracheal if appropriate and nasal secretions for changes in amount and color  - [place] appropriate cooling/warming therapies per order  - Administer medications as ordered  - Instruct and encourage patient and family to use good hand hygiene technique  - Identify and instruct in appropriate isolation precautions for identified infection/condition  Outcome: Progressing     Problem: SAFETY ADULT  Goal: Patient will remain free of falls  Description: INTERVENTIONS:  - Educate patient/family on patient safety including physical limitations  - Instruct patient to call for assistance with activity   - Consult OT/PT to assist with strengthening/mobility   - Keep Call bell within reach  - Keep bed low and locked with side rails adjusted as appropriate  - Keep care items and personal belongings within reach  - Initiate and maintain comfort rounds  Outcome: Progressing  Goal: Maintain or return to baseline ADL function  Description:  INTERVENTIONS:  -  Assess patient's ability to carry out ADLs; assess patient's baseline for ADL function and identify physical deficits which impact ability to perform ADLs (bathing, care of mouth/teeth, toileting, grooming, dressing, etc.)  - Assess/evaluate cause of self-care deficits   - Assess range of motion  - Assess patient's mobility; develop plan if impaired  - Assess patient's need for assistive devices and provide as appropriate  - Encourage maximum independence but intervene and supervise when necessary  - Involve family in performance of ADLs  - Assess for home care needs following discharge   - Consider OT consult to assist with ADL evaluation and planning for discharge  - Provide patient education as appropriate  Outcome: Progressing  Goal: Maintains/Returns to pre admission functional level  Description: INTERVENTIONS:  - Set and communicate daily mobility goal to care team and patient/family/caregiver.   - Out of bed for toileting  - Record patient progress and toleration of activity level   Outcome: Progressing     Problem: Knowledge Deficit  Goal: Patient/family/caregiver demonstrates understanding of disease process, treatment plan, medications, and discharge instructions  Description: Complete learning assessment and assess knowledge base.  Interventions:  - Provide teaching at level of understanding  - Provide teaching via preferred learning methods  Outcome: Progressing     Problem: DISCHARGE PLANNING  Goal: Discharge to home or other facility with appropriate resources  Description: INTERVENTIONS:  - Identify barriers to discharge w/patient and caregiver  - Arrange for needed discharge resources and transportation as appropriate  - Identify discharge learning needs (meds, wound care, etc.)  - Arrange for interpretive services to assist at discharge as needed  - Refer to Case Management Department for coordinating discharge planning if the patient needs post-hospital services based on  physician/advanced practitioner order or complex needs related to functional status, cognitive ability, or social support system  Outcome: Progressing

## 2024-05-02 NOTE — PROGRESS NOTES
Obstetrics Progress Note  Suze Leigh 28 y.o. female MRN: [id number]  Unit/Bed#: -01 Encounter: [id number]    Assessment/Plan:  28 y.o.  now postoperative day #0 s/p primary low transverse  delivery. Stable.  Baby in room. By issue:  History of mechanical aortic valve replacement  Assessment & Plan  Heparin drip restarted at 1448  Plan to start Warfarin 5mg tonight   INR check in the morning  Continue Metoprolol XL 25mg daily   Cardiology will see her POD1    * Status post primary low transverse  section  Assessment & Plan  Lochia wnl. Fundus firm at umbilicus  Incision clean, dry and intact  Pain regimen: s/p Duramorph; viral Tylenol, viral Toradol->Motrin  Bowel regimen: Colace & Senna   Maintain patton; may removed later today  Encourage ambulation and breastfeeding/pumping      Preeclampsia  Assessment & Plan  No evidence of severe features  Routine BP monitoring    Anemia complicating pregnancy in third trimester  Assessment & Plan  Follow up post-op CBC      Continue inpatient care.      Subjective/Objective   Chief Complaint:   Post delivery.     Subjective:   Pain: controlled. Tolerating PO: yes. Patton in place. Flatus: no. Bowel Movement: no. Ambulating: not yet. Chest pain: no. Shortness of breath: no. Leg pain: no. Lochia: within normal limits. Infant feeding: breast.    Objective:   Vitals:   Temp:  [97.3 °F (36.3 °C)-98 °F (36.7 °C)] 98 °F (36.7 °C)  HR:  [66-83] 78  Resp:  [17-22] 18  BP: (108-141)/(56-88) 127/82     Intake/Output Summary (Last 24 hours) at 2024 1540  Last data filed at 2024 1430  Gross per 24 hour   Intake 1400 ml   Output 816 ml   Net 584 ml       Physical Exam:   -General: alert and oriented x 3, in no apparent distress  -Pulmonary: normal effort, no distress  -Abdomen/Pelvis: soft, non-tender, non-distended, no rebound or guarding. Uterine fundus firm and non-tender, at the umbilicus. Incision: clean, dry and intact. Maintain pressure dressing  until 24hrs postop  -Extremities: Non-tender, SCDs on and pumping    Lab Results   Component Value Date    WBC 9.56 05/02/2024    HGB 11.4 (L) 05/02/2024    HCT 34.3 (L) 05/02/2024    MCV 89 05/02/2024     05/02/2024         Madeline Roberson MD  PGY-IV, OBGYN  5/2/2024, 3:40 PM

## 2024-05-02 NOTE — ANESTHESIA PROCEDURE NOTES
Spinal Block    Patient location during procedure: OB/L&D  Start time: 5/2/2024 8:07 AM  Reason for block: procedure for pain, at surgeon's request and primary anesthetic  Staffing  Performed by: Mary Ramírez CRNA  Authorized by: Huber Howe MD    Preanesthetic Checklist  Completed: patient identified, IV checked, site marked, risks and benefits discussed, surgical consent, monitors and equipment checked, pre-op evaluation and timeout performed  Spinal Block  Patient position: sitting  Prep: ChloraPrep and site prepped and draped  Patient monitoring: frequent blood pressure checks, continuous pulse ox and heart rate  Approach: midline  Location: L3-4  Needle  Needle type: Pencan   Needle gauge: 24 G  Needle length: 4 in  Assessment  Sensory level: T4  Injection Assessment:  negative aspiration for heme, no paresthesia on injection and positive aspiration for clear CSF.  Post-procedure:  site cleaned  Additional Notes  Needle stick x 1, no issues

## 2024-05-02 NOTE — PLAN OF CARE
Problem: ANTEPARTUM  Goal: Maintain pregnancy as long as maternal and/or fetal condition is stable  Description: INTERVENTIONS:  - Maternal surveillance  - Fetal surveillance  - Monitor uterine activity  - Medications as ordered  - Bedrest  Outcome: Progressing     Problem: BIRTH - VAGINAL/ SECTION  Goal: Fetal and maternal status remain reassuring during the birth process  Description: INTERVENTIONS:  - Monitor vital signs  - Monitor fetal heart rate  - Monitor uterine activity  - Monitor labor progression (vaginal delivery)  - DVT prophylaxis  - Antibiotic prophylaxis  Outcome: Progressing  Goal: Emotionally satisfying birthing experience for mother/fetus  Description: Interventions:  - Assess, plan, implement and evaluate the nursing care given to the patient in labor  - Advocate the philosophy that each childbirth experience is a unique experience and support the family's chosen level of involvement and control during the labor process   - Actively participate in both the patient's and family's teaching of the birth process  - Consider cultural, Jewish and age-specific factors and plan care for the patient in labor  Outcome: Progressing     Problem: POSTPARTUM  Goal: Experiences normal postpartum course  Description: INTERVENTIONS:  - Monitor maternal vital signs  - Assess uterine involution and lochia  Outcome: Progressing  Goal: Appropriate maternal -  bonding  Description: INTERVENTIONS:  - Identify family support  - Assess for appropriate maternal/infant bonding   -Encourage maternal/infant bonding opportunities  - Referral to  or  as needed  Outcome: Progressing  Goal: Establishment of infant feeding pattern  Description: INTERVENTIONS:  - Assess breast/bottle feeding  - Refer to lactation as needed  Outcome: Progressing  Goal: Incision(s), wounds(s) or drain site(s) healing without S/S of infection  Description: INTERVENTIONS  - Assess and document dressing,  incision, wound bed, drain sites and surrounding tissue  - Provide patient and family education  Outcome: Progressing     Problem: PAIN - ADULT  Goal: Verbalizes/displays adequate comfort level or baseline comfort level  Description: Interventions:  - Encourage patient to monitor pain and request assistance  - Assess pain using appropriate pain scale  - Administer analgesics based on type and severity of pain and evaluate response  - Implement non-pharmacological measures as appropriate and evaluate response  - Consider cultural and social influences on pain and pain management  - Notify physician/advanced practitioner if interventions unsuccessful or patient reports new pain  Outcome: Progressing     Problem: INFECTION - ADULT  Goal: Absence or prevention of progression during hospitalization  Description: INTERVENTIONS:  - Assess and monitor for signs and symptoms of infection  - Monitor lab/diagnostic results  - Monitor all insertion sites, i.e. indwelling lines, tubes, and drains  - Monitor endotracheal if appropriate and nasal secretions for changes in amount and color  - [place] appropriate cooling/warming therapies per order  - Administer medications as ordered  - Instruct and encourage patient and family to use good hand hygiene technique  - Identify and instruct in appropriate isolation precautions for identified infection/condition  Outcome: Progressing  Goal: Absence of fever/infection during neutropenic period  Description: INTERVENTIONS:  - Monitor WBC    Outcome: Progressing     Problem: SAFETY ADULT  Goal: Patient will remain free of falls  Description: INTERVENTIONS:  - Educate patient/family on patient safety including physical limitations  - Instruct patient to call for assistance with activity   - Consult OT/PT to assist with strengthening/mobility   - Keep Call bell within reach  - Keep bed low and locked with side rails adjusted as appropriate  - Keep care items and personal belongings within  reach  - Initiate and maintain comfort rounds  Outcome: Progressing  Goal: Maintain or return to baseline ADL function  Description: INTERVENTIONS:  -  Assess patient's ability to carry out ADLs; assess patient's baseline for ADL function and identify physical deficits which impact ability to perform ADLs (bathing, care of mouth/teeth, toileting, grooming, dressing, etc.)  - Assess/evaluate cause of self-care deficits   - Assess range of motion  - Assess patient's mobility; develop plan if impaired  - Assess patient's need for assistive devices and provide as appropriate  - Encourage maximum independence but intervene and supervise when necessary  - Involve family in performance of ADLs  - Assess for home care needs following discharge   - Consider OT consult to assist with ADL evaluation and planning for discharge  - Provide patient education as appropriate  Outcome: Progressing  Goal: Maintains/Returns to pre admission functional level  Description: INTERVENTIONS:  - Perform AM-PAC 6 Click Basic Mobility/ Daily Activity assessment daily.  - Set and communicate daily mobility goal to care team and patient/family/caregiver.   - Out of bed for toileting  - Record patient progress and toleration of activity level   Outcome: Progressing     Problem: Knowledge Deficit  Goal: Patient/family/caregiver demonstrates understanding of disease process, treatment plan, medications, and discharge instructions  Description: Complete learning assessment and assess knowledge base.  Interventions:  - Provide teaching at level of understanding  - Provide teaching via preferred learning methods  Outcome: Progressing     Problem: DISCHARGE PLANNING  Goal: Discharge to home or other facility with appropriate resources  Description: INTERVENTIONS:  - Identify barriers to discharge w/patient and caregiver  - Arrange for needed discharge resources and transportation as appropriate  - Identify discharge learning needs (meds, wound care,  etc.)  - Arrange for interpretive services to assist at discharge as needed  - Refer to Case Management Department for coordinating discharge planning if the patient needs post-hospital services based on physician/advanced practitioner order or complex needs related to functional status, cognitive ability, or social support system  Outcome: Progressing

## 2024-05-02 NOTE — OP NOTE
OPERATIVE REPORT  PATIENT NAME: Suze Leigh    :  1995  MRN: [id number]  Pt Location: [facility] L&D OR ROOM 02    SURGERY DATE: 2024    Surgeons and Role:     * Sanjay Thompson MD - Primary     * Madeline Roberson MD - Assisting    Preop Diagnosis:  Pregnancy at 37w6d  History of mechanical aortic valve replacement [Z95.2]    Post-Op Diagnosis Codes:     * History of mechanical aortic valve replacement [Z95.2]     * S/P primary low transverse  [Z98.891]    Procedure(s) (LRB):   SECTION () (N/A)    Specimen(s):  ID Type Source Tests Collected by Time Destination   A :  Tissue (Placenta on Hold) OB Only Placenta PLACENTA IN STORAGE Sanjay Thompson MD 2024    B :  Cord Blood Cord BLOOD GAS, VENOUS, CORD, BLOOD GAS, ARTERIAL, CORD Sanjay Thompson MD 2024        Surgical QBL:  Surgical QBL (mL): 291 mL      Drains:  Urethral Catheter Double-lumen;Non-latex 16 Fr. (Active)   Reasons to continue Urinary Catheter  Post-operative urological requirements 24 08   Goal for Removal Voiding trial when ambulation improves 24 08   Site Assessment Clean;Skin intact 24 08   Collection Container Standard drainage bag 24 08   Securement Method Securing device (Describe) 24 0813   Number of days: 0       Anesthesia Type:   Spinal     Operative Indications:  Pregnancy at 37w6d  History of mechanical aortic valve replacement [Z95.2]     Jori Group Classification System:  No Multiple pregnancy, No Transverse or oblique lie, No Breech lie, Gestational age is > or =37 weeks, Nulliparous, Prelabor CD is JORI GROUP 2b    Operative Findings:  No adhesive disease  Viable female  delivered at 0827 with APGARS of 7 and 9 at 1 and 5 minutes respectively. Birth weight 6lbs 4oz  Intact placenta with eccentric insertion of 3 vessel umbilical cord  Normal appearing fallopian tubes and ovaries bilaterally. Normal uterus  Hemostasis noted along rectus  muscles, fascia and subcutaneous tissue during closure  Umbilical Cord Arterial & Venous Blood Gases:  Arterial pH 7.112, base excess -10.6  Venous pH 7.194, base excess -9.2    Complications:   None apparent    Brief History:  Suze Leigh is a 28-year-old G1, P0 now at 37 weeks 6 days gestation.  Her medical history is notable for repair of ascending aortic aneurysm and 2015 in the setting of bicuspid aortic valve with aortic stenosis and expanding aortic aneurysm.  She has a mechanical aortic valve in place.  She was admitted on 2024 for transition from Lovenox to heparin drip before planned primary low-transverse  section.  Preoperatively, heparin drip was discontinued 7 hours preprocedure.  aPTT 1 hour before regional anesthesia was noted to be normal.  She was crossmatched for 2 units of packed red blood cells for her  section.    Procedure and Technique:  In the operating room the correct patient and procedures were identified. Spinal anesthesia was adequately established. 2g IV Ancef was given for preoperative surgical and endocarditis prophylaxis. Patient was placed in the dorsal supine position with a left tilt of the hips. SCDs were applied to the lower extremities. Fetal heart tones were confirmed. Chlorhexidine was used to prep the vagina and perineum. A patton catheter was inserted into the bladder. Chloraprep was used to prep the abdomen. She was draped in the usual sterile fashion. Time out was performed with all in agreement.     A Pfannenstiel incision was made in the skin with a surgical scalpel and sharp dissection was carried out over subsequent layers of tissue with the Bovie down to the level of the fascia. Hemostasis of the subcutaneous tissue was assured. The fascia was incised at the midline and the incision was extended bilaterally using the curved Alcaraz scissors. The rectus muscles were dissected off the fascial edges and then divided at midline. The peritoneum was  identified, entered bluntly and extended superiorly and inferiorly. The bladder blade was inserted and the vesicouterine peritoneum was identified. A transverse incision was made in the lower uterine segment using a new surgical blade. Clear amniotic fluid was noted. The uterine incision was extended cephalad and caudal using blunt dissection. The surgeon's hand was placed into the uterine cavity. The fetal head was identified and elevated through the uterine incision with the assistance of fundal pressure. A loop of nuchal cord was noted and reduced. With gentle traction, the shoulders were delivered followed by the rest of the fetal body. Delayed cord clamping was performed. The umbilical cord was doubly clamped and cut. The infant was then passed off the table to the awaiting  staff. The  was noted to cry spontaneously and moved all extremities. Blood gas, cord blood, and portion of cord were obtained for analysis and routine blood testing.  The placenta delivered with uterine massage and was noted to be intact with and had a eccentric insertion of a  three-vessel cord. The placenta was sent to storage. Oxytocin was administered by IV infusion to enhance uterine contraction. The uterus was exteriorized and cleared of all clots and remaining products of conception.      The hysterotomy was reapproximated using 0-Vicryl in a running sade fashion.  A second imbricating stitch with 0-Vicryl was applied. Hemostasis was achieved. The posterior cul-de-sac was cleared of all clots and products of conception. The uterus was replaced into the abdomen and the pericolic gutters were cleared of all clots. Hemostasis was once again confirmed at the hysterotomy. The fascia was reapproximated using 0-Vicryl in a running nonlocked fashion. The subcutaneous tissue was irrigated and cleared of all clots and debris. Excellent hemostasis was noted. The subcutaneous tissue was reapproximated with 2-0 Plain.  The skin  incision was closed in a running subcuticular fashion with 4-0 Monocryl.  Excellent hemostasis was noted. A pressure dressing was applied. The uterus was expressed of clots. Patient tolerated the procedure well.  All needle, sponge, and instrument counts were noted to be correct x 2 at the end of the procedure.  Patient was transferred to the recovery room in stable condition.   was admitted to the  nursery.    Dr. Thompson was present for all key portions of this procedure.      Patient Disposition:  PACU         SIGNATURE: Madeline Roberson MD  DATE: May 2, 2024  TIME: 9:08 AM

## 2024-05-03 PROBLEM — D62 ACUTE BLOOD LOSS AS CAUSE OF POSTOPERATIVE ANEMIA: Status: ACTIVE | Noted: 2024-03-11

## 2024-05-03 LAB
ABO GROUP BLD BPU: NORMAL
ABO GROUP BLD BPU: NORMAL
APTT PPP: 81 SECONDS (ref 23–37)
APTT PPP: 81 SECONDS (ref 23–37)
BPU ID: NORMAL
BPU ID: NORMAL
CROSSMATCH: NORMAL
CROSSMATCH: NORMAL
ERYTHROCYTE [DISTWIDTH] IN BLOOD BY AUTOMATED COUNT: 12.7 % (ref 11.6–15.1)
HCT VFR BLD AUTO: 28.7 % (ref 34.8–46.1)
HGB BLD-MCNC: 9.6 G/DL (ref 11.5–15.4)
INR PPP: 0.92 (ref 0.84–1.19)
MCH RBC QN AUTO: 29.5 PG (ref 26.8–34.3)
MCHC RBC AUTO-ENTMCNC: 33.4 G/DL (ref 31.4–37.4)
MCV RBC AUTO: 88 FL (ref 82–98)
PLATELET # BLD AUTO: 215 THOUSANDS/UL (ref 149–390)
PMV BLD AUTO: 10.2 FL (ref 8.9–12.7)
PROTHROMBIN TIME: 12.9 SECONDS (ref 11.6–14.5)
RBC # BLD AUTO: 3.25 MILLION/UL (ref 3.81–5.12)
UNIT DISPENSE STATUS: NORMAL
UNIT DISPENSE STATUS: NORMAL
UNIT PRODUCT CODE: NORMAL
UNIT PRODUCT CODE: NORMAL
UNIT PRODUCT VOLUME: 275 ML
UNIT PRODUCT VOLUME: 300 ML
UNIT RH: NORMAL
UNIT RH: NORMAL
WBC # BLD AUTO: 14.63 THOUSAND/UL (ref 4.31–10.16)

## 2024-05-03 PROCEDURE — 4A1HXFZ MONITORING OF PRODUCTS OF CONCEPTION, CARDIAC RHYTHM, EXTERNAL APPROACH: ICD-10-PCS | Performed by: STUDENT IN AN ORGANIZED HEALTH CARE EDUCATION/TRAINING PROGRAM

## 2024-05-03 PROCEDURE — 99222 1ST HOSP IP/OBS MODERATE 55: CPT | Performed by: INTERNAL MEDICINE

## 2024-05-03 PROCEDURE — 85610 PROTHROMBIN TIME: CPT | Performed by: OBSTETRICS & GYNECOLOGY

## 2024-05-03 PROCEDURE — 85027 COMPLETE CBC AUTOMATED: CPT | Performed by: OBSTETRICS & GYNECOLOGY

## 2024-05-03 PROCEDURE — 99024 POSTOP FOLLOW-UP VISIT: CPT | Performed by: OBSTETRICS & GYNECOLOGY

## 2024-05-03 PROCEDURE — 85730 THROMBOPLASTIN TIME PARTIAL: CPT | Performed by: OBSTETRICS & GYNECOLOGY

## 2024-05-03 RX ORDER — SIMETHICONE 80 MG
80 TABLET,CHEWABLE ORAL EVERY 6 HOURS PRN
Status: DISCONTINUED | OUTPATIENT
Start: 2024-05-03 | End: 2024-05-04 | Stop reason: HOSPADM

## 2024-05-03 RX ORDER — FERROUS SULFATE 325(65) MG
325 TABLET ORAL
Status: DISCONTINUED | OUTPATIENT
Start: 2024-05-04 | End: 2024-05-04 | Stop reason: HOSPADM

## 2024-05-03 RX ORDER — ENOXAPARIN SODIUM 100 MG/ML
1 INJECTION SUBCUTANEOUS EVERY 12 HOURS SCHEDULED
Status: DISCONTINUED | OUTPATIENT
Start: 2024-05-03 | End: 2024-05-04 | Stop reason: HOSPADM

## 2024-05-03 RX ORDER — FERROUS SULFATE 325(65) MG
325 TABLET ORAL
Status: DISCONTINUED | OUTPATIENT
Start: 2024-05-03 | End: 2024-05-03

## 2024-05-03 RX ORDER — POLYETHYLENE GLYCOL 3350 17 G/17G
17 POWDER, FOR SOLUTION ORAL DAILY PRN
Status: DISCONTINUED | OUTPATIENT
Start: 2024-05-03 | End: 2024-05-04 | Stop reason: HOSPADM

## 2024-05-03 RX ADMIN — ACETAMINOPHEN 650 MG: 325 TABLET, FILM COATED ORAL at 08:59

## 2024-05-03 RX ADMIN — ACETAMINOPHEN 650 MG: 325 TABLET, FILM COATED ORAL at 04:14

## 2024-05-03 RX ADMIN — ENOXAPARIN SODIUM 80 MG: 100 INJECTION SUBCUTANEOUS at 13:29

## 2024-05-03 RX ADMIN — SIMETHICONE 80 MG: 80 TABLET, CHEWABLE ORAL at 06:30

## 2024-05-03 RX ADMIN — ACETAMINOPHEN 650 MG: 325 TABLET, FILM COATED ORAL at 21:05

## 2024-05-03 RX ADMIN — HEPARIN SODIUM 16 UNITS/KG/HR: 10000 INJECTION, SOLUTION INTRAVENOUS at 07:37

## 2024-05-03 RX ADMIN — ESCITALOPRAM OXALATE 20 MG: 20 TABLET ORAL at 21:05

## 2024-05-03 RX ADMIN — KETOROLAC TROMETHAMINE 15 MG: 30 INJECTION, SOLUTION INTRAMUSCULAR; INTRAVENOUS at 12:51

## 2024-05-03 RX ADMIN — METOPROLOL SUCCINATE 25 MG: 25 TABLET, EXTENDED RELEASE ORAL at 21:26

## 2024-05-03 RX ADMIN — WARFARIN SODIUM 5 MG: 5 TABLET ORAL at 21:05

## 2024-05-03 RX ADMIN — KETOROLAC TROMETHAMINE 15 MG: 30 INJECTION, SOLUTION INTRAMUSCULAR; INTRAVENOUS at 06:30

## 2024-05-03 NOTE — UTILIZATION REVIEW
"NOTIFICATION OF INPATIENT ADMISSION   MATERNITY/DELIVERY AUTHORIZATION REQUEST   SERVICING FACILITY:   [facility]  Parent Child Health - L&D, [place], NICU   [facility] [place] [address]  Tax ID: [id number]  NPI: [id number]   ATTENDING PROVIDER:  Attending Name and NPI#: Sanjay Thompson Md [[id number]]  Address: [address] [place] [address] [place] [address]  Phone: [phone]   ADMISSION INFORMATION:  Place of Service: Inpatient [facility]  Place of Service Code: 21  Inpatient Admission Date/Time: 24  6:25 AM  Discharge Date/Time: No discharge date for patient encounter.  Admitting Diagnosis Code/Description:  Congenital atrial septal defect [Q21.10]  History of mechanical aortic valve replacement [Z95.2]  Pregnant [Z34.90]  37 weeks gestation of pregnancy [Z3A.37]  Encounter for  delivery without indication [O82]     Mother: Suze Leigh 1995 Estimated Date of Delivery: 24  Delivering clinician: Sanjay Thompson    OB History          1    Para   1    Term   1       0    AB   0    Living   1         SAB   0    IAB   0    Ectopic   0    Multiple   0    Live Births   1               [place] Name & MRN:   Information for the patient's :  Reese, Baby Girl (Suze) [[id number]]    Delivery Information:  Sex: female  Delivered 2024 8:27 AM by , Low Transverse; Gestational Age: 37w6d    [place] Measurements:  Weight: 6 lb 4.4 oz (2845 g);  Height: 19\"    APGAR 1 minute 5 minutes 10 minutes   Totals: 7 9       UTILIZATION REVIEW CONTACT:  Lexi Dodson, Utilization   Network Utilization Review Department  Phone: [phone]  Fax [phone]  Email: [email or web address]  Contact for approvals/pending authorizations, clinical reviews, and discharge.     PHYSICIAN ADVISORY SERVICES:  Medical Necessity Denial & Nzcq-ol-Pris Review  Phone: [phone]  Fax: [phone]  Email: " [email or web address]     DISCHARGE SUPPORT TEAM:  For Patients Discharge Needs & Updates  Phone: [phone] opt. 2 Fax: [phone]  Email: [email or web address]

## 2024-05-03 NOTE — PLAN OF CARE
Problem: POSTPARTUM  Goal: Experiences normal postpartum course  Description: INTERVENTIONS:  - Monitor maternal vital signs  - Assess uterine involution and lochia  Outcome: Progressing  Goal: Appropriate maternal -  bonding  Description: INTERVENTIONS:  - Identify family support  - Assess for appropriate maternal/infant bonding   -Encourage maternal/infant bonding opportunities  - Referral to  or  as needed  Outcome: Progressing  Goal: Establishment of infant feeding pattern  Description: INTERVENTIONS:  - Assess breast/bottle feeding  - Refer to lactation as needed  Outcome: Progressing  Goal: Incision(s), wounds(s) or drain site(s) healing without S/S of infection  Description: INTERVENTIONS  - Assess and document dressing, incision, wound bed, drain sites and surrounding tissue  - Provide patient and family education  Outcome: Progressing     Problem: PAIN - ADULT  Goal: Verbalizes/displays adequate comfort level or baseline comfort level  Description: Interventions:  - Encourage patient to monitor pain and request assistance  - Assess pain using appropriate pain scale  - Administer analgesics based on type and severity of pain and evaluate response  - Implement non-pharmacological measures as appropriate and evaluate response  - Consider cultural and social influences on pain and pain management  - Notify physician/advanced practitioner if interventions unsuccessful or patient reports new pain  Outcome: Progressing     Problem: INFECTION - ADULT  Goal: Absence or prevention of progression during hospitalization  Description: INTERVENTIONS:  - Assess and monitor for signs and symptoms of infection  - Monitor lab/diagnostic results  - Monitor all insertion sites, i.e. indwelling lines, tubes, and drains  - Monitor endotracheal if appropriate and nasal secretions for changes in amount and color  - [place] appropriate cooling/warming therapies per order  - Administer medications  as ordered  - Instruct and encourage patient and family to use good hand hygiene technique  - Identify and instruct in appropriate isolation precautions for identified infection/condition  Outcome: Progressing     Problem: SAFETY ADULT  Goal: Patient will remain free of falls  Description: INTERVENTIONS:  - Educate patient/family on patient safety including physical limitations  - Instruct patient to call for assistance with activity   - Consult OT/PT to assist with strengthening/mobility   - Keep Call bell within reach  - Keep bed low and locked with side rails adjusted as appropriate  - Keep care items and personal belongings within reach  - Initiate and maintain comfort rounds  Outcome: Progressing  Goal: Maintain or return to baseline ADL function  Description: INTERVENTIONS:  -  Assess patient's ability to carry out ADLs; assess patient's baseline for ADL function and identify physical deficits which impact ability to perform ADLs (bathing, care of mouth/teeth, toileting, grooming, dressing, etc.)  - Assess/evaluate cause of self-care deficits   - Assess range of motion  - Assess patient's mobility; develop plan if impaired  - Assess patient's need for assistive devices and provide as appropriate  - Encourage maximum independence but intervene and supervise when necessary  - Involve family in performance of ADLs  - Assess for home care needs following discharge   - Consider OT consult to assist with ADL evaluation and planning for discharge  - Provide patient education as appropriate  Outcome: Progressing  Goal: Maintains/Returns to pre admission functional level  Description: INTERVENTIONS:  - Set and communicate daily mobility goal to care team and patient/family/caregiver.   - Out of bed for toileting  - Record patient progress and toleration of activity level   Outcome: Progressing     Problem: Knowledge Deficit  Goal: Patient/family/caregiver demonstrates understanding of disease process, treatment plan,  medications, and discharge instructions  Description: Complete learning assessment and assess knowledge base.  Interventions:  - Provide teaching at level of understanding  - Provide teaching via preferred learning methods  Outcome: Progressing     Problem: DISCHARGE PLANNING  Goal: Discharge to home or other facility with appropriate resources  Description: INTERVENTIONS:  - Identify barriers to discharge w/patient and caregiver  - Arrange for needed discharge resources and transportation as appropriate  - Identify discharge learning needs (meds, wound care, etc.)  - Arrange for interpretive services to assist at discharge as needed  - Refer to Case Management Department for coordinating discharge planning if the patient needs post-hospital services based on physician/advanced practitioner order or complex needs related to functional status, cognitive ability, or social support system  Outcome: Progressing     Problem: ALTERATION IN THE BREAST  Goal: Optimize infant feeding at the breast  Description: INTERVENTIONS:  - Latch, breast and nipple assessment  - Assess prior breast feeding history  - Hand expression of breast milk  - For cracked, bleeding and or sore nipples reassess latch, treat damaged nipple  -Educate mother on feeding cues  -Positioning/latch techniques  Outcome: Progressing     Problem: INADEQUATE LATCH, SUCK OR SWALLOW  Goal: Demonstrate ability to latch and sustain latch, audible swallowing and satiety  Description: INTERVENTIONS:  - Assess oral anatomy, notify Physician/AP for abnormal findings  - Establish milk expression  - Maximize feeding opportunity (skin to skin, behavioral state)  - Position/latch techniques  - Discourage use of pacifier-artificial nipple  - Mechanical pumping  - Nipple Shield  - Supplemental formula feeding (Physician/AP order)  - Alternative feeding method  Outcome: Progressing

## 2024-05-03 NOTE — PLAN OF CARE
Problem: POSTPARTUM  Goal: Experiences normal postpartum course  Description: INTERVENTIONS:  - Monitor maternal vital signs  - Assess uterine involution and lochia  Outcome: Progressing  Goal: Appropriate maternal -  bonding  Description: INTERVENTIONS:  - Identify family support  - Assess for appropriate maternal/infant bonding   -Encourage maternal/infant bonding opportunities  - Referral to  or  as needed  Outcome: Progressing  Goal: Establishment of infant feeding pattern  Description: INTERVENTIONS:  - Assess breast/bottle feeding  - Refer to lactation as needed  Outcome: Progressing  Goal: Incision(s), wounds(s) or drain site(s) healing without S/S of infection  Description: INTERVENTIONS  - Assess and document dressing, incision, wound bed, drain sites and surrounding tissue  - Provide patient and family education  - Perform skin care/dressing changes every  Outcome: Progressing     Problem: PAIN - ADULT  Goal: Verbalizes/displays adequate comfort level or baseline comfort level  Description: Interventions:  - Encourage patient to monitor pain and request assistance  - Assess pain using appropriate pain scale  - Administer analgesics based on type and severity of pain and evaluate response  - Implement non-pharmacological measures as appropriate and evaluate response  - Consider cultural and social influences on pain and pain management  - Notify physician/advanced practitioner if interventions unsuccessful or patient reports new pain  Outcome: Progressing     Problem: INFECTION - ADULT  Goal: Absence or prevention of progression during hospitalization  Description: INTERVENTIONS:  - Assess and monitor for signs and symptoms of infection  - Monitor lab/diagnostic results  - Monitor all insertion sites, i.e. indwelling lines, tubes, and drains  - Monitor endotracheal if appropriate and nasal secretions for changes in amount and color  - [place] appropriate cooling/warming  therapies per order  - Administer medications as ordered  - Instruct and encourage patient and family to use good hand hygiene technique  - Identify and instruct in appropriate isolation precautions for identified infection/condition  Outcome: Progressing     Problem: SAFETY ADULT  Goal: Patient will remain free of falls  Description: INTERVENTIONS:  - Educate patient/family on patient safety including physical limitations  - Instruct patient to call for assistance with activity   - Consult OT/PT to assist with strengthening/mobility   - Keep Call bell within reach  - Keep bed low and locked with side rails adjusted as appropriate  - Keep care items and personal belongings within reach  - Initiate and maintain comfort rounds  Outcome: Progressing  Goal: Maintain or return to baseline ADL function  Description: INTERVENTIONS:  -  Assess patient's ability to carry out ADLs; assess patient's baseline for ADL function and identify physical deficits which impact ability to perform ADLs (bathing, care of mouth/teeth, toileting, grooming, dressing, etc.)  - Assess/evaluate cause of self-care deficits   - Assess range of motion  - Assess patient's mobility; develop plan if impaired  - Assess patient's need for assistive devices and provide as appropriate  - Encourage maximum independence but intervene and supervise when necessary  - Involve family in performance of ADLs  - Assess for home care needs following discharge   - Consider OT consult to assist with ADL evaluation and planning for discharge  - Provide patient education as appropriate  Outcome: Progressing  Goal: Maintains/Returns to pre admission functional level  Description: INTERVENTIONS:  - Set and communicate daily mobility goal to care team and patient/family/caregiver.   - Out of bed for toileting  - Record patient progress and toleration of activity level   Outcome: Progressing     Problem: Knowledge Deficit  Goal: Patient/family/caregiver demonstrates  understanding of disease process, treatment plan, medications, and discharge instructions  Description: Complete learning assessment and assess knowledge base.  Interventions:  - Provide teaching at level of understanding  - Provide teaching via preferred learning methods  Outcome: Progressing     Problem: DISCHARGE PLANNING  Goal: Discharge to home or other facility with appropriate resources  Description: INTERVENTIONS:  - Identify barriers to discharge w/patient and caregiver  - Arrange for needed discharge resources and transportation as appropriate  - Identify discharge learning needs (meds, wound care, etc.)  - Arrange for interpretive services to assist at discharge as needed  - Refer to Case Management Department for coordinating discharge planning if the patient needs post-hospital services based on physician/advanced practitioner order or complex needs related to functional status, cognitive ability, or social support system  Outcome: Progressing     Problem: ALTERATION IN THE BREAST  Goal: Optimize infant feeding at the breast  Description: INTERVENTIONS:  - Latch, breast and nipple assessment  - Assess prior breast feeding history  - Hand expression of breast milk  - For cracked, bleeding and or sore nipples reassess latch, treat damaged nipple  -Educate mother on feeding cues  -Positioning/latch techniques  Outcome: Progressing     Problem: INADEQUATE LATCH, SUCK OR SWALLOW  Goal: Demonstrate ability to latch and sustain latch, audible swallowing and satiety  Description: INTERVENTIONS:  - Assess oral anatomy, notify Physician/AP for abnormal findings  - Establish milk expression  - Maximize feeding opportunity (skin to skin, behavioral state)  - Position/latch techniques  - Discourage use of pacifier-artificial nipple  - Mechanical pumping  - Nipple Shield  - Supplemental formula feeding (Physician/AP order)  - Alternative feeding method  Outcome: Progressing

## 2024-05-03 NOTE — PROGRESS NOTES
Obstetrics Progress Note  Suze Leigh 28 y.o. female MRN: [id number]  Unit/Bed#: -01 Encounter: [id number]    Assessment/Plan:  28 y.o.  now postoperative day #1 s/p uncomplicated primary low transverse  delivery. Stable.  Baby in room. By issue:  History of mechanical aortic valve replacement  Assessment & Plan  Heparin drip therapeutic  Warfarin 5mg PO restarted last night; INR not at goal this morning  Appreciate Cardiology input for further Warfarin dosing and INR checks  Continue Metoprolol XL 25mg daily     * Status post primary low transverse  section  Assessment & Plan  Lochia wnl. Fundus firm at umbilicus  Incision clean, dry and intact  Pain regimen: s/p Duramorph; viral Tylenol, viral Toradol->Motrin; PRN Oxycodone  Bowel regimen: Colace & Senna   Voiding s/p removal of patton catheter  Encourage ambulation and breastfeeding/pumping      Preeclampsia  Assessment & Plan  Asymptomatic  BP since delivery:   Systolic (22hrs), Av , Min:113 , Max:138    Diastolic (22hrs), Av, Min:63, Max:88  Continue routine BP monitoring        Acute blood loss as cause of postoperative anemia  Assessment & Plan  Post op Hgb 9.6g/dL  VSS  Will hold off on administering Venofer for now because patient's IV is being used for Heparin drip      Continue inpatient care.      Subjective/Objective   Chief Complaint:   Post delivery.     Subjective:   Pain: controlled. Tolerating PO: yes. Voiding: yes. Flatus: yes. Bowel Movement: no. Ambulating: yes. Chest pain: no. Shortness of breath: no. Leg pain: no. Lochia: within normal limits. Infant feeding: breast.    Objective:   Vitals:   Temp:  [97.3 °F (36.3 °C)-98 °F (36.7 °C)] 97.7 °F (36.5 °C)  HR:  [65-84] 65  Resp:  [16-20] 18  BP: (108-138)/(56-88) 138/79     Intake/Output Summary (Last 24 hours) at 5/3/2024 0745  Last data filed at 5/3/2024 0229  Gross per 24 hour   Intake 3265.31 ml   Output 2566 ml   Net 699.31 ml       Physical Exam:    -General: alert and oriented x 3, in no apparent distress  -Cardiovascular: Murmur present  -Pulmonary: normal effort. CTA bilaterally  -Abdomen/Pelvis: soft, non-tender, non-distended, no rebound or guarding. Uterine fundus firm and non-tender, at the umbilicus. Incision: clean, dry and intact  -Extremities: Non-tender, no edema    Lab Results   Component Value Date    WBC 14.63 (H) 05/03/2024    HGB 9.6 (L) 05/03/2024    HCT 28.7 (L) 05/03/2024    MCV 88 05/03/2024     05/03/2024         Madeline Roberson MD  PGY-IV, OBGYN  5/3/2024, 7:45 AM

## 2024-05-03 NOTE — CONSULTS
Consultation - Cardiology   Suze Leigh 28 y.o. female MRN: [id number]  Unit/Bed#: -01 Encounter: [id number]    Inpatient consult to Cardiology  Consult performed by: Zaheer Robles MD  Consult ordered by: Sujatha Belcher MD        Impression:  Mechanical AVR - given history of CVA, will need to be therapeutic on INR before stopping parenteral anticoagulation.  However, this could be achieved with sc lovenox.      Plan:  D/C heparin gtt.  Start lovenox 1mg/kg BID  Continue warfarin.  Goal INR prior to stopping lovenox 2.0  Stable for discharge home when stable from OB standpoint.  Can continue sc lovenox at home until therapeutic.     History of Present Illness   Physician Requesting Consult: Sanjay Thompson MD  Reason for Consult / Principal Problem: Anticoagulation.      HPI: Suze Leigh is a 28 y.o. year old female with hx of bicuspid AoV s/p mech AVR/repair of ascending aorta, , s/p PDA repair, CVA in setting of holding warfarin for surgery in past with no residual symptoms, who is POD#1 s/p planned  yesterday.  Doing well from cardiac standpoint.  Echo  - EF 65%, normal mech AVR.        Review of Systems   Constitutional: Negative for chills, diaphoresis, fever and malaise/fatigue.   HENT: Negative.     Eyes: Negative.    Cardiovascular:  Negative for chest pain, dyspnea on exertion, leg swelling and palpitations.   Respiratory:  Negative for cough, shortness of breath, snoring and wheezing.    Endocrine: Negative.    Hematologic/Lymphatic: Negative.    Skin:  Negative for rash.   Musculoskeletal: Negative.    Gastrointestinal:  Negative for abdominal pain, constipation and diarrhea.   Genitourinary:  Negative for bladder incontinence, dysuria and frequency.   Neurological:  Negative for dizziness and light-headedness.   Psychiatric/Behavioral: Negative.     All other systems reviewed and are negative.    Historical Information   Past Medical History:   Diagnosis Date     "Anxiety     Bicuspid aortic valve     s/p mechanical AVR    Coronary artery disease 1993    Moderate aortic insufficiency     s/p mechanical AVR    Motion sickness     Patent ductus arteriosus     s/p repair    PONV (postoperative nausea and vomiting)     S/P ascending aortic aneurysm repair     Stroke (HCC) 02/2022    no residual effects per pt--initially couldnt speak at first--\"feels back to normal now\"    Varicella     hx as child, and vaccine     Past Surgical History:   Procedure Laterality Date    ASCENDING AORTIC ANEURYSM REPAIR  2015    with\" Aortic valve surgery\"    MECHANICAL AORTIC VALVE REPLACEMENT  2015    PATENT DUCTUS ARTERIOUS LIGATION      per pt had this surgery age 1    WI EXC B9 LESION MRGN XCP SK TG T/A/L 1.1-2.0 CM Right 12/05/2022    Procedure: EXCISION  BIOPSY LESION/MASS BACK;  Surgeon: David Cloud MD;  Location: [facility];  Service: General    THROMBECTOMY W/ EMBOLECTOMY  2022    Percutanoeous embolectomy in setting of stroke    WISDOM TOOTH EXTRACTION       Social History     Substance and Sexual Activity   Alcohol Use Not Currently    Alcohol/week: 1.0 standard drink of alcohol    Types: 1 Glasses of wine per week     Social History     Substance and Sexual Activity   Drug Use No     Social History     Tobacco Use   Smoking Status Never    Passive exposure: Never   Smokeless Tobacco Never     Family History: non-contributory    Meds/Allergies   Current Facility-Administered Medications   Medication Dose Route Frequency Provider Last Rate    acetaminophen  650 mg Oral Q6H JAMES Madeline Roberson MD      calcium carbonate  1,000 mg Oral Daily PRN Madeline Roberson MD      diphenhydrAMINE  25 mg Oral Q6H PRN Madeline Roberson MD      escitalopram  20 mg Oral Daily Madeline Roberson MD      ferrous sulfate  325 mg Oral Every Other Day with Breakfast Tabby Payne MD      heparin (porcine)  3-30 Units/kg/hr (Order-Specific) Intravenous Titrated Madeline Roberson MD 16 Units/kg/hr " "(24)    heparin (porcine)  3,200 Units Intravenous Q6H PRN Madeline Roberson MD      heparin (porcine)  6,400 Units Intravenous Q6H PRN Madeline Roberson MD      HYDROmorphone  0.5 mg Intravenous Q2H PRN Mary Ramírez CRNA      ketorolac  15 mg Intravenous Q6H JAMES Roberson MD      Followed by    ibuprofen  600 mg Oral Q6H [facility] Madeline Roberson MD      lactated ringers  125 mL/hr Intravenous Continuous Sanjay Thompson  mL/hr (24)    metoclopramide  10 mg Intravenous Q6H PRN Madeline Roberson MD      metoprolol succinate  25 mg Oral Daily Madeline Roberson MD      oxyCODONE  10 mg Oral Q4H PRN Madeline Roberson MD      oxyCODONE  5 mg Oral Q4H PRN Madeline Roberson MD      polyethylene glycol  17 g Oral Daily PRN Tabby Payne MD      senna  1 tablet Oral Daily Madeline Roberson MD      simethicone  80 mg Oral Q6H PRN Orquidea Sheriff DO      warfarin  5 mg Oral HS Madeline Roberson MD       heparin (porcine), 3-30 Units/kg/hr (Order-Specific), Last Rate: 16 Units/kg/hr (24)  lactated ringers, 125 mL/hr, Last Rate: 125 mL/hr (24)      Medications Prior to Admission   Medication    enoxaparin (LOVENOX) 100 mg/mL    escitalopram (LEXAPRO) 10 mg tablet    ferrous sulfate 325 (65 FE) MG EC tablet    metoprolol succinate (TOPROL-XL) 25 mg 24 hr tablet    Prenatal Vit-Fe Fumarate-FA (PRENATAL VITAMIN PO)       No Active Allergies    Objective   Vitals: Blood pressure 129/77, pulse 64, temperature 97.9 °F (36.6 °C), temperature source Oral, resp. rate 16, height 5' 3\" (1.6 m), weight 83.5 kg (184 lb), last menstrual period 2023, SpO2 97%, currently breastfeeding., Body mass index is 32.59 kg/m².,   Orthostatic Blood Pressures      Flowsheet Row Most Recent Value   Blood Pressure 129/77 filed at 2024 0843   Patient Position - Orthostatic VS Lying filed at 2024 0843          Systolic (24hrs), Av , Min:118 , Max:138     Diastolic (24hrs), Av, Min:70, " Max:88      Intake/Output Summary (Last 24 hours) at 5/3/2024 1134  Last data filed at 5/3/2024 0800  Gross per 24 hour   Intake 2046.59 ml   Output 2850 ml   Net -803.41 ml       Weight (last 2 days)       Date/Time    05/03/24 0800    Comment rows:    OBSERV: Plan of care for mom and baby reviewed. Johanna Steen and Elmer in to round and discuss care plan. at 05/03/24 0800    05/02/24 1130    Comment rows:    OBSERV: Pt recieved and oriented to room 313. at 05/02/24 1130    05/02/24 0950    Comment rows:    OBSERV: ice water provided at 05/02/24 0950    05/01/24 2100    Comment rows:    OBSERV: Pt instructed to clean abdomen with chlorehexidine wipes at 05/01/24 2100            Invasive Devices       Peripheral Intravenous Line  Duration             Peripheral IV 04/30/24 Distal;Dorsal (posterior);Right Forearm 3 days    Peripheral IV 04/30/24 Left;Lower;Dorsal (posterior) Forearm 3 days                      Physical Exam  Constitutional:       Appearance: She is well-developed. She is ill-appearing.   HENT:      Head: Normocephalic and atraumatic.   Neck:      Vascular: No JVD.   Cardiovascular:      Rate and Rhythm: Normal rate and regular rhythm.      Heart sounds: Murmur heard.      Systolic murmur is present with a grade of 2/6.      No friction rub. No gallop.      Comments: Mech heart sounds  Pulmonary:      Effort: Pulmonary effort is normal.      Breath sounds: Normal breath sounds. No wheezing or rales.   Abdominal:      General: Bowel sounds are normal. There is no distension.      Palpations: Abdomen is soft.      Tenderness: There is no abdominal tenderness.   Skin:     General: Skin is warm.      Findings: No erythema or rash.   Neurological:      Deep Tendon Reflexes: Reflexes are normal and symmetric.   Psychiatric:      Comments:                  Laboratory Results:        CBC with diff:   Results from last 7 days   Lab Units 05/03/24  0445 05/02/24  2115 05/02/24  0702 04/30/24  0700   WBC  "Thousand/uL 14.63* 17.66* 9.56 10.44*   HEMOGLOBIN g/dL 9.6* 10.4* 11.4* 11.3*   HEMATOCRIT % 28.7* 31.0* 34.3* 33.3*   MCV fL 88 88 89 88   PLATELETS Thousands/uL 215 250 236 256   RBC Million/uL 3.25* 3.54* 3.84 3.77*   MCH pg 29.5 29.4 29.7 30.0   MCHC g/dL 33.4 33.5 33.2 33.9   RDW % 12.7 12.5 13.1 13.1   MPV fL 10.2 10.3 10.1 10.1   NRBC AUTO /100 WBCs  --   --  0 0         CMP:  Results from last 7 days   Lab Units 24  0702   POTASSIUM mmol/L 3.8   CHLORIDE mmol/L 106   CO2 mmol/L 21   BUN mg/dL 4*   CREATININE mg/dL 0.50*   CALCIUM mg/dL 8.5   AST U/L 12*   ALT U/L 6*   ALK PHOS U/L 155*   EGFR ml/min/1.73sq m 130         BMP:  Results from last 7 days   Lab Units 24  0702   POTASSIUM mmol/L 3.8   CHLORIDE mmol/L 106   CO2 mmol/L 21   BUN mg/dL 4*   CREATININE mg/dL 0.50*   CALCIUM mg/dL 8.5       BNP:   Results Reviewed       None          No results for input(s): \"BNP\" in the last 72 hours.    Magnesium:       Coags:   Results from last 7 days   Lab Units 24  0445 24  2115 24  1405 24  0702 24  1250 24  0555 24  2037 24  1435 24  0700   PTT seconds 81* 99* 30 32 71* 69* 67*   < > 34   INR  0.92  --   --   --   --   --   --   --  0.87    < > = values in this interval not displayed.       TSH:       Lipid Profile:         Cardiac testing:   Results for orders placed during the hospital encounter of 18    Echo complete with contrast if indicated    Narrative  [address] [address]    Transthoracic Echocardiogram  2D, M-mode, Doppler, and Color Doppler    Study date:  2018    Patient: CHRIS MOON  MR number: [id number]  Account number: [id number]  : 1995  Age: 23 years  Gender: Female  Status: Outpatient  Location: [facility]  Height: 63 in  Weight: 159.7 lb  BP: 124/ 82 mmHg    Indications: Aortic valve replacement.    Diagnoses: Z95.2 " - Presence of prosthetic heart valve    Sonographer:  AMY Oseguera RD RVT  Primary Physician:  Huber Oseguera MD  Referring Physician:  Sebastian Shell MD  Group:  [facility]  Interpreting Physician:  Anirudh Hodge MD    SUMMARY    LEFT VENTRICLE:  Systolic function was normal. Ejection fraction was estimated to be 60 %.  There were no regional wall motion abnormalities.    VENTRICULAR SEPTUM:  There was dyssynergic motion. 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    MITRAL VALVE:  There was mild regurgitation.    AORTIC VALVE:  A mechanical prosthesis was present. The valve does appear to be functioning appropriately visually.  Transaortic velocity was increased. At the time of a prior echo done January 4, 2016, the peak continuous wave velocity across the aortic valve was 3.25 m/sec. Today's peak velocity across the valve is 3.75 m/sec.  Valve mean gradient was 34.8 mmHg.    TRICUSPID VALVE:  There was mild regurgitation.  Pulmonary artery systolic pressure was within the normal range.    AORTA:  Patient had a graft placed in the ascending aorta.    HISTORY: PRIOR HISTORY: Mechanical AVR and Ascending Aorta repair with graft replacement, PDA closure (age 1), on warfarin.    PROCEDURE: The study was performed in the [facility]. This was a routine study. The transthoracic approach was used. The study included complete 2D imaging, M-mode, complete spectral Doppler, and color Doppler.  Images were obtained from the parasternal, apical, subcostal, and suprasternal notch acoustic windows. Image quality was adequate.    LEFT VENTRICLE: Size was normal. Systolic function was normal. Ejection fraction was estimated to be 60 %. There were no regional wall motion abnormalities. Wall thickness was normal. No evidence of apical thrombus. DOPPLER: Left  ventricular diastolic function parameters were normal.    VENTRICULAR SEPTUM: There was  dyssynergic motion. 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    RIGHT VENTRICLE: The size was normal. Systolic function was normal. Wall thickness was normal.    LEFT ATRIUM: Size was normal.    RIGHT ATRIUM: Size was normal.    MITRAL VALVE: Valve structure was normal. There was mild thickening. There was normal leaflet separation. DOPPLER: The transmitral velocity was within the normal range. There was no evidence for stenosis. There was mild regurgitation.    AORTIC VALVE: The valve was trileaflet. Leaflets exhibited normal thickness and normal cuspal separation. A mechanical prosthesis was present. The valve does appear to be functioning appropriately visually. DOPPLER: Transaortic velocity  was increased. At the time of a prior echo done January 4, 2016, the peak continuous wave velocity across the aortic valve was 3.25 m/sec. Today's peak velocity across the valve is 3.75 m/sec. There was no evidence for stenosis. There was  no significant regurgitation.    TRICUSPID VALVE: The valve structure was normal. There was normal leaflet separation. DOPPLER: The transtricuspid velocity was within the normal range. There was no evidence for stenosis. There was mild regurgitation. Pulmonary artery  systolic pressure was within the normal range.    PULMONIC VALVE: Leaflets exhibited normal thickness, no calcification, and normal cuspal separation. DOPPLER: The transpulmonic velocity was within the normal range. There was no significant regurgitation.    PERICARDIUM: There was no pericardial effusion. The pericardium was normal in appearance.    AORTA: Patient had a graft placed in the ascending aorta. The root exhibited normal size.    SYSTEMIC VEINS: IVC: The inferior vena cava was normal in size.    MEASUREMENT TABLES    DOPPLER MEASUREMENTS  Aortic valve   (Reference normals)  Peak master   3.75 cm/s   (--)  Mean gradient   34.8 mmHg   (--)    SYSTEM MEASUREMENT TABLES    2D  %FS: 38.07 %  Ao Diam: 2.5  cm  EDV(Teich): 104.66 ml  EF(Teich): 68.22 %  ESV(Cube): 25.37 ml  ESV(Teich): 33.27 ml  IVSd: 0.96 cm  LA Area: 20.43 cm2  LA Diam: 3.44 cm  LVEDV MOD A4C: 99.72 ml  LVEF MOD A4C: 62.03 %  LVESV MOD A4C: 37.87 ml  LVIDd: 4.74 cm  LVIDs: 2.94 cm  LVLd A4C: 7.78 cm  LVLs A4C: 6.6 cm  LVOT Diam: 1.96 cm  LVPWd: 0.92 cm  RA Area: 20.4 cm2  RV Diam.: 3.44 cm  SI(Cube): 46.28 ml/m2  SI(Teich): 40.56 ml/m2  SV MOD A4C: 61.86 ml  SV(Cube): 81.46 ml  SV(Teich): 71.39 ml    CW  AV VTI: 72.68 cm  AV Vmax: 3.75 m/s  AV Vmean: 2.81 m/s  AV maxP.29 mmHg  AV meanP.82 mmHg  TR Vmax: 2.21 m/s  TR maxP.57 mmHg    MM  TAPSE: 1.98 cm    PW  MOUSTAPHA (VTI): 1.11 cm2  MOUSTAPHA Vmax: 1.04 cm2  E': 0.15 m/s  E/E': 8.94  LVOT VTI: 26.76 cm  LVOT Vmax: 1.29 m/s  LVOT Vmean: 0.93 m/s  LVOT maxP.71 mmHg  LVOT meanPG: 3.9 mmHg  MV A Cesar: 0.72 m/s  MV Dec Putnam: 6.14 m/s2  MV DecT: 214.22 ms  MV E Cesar: 1.31 m/s  MV E/A Ratio: 1.84    [employer] Accredited Echocardiography Laboratory    Prepared and electronically signed by    Anirudh Hodge MD  Signed 2018 10:24:29    No results found for this or any previous visit.    No results found for this or any previous visit.    No results found for this or any previous visit.        Imaging: I have personally reviewed pertinent reports.    No results found.        Assessment:  Principal Problem:    Status post primary low transverse  section  Active Problems:    History of mechanical aortic valve replacement    Acute blood loss as cause of postoperative anemia    Preeclampsia

## 2024-05-04 VITALS
WEIGHT: 184 LBS | BODY MASS INDEX: 32.6 KG/M2 | TEMPERATURE: 97.5 F | RESPIRATION RATE: 16 BRPM | HEIGHT: 63 IN | SYSTOLIC BLOOD PRESSURE: 100 MMHG | HEART RATE: 89 BPM | OXYGEN SATURATION: 97 % | DIASTOLIC BLOOD PRESSURE: 64 MMHG

## 2024-05-04 LAB
INR PPP: 0.96 (ref 0.84–1.19)
PROTHROMBIN TIME: 13.4 SECONDS (ref 11.6–14.5)

## 2024-05-04 PROCEDURE — 99024 POSTOP FOLLOW-UP VISIT: CPT | Performed by: OBSTETRICS & GYNECOLOGY

## 2024-05-04 PROCEDURE — 85610 PROTHROMBIN TIME: CPT | Performed by: OBSTETRICS & GYNECOLOGY

## 2024-05-04 RX ORDER — IBUPROFEN 600 MG/1
600 TABLET ORAL EVERY 6 HOURS SCHEDULED
Qty: 30 TABLET | Refills: 0 | Status: SHIPPED | OUTPATIENT
Start: 2024-05-04 | End: 2024-05-14

## 2024-05-04 RX ORDER — OXYCODONE HYDROCHLORIDE 5 MG/1
5 TABLET ORAL EVERY 4 HOURS PRN
Qty: 12 TABLET | Refills: 0 | Status: SHIPPED | OUTPATIENT
Start: 2024-05-04 | End: 2024-05-15

## 2024-05-04 RX ORDER — WARFARIN SODIUM 5 MG/1
TABLET ORAL
Qty: 30 TABLET | Refills: 0 | Status: SHIPPED | OUTPATIENT
Start: 2024-05-04

## 2024-05-04 RX ORDER — POLYETHYLENE GLYCOL 3350 17 G/17G
17 POWDER, FOR SOLUTION ORAL DAILY PRN
Qty: 30 EACH | Refills: 0 | Status: SHIPPED | OUTPATIENT
Start: 2024-05-04 | End: 2024-05-15

## 2024-05-04 RX ORDER — ACETAMINOPHEN 325 MG/1
650 TABLET ORAL EVERY 6 HOURS SCHEDULED
Qty: 30 TABLET | Refills: 0 | Status: SHIPPED | OUTPATIENT
Start: 2024-05-04 | End: 2024-05-15

## 2024-05-04 RX ADMIN — ENOXAPARIN SODIUM 80 MG: 100 INJECTION SUBCUTANEOUS at 12:56

## 2024-05-04 RX ADMIN — ACETAMINOPHEN 650 MG: 325 TABLET, FILM COATED ORAL at 10:06

## 2024-05-04 RX ADMIN — FERROUS SULFATE TAB 325 MG (65 MG ELEMENTAL FE) 325 MG: 325 (65 FE) TAB at 08:31

## 2024-05-04 RX ADMIN — OXYCODONE HYDROCHLORIDE 10 MG: 10 TABLET ORAL at 02:27

## 2024-05-04 RX ADMIN — OXYCODONE HYDROCHLORIDE 10 MG: 10 TABLET ORAL at 08:34

## 2024-05-04 RX ADMIN — SENNOSIDES 8.6 MG: 8.6 TABLET, FILM COATED ORAL at 08:31

## 2024-05-04 RX ADMIN — IBUPROFEN 600 MG: 600 TABLET, FILM COATED ORAL at 12:15

## 2024-05-04 RX ADMIN — ACETAMINOPHEN 650 MG: 325 TABLET, FILM COATED ORAL at 04:15

## 2024-05-04 RX ADMIN — IBUPROFEN 600 MG: 600 TABLET, FILM COATED ORAL at 00:58

## 2024-05-04 RX ADMIN — ENOXAPARIN SODIUM 80 MG: 100 INJECTION SUBCUTANEOUS at 01:14

## 2024-05-04 RX ADMIN — IBUPROFEN 600 MG: 600 TABLET, FILM COATED ORAL at 05:45

## 2024-05-04 NOTE — PROGRESS NOTES
Obstetrics Progress Note  Suze Leigh 28 y.o. female MRN: [id number]  Unit/Bed#: -01 Encounter: [id number]    Assessment/Plan:    Postpartum Day #2 s/p 1LTCS, pregnancy complicated by history of mechanical aortic valve requiring anticoagulation, currently stable. Baby in room. By issue:    * Status post primary low transverse  section  Assessment & Plan  Lochia wnl. Fundus firm at umbilicus  Incision clean, dry and intact  Pain regimen: s/p Duramorph; viral Tylenol, viral Toradol->Motrin; PRN Oxycodone  Bowel regimen: Colace & Senna   Voiding s/p removal of patton catheter  Encourage ambulation and breastfeeding/pumping  Anticipate d/c home POD2      Preeclampsia  Assessment & Plan  Asymptomatic  BP since delivery:   Systolic (22hrs), Av , Min:113 , Max:138    Diastolic (22hrs), Av, Min:63, Max:88  Continue routine BP monitoring        Acute blood loss as cause of postoperative anemia  Assessment & Plan  Post op Hgb 9.6g/dL  VSS  To d/c home with PO iron    History of mechanical aortic valve replacement  Assessment & Plan  S/p Cardiology consultation 5/3/24  S/p Heparin drip. Continue Warfarin 5mg QHS and Lovenox 1mg/kg BID until INR at goal (>2)  Continue Metoprolol XL 25mg daily         Subjective/Objective     Subjective:   No acute events overnight. Pain: controlled. Tolerating PO: yes. Voiding: yes. Flatus: passing. Ambulating: yes. Chest pain: no. Shortness of breath: no. Leg pain: no. Lochia: within normal limits. Baby in room.    Objective:   Vitals:   Temp:  [97.5 °F (36.4 °C)-98.1 °F (36.7 °C)] 97.5 °F (36.4 °C)  HR:  [64-89] 89  Resp:  [16-18] 16  BP: (100-140)/(64-95) 100/64       Intake/Output Summary (Last 24 hours) at 2024 0930  Last data filed at 5/3/2024 1223  Gross per 24 hour   Intake 71.01 ml   Output --   Net 71.01 ml       Lab Results   Component Value Date    WBC 14.63 (H) 2024    HGB 9.6 (L) 2024    HCT 28.7 (L) 2024    MCV 88 2024      05/03/2024       Physical Exam:   General: alert and oriented x3, in no apparent distress  Cardiovascular: regular rate  Pulmonary: normal effort  Abdomen: Soft, appropriately tender, non-distended, no rebound or guarding. Uterine fundus firm and non-tender, -1 cm below the umbilicus  Incision: clean/dry/intact  Extremities: Non tender with no edema      Ambar Horan MD  PGY-I, OBGYN  5/4/2024, 9:30 AM

## 2024-05-04 NOTE — LACTATION NOTE
This note was copied from a baby's chart.  Discharge Lactation    Met with Suze who is breastfeeding her baby girl and both are anticipating discharge home today. Suze states breastfeeding has been going well since lactation reviewed latch and positioning with her two days ago. She states she her very first latch caused some bruising on her areola, but she has had no further damage since.    Suze is in receipt of the Discharge Breastfeeding Booklet. Reviewed continuing the feeding and diaper output log. Discussed cluster feeding at home and providing skin to skin stimulation to calm baby and meet early feeding cues. Engorgement and breast comfort measures discussed.     Encouraged following [facility] at the [facility] for any lactation concerns after discharge. Suze has no questions or concerns at this time.

## 2024-05-04 NOTE — PLAN OF CARE
Problem: POSTPARTUM  Goal: Experiences normal postpartum course  Description: INTERVENTIONS:  - Monitor maternal vital signs  - Assess uterine involution and lochia  Outcome: Progressing  Goal: Appropriate maternal -  bonding  Description: INTERVENTIONS:  - Identify family support  - Assess for appropriate maternal/infant bonding   -Encourage maternal/infant bonding opportunities  - Referral to  or  as needed  Outcome: Progressing  Goal: Establishment of infant feeding pattern  Description: INTERVENTIONS:  - Assess breast/bottle feeding  - Refer to lactation as needed  Outcome: Progressing  Goal: Incision(s), wounds(s) or drain site(s) healing without S/S of infection  Description: INTERVENTIONS  - Assess and document dressing, incision, wound bed, drain sites and surrounding tissue  - Provide patient and family education  - Perform skin care/dressing changes every   Outcome: Progressing     Problem: PAIN - ADULT  Goal: Verbalizes/displays adequate comfort level or baseline comfort level  Description: Interventions:  - Encourage patient to monitor pain and request assistance  - Assess pain using appropriate pain scale  - Administer analgesics based on type and severity of pain and evaluate response  - Implement non-pharmacological measures as appropriate and evaluate response  - Consider cultural and social influences on pain and pain management  - Notify physician/advanced practitioner if interventions unsuccessful or patient reports new pain  Outcome: Progressing     Problem: INFECTION - ADULT  Goal: Absence or prevention of progression during hospitalization  Description: INTERVENTIONS:  - Assess and monitor for signs and symptoms of infection  - Monitor lab/diagnostic results  - Monitor all insertion sites, i.e. indwelling lines, tubes, and drains  - Monitor endotracheal if appropriate and nasal secretions for changes in amount and color  - [place] appropriate cooling/warming  therapies per order  - Administer medications as ordered  - Instruct and encourage patient and family to use good hand hygiene technique  - Identify and instruct in appropriate isolation precautions for identified infection/condition  Outcome: Progressing     Problem: SAFETY ADULT  Goal: Patient will remain free of falls  Description: INTERVENTIONS:  - Educate patient/family on patient safety including physical limitations  - Instruct patient to call for assistance with activity   - Consult OT/PT to assist with strengthening/mobility   - Keep Call bell within reach  - Keep bed low and locked with side rails adjusted as appropriate  - Keep care items and personal belongings within reach  - Initiate and maintain comfort rounds  Outcome: Progressing  Goal: Maintain or return to baseline ADL function  Description: INTERVENTIONS:  -  Assess patient's ability to carry out ADLs; assess patient's baseline for ADL function and identify physical deficits which impact ability to perform ADLs (bathing, care of mouth/teeth, toileting, grooming, dressing, etc.)  - Assess/evaluate cause of self-care deficits   - Assess range of motion  - Assess patient's mobility; develop plan if impaired  - Assess patient's need for assistive devices and provide as appropriate  - Encourage maximum independence but intervene and supervise when necessary  - Involve family in performance of ADLs  - Assess for home care needs following discharge   - Consider OT consult to assist with ADL evaluation and planning for discharge  - Provide patient education as appropriate  Outcome: Progressing  Goal: Maintains/Returns to pre admission functional level  Description: INTERVENTIONS:  - Set and communicate daily mobility goal to care team and patient/family/caregiver.   - Out of bed for toileting  - Record patient progress and toleration of activity level   Outcome: Progressing     Problem: Knowledge Deficit  Goal: Patient/family/caregiver demonstrates  understanding of disease process, treatment plan, medications, and discharge instructions  Description: Complete learning assessment and assess knowledge base.  Interventions:  - Provide teaching at level of understanding  - Provide teaching via preferred learning methods  Outcome: Progressing     Problem: DISCHARGE PLANNING  Goal: Discharge to home or other facility with appropriate resources  Description: INTERVENTIONS:  - Identify barriers to discharge w/patient and caregiver  - Arrange for needed discharge resources and transportation as appropriate  - Identify discharge learning needs (meds, wound care, etc.)  - Arrange for interpretive services to assist at discharge as needed  - Refer to Case Management Department for coordinating discharge planning if the patient needs post-hospital services based on physician/advanced practitioner order or complex needs related to functional status, cognitive ability, or social support system  Outcome: Progressing     Problem: ALTERATION IN THE BREAST  Goal: Optimize infant feeding at the breast  Description: INTERVENTIONS:  - Latch, breast and nipple assessment  - Assess prior breast feeding history  - Hand expression of breast milk  - For cracked, bleeding and or sore nipples reassess latch, treat damaged nipple  -Educate mother on feeding cues  -Positioning/latch techniques  Outcome: Progressing     Problem: INADEQUATE LATCH, SUCK OR SWALLOW  Goal: Demonstrate ability to latch and sustain latch, audible swallowing and satiety  Description: INTERVENTIONS:  - Assess oral anatomy, notify Physician/AP for abnormal findings  - Establish milk expression  - Maximize feeding opportunity (skin to skin, behavioral state)  - Position/latch techniques  - Discourage use of pacifier-artificial nipple  - Mechanical pumping  - Nipple Shield  - Supplemental formula feeding (Physician/AP order)  - Alternative feeding method  Outcome: Progressing

## 2024-05-04 NOTE — PLAN OF CARE
Problem: POSTPARTUM  Goal: Experiences normal postpartum course  Description: INTERVENTIONS:  - Monitor maternal vital signs  - Assess uterine involution and lochia  Outcome: Adequate for Discharge  Goal: Appropriate maternal -  bonding  Description: INTERVENTIONS:  - Identify family support  - Assess for appropriate maternal/infant bonding   -Encourage maternal/infant bonding opportunities  - Referral to  or  as needed  Outcome: Adequate for Discharge  Goal: Establishment of infant feeding pattern  Description: INTERVENTIONS:  - Assess breast/bottle feeding  - Refer to lactation as needed  Outcome: Adequate for Discharge  Goal: Incision(s), wounds(s) or drain site(s) healing without S/S of infection  Description: INTERVENTIONS  - Assess and document dressing, incision, wound bed, drain sites and surrounding tissue  - Provide patient and family education  - Perform skin care/dressing changes ev  Outcome: Adequate for Discharge     Problem: PAIN - ADULT  Goal: Verbalizes/displays adequate comfort level or baseline comfort level  Description: Interventions:  - Encourage patient to monitor pain and request assistance  - Assess pain using appropriate pain scale  - Administer analgesics based on type and severity of pain and evaluate response  - Implement non-pharmacological measures as appropriate and evaluate response  - Consider cultural and social influences on pain and pain management  - Notify physician/advanced practitioner if interventions unsuccessful or patient reports new pain  Outcome: Adequate for Discharge     Problem: INFECTION - ADULT  Goal: Absence or prevention of progression during hospitalization  Description: INTERVENTIONS:  - Assess and monitor for signs and symptoms of infection  - Monitor lab/diagnostic results  - Monitor all insertion sites, i.e. indwelling lines, tubes, and drains  - Monitor endotracheal if appropriate and nasal secretions for changes in amount  and color  - [place] appropriate cooling/warming therapies per order  - Administer medications as ordered  - Instruct and encourage patient and family to use good hand hygiene technique  - Identify and instruct in appropriate isolation precautions for identified infection/condition  Outcome: Adequate for Discharge     Problem: SAFETY ADULT  Goal: Patient will remain free of falls  Description: INTERVENTIONS:  - Educate patient/family on patient safety including physical limitations  - Instruct patient to call for assistance with activity   - Consult OT/PT to assist with strengthening/mobility   - Keep Call bell within reach  - Keep bed low and locked with side rails adjusted as appropriate  - Keep care items and personal belongings within reach  - Initiate and maintain comfort rounds  Outcome: Adequate for Discharge  Goal: Maintain or return to baseline ADL function  Description: INTERVENTIONS:  -  Assess patient's ability to carry out ADLs; assess patient's baseline for ADL function and identify physical deficits which impact ability to perform ADLs (bathing, care of mouth/teeth, toileting, grooming, dressing, etc.)  - Assess/evaluate cause of self-care deficits   - Assess range of motion  - Assess patient's mobility; develop plan if impaired  - Assess patient's need for assistive devices and provide as appropriate  - Encourage maximum independence but intervene and supervise when necessary  - Involve family in performance of ADLs  - Assess for home care needs following discharge   - Consider OT consult to assist with ADL evaluation and planning for discharge  - Provide patient education as appropriate  Outcome: Adequate for Discharge  Goal: Maintains/Returns to pre admission functional level  Description: INTERVENTIONS:  - Set and communicate daily mobility goal to care team and patient/family/caregiver.   - Out of bed for toileting  - Record patient progress and toleration of activity level   Outcome: Adequate  for Discharge     Problem: Knowledge Deficit  Goal: Patient/family/caregiver demonstrates understanding of disease process, treatment plan, medications, and discharge instructions  Description: Complete learning assessment and assess knowledge base.  Interventions:  - Provide teaching at level of understanding  - Provide teaching via preferred learning methods  Outcome: Adequate for Discharge     Problem: DISCHARGE PLANNING  Goal: Discharge to home or other facility with appropriate resources  Description: INTERVENTIONS:  - Identify barriers to discharge w/patient and caregiver  - Arrange for needed discharge resources and transportation as appropriate  - Identify discharge learning needs (meds, wound care, etc.)  - Arrange for interpretive services to assist at discharge as needed  - Refer to Case Management Department for coordinating discharge planning if the patient needs post-hospital services based on physician/advanced practitioner order or complex needs related to functional status, cognitive ability, or social support system  Outcome: Adequate for Discharge     Problem: ALTERATION IN THE BREAST  Goal: Optimize infant feeding at the breast  Description: INTERVENTIONS:  - Latch, breast and nipple assessment  - Assess prior breast feeding history  - Hand expression of breast milk  - For cracked, bleeding and or sore nipples reassess latch, treat damaged nipple  -Educate mother on feeding cues  -Positioning/latch techniques  Outcome: Adequate for Discharge     Problem: INADEQUATE LATCH, SUCK OR SWALLOW  Goal: Demonstrate ability to latch and sustain latch, audible swallowing and satiety  Description: INTERVENTIONS:  - Assess oral anatomy, notify Physician/AP for abnormal findings  - Establish milk expression  - Maximize feeding opportunity (skin to skin, behavioral state)  - Position/latch techniques  - Discourage use of pacifier-artificial nipple  - Mechanical pumping  - Nipple Shield  - Supplemental formula  feeding (Physician/AP order)  - Alternative feeding method  Outcome: Adequate for Discharge

## 2024-05-06 ENCOUNTER — ANTICOAG VISIT (OUTPATIENT)
Dept: CARDIOLOGY CLINIC | Facility: CLINIC | Age: 29
End: 2024-05-06

## 2024-05-06 DIAGNOSIS — Z95.2 HISTORY OF MECHANICAL AORTIC VALVE REPLACEMENT: Primary | ICD-10-CM

## 2024-05-06 LAB — INR PPP: 3.1 (ref 0.84–1.19)

## 2024-05-06 NOTE — UTILIZATION REVIEW
Obstetrics Discharge Summary  Suze Leigh 28 y.o. female MRN: [id number]  Unit/Bed#: -01 Encounter: [id number]     Admission Date: 2024      Discharge Date: 2024     Primary Obstetrician: Juani CABRERA   Admitting/Delivering Attending: Sanjay Thompson MD   Discharging Attending: Tabby Payne MD     Admitting Diagnoses:   1. Pregnancy at 37 weeks of gestation   2. History of mechanical aortic valve replacement   3. History of cerebrovascular accident   4. Hx of repair of ascending aorta   5. Mild anemia complicating pregnancy      Discharge Diagnoses:   Same, delivered     Consultant(s):   Maternal Fetal Medicine   Cardiology  Anesthesiology     Hospital course: Suze Leigh is now a 28 y.o.  who was initially admitted at 37w4d for planned transition of anticoagulation due to mechanical aortic valve before planned primary  section. In the weeks leading up to admission she was on Lovenox. On admission she was stared on a heparin drip and this as discontinued 7hrs pre-delivery per Anesthesia. Repeat aPTT 1-hr before her surgery time was appropriate for regional anesthesia.      She underwent an uncomplicated primary, low transverse  delivery on 2024. APGARS were 7, 9 at 1 and 5 minutes, respectively. 's birth weight was 6lb 4oz. Placenta delivered without difficulty. Blood loss at delivery was 291mL.   was admitted to the  nursery. Patient tolerated the procedure well and was transferred to recovery in stable condition.      Heparin drip was restarted at 6hrs post delivery and 5mg of Warfarin . Heparin was discontinued once her INR was at goal of > 2. She was discharged home on 5 mg of Warfarin with plan to follow up for INR check.     She was diagnosed with gestational hypertension pre-delivery. There was no laboratory evidence of preeclampsia and blood pressures were at goal post delivery without antihypertensive therapy.     On day of  discharge, patient was instructed to follow up with her OBGYN as an outpatient and was given appropriate precautions for which to call her obstetric provider or return to the hospital.     Complications: none apparent     Condition at discharge: good      Provisions for Follow-Up Care:  Please see after visit summary for information related to follow-up care and any pertinent home health orders.       Disposition: Home     Planned Readmission: no     Discharge Medications:   Please see AVS for a complete list of discharge medications.     Discharge instructions :   Please see AVS for complete discharge instructions.     Ambar Horan MD  PGY-1 OBGYN

## 2024-05-06 NOTE — PROGRESS NOTES
Spoke with patient, she had C section last week, was advised to take 5 mg daily, was also on Lovenox, last dose of Lovenox was last night.   INT at top of goal. Advised to take 1.25 mg tonight only, then go back to 2.5 mg Tues Thurs Sat, 3.75 mg all other days, will recheck Fri 5/10/24    Patient is a home elvia

## 2024-05-08 LAB
ALBUMIN SERPL BCP-MCNC: 3.1 G/DL (ref 3.5–5)
ALP SERPL-CCNC: 155 U/L (ref 34–104)
ALT SERPL W P-5'-P-CCNC: 6 U/L (ref 7–52)
ANION GAP SERPL CALCULATED.3IONS-SCNC: 7 MMOL/L (ref 4–13)
AST SERPL W P-5'-P-CCNC: 12 U/L (ref 13–39)
BILIRUB SERPL-MCNC: 0.98 MG/DL (ref 0.2–1)
BUN SERPL-MCNC: 4 MG/DL (ref 5–25)
CALCIUM ALBUM COR SERPL-MCNC: 9.2 MG/DL (ref 8.3–10.1)
CALCIUM SERPL-MCNC: 8.5 MG/DL (ref 8.4–10.2)
CHLORIDE SERPL-SCNC: 106 MMOL/L (ref 96–108)
CO2 SERPL-SCNC: 21 MMOL/L (ref 21–32)
CREAT SERPL-MCNC: 0.5 MG/DL (ref 0.6–1.3)
GFR SERPL CREATININE-BSD FRML MDRD: 130 ML/MIN/1.73SQ M
GLUCOSE SERPL-MCNC: 73 MG/DL (ref 65–140)
PLACENTA IN STORAGE: NORMAL
POTASSIUM SERPL-SCNC: 3.8 MMOL/L (ref 3.5–5.3)
PROT SERPL-MCNC: 6.1 G/DL (ref 6.4–8.4)
SODIUM SERPL-SCNC: 134 MMOL/L (ref 135–147)

## 2024-05-10 ENCOUNTER — POSTPARTUM VISIT (OUTPATIENT)
Dept: OBGYN CLINIC | Facility: CLINIC | Age: 29
End: 2024-05-10

## 2024-05-10 ENCOUNTER — APPOINTMENT (EMERGENCY)
Dept: CT IMAGING | Facility: HOSPITAL | Age: 29
DRG: 776 | End: 2024-05-10
Payer: COMMERCIAL

## 2024-05-10 ENCOUNTER — APPOINTMENT (INPATIENT)
Dept: CT IMAGING | Facility: HOSPITAL | Age: 29
DRG: 776 | End: 2024-05-10
Payer: COMMERCIAL

## 2024-05-10 ENCOUNTER — APPOINTMENT (INPATIENT)
Dept: RADIOLOGY | Facility: HOSPITAL | Age: 29
DRG: 776 | End: 2024-05-10
Payer: COMMERCIAL

## 2024-05-10 ENCOUNTER — HOSPITAL ENCOUNTER (INPATIENT)
Facility: HOSPITAL | Age: 29
LOS: 4 days | Discharge: HOME | DRG: 776 | End: 2024-05-14
Attending: EMERGENCY MEDICINE | Admitting: SURGERY
Payer: COMMERCIAL

## 2024-05-10 VITALS
DIASTOLIC BLOOD PRESSURE: 74 MMHG | BODY MASS INDEX: 31.18 KG/M2 | WEIGHT: 176 LBS | SYSTOLIC BLOOD PRESSURE: 118 MMHG | HEIGHT: 63 IN

## 2024-05-10 DIAGNOSIS — S30.1XXA HEMATOMA OF RECTUS SHEATH, INITIAL ENCOUNTER: ICD-10-CM

## 2024-05-10 DIAGNOSIS — D62 ACUTE BLOOD LOSS ANEMIA: ICD-10-CM

## 2024-05-10 DIAGNOSIS — Z95.2 HISTORY OF MECHANICAL AORTIC VALVE REPLACEMENT: ICD-10-CM

## 2024-05-10 DIAGNOSIS — Z86.73 HISTORY OF EMBOLIC STROKE: ICD-10-CM

## 2024-05-10 DIAGNOSIS — Q23.1 BICUSPID AORTIC VALVE: ICD-10-CM

## 2024-05-10 DIAGNOSIS — Z79.01 CHRONIC ANTICOAGULATION: ICD-10-CM

## 2024-05-10 LAB
ABO GROUP BLD: NORMAL
ALBUMIN SERPL BCP-MCNC: 2.8 G/DL (ref 3.5–5)
ALP SERPL-CCNC: 102 U/L (ref 34–104)
ALT SERPL W P-5'-P-CCNC: 12 U/L (ref 7–52)
ANION GAP SERPL CALCULATED.3IONS-SCNC: 10 MMOL/L (ref 4–13)
APAP SERPL-MCNC: <2 UG/ML (ref 10–20)
APTT PPP: 85 SECONDS (ref 23–37)
AST SERPL W P-5'-P-CCNC: 25 U/L (ref 13–39)
ATRIAL RATE: 101 BPM
BACTERIA UR QL AUTO: NORMAL /HPF
BASOPHILS # BLD AUTO: 0.02 THOUSANDS/ÂΜL (ref 0–0.1)
BASOPHILS NFR BLD AUTO: 0 % (ref 0–1)
BILIRUB DIRECT SERPL-MCNC: 0.33 MG/DL (ref 0–0.2)
BILIRUB SERPL-MCNC: 2.47 MG/DL (ref 0.2–1)
BILIRUB UR QL STRIP: NEGATIVE
BLD GP AB SCN SERPL QL: NEGATIVE
BUN SERPL-MCNC: 14 MG/DL (ref 5–25)
CALCIUM SERPL-MCNC: 8.1 MG/DL (ref 8.4–10.2)
CFFMA (FUNCTIONAL FIBRINOGEN MAX AMPLITUDE): >52 MM (ref 15–32)
CHLORIDE SERPL-SCNC: 106 MMOL/L (ref 96–108)
CKLY30: 0 % (ref 0–2.6)
CKR(REACTION TIME): >17 MIN (ref 4.6–9.1)
CLARITY UR: CLEAR
CO2 SERPL-SCNC: 22 MMOL/L (ref 21–32)
COLOR UR: YELLOW
CREAT SERPL-MCNC: 0.45 MG/DL (ref 0.6–1.3)
CRTMA(RAPIDTEG MAX AMPLITUDE): >75 MM (ref 52–70)
EOSINOPHIL # BLD AUTO: 0.21 THOUSAND/ÂΜL (ref 0–0.61)
EOSINOPHIL NFR BLD AUTO: 2 % (ref 0–6)
ERYTHROCYTE [DISTWIDTH] IN BLOOD BY AUTOMATED COUNT: 14.2 % (ref 11.6–15.1)
ERYTHROCYTE [DISTWIDTH] IN BLOOD BY AUTOMATED COUNT: 14.6 % (ref 11.6–15.1)
ETHANOL SERPL-MCNC: <10 MG/DL
FIBRINOGEN PPP-MCNC: 1149 MG/DL (ref 207–520)
GFR SERPL CREATININE-BSD FRML MDRD: 134 ML/MIN/1.73SQ M
GLUCOSE SERPL-MCNC: 82 MG/DL (ref 65–140)
GLUCOSE UR STRIP-MCNC: NEGATIVE MG/DL
HCT VFR BLD AUTO: 16.2 % (ref 34.8–46.1)
HCT VFR BLD AUTO: 24.2 % (ref 34.8–46.1)
HGB BLD-MCNC: 5 G/DL (ref 11.5–15.4)
HGB BLD-MCNC: 7.7 G/DL (ref 11.5–15.4)
HGB UR QL STRIP.AUTO: ABNORMAL
IMM GRANULOCYTES # BLD AUTO: 0.23 THOUSAND/UL (ref 0–0.2)
IMM GRANULOCYTES NFR BLD AUTO: 2 % (ref 0–2)
INR PPP: 3.64 (ref 0.84–1.19)
KETONES UR STRIP-MCNC: ABNORMAL MG/DL
LEUKOCYTE ESTERASE UR QL STRIP: NEGATIVE
LYMPHOCYTES # BLD AUTO: 1.31 THOUSANDS/ÂΜL (ref 0.6–4.47)
LYMPHOCYTES NFR BLD AUTO: 9 % (ref 14–44)
MCH RBC QN AUTO: 28.2 PG (ref 26.8–34.3)
MCH RBC QN AUTO: 29.3 PG (ref 26.8–34.3)
MCHC RBC AUTO-ENTMCNC: 30.9 G/DL (ref 31.4–37.4)
MCHC RBC AUTO-ENTMCNC: 31.8 G/DL (ref 31.4–37.4)
MCV RBC AUTO: 92 FL (ref 82–98)
MCV RBC AUTO: 92 FL (ref 82–98)
MONOCYTES # BLD AUTO: 0.81 THOUSAND/ÂΜL (ref 0.17–1.22)
MONOCYTES NFR BLD AUTO: 6 % (ref 4–12)
NEUTROPHILS # BLD AUTO: 11.54 THOUSANDS/ÂΜL (ref 1.85–7.62)
NEUTS SEG NFR BLD AUTO: 81 % (ref 43–75)
NITRITE UR QL STRIP: NEGATIVE
NON-SQ EPI CELLS URNS QL MICRO: NORMAL /HPF
NRBC BLD AUTO-RTO: 1 /100 WBCS
P AXIS: 20 DEGREES
PH UR STRIP.AUTO: 6 [PH]
PLATELET # BLD AUTO: 364 THOUSANDS/UL (ref 149–390)
PLATELET # BLD AUTO: 716 THOUSANDS/UL (ref 149–390)
PMV BLD AUTO: 8.1 FL (ref 8.9–12.7)
PMV BLD AUTO: 8.4 FL (ref 8.9–12.7)
POTASSIUM SERPL-SCNC: 4 MMOL/L (ref 3.5–5.3)
PR INTERVAL: 128 MS
PROT SERPL-MCNC: 5.6 G/DL (ref 6.4–8.4)
PROT UR STRIP-MCNC: ABNORMAL MG/DL
PROTHROMBIN TIME: 37.2 SECONDS (ref 11.6–14.5)
QRS AXIS: 60 DEGREES
QRSD INTERVAL: 74 MS
QT INTERVAL: 310 MS
QTC INTERVAL: 401 MS
RBC # BLD AUTO: 1.77 MILLION/UL (ref 3.81–5.12)
RBC # BLD AUTO: 2.63 MILLION/UL (ref 3.81–5.12)
RBC #/AREA URNS AUTO: NORMAL /HPF
RH BLD: POSITIVE
SALICYLATES SERPL-MCNC: <5 MG/DL (ref 3–20)
SODIUM SERPL-SCNC: 138 MMOL/L (ref 135–147)
SP GR UR STRIP.AUTO: >=1.05 (ref 1–1.03)
SPECIMEN EXPIRATION DATE: NORMAL
T WAVE AXIS: 50 DEGREES
UROBILINOGEN UR STRIP-ACNC: 2 MG/DL
VENTRICULAR RATE: 101 BPM
WBC # BLD AUTO: 14.12 THOUSAND/UL (ref 4.31–10.16)
WBC # BLD AUTO: 8.78 THOUSAND/UL (ref 4.31–10.16)
WBC #/AREA URNS AUTO: NORMAL /HPF

## 2024-05-10 PROCEDURE — 85347 COAGULATION TIME ACTIVATED: CPT | Performed by: EMERGENCY MEDICINE

## 2024-05-10 PROCEDURE — P9016 RBC LEUKOCYTES REDUCED: HCPCS

## 2024-05-10 PROCEDURE — 80143 DRUG ASSAY ACETAMINOPHEN: CPT | Performed by: PHYSICIAN ASSISTANT

## 2024-05-10 PROCEDURE — 86850 RBC ANTIBODY SCREEN: CPT | Performed by: PHYSICIAN ASSISTANT

## 2024-05-10 PROCEDURE — 99291 CRITICAL CARE FIRST HOUR: CPT | Performed by: SURGERY

## 2024-05-10 PROCEDURE — 93010 ELECTROCARDIOGRAM REPORT: CPT | Performed by: INTERNAL MEDICINE

## 2024-05-10 PROCEDURE — 85730 THROMBOPLASTIN TIME PARTIAL: CPT | Performed by: PHYSICIAN ASSISTANT

## 2024-05-10 PROCEDURE — 86923 COMPATIBILITY TEST ELECTRIC: CPT

## 2024-05-10 PROCEDURE — 86900 BLOOD TYPING SEROLOGIC ABO: CPT | Performed by: PHYSICIAN ASSISTANT

## 2024-05-10 PROCEDURE — 82077 ASSAY SPEC XCP UR&BREATH IA: CPT | Performed by: PHYSICIAN ASSISTANT

## 2024-05-10 PROCEDURE — 71045 X-RAY EXAM CHEST 1 VIEW: CPT

## 2024-05-10 PROCEDURE — 85576 BLOOD PLATELET AGGREGATION: CPT | Performed by: EMERGENCY MEDICINE

## 2024-05-10 PROCEDURE — 80076 HEPATIC FUNCTION PANEL: CPT | Performed by: PHYSICIAN ASSISTANT

## 2024-05-10 PROCEDURE — 85397 CLOTTING FUNCT ACTIVITY: CPT | Performed by: EMERGENCY MEDICINE

## 2024-05-10 PROCEDURE — 99223 1ST HOSP IP/OBS HIGH 75: CPT | Performed by: INTERNAL MEDICINE

## 2024-05-10 PROCEDURE — 99284 EMERGENCY DEPT VISIT MOD MDM: CPT

## 2024-05-10 PROCEDURE — 81001 URINALYSIS AUTO W/SCOPE: CPT | Performed by: PHYSICIAN ASSISTANT

## 2024-05-10 PROCEDURE — 96374 THER/PROPH/DIAG INJ IV PUSH: CPT

## 2024-05-10 PROCEDURE — 99024 POSTOP FOLLOW-UP VISIT: CPT | Performed by: STUDENT IN AN ORGANIZED HEALTH CARE EDUCATION/TRAINING PROGRAM

## 2024-05-10 PROCEDURE — 85025 COMPLETE CBC W/AUTO DIFF WBC: CPT | Performed by: PHYSICIAN ASSISTANT

## 2024-05-10 PROCEDURE — 36415 COLL VENOUS BLD VENIPUNCTURE: CPT | Performed by: PHYSICIAN ASSISTANT

## 2024-05-10 PROCEDURE — 85027 COMPLETE CBC AUTOMATED: CPT

## 2024-05-10 PROCEDURE — 74177 CT ABD & PELVIS W/CONTRAST: CPT

## 2024-05-10 PROCEDURE — 85384 FIBRINOGEN ACTIVITY: CPT | Performed by: EMERGENCY MEDICINE

## 2024-05-10 PROCEDURE — 74174 CTA ABD&PLVS W/CONTRAST: CPT

## 2024-05-10 PROCEDURE — 30233N1 TRANSFUSION OF NONAUTOLOGOUS RED BLOOD CELLS INTO PERIPHERAL VEIN, PERCUTANEOUS APPROACH: ICD-10-PCS | Performed by: SURGERY

## 2024-05-10 PROCEDURE — 85610 PROTHROMBIN TIME: CPT | Performed by: PHYSICIAN ASSISTANT

## 2024-05-10 PROCEDURE — 85384 FIBRINOGEN ACTIVITY: CPT

## 2024-05-10 PROCEDURE — 36430 TRANSFUSION BLD/BLD COMPNT: CPT

## 2024-05-10 PROCEDURE — 80179 DRUG ASSAY SALICYLATE: CPT | Performed by: PHYSICIAN ASSISTANT

## 2024-05-10 PROCEDURE — 80048 BASIC METABOLIC PNL TOTAL CA: CPT | Performed by: PHYSICIAN ASSISTANT

## 2024-05-10 PROCEDURE — 99291 CRITICAL CARE FIRST HOUR: CPT | Performed by: PHYSICIAN ASSISTANT

## 2024-05-10 PROCEDURE — P9017 PLASMA 1 DONOR FRZ W/IN 8 HR: HCPCS

## 2024-05-10 PROCEDURE — 93005 ELECTROCARDIOGRAM TRACING: CPT

## 2024-05-10 PROCEDURE — 86901 BLOOD TYPING SEROLOGIC RH(D): CPT | Performed by: PHYSICIAN ASSISTANT

## 2024-05-10 PROCEDURE — 99222 1ST HOSP IP/OBS MODERATE 55: CPT | Performed by: OBSTETRICS & GYNECOLOGY

## 2024-05-10 RX ORDER — ESCITALOPRAM OXALATE 10 MG/1
10 TABLET ORAL DAILY
Status: DISCONTINUED | OUTPATIENT
Start: 2024-05-11 | End: 2024-05-10

## 2024-05-10 RX ORDER — FUROSEMIDE 10 MG/ML
20 INJECTION INTRAMUSCULAR; INTRAVENOUS ONCE
Status: COMPLETED | OUTPATIENT
Start: 2024-05-10 | End: 2024-05-10

## 2024-05-10 RX ORDER — CHLORHEXIDINE GLUCONATE ORAL RINSE 1.2 MG/ML
15 SOLUTION DENTAL EVERY 12 HOURS SCHEDULED
Status: DISCONTINUED | OUTPATIENT
Start: 2024-05-10 | End: 2024-05-14 | Stop reason: HOSPADM

## 2024-05-10 RX ORDER — POLYETHYLENE GLYCOL 3350 17 G/17G
17 POWDER, FOR SOLUTION ORAL DAILY
Status: DISCONTINUED | OUTPATIENT
Start: 2024-05-11 | End: 2024-05-14 | Stop reason: HOSPADM

## 2024-05-10 RX ORDER — ACETAMINOPHEN 10 MG/ML
1000 INJECTION, SOLUTION INTRAVENOUS EVERY 8 HOURS
Status: DISCONTINUED | OUTPATIENT
Start: 2024-05-10 | End: 2024-05-12

## 2024-05-10 RX ORDER — HYDROMORPHONE HCL/PF 1 MG/ML
0.5 SYRINGE (ML) INJECTION EVERY 2 HOUR PRN
Status: DISCONTINUED | OUTPATIENT
Start: 2024-05-10 | End: 2024-05-12

## 2024-05-10 RX ORDER — FENTANYL CITRATE 50 UG/ML
25 INJECTION, SOLUTION INTRAMUSCULAR; INTRAVENOUS ONCE
Status: COMPLETED | OUTPATIENT
Start: 2024-05-10 | End: 2024-05-10

## 2024-05-10 RX ORDER — ONDANSETRON 2 MG/ML
4 INJECTION INTRAMUSCULAR; INTRAVENOUS ONCE
Status: COMPLETED | OUTPATIENT
Start: 2024-05-10 | End: 2024-05-10

## 2024-05-10 RX ORDER — HYDROMORPHONE HCL/PF 1 MG/ML
0.5 SYRINGE (ML) INJECTION EVERY 2 HOUR PRN
Status: DISCONTINUED | OUTPATIENT
Start: 2024-05-10 | End: 2024-05-10

## 2024-05-10 RX ORDER — HYDROMORPHONE HYDROCHLORIDE 2 MG/ML
2 INJECTION, SOLUTION INTRAMUSCULAR; INTRAVENOUS; SUBCUTANEOUS ONCE
Status: COMPLETED | OUTPATIENT
Start: 2024-05-10 | End: 2024-05-10

## 2024-05-10 RX ORDER — FENTANYL CITRATE 50 UG/ML
50 INJECTION, SOLUTION INTRAMUSCULAR; INTRAVENOUS ONCE
Status: DISCONTINUED | OUTPATIENT
Start: 2024-05-10 | End: 2024-05-10

## 2024-05-10 RX ORDER — ESCITALOPRAM OXALATE 20 MG/1
20 TABLET ORAL DAILY
Status: DISCONTINUED | OUTPATIENT
Start: 2024-05-11 | End: 2024-05-13

## 2024-05-10 RX ADMIN — FENTANYL CITRATE 25 MCG: 50 INJECTION INTRAMUSCULAR; INTRAVENOUS at 15:26

## 2024-05-10 RX ADMIN — IOHEXOL 80 ML: 350 INJECTION, SOLUTION INTRAVENOUS at 17:03

## 2024-05-10 RX ADMIN — HYDROMORPHONE HYDROCHLORIDE 2 MG: 2 INJECTION INTRAMUSCULAR; INTRAVENOUS; SUBCUTANEOUS at 17:24

## 2024-05-10 RX ADMIN — SODIUM CHLORIDE 500 ML: 0.9 INJECTION, SOLUTION INTRAVENOUS at 16:09

## 2024-05-10 RX ADMIN — FUROSEMIDE 20 MG: 10 INJECTION, SOLUTION INTRAMUSCULAR; INTRAVENOUS at 18:39

## 2024-05-10 RX ADMIN — CHLORHEXIDINE GLUCONATE 15 ML: 1.2 RINSE ORAL at 20:10

## 2024-05-10 RX ADMIN — HYDROMORPHONE HYDROCHLORIDE 0.5 MG: 1 INJECTION, SOLUTION INTRAMUSCULAR; INTRAVENOUS; SUBCUTANEOUS at 19:54

## 2024-05-10 RX ADMIN — IOHEXOL 80 ML: 350 INJECTION, SOLUTION INTRAVENOUS at 15:52

## 2024-05-10 RX ADMIN — ACETAMINOPHEN 1000 MG: 10 INJECTION INTRAVENOUS at 18:43

## 2024-05-10 RX ADMIN — ONDANSETRON 4 MG: 2 INJECTION INTRAMUSCULAR; INTRAVENOUS at 20:52

## 2024-05-10 NOTE — PROGRESS NOTES
[facility] - [address], [address], [place] [address]    Assessment/Plan:  1. Hematoma of surgical wound following  section  Assessment & Plan:  - Physical exam is consistent with incisional hematoma. Given her current anticoagulation, jaundice on exam, and bruising of bilateral flanks, further evaluation in ER is recommended for further evaluation due to concern for potentially larger occult hematoma. She is clinically stable, but cannot rule out large occult bleeding. Bruising of flanks is concerning for possible retroperitoneal hematoma. Given her history of mechanical aortic valve currently on warfarin, I recommend that she proceed to [facility] for higher level of care than can be provided locally. She agrees. Her mother will drive her to ER now.   - Senior resident notified. Transfer order placed in order to facilitate physician to physician communication with ER prior to arrival.     Orders:  -     Transfer to other facility    2. History of mechanical aortic valve replacement  -     Transfer to other facility    3. Chronic anticoagulation  -     Transfer to other facility        Subjective:   Suze Leigh is a 28 y.o.  now POD#8 s/p primary low-transverse  section who presents for routine wound check.  CC:   Chief Complaint   Patient presents with    Postpartum Care     1 week post partum visit - Pt states she looks yellowish jaundice        Date of surgery: 2024  Procedure: Primary low-transverse  section    HPI: Patient presents for wound check following primary  section. She reports incisional pain, which she has been managing with acetaminophen and ibuprofen. She and her mother have noted yellowing of her skin. Mother noted bruising of her back. Patient denies chest pain, shortness of breath, or fever.     ROS: Negative except as noted in HPI    The following portions of the patient's history were reviewed and updated as  "appropriate:   Past Medical History:   Diagnosis Date    Anxiety     Bicuspid aortic valve     s/p mechanical AVR    Coronary artery disease 1993    Moderate aortic insufficiency     s/p mechanical AVR    Motion sickness     Patent ductus arteriosus     s/p repair    PONV (postoperative nausea and vomiting)     S/P ascending aortic aneurysm repair     Stroke (HCC) 2022    no residual effects per pt--initially couldnt speak at first--\"feels back to normal now\"    Varicella     hx as child, and vaccine     Past Surgical History:   Procedure Laterality Date    ASCENDING AORTIC ANEURYSM REPAIR      with\" Aortic valve surgery\"    MECHANICAL AORTIC VALVE REPLACEMENT      PATENT DUCTUS ARTERIOUS LIGATION      per pt had this surgery age 1    [place]  DELIVERY ONLY N/A 2024    Procedure:  SECTION ();  Surgeon: Sanjay Thompson MD;  Location: [facility];  Service: Obstetrics    [place] EXC B9 LESION MRGN XCP SK TG T/A/L 1.1-2.0 CM Right 2022    Procedure: EXCISION  BIOPSY LESION/MASS BACK;  Surgeon: David Cloud MD;  Location: [facility];  Service: General    THROMBECTOMY W/ EMBOLECTOMY      Percutanoeous embolectomy in setting of stroke    WISDOM TOOTH EXTRACTION       Family History   Problem Relation Age of Onset    Aneurysm Father         Aortic aneurysm    Other Father         Bicuspid AV    Heart disease Father     Aortic stenosis Maternal Grandmother     Breast cancer Family     Colon cancer Family     Diabetes Family     Sudden death Paternal Grandfather     Depression Mother     Anxiety disorder Mother     Drug abuse Brother     Schizoaffective Disorder  Brother      Social History     Socioeconomic History    Marital status: Single     Spouse name: Not on file    Number of children: Not on file    Years of education: Not on file    Highest education level: Not on file   Occupational History    Not on file   Tobacco Use    Smoking status: Never     Passive exposure: Never " "   Smokeless tobacco: Never   Vaping Use    Vaping status: Never Used   Substance and Sexual Activity    Alcohol use: Not Currently     Alcohol/week: 1.0 standard drink of alcohol     Types: 1 Glasses of wine per week    Drug use: No    Sexual activity: Yes     Partners: Male     Birth control/protection: OCP     Comment: Birth control pill   Other Topics Concern    Not on file   Social History Narrative    Not on file     Social Determinants of Health     Financial Resource Strain: Not on file   Food Insecurity: No Food Insecurity (3/21/2024)    Hunger Vital Sign     Worried About Running Out of Food in the Last Year: Never true     Ran Out of Food in the Last Year: Never true   Transportation Needs: No Transportation Needs (3/21/2024)    PRAPARE - Transportation     Lack of Transportation (Medical): No     Lack of Transportation (Non-Medical): No   Physical Activity: Not on file   Stress: Not on file   Social Connections: Not on file   Intimate Partner Violence: Not on file   Housing Stability: Low Risk  (3/21/2024)    Housing Stability Vital Sign     Unable to Pay for Housing in the Last Year: No     Number of Places Lived in the Last Year: 1     Unstable Housing in the Last Year: No     Outpatient Medications Marked as Taking for the 5/10/24 encounter (Postpartum Visit) with Sanjay Thompson MD   Medication    acetaminophen (TYLENOL) 325 mg tablet    escitalopram (LEXAPRO) 10 mg tablet    ferrous sulfate 325 (65 FE) MG EC tablet    ibuprofen (MOTRIN) 600 mg tablet    metoprolol succinate (TOPROL-XL) 25 mg 24 hr tablet    oxyCODONE (Roxicodone) 5 immediate release tablet    polyethylene glycol (MIRALAX) 17 g packet    Prenatal Vit-Fe Fumarate-FA (PRENATAL VITAMIN PO)    warfarin (COUMADIN) 5 mg tablet     No Known Allergies        Objective:  /74 (BP Location: Left arm, Patient Position: Sitting, Cuff Size: Standard)   Ht 5' 3\" (1.6 m)   Wt 79.8 kg (176 lb)   LMP 08/11/2023 (Exact Date) Comment: negative " pregnancy test  Breastfeeding Yes   BMI 31.18 kg/m²      General Appearance: alert and oriented, in no acute distress. Significant Jaundice.   Abdomen: Abdomen firm between umbilicus and Pfannenstiel incision, bruising noted. Concern for subcutaneous hematoma. Abdomen is tender to palpation. No rebound. Ecchymosis of bilateral flanks noted.   Extremities: Normal range of motion.   Skin: normal, no rash or abnormalities  Neurologic: alert, oriented x3  Psychiatric: Appropriate affect, mood stable, cooperative with exam.        Sanjay Thompson MD  5/10/2024 11:50 AM

## 2024-05-10 NOTE — ED ATTENDING ATTESTATION
5/10/2024  ISamira DO, saw and evaluated the patient. I have discussed the patient with the resident/non-physician practitioner and agree with the resident's/non-physician practitioner's findings, Plan of Care, and MDM as documented in the resident's/non-physician practitioner's note, except where noted. All available labs and Radiology studies were reviewed.  I was present for key portions of any procedure(s) performed by the resident/non-physician practitioner and I was immediately available to provide assistance.       At this point I agree with the current assessment done in the Emergency Department.  I have conducted an independent evaluation of this patient a history and physical is as follows:    ED Course   28-year-old female G1, P1 presents 8 days postpartum for evaluation of abdominal pain.  Patient is mother at the bedside, former emergency department nurse, to help provide history.  The patient has a history of an aortic valve replacement with a mechanical valve.  She takes warfarin.  This was transitioned to heparin 2 days prior to planned  section.  Patient had an uncomplicated  at 37 weeks and was discharged 2 days later.  Patient was transition back to warfarin.  Since returning home she has had increasing lower abdominal pain.  She has had poor appetite.  She has been taking Tylenol, ibuprofen, and oxycodone for pain with poor control.  Mother has noted the patient to appear pale.  She had a near syncopal episode yesterday.  Patient has developed distention and tenderness of the lower abdomen and bruising into both flanks.  Patient does have some dyspnea with exertion and feels overall fatigued.  No chest pain.    PmhX:  bicuspid aortic valve s/p mechanical valve, CVA, anemia    PE: Patient appears pale, moderate distress.  Sclera are icteric.  Heart is tachycardic with a mechanical click.  Lungs are clear to auscultation.  Abdomen is distended, firm and exquisitely tender  with rebound.  Ecchymosis noted in the lower abdomen and flanks.  Surgical incision is intact.  2+ pitting edema bilateral lower extremities.  Patient is awake and alert.  Depressed affect.    Assessment/plan: 28-year-old female currently anticoagulated due to history of mechanical aortic valve presents with pale appearance and tachycardia.  I am concerned for acute blood loss anemia.  Will type and screen, plan to likely transfuse, will CT abdomen pelvis to evaluate for suspected abdominal hematoma.  Patient may need reversal of coagulopathy.  Will discuss case with cardiology, surgical critical care, OB/GYN.          Critical Care Time  Procedures

## 2024-05-10 NOTE — CONSULTS
"Consultation - Gynecology  Suze Leigh 28 y.o. female MRN: [id number]  Unit/Bed#: ED-19 Encounter: [id number]      Inpatient consult to Obstetrics / Gynecology  Consult performed by: Gregory Rosa DO  Consult ordered by: Lisha Kellogg PA-C  Reason for consult: s/p , pain, jaundice          Chief Complaint   Patient presents with    Post-op Problem     Patient had a  on , reports she noticed some bruising on Monday that has gotten worse throughout the week, referred to ED by OB due to concern for hematoma, patient is on coumadin       Assessment/Plan      * Rectus sheath hematoma  Assessment & Plan  CTAP 5/10/24 notable for \"18.1 x 17.6 x 9.6 infraumbilical rectus sheath hematoma. No extravasation of IV contrast to suggest an active hemorrhage, although evaluation somewhat limited without a delayed phase\"  - CTA abdomen pelvis ordered  - See plan under ABLA    ABLA (acute blood loss anemia)  Assessment & Plan  Hgb 9.6 on discharge, 5 today   Complicated by chronic coumadin use in setting of mechanical aortic valve  ICU primary, appreciate care  S/p 1u whole blood, 1 FFP, plan for additional unit of FFP and pRBC once whole blood complete   F/u CTA abd/pelvis    Status post primary low transverse  section  Assessment & Plan  [facility], in setting of mechanical heart valve  Managed on warfarin since discharge.   Routine postoperative care  Breast pump - okay for opioids for pain management, no indications for \"pump and dump\"  OBGYN will continue to follow, appreciate ICU care      History of mechanical aortic valve replacement  Assessment & Plan  Underwent mechanical aortic valve placement with repair of ascending aortic aneurysm in  in the setting of bicuspid aortic valve with aortic stenosis and expanding ascending aortic aneurysm.  Follows with cardiology  Current regimen at time of discharge: Coumadin 2.5 mg " Tues/Thurs/Sat, 3.75 mg all other days          History of Present Illness:  Physician Requesting Consult: Tip Lomeli,*  Reason for Consult / Principal Problem: jaundice,  lower abdominal pain, post op from   HPI: Suze Leigh is a 28 y.o.  female who presents with lower abdominal pain, jaundice and abdominal distension.     She was seen in the office earlier today by her primary OB/GYN for her post operative appointment after her 1LTCS on 24. Sent to ED for evaluation given her hx of anticoagulation postoperatively for her mechanical heart valve, new jaundice and lower abdominal distension with ecchymosis of flank area. She reports that this pain and distension began on  after she was discharged and has worsened over the past several days and has not been feeling well. Her skin has become jaundiced over the past several days. She is not having any gastrointestinal or urinary complaints. Reports increased fatigue, decreased breast milk supply over the last few days. She reports normal lochia since delivery.     S/p 2u whole blood in ED.     Surgical history is significant for mechanical aortic valve replacement in  at time of ascending aortic aneurysm repair.  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 She is on lifelong anticoagulation and follows with her primary cardiology, Dr. Shell. Current anticoagulation regimen was Coumadin 2.5 mg Tues/Thurs/Sat, 3.75 mg all other days. Last INR on  was 3.1. Last coumadin dose was 2.5 mg last night. Today in ED, 3.6.     All other review of symptoms are negative.      Historical Information   Past Medical History:   Diagnosis Date    Anxiety     Bicuspid aortic valve     s/p mechanical AVR    Coronary artery disease 1993    Moderate aortic insufficiency     s/p mechanical AVR    Motion sickness     Patent ductus arteriosus     s/p repair    PONV (postoperative nausea and  "vomiting)     S/P ascending aortic aneurysm repair     Stroke (HCC) 2022    no residual effects per pt--initially couldnt speak at first--\"feels back to normal now\"    Varicella     hx as child, and vaccine     Past Surgical History:   Procedure Laterality Date    ASCENDING AORTIC ANEURYSM REPAIR      with\" Aortic valve surgery\"    MECHANICAL AORTIC VALVE REPLACEMENT      PATENT DUCTUS ARTERIOUS LIGATION      per pt had this surgery age 1    [place]  DELIVERY ONLY N/A 2024    Procedure:  SECTION ();  Surgeon: Sanjay Thompson MD;  Location: [facility];  Service: Obstetrics    [place] EXC B9 LESION MRGN XCP SK TG T/A/L 1.1-2.0 CM Right 2022    Procedure: EXCISION  BIOPSY LESION/MASS BACK;  Surgeon: David Cloud MD;  Location: [facility];  Service: General    THROMBECTOMY W/ EMBOLECTOMY      Percutanoeous embolectomy in setting of stroke    WISDOM TOOTH EXTRACTION       OB History    Para Term  AB Living   1 1 1 0 0 1   SAB IAB Ectopic Multiple Live Births   0 0 0 0 1      # Outcome Date GA Lbr Joe/2nd Weight Sex Delivery Anes PTL Lv   1 Term 24 37w6d  2845 g (6 lb 4.4 oz) F CS-LTranv Spinal N KATALINA     Family History   Problem Relation Age of Onset    Aneurysm Father         Aortic aneurysm    Other Father         Bicuspid AV    Heart disease Father     Aortic stenosis Maternal Grandmother     Breast cancer Family     Colon cancer Family     Diabetes Family     Sudden death Paternal Grandfather     Depression Mother     Anxiety disorder Mother     Drug abuse Brother     Schizoaffective Disorder  Brother      Social History   Social History     Substance and Sexual Activity   Alcohol Use Not Currently    Alcohol/week: 1.0 standard drink of alcohol    Types: 1 Glasses of wine per week     Social History     Substance and Sexual Activity   Drug Use No     Social History     Tobacco Use   Smoking Status Never    Passive exposure: Never   Smokeless Tobacco Never "     No Known Allergies    Objective   Vitals: Blood pressure 117/73, pulse 103, temperature 99.4 °F (37.4 °C), temperature source Oral, resp. rate 18, last menstrual period 2023, SpO2 100%, currently breastfeeding. There is no height or weight on file to calculate BMI.    Invasive Devices       Peripheral Intravenous Line  Duration             Peripheral IV 05/10/24 Distal;Right;Ventral (anterior) Forearm <1 day    Peripheral IV 05/10/24 Right Antecubital <1 day                    Physical Exam  Vitals reviewed.   Constitutional:       Appearance: She is ill-appearing. She is not diaphoretic.   HENT:      Head: Normocephalic and atraumatic.   Eyes:      Extraocular Movements: Extraocular movements intact.   Cardiovascular:      Rate and Rhythm: Tachycardia present.   Pulmonary:      Effort: Pulmonary effort is normal.   Abdominal:      General: There is distension.      Tenderness: There is abdominal tenderness.          Comments:  incision c/d/I  Diffuse ecchymosis noted overlying pubic region   Skin:     General: Skin is warm and dry.      Coloration: Skin is jaundiced.   Neurological:      General: No focal deficit present.      Mental Status: She is alert.      Motor: No weakness.   Psychiatric:         Mood and Affect: Mood normal.         Judgment: Judgment normal.         Lab Results:   Lab Results   Component Value Date    WBC 14.12 (H) 05/10/2024    HGB 5.0 (LL) 05/10/2024    HCT 16.2 (L) 05/10/2024    MCV 92 05/10/2024     (H) 05/10/2024     Lab Results   Component Value Date    ALT 12 05/10/2024    AST 25 05/10/2024    ALKPHOS 102 05/10/2024     Lab Results   Component Value Date     (L) 2015    SODIUM 138 05/10/2024    K 4.0 05/10/2024     05/10/2024    CO2 22 05/10/2024    ANIONGAP 2 (L) 2015    AGAP 10 05/10/2024    BUN 14 05/10/2024    CREATININE 0.45 (L) 05/10/2024    GLUC 82 05/10/2024    GLUF 76 2024    CALCIUM 8.1 (L) 05/10/2024    AST 25  05/10/2024    ALT 12 05/10/2024    ALKPHOS 102 05/10/2024    TP 5.6 (L) 05/10/2024    TBILI 2.47 (H) 05/10/2024    EGFR 134 05/10/2024     Lab Results   Component Value Date    INR 3.64 (H) 05/10/2024    INR 3.10 (A) 05/06/2024    INR 0.96 05/04/2024    PROTIME 37.2 (H) 05/10/2024    PROTIME 13.4 05/04/2024    PROTIME 12.9 05/03/2024         Steph Traore MD  5/10/2024  4:54 PM

## 2024-05-10 NOTE — ASSESSMENT & PLAN NOTE
Hgb 5 on admission, 2/2 large rectus sheath hematoma complicated by coagulopathy/chronic coumadin use  Total products during admission: Whole blood 1 unit, FFP 2 units, PRBC 3 units     Lab Results   Component Value Date    HGB 8.4 (L) 05/14/2024       Management per Primary Surgery team and Cardiology:  Coagulopathy improving, s/p KCentra  Continue to transfuse as needed  Lovenox ongoing, Warfarin resumed 05/13

## 2024-05-10 NOTE — ASSESSMENT & PLAN NOTE
28 y.o.  now POD#8 s/p primary low-transverse  section on   Presented to outpatient OB appointment reporting persistent lower abdominal pain incisional pain, was encouraged to present to the ED for further evaluation

## 2024-05-10 NOTE — ED PROVIDER NOTES
History  Chief Complaint   Patient presents with    Post-op Problem     Patient had a  on , reports she noticed some bruising on Monday that has gotten worse throughout the week, referred to ED by OB due to concern for hematoma, patient is on coumadin     Patient is a 27 y/o  female with a PMHx of mechanical aortic valve replacement (on Coumadin), currently POD#8 from a  on 24, presenting to the ED for evaluation of worsening abdominal pain, flank ecchymosis and jaundice.  Patient had an uncomplicated, scheduled  on 24 and was discharged 2 days later. She was started on heparin 2 days prior to  and transitioned back to Warfarin with Lovenox at time of discharge (Lovenox was then discontinued once INR reached therapeutic level). Patient states that she has had progressively worsening lower abdominal pain and distension for the past week with increased bruising surrounding the incision site.  She has been taking ibuprofen, Tylenol and oxycodone without relief.  Patient's mom also noticed new bruising over the bilateral flank region 2 days ago which was not present earlier in the week. Patient reports SOB with exertion, fatigue, decreased appetite, lightheadedness and generalized weakness and had a near-syncopal episode when trying to shower the other day. She has also developed new jaundice with bilateral scleral icterus over the past few days and denies any prior history of liver disease.  She denies any fevers, chills, vomiting, chest pain, diarrhea or urinary symptoms.         Prior to Admission Medications   Prescriptions Last Dose Informant Patient Reported? Taking?   Prenatal Vit-Fe Fumarate-FA (PRENATAL VITAMIN PO)  Self Yes No   Sig: Take by mouth   acetaminophen (TYLENOL) 325 mg tablet   No No   Sig: Take 2 tablets (650 mg total) by mouth every 6 (six) hours   escitalopram (LEXAPRO) 10 mg tablet   No No   Sig: Take 2 tablets (20 mg total) by mouth daily  "  ferrous sulfate 325 (65 FE) MG EC tablet   Yes No   Sig: Take 325 mg by mouth daily with breakfast   ibuprofen (MOTRIN) 600 mg tablet   No No   Sig: Take 1 tablet (600 mg total) by mouth every 6 (six) hours   metoprolol succinate (TOPROL-XL) 25 mg 24 hr tablet   No No   Sig: Take 1 tablet (25 mg total) by mouth daily   oxyCODONE (Roxicodone) 5 immediate release tablet   No No   Sig: Take 1 tablet (5 mg total) by mouth every 4 (four) hours as needed for moderate pain for up to 10 days Max Daily Amount: 30 mg   polyethylene glycol (MIRALAX) 17 g packet   No No   Sig: Take 17 g by mouth daily as needed (Constipation)   warfarin (COUMADIN) 5 mg tablet   No No   Sig: Take one tablet daily until INR at least 2.0.  Then review with your cardiology.      Facility-Administered Medications: None       Past Medical History:   Diagnosis Date    Anxiety     Bicuspid aortic valve     s/p mechanical AVR    Coronary artery disease 1993    Moderate aortic insufficiency     s/p mechanical AVR    Motion sickness     Patent ductus arteriosus     s/p repair    PONV (postoperative nausea and vomiting)     S/P ascending aortic aneurysm repair     Stroke (HCC) 2022    no residual effects per pt--initially couldnt speak at first--\"feels back to normal now\"    Varicella     hx as child, and vaccine       Past Surgical History:   Procedure Laterality Date    ASCENDING AORTIC ANEURYSM REPAIR      with\" Aortic valve surgery\"    MECHANICAL AORTIC VALVE REPLACEMENT      PATENT DUCTUS ARTERIOUS LIGATION      per pt had this surgery age 1    [place]  DELIVERY ONLY N/A 2024    Procedure:  SECTION ();  Surgeon: Sanjay Thompson MD;  Location: [facility];  Service: Obstetrics    [place] EXC B9 LESION MRGN XCP SK TG T/A/L 1.1-2.0 CM Right 2022    Procedure: EXCISION  BIOPSY LESION/MASS BACK;  Surgeon: David Cloud MD;  Location: [facility];  Service: General    THROMBECTOMY W/ EMBOLECTOMY      Percutanoeous " embolectomy in setting of stroke    WISDOM TOOTH EXTRACTION         Family History   Problem Relation Age of Onset    Aneurysm Father         Aortic aneurysm    Other Father         Bicuspid AV    Heart disease Father     Aortic stenosis Maternal Grandmother     Breast cancer Family     Colon cancer Family     Diabetes Family     Sudden death Paternal Grandfather     Depression Mother     Anxiety disorder Mother     Drug abuse Brother     Schizoaffective Disorder  Brother      I have reviewed and agree with the history as documented.    E-Cigarette/Vaping    E-Cigarette Use Never User      E-Cigarette/Vaping Substances    Nicotine No     THC No     CBD No     Flavoring No     Other No     Unknown No      Social History     Tobacco Use    Smoking status: Never     Passive exposure: Never    Smokeless tobacco: Never   Vaping Use    Vaping status: Never Used   Substance Use Topics    Alcohol use: Not Currently     Alcohol/week: 1.0 standard drink of alcohol     Types: 1 Glasses of wine per week    Drug use: No       Review of Systems   Constitutional:  Positive for appetite change and fatigue. Negative for chills and fever.   HENT:  Negative for congestion, rhinorrhea and sore throat.    Eyes:  Negative for visual disturbance.   Respiratory:  Positive for shortness of breath. Negative for cough.    Cardiovascular:  Negative for chest pain, palpitations and leg swelling.   Gastrointestinal:  Positive for abdominal pain and constipation. Negative for diarrhea, nausea and vomiting.   Genitourinary:  Negative for dysuria, flank pain and hematuria.   Musculoskeletal:  Negative for back pain and neck pain.   Skin:  Positive for color change (jaundice, abdominal/flank ecchymosis).   Neurological:  Positive for dizziness, weakness and light-headedness. Negative for headaches.   Hematological:  Bruises/bleeds easily.   All other systems reviewed and are negative.      Physical Exam  Physical Exam  Vitals and nursing note  reviewed.   Constitutional:       General: She is awake.      Appearance: She is well-developed. She is ill-appearing. She is not diaphoretic.   HENT:      Head: Normocephalic and atraumatic.      Right Ear: External ear normal.      Left Ear: External ear normal.      Nose: Nose normal.      Mouth/Throat:      Lips: Pink.      Mouth: Mucous membranes are dry.   Eyes:      General: Lids are normal. Scleral icterus present.      Conjunctiva/sclera: Conjunctivae normal.      Pupils: Pupils are equal, round, and reactive to light.   Cardiovascular:      Rate and Rhythm: Regular rhythm. Tachycardia present.      Pulses: Normal pulses.           Radial pulses are 2+ on the right side and 2+ on the left side.      Heart sounds: Murmur heard.   Pulmonary:      Effort: Pulmonary effort is normal. No accessory muscle usage.      Breath sounds: Normal breath sounds. No stridor. No decreased breath sounds, wheezing, rhonchi or rales.   Abdominal:      General: Bowel sounds are normal. There is no distension.      Tenderness: There is abdominal tenderness in the right lower quadrant, periumbilical area, suprapubic area and left lower quadrant. There is no guarding or rebound.      Comments:  incision site is c/d/I and well-healed. The lower abdomen is tender and firm to palpation. There is bruising over the lower abdomen and over bilateral flanks (see attached media).    Musculoskeletal:      Cervical back: Full passive range of motion without pain and neck supple. No signs of trauma. No pain with movement.   Lymphadenopathy:      Cervical: No cervical adenopathy.   Skin:     General: Skin is warm and dry.      Capillary Refill: Capillary refill takes less than 2 seconds.      Coloration: Skin is jaundiced and pale. Skin is not cyanotic.   Neurological:      Mental Status: She is alert and oriented to person, place, and time.      GCS: GCS eye subscore is 4. GCS verbal subscore is 5. GCS motor subscore is 6.      Gait:  Gait normal.   Psychiatric:         Mood and Affect: Mood normal.         Speech: Speech normal.         Behavior: Behavior is cooperative.                   Vital Signs  ED Triage Vitals   Temperature Pulse Respirations Blood Pressure SpO2   05/10/24 1237 05/10/24 1237 05/10/24 1237 05/10/24 1237 05/10/24 1237   98 °F (36.7 °C) (!) 112 18 113/80 100 %      Temp Source Heart Rate Source Patient Position - Orthostatic VS BP Location FiO2 (%)   05/10/24 1237 05/10/24 1515 05/10/24 1237 05/10/24 1237 --   Oral Monitor Sitting Left arm       Pain Score       05/10/24 1237       7           Vitals:    05/10/24 1946 05/10/24 2022 05/10/24 2034 05/10/24 2049   BP: 112/72 128/78 128/73 118/68   Pulse: (!) 108 (!) 109 (!) 109 (!) 111   Patient Position - Orthostatic VS: Lying            Visual Acuity      ED Medications  Medications   chlorhexidine (PERIDEX) 0.12 % oral rinse 15 mL (15 mL Mouth/Throat Given 5/10/24 2010)   acetaminophen (Ofirmev) injection 1,000 mg (0 mg Intravenous Stopped 5/10/24 1901)   HYDROmorphone (DILAUDID) injection 0.5 mg (0.5 mg Intravenous Given 5/10/24 1954)   escitalopram (LEXAPRO) tablet 20 mg (has no administration in time range)   polyethylene glycol (MIRALAX) packet 17 g (has no administration in time range)   fentaNYL injection 25 mcg (25 mcg Intravenous Given 5/10/24 1526)   sodium chloride 0.9 % bolus 500 mL (0 mL Intravenous Stopped 5/10/24 1648)   iohexol (OMNIPAQUE) 350 MG/ML injection (SINGLE-DOSE) 80 mL (80 mL Intravenous Given 5/10/24 1552)   HYDROmorphone (DILAUDID) injection 2 mg (2 mg Intravenous Given 5/10/24 1724)   iohexol (OMNIPAQUE) 350 MG/ML injection (SINGLE-DOSE) 80 mL (80 mL Intravenous Given 5/10/24 1703)   furosemide (LASIX) injection 20 mg (20 mg Intravenous Given 5/10/24 1839)   ondansetron (ZOFRAN) injection 4 mg (4 mg Intravenous Given 5/10/24 2052)       Diagnostic Studies  Results Reviewed       Procedure Component Value Units Date/Time    CBC and Platelet  [[id number]]  (Abnormal) Collected: 05/10/24 1945    Lab Status: Final result Specimen: Blood from Arm, Right Updated: 05/10/24 2102     WBC 8.78 Thousand/uL      RBC 2.63 Million/uL      Hemoglobin 7.7 g/dL      Hematocrit 24.2 %      MCV 92 fL      MCH 29.3 pg      MCHC 31.8 g/dL      RDW 14.6 %      Platelets 364 Thousands/uL      MPV 8.1 fL     Urine Microscopic [[id number]]  (Normal) Collected: 05/10/24 1933    Lab Status: Final result Specimen: Urine, Clean Catch Updated: 05/10/24 2030     RBC, UA 1-2 /hpf      WBC, UA 1-2 /hpf      Epithelial Cells Occasional /hpf      Bacteria, UA None Seen /hpf     UA w Reflex to Microscopic w Reflex to Culture [[id number]]  (Abnormal) Collected: 05/10/24 1933    Lab Status: Final result Specimen: Urine, Clean Catch Updated: 05/10/24 2022     Color, UA Yellow     Clarity, UA Clear     Specific Gravity, UA >=1.050     pH, UA 6.0     Leukocytes, UA Negative     Nitrite, UA Negative     Protein, UA Trace mg/dl      Glucose, UA Negative mg/dl      Ketones, UA 60 (2+) mg/dl      Urobilinogen, UA 2.0 mg/dl      Bilirubin, UA Negative     Occult Blood, UA Small    CBC and Platelet [[id number]]     Lab Status: No result Specimen: Blood     TEG 6 Global Hemostasis with Lysis [[id number]]  (Abnormal) Collected: 05/10/24 1624    Lab Status: Final result Specimen: Blood from Line, Venous Updated: 05/10/24 1751     CKR(REACTION TIME) >17.0 Min      CKLY30 0.0 %      CRTMA(RAPIDTEG MAX AMPLITUDE) >75 mm      CFFMA (FUNCTIONAL FIBRINOGEN MAX AMPLITUDE) >52 mm     Fibrinogen [[id number]]  (Abnormal) Collected: 05/10/24 1454    Lab Status: Final result Specimen: Blood from Arm, Right Updated: 05/10/24 1716     Fibrinogen 1,149 mg/dL     Salicylate level [[id number]]  (Normal) Collected: 05/10/24 1454    Lab Status: Final result Specimen: Blood from Arm, Right Updated: 05/10/24 1618     Salicylate Lvl <5 mg/dL     Basic metabolic panel [[id number]]  (Abnormal) Collected: 05/10/24 1459    Lab Status:  Final result Specimen: Blood from Arm, Right Updated: 05/10/24 1610     Sodium 138 mmol/L      Potassium 4.0 mmol/L      Chloride 106 mmol/L      CO2 22 mmol/L      ANION GAP 10 mmol/L      BUN 14 mg/dL      Creatinine 0.45 mg/dL      Glucose 82 mg/dL      Calcium 8.1 mg/dL      eGFR 134 ml/min/1.73sq m     Narrative:      [employer] guidelines for Chronic Kidney Disease (CKD):     Stage 1 with normal or high GFR (GFR > 90 mL/min/1.73 square meters)    Stage 2 Mild CKD (GFR = 60-89 mL/min/1.73 square meters)    Stage 3A Moderate CKD (GFR = 45-59 mL/min/1.73 square meters)    Stage 3B Moderate CKD (GFR = 30-44 mL/min/1.73 square meters)    Stage 4 Severe CKD (GFR = 15-29 mL/min/1.73 square meters)    Stage 5 End Stage CKD (GFR <15 mL/min/1.73 square meters)  Note: GFR calculation is accurate only with a steady state creatinine    Hepatic function panel [[id number]]  (Abnormal) Collected: 05/10/24 1454    Lab Status: Final result Specimen: Blood from Arm, Right Updated: 05/10/24 1610     Total Bilirubin 2.47 mg/dL      Bilirubin, Direct 0.33 mg/dL      Alkaline Phosphatase 102 U/L      AST 25 U/L      ALT 12 U/L      Total Protein 5.6 g/dL      Albumin 2.8 g/dL     Acetaminophen level-If concentration is detectable, please discuss with medical  on call. [[id number]]  (Abnormal) Collected: 05/10/24 1454    Lab Status: Final result Specimen: Blood from Arm, Right Updated: 05/10/24 1610     Acetaminophen Level <2 ug/mL     CBC and differential [[id number]]  (Abnormal) Collected: 05/10/24 1454    Lab Status: Final result Specimen: Blood from Arm, Right Updated: 05/10/24 1534     WBC 14.12 Thousand/uL      RBC 1.77 Million/uL      Hemoglobin 5.0 g/dL      Hematocrit 16.2 %      MCV 92 fL      MCH 28.2 pg      MCHC 30.9 g/dL      RDW 14.2 %      MPV 8.4 fL      Platelets 716 Thousands/uL      nRBC 1 /100 WBCs      Segmented % 81 %      Immature Grans % 2 %      Lymphocytes % 9 %       Monocytes % 6 %      Eosinophils Relative 2 %      Basophils Relative 0 %      Absolute Neutrophils 11.54 Thousands/µL      Absolute Immature Grans 0.23 Thousand/uL      Absolute Lymphocytes 1.31 Thousands/µL      Absolute Monocytes 0.81 Thousand/µL      Eosinophils Absolute 0.21 Thousand/µL      Basophils Absolute 0.02 Thousands/µL     Narrative:      This is an appended report.  These results have been appended to a previously verified report.    Protime-INR [[id number]]  (Abnormal) Collected: 05/10/24 145    Lab Status: Final result Specimen: Blood from Arm, Right Updated: 05/10/24 1534     Protime 37.2 seconds      INR 3.64    APTT [[id number]]  (Abnormal) Collected: 05/10/24 145    Lab Status: Final result Specimen: Blood from Arm, Right Updated: 05/10/24 1534     PTT 85 seconds     Ethanol [[id number]]  (Normal) Collected: 05/10/24 1454    Lab Status: Final result Specimen: Blood from Arm, Right Updated: 05/10/24 1526     Ethanol Lvl <10 mg/dL                    CTA abdomen pelvis w wo contrast   Final Result by Evan Bond MD (05/10 1851)         1. Recent postoperative change reflecting  section with stable large infraumbilical abdominal wall rectus sheath hematoma. No definite contrast extravasation to indicate active hemorrhage, though evaluation is slightly limited due to artifact as    well as the presence of previously administered intravenous contrast. Stable amount of high attenuation material in the prevesical space may reflect some hemorrhagic components and/or postoperative change, not appreciably changed from the earlier study.   2. Additional stable findings as noted.      The study was marked in EPIC for immediate notification.         Workstation performed: [id number]         CT abdomen pelvis with contrast   Final Result by Mallory Burnett MD (05/10 159)      1) 18.1 x 17.6 x 9.6 infraumbilical rectus sheath hematoma. No extravasation of IV contrast to suggest an active  hemorrhage, although evaluation somewhat limited without a delayed phase. If there is high clinical suspicion for an active hemorrhage, this    would be best assessed with CTA abdomen/pelvis (including an arterial and delayed phase).      2) Free fluid with density suggestive of blood products also seen in the prevesical space, most likely related to the aforementioned hematoma. No evidence of other intra-abdominal/pelvic hematoma.      3) Subcutaneous tissue stranding in the bilateral flanks, and in the infraumbilical anterior abdominal and pelvic wall, likely related to ecchymosis with several additional small subcutaneous hematomas measuring up to 2.5 x 1.9 cm.      4) No biliary ductal dilatation. No radiopaque cholelithiasis or choledocholithiasis.      5) Additional findings as above.      I personally discussed this study with LISHA PEREZ on 5/10/2024 at 4:44 PM.                     Workstation performed: [employer]         XR chest portable    (Results Pending)              Procedures  ECG 12 Lead Documentation Only    Date/Time: 5/10/2024 7:28 PM    Performed by: Lisha Perez PA-C  Authorized by: Lisha Perez PA-C    Indications / Diagnosis:  Tachycardia  ECG reviewed by me, the ED Provider: yes    Patient location:  ED  Previous ECG:     Previous ECG:  Unavailable    Comparison to cardiac monitor: Yes    Rate:     ECG rate:  101    ECG rate assessment: tachycardic    Rhythm:     Rhythm: sinus tachycardia    Ectopy:     Ectopy: none    QRS:     QRS axis:  Normal    QRS intervals:  Normal  Conduction:     Conduction: normal    ST segments:     ST segments:  Normal  T waves:     T waves: normal    Other findings:     Other findings: LAE    Comments:      No STEMI.    CriticalCare Time    Date/Time: 5/10/2024 9:46 PM    Performed by: Lisha Perez PA-C  Authorized by: Lisha Perez PA-C    Critical care provider statement:     Critical care time (minutes):  60    Critical care  "time was exclusive of:  Separately billable procedures and treating other patients    Critical care was necessary to treat or prevent imminent or life-threatening deterioration of the following conditions:  Circulatory failure    Critical care was time spent personally by me on the following activities:  Obtaining history from patient or surrogate, discussions with consultants, development of treatment plan with patient or surrogate, evaluation of patient's response to treatment, examination of patient, ordering and performing treatments and interventions, ordering and review of laboratory studies, ordering and review of radiographic studies, re-evaluation of patient's condition and review of old charts    I assumed direction of critical care for this patient from another provider in my specialty: no             ED Course  ED Course as of 05/10/24 2148   Fri May 10, 2024   1502 Tiger Text sent to OB at this time, they will be down to evaluate.    1530 Hemoglobin(!!): 5.0  Blood consent signed, transfusion orders placed.    1535 POCT INR(!): 3.64   1603 CT concerning for large abdominal wall hematoma - reading room contacted for expedited CT read.    1640 Per radiologist (Dr. Burnett) - large rectus sheath hematoma, no evidence of active bleed, agree with CTA to assess for active extravasation.                                              Medical Decision Making  Patient is a 29 y/o  female with a PMHx of mechanical aortic valve replacement (on Coumadin), currently POD#8 from a  on 24, presenting to the ED for evaluation of worsening abdominal pain, flank ecchymosis and jaundice.    Patient's symptoms and exam concerning for internal bleeding/abdominal wall hematoma given recent  on Coumadin. Patient's labs notable for a Hgb of 5.0 secondary to acute blood loss anemia. INR supra-therapeutic INR at 3.64. Tbili elevated at 2.47, Dbili 0.33. CT abd/pelvis shows the following: \"18.1 x 17.6 x 9.6 " "infraumbilical rectus sheath hematoma. No extravasation of IV contrast to suggest an active hemorrhage. Subcutaneous tissue stranding in the bilateral flanks, and in the infraumbilical anterior abdominal and pelvic wall, likely related to ecchymosis with several additional small subcutaneous hematomas measuring up to 2.5 x 1.9 cm.\" Discussed with OBGYN, critical care and cardiology in the ED. Per cardiology - agree with reversal of anticoagulation at this time given patient's critical state with initiation of heparin drip as soon as safe from a bleeding standpoint. Critical care recommending transfusion of 1 unit whole blood, 1 unit PRBCs and 2 units of FFP.  CTA abdomen ordered at the recommendation of critical care and radiologist to evaluate for active extravasation from rectus sheath hematoma. CTA shows no definite contrast extravasation to indicate active hemorrhage.      Patient was admitted to the ICU for continued management.     Amount and/or Complexity of Data Reviewed  Labs: ordered. Decision-making details documented in ED Course.  Radiology: ordered.  Discussion of management or test interpretation with external provider(s): Dr. Traore (OBGYN)  Dr. Reid (cardiology)    Risk  Prescription drug management.  Decision regarding hospitalization.             Disposition  Final diagnoses:   Hematoma of surgical wound following  section   Acute blood loss anemia     Time reflects when diagnosis was documented in both MDM as applicable and the Disposition within this note       Time User Action Codes Description Comment    5/10/2024  3:55 PM Samira Gandhi Add [O90.2] Hematoma of surgical wound following  section     5/10/2024  3:55 PM Samira Gandhi Add [D62] Acute blood loss anemia           ED Disposition       ED Disposition   Admit    Condition   Stable    Date/Time   Fri May 10, 2024  4:27 PM    Comment   Case was discussed with Critical Care and the patient's admission status was agreed to be " Admission Status: inpatient status to the service of Dr. Lomeli.               Follow-up Information    None         Current Discharge Medication List        CONTINUE these medications which have NOT CHANGED    Details   acetaminophen (TYLENOL) 325 mg tablet Take 2 tablets (650 mg total) by mouth every 6 (six) hours  Qty: 30 tablet, Refills: 0    Associated Diagnoses: Status post primary low transverse  section      escitalopram (LEXAPRO) 10 mg tablet Take 2 tablets (20 mg total) by mouth daily  Qty: 90 tablet, Refills: 0    Associated Diagnoses: Anxiety      ferrous sulfate 325 (65 FE) MG EC tablet Take 325 mg by mouth daily with breakfast      ibuprofen (MOTRIN) 600 mg tablet Take 1 tablet (600 mg total) by mouth every 6 (six) hours  Qty: 30 tablet, Refills: 0    Associated Diagnoses: Status post primary low transverse  section      metoprolol succinate (TOPROL-XL) 25 mg 24 hr tablet Take 1 tablet (25 mg total) by mouth daily    Associated Diagnoses: History of mechanical aortic valve replacement      oxyCODONE (Roxicodone) 5 immediate release tablet Take 1 tablet (5 mg total) by mouth every 4 (four) hours as needed for moderate pain for up to 10 days Max Daily Amount: 30 mg  Qty: 12 tablet, Refills: 0    Associated Diagnoses: Status post primary low transverse  section      polyethylene glycol (MIRALAX) 17 g packet Take 17 g by mouth daily as needed (Constipation)  Qty: 30 each, Refills: 0    Associated Diagnoses: Status post primary low transverse  section      Prenatal Vit-Fe Fumarate-FA (PRENATAL VITAMIN PO) Take by mouth      warfarin (COUMADIN) 5 mg tablet Take one tablet daily until INR at least 2.0.  Then review with your cardiology.  Qty: 30 tablet, Refills: 0    Associated Diagnoses: History of mechanical aortic valve replacement; Status post primary low transverse  section             No discharge procedures on file.    PDMP Review       None            ED  Provider  Electronically Signed by             Lisha Kellogg PA-C  05/10/24 8852

## 2024-05-10 NOTE — ASSESSMENT & PLAN NOTE
Underwent mechanical aortic valve placement with repair of ascending aortic aneurysm in 2015 in the setting of bicuspid aortic valve with aortic stenosis and expanding ascending aortic aneurysm.    Per primary team, avoid full AC reversal in the setting of previous CVA when AC was held in the past. Did receive K-Centra  as hemoglobin was downtrending after transfusion  Daily INR  Cardiology consulted, appreciate recommendations for anticoagulation dosage  Warfarin restarted 05/13/2024 per primary team  Current regimen at time of discharge: Coumadin increased to 3.75 mg

## 2024-05-10 NOTE — H&P
[facility]  H&P  Name: Suze Leigh 28 y.o. female I MRN: [id number]  Unit/Bed#: ED-19 I Date of Admission: 5/10/2024   Date of Service: 5/10/2024 I Hospital Day: 0      Assessment/Plan   * Rectus sheath hematoma  Assessment & Plan  POD#8 s/p primary low-transverse  section on    Presented today with worsening lower abdominal incision pain  Found to have rectus sheath hematoma  Hx of mechanical valve replacement on warfarin  5/10 CT abd/pelvis: 18.1 x 17.6 x 9.6 infraumbilical rectus sheath hematoma. No extravasation of IV contrast to suggest an active hemorrhage. Subcutaneous tissue stranding in the bilateral flanks, and in the infraumbilical anterior abdominal and pelvic wall, likely related to ecchymosis with several additional small subcutaneous hematomas measuring up to 2.5 x 1.9 cm.   OBGYN aware, consulted    ABLA (acute blood loss anemia)  Assessment & Plan  POD8 from , presented to ED with worsening incision pain and bruising, found to have rectus sheath hematoma  Complicated by daily warfarin use for mechanical valve  Hgb 5.0 on admission; was 9.6 post op    BP 110s/80s, HR 110s, afebrile, satting 100% on RA  Plan:  Admit to ICU for close monitoring  Transfuse 1 unit whole blood, 1 unit pRBCs, 2 units FFP  Monitor for transfusion reaction  Avoiding complete/rapid reversal in the setting of mechanical valve   TEG sent, awaiting results  CTA abd/pelvis, awaiting results  Q4h CBC with platelet  Depending on transfusion needs, may need calcium replacement  Keep NPO for possible OR  Hold home warfarin  Hold home iron tablet while NPO    Status post primary low transverse  section  Assessment & Plan  28 y.o.  now POD#8 s/p primary low-transverse  section on   Presented to outpatient OB appointment reporting persistent lower abdominal pain incisional pain, was encouraged to present to the ED for further  evaluation    Anxiety  Assessment & Plan  Takes lexapro 20mg daily at home  Continue home med    History of mechanical aortic valve replacement  Assessment & Plan  S/p mechanical aortic valve placement with repair of ascending aortic aneurysm in    On Warfarin, 5mg daily  Most recent echo 2024 with LVEF 65%  Cardiology consulted           History of Present Illness     HPI: Suze Leigh is a 28 y.o. with PMH of aortic valve replacement on warfarin who is POD8 from  who presents with worsening and persistent lower abdominal incisional pain and bruising. Pain uncontrolled on tylenol, ibuprofen and oxy at home. She was evaluated at outpatient OB office and recommended to come to ED for further evaluation, where imaging demonstrated rectus sheath hematoma and Hgb 5. Endorses sob with activity, fatigue, lightheadedness when she stands and pallor. Confirms a level 1 code status. Denies any known allergies to medications.     History obtained from the patient and her mother.  Review of Systems   Constitutional:  Positive for fatigue. Negative for chills and fever.   HENT:  Negative for ear pain and sore throat.    Eyes:  Negative for visual disturbance.   Respiratory:  Positive for shortness of breath. Negative for cough.    Cardiovascular:  Negative for chest pain and leg swelling.   Gastrointestinal:  Positive for abdominal pain. Negative for blood in stool, constipation, diarrhea, nausea and vomiting.   Genitourinary:  Negative for dysuria and hematuria.   Musculoskeletal:  Positive for back pain. Negative for neck pain and neck stiffness.   Skin:  Positive for pallor.   Neurological:  Positive for light-headedness. Negative for dizziness, syncope and weakness.   All other systems reviewed and are negative.    Disposition: Stepdown Level 1  Historical Information   Past Medical History:  No date: Anxiety  No date: Bicuspid aortic valve      Comment:  s/p mechanical AVR  1993: Coronary artery  "disease  No date: Moderate aortic insufficiency      Comment:  s/p mechanical AVR  No date: Motion sickness  No date: Patent ductus arteriosus      Comment:  s/p repair  No date: PONV (postoperative nausea and vomiting)  No date: S/P ascending aortic aneurysm repair  2022: Stroke (HCC)      Comment:  no residual effects per pt--initially couldnt speak at                first--\"feels back to normal now\"  No date: Varicella      Comment:  hx as child, and vaccine Past Surgical History:  2015: ASCENDING AORTIC ANEURYSM REPAIR      Comment:  with\" Aortic valve surgery\"  2015: MECHANICAL AORTIC VALVE REPLACEMENT  No date: PATENT DUCTUS ARTERIOUS LIGATION      Comment:  per pt had this surgery age 1  2024: [place]  DELIVERY ONLY; N/A      Comment:  Procedure:  SECTION ();  Surgeon:                Sanjay Thompson MD;  Location: [facility] ;  Service:                Obstetrics  2022: [place] EXC B9 LESION MRGN XCP SK TG T/A/L 1.1-2.0 CM; Right      Comment:  Procedure: EXCISION  BIOPSY LESION/MASS BACK;  Surgeon:                David Cloud MD;  Location: [facility];  Service:                General  : THROMBECTOMY W/ EMBOLECTOMY      Comment:  Percutanoeous embolectomy in setting of stroke  No date: WISDOM TOOTH EXTRACTION   Current Outpatient Medications   Medication Instructions    acetaminophen (TYLENOL) 650 mg, Oral, Every 6 hours scheduled    escitalopram (LEXAPRO) 20 mg, Oral, Daily    ferrous sulfate 325 mg, Oral, Daily with breakfast    ibuprofen (MOTRIN) 600 mg, Oral, Every 6 hours scheduled    metoprolol succinate (TOPROL-XL) 25 mg, Oral, Daily    oxyCODONE (ROXICODONE) 5 mg, Oral, Every 4 hours PRN    polyethylene glycol (MIRALAX) 17 g, Oral, Daily PRN    Prenatal Vit-Fe Fumarate-FA (PRENATAL VITAMIN PO) Oral    warfarin (COUMADIN) 5 mg tablet Take one tablet daily until INR at least 2.0.  Then review with your cardiology.    No Known Allergies   Social History     Tobacco Use    " Smoking status: Never     Passive exposure: Never    Smokeless tobacco: Never   Vaping Use    Vaping status: Never Used   Substance Use Topics    Alcohol use: Not Currently     Alcohol/week: 1.0 standard drink of alcohol     Types: 1 Glasses of wine per week    Drug use: No    Family History   Problem Relation Age of Onset    Aneurysm Father         Aortic aneurysm    Other Father         Bicuspid AV    Heart disease Father     Aortic stenosis Maternal Grandmother     Breast cancer Family     Colon cancer Family     Diabetes Family     Sudden death Paternal Grandfather     Depression Mother     Anxiety disorder Mother     Drug abuse Brother     Schizoaffective Disorder  Brother           Objective                            Vitals I/O      Most Recent Min/Max in 24hrs   Temp 99.7 °F (37.6 °C) Temp  Min: 98 °F (36.7 °C)  Max: 100.6 °F (38.1 °C)   Pulse (!) 117 Pulse  Min: 103  Max: 121   Resp 18 Resp  Min: 18  Max: 18   /78 BP  Min: 110/71  Max: 161/86   O2 Sat 91 % SpO2  Min: 91 %  Max: 100 %      Intake/Output Summary (Last 24 hours) at 5/10/2024 1841  Last data filed at 5/10/2024 1806  Gross per 24 hour   Intake 800 ml   Output --   Net 800 ml       Diet NPO    Invasive Monitoring           Physical Exam   Physical Exam  Eyes:      General: Vision grossly intact.      Extraocular Movements: Extraocular movements intact.   Skin:     Coloration: Skin is pale.      Comments: Bruising along right flank   HENT:      Head: Normocephalic and atraumatic.   Cardiovascular:      Rate and Rhythm: Regular rhythm. Tachycardia present.      Pulses: Normal pulses.      Heart sounds: Murmur heard.   Musculoskeletal:      Right lower leg: No edema.      Left lower leg: No edema.   Abdominal: General: There is no distension.      Tenderness: There is abdominal tenderness (diffuse lower abdominal pain along incision. Surgical incision well approximated and healing well). There is no guarding.      Hernia: No hernia is present.    Constitutional:       General: She is not in acute distress.     Appearance: She is ill-appearing. She is not toxic-appearing.      Interventions: She is not sedated, not intubated and not restrained.  Pulmonary:      Effort: Pulmonary effort is normal. No accessory muscle usage, respiratory distress or accessory muscle usage. She is not intubated.      Breath sounds: Normal breath sounds. No stridor. No wheezing, rhonchi or rales.   Secretions are normal.Chest:      Chest wall: No tenderness.   Neurological:      Mental Status: She is alert and oriented to person, place and time. Mental status is at baseline. She is calm.      Comments: Moving all extremities spontaneously.             Diagnostic Studies      EKG: sinus tach,   Imaging:  I have personally reviewed pertinent reports.       Medications:  Scheduled PRN   acetaminophen, 1,000 mg, Q8H  chlorhexidine, 15 mL, Q12H JAMES  [START ON 5/11/2024] escitalopram, 20 mg, Daily      HYDROmorphone, 0.5 mg, Q2H PRN       Continuous          Labs:    CBC    Recent Labs     05/10/24  1454   WBC 14.12*   HGB 5.0*   HCT 16.2*   *     BMP    Recent Labs     05/10/24  1454   SODIUM 138   K 4.0      CO2 22   AGAP 10   BUN 14   CREATININE 0.45*   CALCIUM 8.1*       Coags    Recent Labs     05/10/24  1454   INR 3.64*   PTT 85*        Additional Electrolytes  No recent results       Blood Gas    No recent results  No recent results LFTs  Recent Labs     05/10/24  1454   ALT 12   AST 25   ALKPHOS 102   ALB 2.8*   TBILI 2.47*       Infectious  No recent results  Glucose  Recent Labs     05/10/24  1454   GLUC 82             Anticipated Length of Stay is > 2 midnights  Debbi Darling MD

## 2024-05-10 NOTE — ASSESSMENT & PLAN NOTE
S/p mechanical aortic valve placement with repair of ascending aortic aneurysm in 2015   On Warfarin, 5mg daily  Most recent echo Jan 2024 with LVEF 65%

## 2024-05-10 NOTE — ASSESSMENT & PLAN NOTE
POD8 from , presented to ED with worsening incision pain and bruising, found to have rectus sheath hematoma  Complicated by daily warfarin use for mechanical valve  Hgb 5.0 on admission; was 9.6 post op    BP 110s/80s, HR 110s, afebrile, satting 100% on RA  Plan:  Admit to ICU for close monitoring  Transfuse 1 unit whole blood, 1 unit pRBCs, 2 units FFP  Monitor for transfusion reaction  Avoiding complete/rapid reversal in the setting of mechanical valve   TEG sent, awaiting results  CTA abd/pelvis, awaiting results  Q4h CBC with platelet  Depending on transfusion needs, may need calcium replacement  Keep NPO for possible OR  Hold home warfarin  Hold home iron tablet while NPO

## 2024-05-10 NOTE — ASSESSMENT & PLAN NOTE
"CTAP 5/10/24 notable for \"18.1 x 17.6 x 9.6 infraumbilical rectus sheath hematoma. No extravasation of IV contrast to suggest an active hemorrhage, although evaluation somewhat limited without a delayed phase\"  Abdominal binder ordered  - See plan under ABLA  - hgb stable and no clinical signs of ongoing bleeding    "

## 2024-05-10 NOTE — CONSULTS
Consultation - Cardiology   Suze Leigh 28 y.o. female MRN: [id number]  Unit/Bed#: ED-19 Encounter: [id number]    Assessment:  Principal Problem:    Rectus sheath hematoma  Active Problems:    History of mechanical aortic valve replacement    Anxiety    Status post primary low transverse  section    ABLA (acute blood loss anemia)    Hematoma post . Anticoagulation with warfarin due to mechanical aortic valve.  Supratherapeutic INR 3.6 in the setting of being on warfarin.  Acute blood loss anemia, hemoglobin 5.    Plan:    Due to her critical state, agree with reversal of anticoagulation. Blood products as needed. Would recommend initiating a heparin infusion as soon as safe from a bleeding standpoint. She will need parenteral anticoagulation until that time that her INR becomes therapeutic again.    Warfarin is the only anticoagulant that we can use given her mechanical AVR.    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 As such, she will need the shortest amount of time possible off of anticoagulation, and will need to be parenterally bridged to a therapeutic INR either with IV heparin or eventually possibly with Lovenox.    She is known to have some increased gradients across her aortic valve, but this is something that can continue to be monitored as an outpatient.    History of Present Illness   Physician Requesting Consult: Tip Lomeli,*  Reason for Consult / Principal Problem: Anemia, mechanical AVR  HPI: Suze Leigh is a 28 y.o. year old female who has a history of a bicuspid aortic valve, mechanical AVR, repair of ascending aorta, PDA in . In early , she had CVA in the setting of being off of her anticoagulation for longer than anticipated around the time of surgery. She has recovered and essentially has no deficits at this point.    She just recently had a baby, was  "delivered by . She was bridged with parenteral anticoagulation and was seen during that hospitalization by cardiology consultation.    She has been feeling unwell a few days. She went for a postpartum visit today and there was concerns for hematoma she was referred to the emergency room. I was contacted by the ER staff because she has a hemoglobin of 5 and they were asking me about reversing her anticoagulation.    She had a little bit of lower extremity edema. Other than abdominal pain, she denies any cardiac complaints to me.    Inpatient consult to Cardiology  Consult performed by: Jeremiah Reid MD  Consult ordered by: Lisha Kellogg PA-C          Review of Systems:  Abd pain, fatigue, weakness, near syncope,   Otherwise 12 point ROS negative    Historical Information   Past Medical History:   Diagnosis Date    Anxiety     Bicuspid aortic valve     s/p mechanical AVR    Coronary artery disease 1993    Moderate aortic insufficiency     s/p mechanical AVR    Motion sickness     Patent ductus arteriosus     s/p repair    PONV (postoperative nausea and vomiting)     S/P ascending aortic aneurysm repair     Stroke (HCC) 2022    no residual effects per pt--initially couldnt speak at first--\"feels back to normal now\"    Varicella     hx as child, and vaccine     Past Surgical History:   Procedure Laterality Date    ASCENDING AORTIC ANEURYSM REPAIR      with\" Aortic valve surgery\"    MECHANICAL AORTIC VALVE REPLACEMENT      PATENT DUCTUS ARTERIOUS LIGATION      per pt had this surgery age 1    [place]  DELIVERY ONLY N/A 2024    Procedure:  SECTION ();  Surgeon: Sanjay Thompson MD;  Location: [facility] ;  Service: Obstetrics    [place] EXC B9 LESION MRGN XCP SK TG T/A/L 1.1-2.0 CM Right 2022    Procedure: EXCISION  BIOPSY LESION/MASS BACK;  Surgeon: David Cloud MD;  Location: [facility] OR;  Service: General    THROMBECTOMY W/ EMBOLECTOMY      Percutanoeous " embolectomy in setting of stroke    WISDOM TOOTH EXTRACTION       Social History     Substance and Sexual Activity   Alcohol Use Not Currently    Alcohol/week: 1.0 standard drink of alcohol    Types: 1 Glasses of wine per week     Social History     Substance and Sexual Activity   Drug Use No     Social History     Tobacco Use   Smoking Status Never    Passive exposure: Never   Smokeless Tobacco Never     Family History: non-contributory    Meds/Allergies     Current Facility-Administered Medications:     acetaminophen (Ofirmev) injection 1,000 mg, 1,000 mg, Intravenous, Q8H, Debbi Darling MD    chlorhexidine (PERIDEX) 0.12 % oral rinse 15 mL, 15 mL, Mouth/Throat, Q12H JAMES, Debbi Darling MD    [START ON 5/11/2024] escitalopram (LEXAPRO) tablet 10 mg, 10 mg, Oral, Daily, Debbi Darling MD    furosemide (LASIX) injection 20 mg, 20 mg, Intravenous, Once, Debbi Darling MD    HYDROmorphone (DILAUDID) injection 0.5 mg, 0.5 mg, Intravenous, Q2H PRN, Debbi Darling MD    Current Outpatient Medications:     acetaminophen (TYLENOL) 325 mg tablet, Take 2 tablets (650 mg total) by mouth every 6 (six) hours, Disp: 30 tablet, Rfl: 0    escitalopram (LEXAPRO) 10 mg tablet, Take 2 tablets (20 mg total) by mouth daily, Disp: 90 tablet, Rfl: 0    ferrous sulfate 325 (65 FE) MG EC tablet, Take 325 mg by mouth daily with breakfast, Disp: , Rfl:     ibuprofen (MOTRIN) 600 mg tablet, Take 1 tablet (600 mg total) by mouth every 6 (six) hours, Disp: 30 tablet, Rfl: 0    metoprolol succinate (TOPROL-XL) 25 mg 24 hr tablet, Take 1 tablet (25 mg total) by mouth daily, Disp: , Rfl:     oxyCODONE (Roxicodone) 5 immediate release tablet, Take 1 tablet (5 mg total) by mouth every 4 (four) hours as needed for moderate pain for up to 10 days Max Daily Amount: 30 mg, Disp: 12 tablet, Rfl: 0    polyethylene glycol (MIRALAX) 17 g packet, Take 17 g by mouth daily as needed (Constipation), Disp: 30 each, Rfl: 0    Prenatal Vit-Fe  Fumarate-FA (PRENATAL VITAMIN PO), Take by mouth, Disp: , Rfl:     warfarin (COUMADIN) 5 mg tablet, Take one tablet daily until INR at least 2.0.  Then review with your cardiology., Disp: 30 tablet, Rfl: 0  No Known Allergies    Objective   Vitals: Blood pressure 124/78, pulse (!) 117, temperature 99.7 °F (37.6 °C), temperature source Oral, resp. rate 18, last menstrual period 08/11/2023, SpO2 91%, currently breastfeeding., There is no height or weight on file to calculate BMI.,   Orthostatic Blood Pressures      Flowsheet Row Most Recent Value   Blood Pressure 124/78 filed at 05/10/2024 1806   Patient Position - Orthostatic VS Lying filed at 05/10/2024 1730              Intake/Output Summary (Last 24 hours) at 5/10/2024 1838  Last data filed at 5/10/2024 1806  Gross per 24 hour   Intake 800 ml   Output --   Net 800 ml       Invasive Devices       Peripheral Intravenous Line  Duration             Peripheral IV 05/10/24 Distal;Right;Ventral (anterior) Forearm <1 day    Peripheral IV 05/10/24 Right Antecubital <1 day                    Physical Exam:  GEN: Suze N Reese ill appearing female. Pale.  HEENT: pupils equal, round, and reactive to light; extraocular muscles intact  NECK: supple, no carotid bruits   HEART: tachycardic. Mechanical click. + 3/6 JOIE  LUNGS: clear to auscultation bilaterally; no wheezes, rales, or rhonchi   ABDOMEN: tender. Ecchymosis noted R flank  EXTREMITIES: trace edema  NEURO: no focal findings   SKIN: normal without suspicious lesions on exposed skin    Lab Results:         Results from last 7 days   Lab Units 05/10/24  1454   WBC Thousand/uL 14.12*   HEMOGLOBIN g/dL 5.0*   HEMATOCRIT % 16.2*   PLATELETS Thousands/uL 716*         Results from last 7 days   Lab Units 05/10/24  1454   POTASSIUM mmol/L 4.0   CHLORIDE mmol/L 106   CO2 mmol/L 22   BUN mg/dL 14   CREATININE mg/dL 0.45*   CALCIUM mg/dL 8.1*   ALK PHOS U/L 102   ALT U/L 12   AST U/L 25     Results from last 7 days   Lab Units  05/10/24  1454 24  0000 24  0655   INR  3.64* 3.10* 0.96   PTT seconds 85*  --   --            Imaging: I have personally reviewed pertinent films in PACS  CT abdomen pelvis with contrast    Result Date: 5/10/2024  Narrative: CT ABDOMEN AND PELVIS WITH IV CONTRAST INDICATION: Patient with history of mechanical aortic valve replacement on Coumadin, status post  section on 2024 with ecchymosis over lower abdomen and bilateral flanks; abdominal pain, lower abdomen is firm. Concern for hematoma/active hemorrhage; patient also with new jaundice/scleral icterus. COMPARISON: No similar prior studies available for comparison. TECHNIQUE: CT examination of the abdomen and pelvis was performed. Multiplanar 2D reformatted images were created from the source data. This examination, like all CT scans performed in the [facility], was performed utilizing techniques to minimize radiation dose exposure, including the use of iterative reconstruction and automated exposure control. Radiation dose length product (DLP) for this visit: 1000 mGy-cm IV Contrast: 80 mL of iohexol (OMNIPAQUE) Enteric Contrast: Enteric contrast was not administered. FINDINGS: Evaluation is limited by motion artifact. ABDOMINAL WALL/INGUINAL REGIONS: There is a large infraumbilical rectus sheath hematoma. It measures approximately 17.6 x 9.6 x 18.1 cm (CC x AP x transverse). The rectus abdominis muscles are not clearly visualized separate from the blood components and  are included in the aforementioned measurement. There is no extravasation of IV contrast noted, although evaluation is somewhat limited without a delayed phase. A droplet of air is seen within the hematoma on the right. There is subcutaneous tissue stranding in the anterior infraumbilical abdominal and pelvic wall with several nodules representing additional hematomas. The largest of these measures 2.5 x 1.9 cm. There is mild subcutaneous tissue stranding  in both flanks, likely related to aforementioned ecchymoses, however without discrete, measurable hematomas. ABDOMINOPELVIC CAVITY: There is free fluid isodense to muscle, likely representing blood products seen in the prevesical space, most likely related to the aforementioned hematoma. No evidence of hematoma elsewhere in the abdomen or pelvis. No pneumoperitoneum. LIVER/BILIARY TREE:  Normal liver morphology.  No focal hepatic lesions. No biliary ductal dilation. GALLBLADDER:  No calcified gallstones. No pericholecystic inflammatory change. SPLEEN: Unremarkable. PANCREAS:  Unremarkable. Normal caliber main pancreatic duct. ADRENAL GLANDS:  Unremarkable. KIDNEYS/URETERS: Symmetric renal enhancement.  No intrarenal or ureteral calculi. No hydronephrosis.  No focal renal lesions. STOMACH AND BOWEL:  Evaluation of the gastrointestinal tract is somewhat limited by underdistention and lack of oral contrast. No bowel obstruction or convincing inflammation. APPENDIX:  The appendix is not visualized, however there are no secondary findings of appendicitis. LYMPH NODES: No abdominal or pelvic lymphadenopathy. VESSELS: Normal caliber aorta. Patent major branch vessels. REPRODUCTIVE ORGANS: The post gravid uterus is enlarged and appears somewhat heterogeneous. URINARY BLADDER: There is some mass effect by the rectus sheath hematoma discussed above. The urinary bladder itself is under distended, limiting its evaluation. OSSEOUS STRUCTURES:  No focal aggressive osseous lesions. Limbus vertebrae at L3 and L4. LOWER CHEST: Clear visualized lung bases. No pleural effusions. The patient is status post aortic valve replacement. The visualized heart is enlarged. There are partially imaged median sternotomy wires.     Impression: 1) 18.1 x 17.6 x 9.6 infraumbilical rectus sheath hematoma. No extravasation of IV contrast to suggest an active hemorrhage, although evaluation somewhat limited without a delayed phase. If there is high  clinical suspicion for an active hemorrhage, this would be best assessed with CTA abdomen/pelvis (including an arterial and delayed phase). 2) Free fluid with density suggestive of blood products also seen in the prevesical space, most likely related to the aforementioned hematoma. No evidence of other intra-abdominal/pelvic hematoma. 3) Subcutaneous tissue stranding in the bilateral flanks, and in the infraumbilical anterior abdominal and pelvic wall, likely related to ecchymosis with several additional small subcutaneous hematomas measuring up to 2.5 x 1.9 cm. 4) No biliary ductal dilatation. No radiopaque cholelithiasis or choledocholithiasis. 5) Additional findings as above. I personally discussed this study with BRET PEREZ on 5/10/2024 at 4:44 PM. Workstation performed: [id number]       EKG: Stach Nonspecfic T wave abnormality.

## 2024-05-10 NOTE — ASSESSMENT & PLAN NOTE
- Physical exam is consistent with incisional hematoma. Given her current anticoagulation, jaundice on exam, and bruising of bilateral flanks, further evaluation in ER is recommended for further evaluation due to concern for potentially larger occult hematoma. She is clinically stable with normal vitals, but cannot rule out large occult hematoma. Bruising of flanks is concerning for possible retroperitoneal hematoma. There is no suspicion of ongoing active bleeding.  - Given her history of mechanical aortic valve currently on warfarin, I recommend that she proceed to [facility] for higher level of care than can be provided locally. She agrees. Her mother will drive her to ER now.   - Recommend coagulation studies, metabolic panel, complete blood count, and CT abdomen/pelvis.  - Senior resident notified. Transfer order placed in order to facilitate physician to physician communication with ER prior to arrival.

## 2024-05-10 NOTE — ASSESSMENT & PLAN NOTE
POD#8 s/p primary low-transverse  section on    Presented today with worsening lower abdominal incision pain  Found to have rectus sheath hematoma  Hx of mechanical valve replacement on warfarin  5/10 CT abd/pelvis: 18.1 x 17.6 x 9.6 infraumbilical rectus sheath hematoma. No extravasation of IV contrast to suggest an active hemorrhage. Subcutaneous tissue stranding in the bilateral flanks, and in the infraumbilical anterior abdominal and pelvic wall, likely related to ecchymosis with several additional small subcutaneous hematomas measuring up to 2.5 x 1.9 cm.   OBGYN aware, consulted

## 2024-05-10 NOTE — ASSESSMENT & PLAN NOTE
POD#9 from , presented to ED with worsening incision pain and bruising, found to have rectus sheath hematoma  Complicated by daily warfarin use for mechanical valve  Hgb 5.0 on admission; was 9.6 post op    BP 110s/80s, HR 110s, afebrile, satting 100% on RA    Plan:  Transfuse 1 unit whole blood, 1 unit pRBCs, 2 units FFP  Monitor for transfusion reaction  Avoiding complete/rapid reversal in the setting of mechanical valve   TEG sent, awaiting results  CTA abd/pelvis: abdominal rectus hematoma  Q4h CBC with platelet  Keep NPO for possible IR  Hold home warfarin  Hold home iron tablet while NPO

## 2024-05-10 NOTE — ASSESSMENT & PLAN NOTE
28 y.o.  now POD#9 s/p primary low-transverse  section on   Presented to outpatient OB appointment reporting persistent lower abdominal pain incisional pain, was encouraged to present to the ED for further evaluation    Plan:  OBGYN consulted, appreciate recommendations

## 2024-05-10 NOTE — ASSESSMENT & PLAN NOTE
"Post op day #10 from [facility] for maternal request on 5/2/24, in setting of mechanical heart valve  Managed on warfarin since discharge.  Routine postoperative care  Abdominal binder  Breast pump - okay for opioids for pain management, no indications for \"pump and dump\"  OBGYN will continue to follow      "

## 2024-05-10 NOTE — ASSESSMENT & PLAN NOTE
S/p mechanical aortic valve placement with repair of ascending aortic aneurysm in 2015   On Warfarin, 5mg daily with goal INR 2.5-3.5  Most recent echo Jan 2024 with LVEF 65%      Plan:  Avoid full AC reversal in the setting of previous CVA when AC was held in the past  Daily INR  Cardiology consulted, appreciate recommendations

## 2024-05-11 LAB
ABO GROUP BLD BPU: NORMAL
ALBUMIN SERPL BCP-MCNC: 2.9 G/DL (ref 3.5–5)
ALP SERPL-CCNC: 89 U/L (ref 34–104)
ALT SERPL W P-5'-P-CCNC: 13 U/L (ref 7–52)
ANION GAP SERPL CALCULATED.3IONS-SCNC: 10 MMOL/L (ref 4–13)
ANISOCYTOSIS BLD QL SMEAR: PRESENT
AST SERPL W P-5'-P-CCNC: 22 U/L (ref 13–39)
BASOPHILS # BLD MANUAL: 0 THOUSAND/UL (ref 0–0.1)
BASOPHILS NFR MAR MANUAL: 0 % (ref 0–1)
BILIRUB SERPL-MCNC: 2.65 MG/DL (ref 0.2–1)
BPU ID: NORMAL
BUN SERPL-MCNC: 11 MG/DL (ref 5–25)
CA-I BLD-SCNC: 1.05 MMOL/L (ref 1.12–1.32)
CALCIUM ALBUM COR SERPL-MCNC: 8.9 MG/DL (ref 8.3–10.1)
CALCIUM SERPL-MCNC: 8 MG/DL (ref 8.4–10.2)
CHLORIDE SERPL-SCNC: 104 MMOL/L (ref 96–108)
CO2 SERPL-SCNC: 23 MMOL/L (ref 21–32)
CREAT SERPL-MCNC: 0.53 MG/DL (ref 0.6–1.3)
CROSSMATCH: NORMAL
EOSINOPHIL # BLD MANUAL: 0.35 THOUSAND/UL (ref 0–0.4)
EOSINOPHIL NFR BLD MANUAL: 3 % (ref 0–6)
ERYTHROCYTE [DISTWIDTH] IN BLOOD BY AUTOMATED COUNT: 14.8 % (ref 11.6–15.1)
ERYTHROCYTE [DISTWIDTH] IN BLOOD BY AUTOMATED COUNT: 15.6 % (ref 11.6–15.1)
GFR SERPL CREATININE-BSD FRML MDRD: 127 ML/MIN/1.73SQ M
GLUCOSE SERPL-MCNC: 85 MG/DL (ref 65–140)
HCT VFR BLD AUTO: 19.8 % (ref 34.8–46.1)
HCT VFR BLD AUTO: 20.2 % (ref 34.8–46.1)
HCT VFR BLD AUTO: 23.5 % (ref 34.8–46.1)
HCT VFR BLD AUTO: 23.9 % (ref 34.8–46.1)
HGB BLD-MCNC: 6.3 G/DL (ref 11.5–15.4)
HGB BLD-MCNC: 6.5 G/DL (ref 11.5–15.4)
HGB BLD-MCNC: 7.7 G/DL (ref 11.5–15.4)
HGB BLD-MCNC: 7.8 G/DL (ref 11.5–15.4)
HYPERCHROMIA BLD QL SMEAR: PRESENT
INR PPP: 1.72 (ref 0.84–1.19)
INR PPP: 2.38 (ref 0.84–1.19)
INR PPP: 2.66 (ref 0.84–1.19)
LYMPHOCYTES # BLD AUTO: 19 % (ref 14–44)
LYMPHOCYTES # BLD AUTO: 2.2 THOUSAND/UL (ref 0.6–4.47)
MAGNESIUM SERPL-MCNC: 1.7 MG/DL (ref 1.9–2.7)
MCH RBC QN AUTO: 28.3 PG (ref 26.8–34.3)
MCH RBC QN AUTO: 28.3 PG (ref 26.8–34.3)
MCH RBC QN AUTO: 28.5 PG (ref 26.8–34.3)
MCH RBC QN AUTO: 28.6 PG (ref 26.8–34.3)
MCHC RBC AUTO-ENTMCNC: 31.2 G/DL (ref 31.4–37.4)
MCHC RBC AUTO-ENTMCNC: 32.6 G/DL (ref 31.4–37.4)
MCHC RBC AUTO-ENTMCNC: 32.8 G/DL (ref 31.4–37.4)
MCHC RBC AUTO-ENTMCNC: 32.8 G/DL (ref 31.4–37.4)
MCV RBC AUTO: 86 FL (ref 82–98)
MCV RBC AUTO: 87 FL (ref 82–98)
MCV RBC AUTO: 88 FL (ref 82–98)
MCV RBC AUTO: 91 FL (ref 82–98)
METAMYELOCYTE ABSOLUTE CT: 0.12 THOUSAND/UL (ref 0–0.1)
METAMYELOCYTES NFR BLD MANUAL: 1 % (ref 0–1)
MONOCYTES # BLD AUTO: 0.69 THOUSAND/UL (ref 0–1.22)
MONOCYTES NFR BLD: 6 % (ref 4–12)
MYELOCYTE ABSOLUTE CT: 0.12 THOUSAND/UL (ref 0–0.1)
MYELOCYTES NFR BLD MANUAL: 1 % (ref 0–1)
NEUTROPHILS # BLD MANUAL: 8.09 THOUSAND/UL (ref 1.85–7.62)
NEUTS BAND NFR BLD MANUAL: 2 % (ref 0–8)
NEUTS SEG NFR BLD AUTO: 68 % (ref 43–75)
NRBC BLD AUTO-RTO: 1 /100 WBC (ref 0–2)
PHOSPHATE SERPL-MCNC: 3 MG/DL (ref 2.7–4.5)
PLATELET # BLD AUTO: 337 THOUSANDS/UL (ref 149–390)
PLATELET # BLD AUTO: 362 THOUSANDS/UL (ref 149–390)
PLATELET # BLD AUTO: 367 THOUSANDS/UL (ref 149–390)
PLATELET # BLD AUTO: 376 THOUSANDS/UL (ref 149–390)
PLATELET BLD QL SMEAR: ADEQUATE
PMV BLD AUTO: 7.9 FL (ref 8.9–12.7)
PMV BLD AUTO: 7.9 FL (ref 8.9–12.7)
PMV BLD AUTO: 8 FL (ref 8.9–12.7)
PMV BLD AUTO: 8.3 FL (ref 8.9–12.7)
POLYCHROMASIA BLD QL SMEAR: PRESENT
POTASSIUM SERPL-SCNC: 3.5 MMOL/L (ref 3.5–5.3)
PROT SERPL-MCNC: 6.1 G/DL (ref 6.4–8.4)
PROTHROMBIN TIME: 20.9 SECONDS (ref 11.6–14.5)
PROTHROMBIN TIME: 26.9 SECONDS (ref 11.6–14.5)
PROTHROMBIN TIME: 29.3 SECONDS (ref 11.6–14.5)
RBC # BLD AUTO: 2.23 MILLION/UL (ref 3.81–5.12)
RBC # BLD AUTO: 2.28 MILLION/UL (ref 3.81–5.12)
RBC # BLD AUTO: 2.72 MILLION/UL (ref 3.81–5.12)
RBC # BLD AUTO: 2.73 MILLION/UL (ref 3.81–5.12)
RBC MORPH BLD: PRESENT
SODIUM SERPL-SCNC: 137 MMOL/L (ref 135–147)
UNIT DISPENSE STATUS: NORMAL
UNIT PRODUCT CODE: NORMAL
UNIT PRODUCT VOLUME: 280 ML
UNIT PRODUCT VOLUME: 280 ML
UNIT PRODUCT VOLUME: 350 ML
UNIT PRODUCT VOLUME: 500 ML
UNIT RH: NORMAL
WBC # BLD AUTO: 11.56 THOUSAND/UL (ref 4.31–10.16)
WBC # BLD AUTO: 11.96 THOUSAND/UL (ref 4.31–10.16)
WBC # BLD AUTO: 12.55 THOUSAND/UL (ref 4.31–10.16)
WBC # BLD AUTO: 13.64 THOUSAND/UL (ref 4.31–10.16)

## 2024-05-11 PROCEDURE — 85610 PROTHROMBIN TIME: CPT

## 2024-05-11 PROCEDURE — 85027 COMPLETE CBC AUTOMATED: CPT

## 2024-05-11 PROCEDURE — P9016 RBC LEUKOCYTES REDUCED: HCPCS

## 2024-05-11 PROCEDURE — 85014 HEMATOCRIT: CPT | Performed by: NURSE PRACTITIONER

## 2024-05-11 PROCEDURE — 85007 BL SMEAR W/DIFF WBC COUNT: CPT

## 2024-05-11 PROCEDURE — 99291 CRITICAL CARE FIRST HOUR: CPT | Performed by: SURGERY

## 2024-05-11 PROCEDURE — 83735 ASSAY OF MAGNESIUM: CPT

## 2024-05-11 PROCEDURE — 82330 ASSAY OF CALCIUM: CPT

## 2024-05-11 PROCEDURE — 99232 SBSQ HOSP IP/OBS MODERATE 35: CPT | Performed by: INTERNAL MEDICINE

## 2024-05-11 PROCEDURE — 85610 PROTHROMBIN TIME: CPT | Performed by: NURSE PRACTITIONER

## 2024-05-11 PROCEDURE — 99232 SBSQ HOSP IP/OBS MODERATE 35: CPT | Performed by: OBSTETRICS & GYNECOLOGY

## 2024-05-11 PROCEDURE — 80053 COMPREHEN METABOLIC PANEL: CPT

## 2024-05-11 PROCEDURE — 84100 ASSAY OF PHOSPHORUS: CPT

## 2024-05-11 PROCEDURE — 85018 HEMOGLOBIN: CPT | Performed by: NURSE PRACTITIONER

## 2024-05-11 RX ORDER — FUROSEMIDE 20 MG/1
20 TABLET ORAL ONCE
Status: COMPLETED | OUTPATIENT
Start: 2024-05-11 | End: 2024-05-11

## 2024-05-11 RX ORDER — MAGNESIUM SULFATE 1 G/100ML
1 INJECTION INTRAVENOUS ONCE
Status: COMPLETED | OUTPATIENT
Start: 2024-05-11 | End: 2024-05-11

## 2024-05-11 RX ORDER — CALCIUM GLUCONATE 20 MG/ML
2 INJECTION, SOLUTION INTRAVENOUS ONCE
Status: COMPLETED | OUTPATIENT
Start: 2024-05-11 | End: 2024-05-11

## 2024-05-11 RX ADMIN — ESCITALOPRAM OXALATE 20 MG: 20 TABLET ORAL at 09:53

## 2024-05-11 RX ADMIN — MAGNESIUM SULFATE HEPTAHYDRATE 1 G: 1 INJECTION, SOLUTION INTRAVENOUS at 07:24

## 2024-05-11 RX ADMIN — Medication 1500 UNITS: at 09:53

## 2024-05-11 RX ADMIN — HYDROMORPHONE HYDROCHLORIDE 0.5 MG: 1 INJECTION, SOLUTION INTRAMUSCULAR; INTRAVENOUS; SUBCUTANEOUS at 20:36

## 2024-05-11 RX ADMIN — CHLORHEXIDINE GLUCONATE 15 ML: 1.2 RINSE ORAL at 09:53

## 2024-05-11 RX ADMIN — CALCIUM GLUCONATE 2 G: 20 INJECTION, SOLUTION INTRAVENOUS at 10:04

## 2024-05-11 RX ADMIN — ACETAMINOPHEN 1000 MG: 10 INJECTION INTRAVENOUS at 17:31

## 2024-05-11 RX ADMIN — FUROSEMIDE 20 MG: 20 TABLET ORAL at 10:04

## 2024-05-11 RX ADMIN — CHLORHEXIDINE GLUCONATE 15 ML: 1.2 RINSE ORAL at 20:36

## 2024-05-11 RX ADMIN — ACETAMINOPHEN 1000 MG: 10 INJECTION INTRAVENOUS at 05:46

## 2024-05-11 RX ADMIN — POLYETHYLENE GLYCOL 3350 17 G: 17 POWDER, FOR SOLUTION ORAL at 10:00

## 2024-05-11 RX ADMIN — ACETAMINOPHEN 1000 MG: 10 INJECTION INTRAVENOUS at 09:53

## 2024-05-11 RX ADMIN — HYDROMORPHONE HYDROCHLORIDE 0.5 MG: 1 INJECTION, SOLUTION INTRAMUSCULAR; INTRAVENOUS; SUBCUTANEOUS at 13:23

## 2024-05-11 NOTE — PLAN OF CARE
Problem: PAIN - ADULT  Goal: Verbalizes/displays adequate comfort level or baseline comfort level  Description: Interventions:  - Encourage patient to monitor pain and request assistance  - Assess pain using appropriate pain scale  - Administer analgesics based on type and severity of pain and evaluate response  - Implement non-pharmacological measures as appropriate and evaluate response  - Consider cultural and social influences on pain and pain management  - Notify physician/advanced practitioner if interventions unsuccessful or patient reports new pain  Outcome: Progressing     Problem: INFECTION - ADULT  Goal: Absence or prevention of progression during hospitalization  Description: INTERVENTIONS:  - Assess and monitor for signs and symptoms of infection  - Monitor lab/diagnostic results  - Monitor all insertion sites, i.e. indwelling lines, tubes, and drains  - Monitor endotracheal if appropriate and nasal secretions for changes in amount and color  - Brenton appropriate cooling/warming therapies per order  - Administer medications as ordered  - Instruct and encourage patient and family to use good hand hygiene technique  - Identify and instruct in appropriate isolation precautions for identified infection/condition  Outcome: Progressing  Goal: Absence of fever/infection during neutropenic period  Description: INTERVENTIONS:  - Monitor WBC    Outcome: Progressing     Problem: SAFETY ADULT  Goal: Patient will remain free of falls  Description: INTERVENTIONS:  - Educate patient/family on patient safety including physical limitations  - Instruct patient to call for assistance with activity   - Consult OT/PT to assist with strengthening/mobility   - Keep Call bell within reach  - Keep bed low and locked with side rails adjusted as appropriate  - Keep care items and personal belongings within reach  - Initiate and maintain comfort rounds  - Make Fall Risk Sign visible to staff  - Offer Toileting every  Hours,  in advance of need  - Initiate/Maintain alarm  - Obtain necessary fall risk management equipment:   - Apply yellow socks and bracelet for high fall risk patients  - Consider moving patient to room near nurses station  Outcome: Progressing  Goal: Maintain or return to baseline ADL function  Description: INTERVENTIONS:  -  Assess patient's ability to carry out ADLs; assess patient's baseline for ADL function and identify physical deficits which impact ability to perform ADLs (bathing, care of mouth/teeth, toileting, grooming, dressing, etc.)  - Assess/evaluate cause of self-care deficits   - Assess range of motion  - Assess patient's mobility; develop plan if impaired  - Assess patient's need for assistive devices and provide as appropriate  - Encourage maximum independence but intervene and supervise when necessary  - Involve family in performance of ADLs  - Assess for home care needs following discharge   - Consider OT consult to assist with ADL evaluation and planning for discharge  - Provide patient education as appropriate  Outcome: Progressing  Goal: Maintains/Returns to pre admission functional level  Description: INTERVENTIONS:  - Perform AM-PAC 6 Click Basic Mobility/ Daily Activity assessment daily.  - Set and communicate daily mobility goal to care team and patient/family/caregiver.   - Collaborate with rehabilitation services on mobility goals if consulted  - Perform Range of Motion  times a day.  - Reposition patient every  hours.  - Dangle patient  times a day  - Stand patient  times a day  - Ambulate patient  times a day  - Out of bed to chair  times a day   - Out of bed for meals times a day  - Out of bed for toileting  - Record patient progress and toleration of activity level   Outcome: Progressing     Problem: DISCHARGE PLANNING  Goal: Discharge to home or other facility with appropriate resources  Description: INTERVENTIONS:  - Identify barriers to discharge w/patient and caregiver  - Arrange for  needed discharge resources and transportation as appropriate  - Identify discharge learning needs (meds, wound care, etc.)  - Arrange for interpretive services to assist at discharge as needed  - Refer to Case Management Department for coordinating discharge planning if the patient needs post-hospital services based on physician/advanced practitioner order or complex needs related to functional status, cognitive ability, or social support system  Outcome: Progressing     Problem: Knowledge Deficit  Goal: Patient/family/caregiver demonstrates understanding of disease process, treatment plan, medications, and discharge instructions  Description: Complete learning assessment and assess knowledge base.  Interventions:  - Provide teaching at level of understanding  - Provide teaching via preferred learning methods  Outcome: Progressing

## 2024-05-11 NOTE — PROGRESS NOTES
"OBGYN Progress note   Suze Leigh 28 y.o. female MRN: [id number]  Unit/Bed#: ICU 15 Encounter: [id number]    Assessment: 28 y.o. POD#9 from [facility] readmitted with rectus sheath hematoma and coagulopathy with acute blood loss anemia.    Plan:  ABLA (acute blood loss anemia)  Assessment & Plan  Hgb 5 on admission --> 1u whole blood, 1u pRBC, 2u FFP --> 7.7 --> 6.3 --> 6.5 --> 7.8  Continue to transfuse as needed  Complicated by chronic coumadin use in setting of mechanical aortic valve  ICU primary, appreciate care  S/p 1u whole blood, 1u pRBC, 2u FFP     Status post primary low transverse  section  Assessment & Plan  Post op day #9 from [facility] for maternal request on 24, in setting of mechanical heart valve  Managed on warfarin since discharge.  Routine postoperative care  Breast pump - okay for opioids for pain management, no indications for \"pump and dump\"  OBGYN will continue to follow, appreciate ICU care      History of mechanical aortic valve replacement  Assessment & Plan  Underwent mechanical aortic valve placement with repair of ascending aortic aneurysm in 2015 in the setting of bicuspid aortic valve with aortic stenosis and expanding ascending aortic aneurysm.  Current regimen at time of discharge: Coumadin 2.5 mg Tues/Thurs/Sat, 3.75 mg all other days   Per primary team, avoid full AC reversal in the setting of previous CVA when AC was held in the past  Daily INR  Cardiology consulted, appreciate recommendations for anticoagulation dosage    * Rectus sheath hematoma  Assessment & Plan  CTAP 5/10/24 notable for \"18.1 x 17.6 x 9.6 infraumbilical rectus sheath hematoma. No extravasation of IV contrast to suggest an active hemorrhage, although evaluation somewhat limited without a delayed phase\"  Abdominal binder ordered  - See plan under ABLA  - Consider IR intervention          Subjective:    Suze Leigh has no current complaints.  Pain is well controlled overall. She reports some " discomfort at the level of the hematoma but reports that it is controlled. She is sad to be inpatient after delivery and misses her baby, who will be visiting tomorrow with a family member. Patient is currently voiding.  She is ambulating.  She is tolerating PO (though only on liquids at this time), and denies nausea or vomiting.     /76 (BP Location: Right arm)   Pulse 91   Temp 98.5 °F (36.9 °C) (Oral)   Resp 20   Wt 79.8 kg (175 lb 14.8 oz)   LMP 08/11/2023 (Exact Date) Comment: negative pregnancy test  SpO2 92%   Breastfeeding Yes   BMI 31.16 kg/m²     I/O last 3 completed shifts:  In: 2375 [Blood:1775; IV Piggyback:600]  Out: -   No intake/output data recorded.    Lab Results   Component Value Date    WBC 11.96 (H) 05/11/2024    HGB 7.8 (L) 05/11/2024    HCT 23.9 (L) 05/11/2024    MCV 88 05/11/2024     05/11/2024       Lab Results   Component Value Date    GLUCOSE 80 04/02/2024    CALCIUM 8.0 (L) 05/11/2024     (L) 06/03/2015    K 3.5 05/11/2024    CO2 23 05/11/2024     05/11/2024    BUN 11 05/11/2024    CREATININE 0.53 (L) 05/11/2024           Physical Exam  Constitutional:       Appearance: Normal appearance.   HENT:      Head: Normocephalic and atraumatic.   Eyes:      Extraocular Movements: Extraocular movements intact.   Cardiovascular:      Rate and Rhythm: Normal rate.   Pulmonary:      Effort: Pulmonary effort is normal.   Abdominal:      General: There is distension.      Tenderness: There is abdominal tenderness. There is no guarding.      Comments: Hematoma palpated to level of umbilicus and throughout lower portion of abdomen, extravasation noted to the mons pubis   Musculoskeletal:         General: Normal range of motion.   Skin:     General: Skin is warm and dry.   Neurological:      Mental Status: She is alert. Mental status is at baseline.   Psychiatric:         Mood and Affect: Mood normal.         Behavior: Behavior normal.           Humaira Allred  MD  5/11/2024  12:43 PM

## 2024-05-11 NOTE — LACTATION NOTE
Lactation Consult    Met with Suze who is pumping for her baby girl while she is in the hospital. Provided sterile pumping supplies, milk collection items, and pump hygiene items. Encouraged Suze to pump every 2-3 hours and allow a 4 hour break over night. Discussed comfort while pumping and washing pump parts after each use. Suze reports that pumping regularly has been a challenge because of her health; encouraged her to pump as often as she can. Encouraged staying hydrated to thirst and picking nutritious foods as her ordered diet allows.    Encouraged Suze to reach out to lactation with any lactation support needs during her hospital stay.

## 2024-05-11 NOTE — PHYSICAL THERAPY NOTE
Physical Therapy Screen         05/11/24 1449   Note Type   Note type Screen   Additional Comments PT orders noted and chart reviewed.  Per EMY Chan, pt has been independent with mobility.  Upon approach, pt and mother both report independence with all mobility and no skilled PT needs at this time. Will discharge pt from PT caseload.     HAILEE ManningT

## 2024-05-11 NOTE — PROGRESS NOTES
Progress Note - Cardiology   Suze Leigh 28 y.o. female MRN: [id number]  Unit/Bed#: ICU 15 Encounter: [id number]    Assessment:  Principal Problem:    Rectus sheath hematoma  Active Problems:    History of mechanical aortic valve replacement    Anxiety    Status post primary low transverse  section    ABLA (acute blood loss anemia)    Hematoma post . Anticoagulation with warfarin due to mechanical aortic valve.  History of CVA in  when she was off of anticoagulation for surgery (albeit for a more prolonged period of time then)  Supratherapeutic INR 3.6 in the setting of being on warfarin on admission.  Partial reversal of anticoagulation, resuscitation of anemia with blood products, improvement in Hgb from 5 -> 7.7 but now trending down again.    Plan:  Management of bleeding per primary team. I discussed with them that if further reversal is necessary of her anticoagulation due to ongoing bleeding, we will need to except that. On the tail end, it may take longer to achieve a therapeutic INR and she will need parenteral anticoagulation as soon as safe.    She has increased gradients across the valve. Echocardiogram was done in January. No need to repeat at this time. She does have a prominent murmur which is likely longstanding.    She received a dose of IV Lasix yesterday with the blood products. She has mild crackles today and oxygen saturation in the mid 90s. Can give 20 mg p.o. Lasix today.      Subjective/Objective     Subjective:   Without any specific cardiac complaints. She received a dose of IV Lasix yesterday. She has received blood products.  INR today is 2.6. She did not have full reversal. Her counts increased to hemoglobin of 7.7, but this morning is down to 6.5.    Objective:    Vitals: /68   Pulse 98   Temp 98.8 °F (37.1 °C)   Resp 22   LMP 2023 (Exact Date) Comment: negative pregnancy test  SpO2 93%   Breastfeeding Yes   There were no vitals filed for this  visit.  Orthostatic Blood Pressures      Flowsheet Row Most Recent Value   Blood Pressure 112/68 filed at 05/11/2024 0739   Patient Position - Orthostatic VS Lying filed at 05/10/2024 1946              Intake/Output Summary (Last 24 hours) at 5/11/2024 0813  Last data filed at 5/11/2024 0528  Gross per 24 hour   Intake 2375 ml   Output --   Net 2375 ml     Physical Exam:   General appearance: alert and in no acute distress  Head: Normocephalic, without obvious abnormality, atraumatic  Neck: no carotid bruit, no JVD and supple, symmetrical, trachea midline  Lungs: basilar rales bilaterally.  Heart: S1, S2 regular. Harsh 3-4/6 JOIE. Mechanical click.  Abdomen: tender  Extremities: extremities normal, atraumatic, no cyanosis or edema  Pulses: 2+ and symmetric bilaterally  Skin: Skin color, texture, turgor normal. No rashes or lesions  Neurologic: Grossly normal. Alert and oriented.    Medications:    Current Facility-Administered Medications:     acetaminophen (Ofirmev) injection 1,000 mg, 1,000 mg, Intravenous, Q8H, Debbi Darling MD, Last Rate: 400 mL/hr at 05/11/24 0546, 1,000 mg at 05/11/24 0546    chlorhexidine (PERIDEX) 0.12 % oral rinse 15 mL, 15 mL, Mouth/Throat, Q12H JAMES, Debbi Darling MD, 15 mL at 05/10/24 2010    escitalopram (LEXAPRO) tablet 20 mg, 20 mg, Oral, Daily, Debbi Darling MD    HYDROmorphone (DILAUDID) injection 0.5 mg, 0.5 mg, Intravenous, Q2H PRN, Debbi Darling MD, 0.5 mg at 05/10/24 1954    magnesium sulfate IVPB (premix) SOLN 1 g, 1 g, Intravenous, Once, WANDA Dickey, 1 g at 05/11/24 0724    polyethylene glycol (MIRALAX) packet 17 g, 17 g, Oral, Daily, WANDA Lozada    Lab Results:      Results from last 7 days   Lab Units 05/11/24  0634 05/11/24  0045 05/10/24  1945   WBC Thousand/uL 11.56* 12.55* 8.78   HEMOGLOBIN g/dL 6.5* 6.3* 7.7*   HEMATOCRIT % 19.8* 20.2* 24.2*   PLATELETS Thousands/uL 337 362 364         Results from last 7 days   Lab Units  05/11/24  0546 05/10/24  1454   SODIUM mmol/L 137 138   POTASSIUM mmol/L 3.5 4.0   CHLORIDE mmol/L 104 106   CO2 mmol/L 23 22   BUN mg/dL 11 14   CREATININE mg/dL 0.53* 0.45*   CALCIUM mg/dL 8.0* 8.1*   ALK PHOS U/L 89 102   ALT U/L 13 12   AST U/L 22 25     Results from last 7 days   Lab Units 05/11/24  0546 05/11/24  0101 05/10/24  1454   INR  2.66* 2.38* 3.64*   PTT seconds  --   --  85*     Results from last 7 days   Lab Units 05/11/24  0546   MAGNESIUM mg/dL 1.7*       Telemetry: Personally reviewed. Sinus rhythm/tachycardia. No significant arrhythmias.    Echo:  Left Ventricle: Left ventricular cavity size is normal. Wall thickness is mildly increased. The left ventricular ejection fraction is 65%. Systolic function is normal. Wall motion is normal. Diastolic function is normal.    IVS: There is abnormal septal motion consistent with post-operative status.    Aortic Valve: The aortic valve was not well visualized. There is a tilting disc mechanical valve. The prosthetic valve appears to be functioning adequately but there are elevated gradients.  The valve leaflets appear to open on the parasternal long axis, but not well-visualized in other images. There is no evidence of paravalvular regurgitation. There is mild transvalvular regurgitation. 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  Therefore this study is inadequate to determine if this is actually valve leaflet dysfunction/MS formation versus more elevated gradients simply due to increased flow. The aortic valve velocity is increased in the setting of stenosis and increased flow.    Mitral Valve: There is trace regurgitation.    Tricuspid Valve: There is mild regurgitation.    Aorta: There is a prosthetic graft present in the ascending aorta.    If clinically indicated suggest JOSE weight aortic valve replacement better.

## 2024-05-11 NOTE — PROGRESS NOTES
[facility]  Progress Note  Name: Suze Leigh I  MRN: [id number]  Unit/Bed#: ICU 15 I Date of Admission: 5/10/2024   Date of Service: 2024 I Hospital Day: 1    Assessment/Plan   * Rectus sheath hematoma  Assessment & Plan  POD#9 s/p primary low-transverse  section on    Presented today with worsening lower abdominal incision pain  Found to have rectus sheath hematoma  Hx of mechanical valve replacement on warfarin  5/10 CT abd/pelvis: 18.1 x 17.6 x 9.6 infraumbilical rectus sheath hematoma. No extravasation of IV contrast to suggest an active hemorrhage. Subcutaneous tissue stranding in the bilateral flanks, and in the infraumbilical anterior abdominal and pelvic wall, likely related to ecchymosis with several additional small subcutaneous hematomas measuring up to 2.5 x 1.9 cm.   OBGYN aware, consulted    Plan:  Trend CBC  Transfuse as needed   Trend INR -- avoid full reversal of AC given previous hx of CVA when off AC   May need to consider IR embolization    ABLA (acute blood loss anemia)  Assessment & Plan  POD#9 from , presented to ED with worsening incision pain and bruising, found to have rectus sheath hematoma  Complicated by daily warfarin use for mechanical valve  Hgb 5.0 on admission; was 9.6 post op    BP 110s/80s, HR 110s, afebrile, satting 100% on RA    Plan:  Transfuse 1 unit whole blood, 1 unit pRBCs, 2 units FFP  Monitor for transfusion reaction  Avoiding complete/rapid reversal in the setting of mechanical valve   TEG sent, awaiting results  CTA abd/pelvis: abdominal rectus hematoma  Q4h CBC with platelet  Keep NPO for possible IR  Hold home warfarin  Hold home iron tablet while NPO    Status post primary low transverse  section  Assessment & Plan  28 y.o.  now POD#9 s/p primary low-transverse  section on   Presented to outpatient OB appointment reporting persistent lower abdominal pain incisional pain, was  encouraged to present to the ED for further evaluation    Plan:  OBGYN consulted, appreciate recommendations    Anxiety  Assessment & Plan  Takes lexapro 20mg daily at home  Continue home med.    History of mechanical aortic valve replacement  Assessment & Plan  S/p mechanical aortic valve placement with repair of ascending aortic aneurysm in 2015   On Warfarin, 5mg daily with goal INR 2.5-3.5  Most recent echo Jan 2024 with LVEF 65%      Plan:  Avoid full AC reversal in the setting of previous CVA when AC was held in the past  Daily INR  Cardiology consulted, appreciate recommendations             Disposition: Critical care    ICU Core Measures     A: Assess, Prevent, and Manage Pain Has pain been assessed? Yes  Need for changes to pain regimen? No   B: Both SAT/SAT  N/A   C: Choice of Sedation RASS Goal: N/A patient not on sedation  Need for changes to sedation or analgesia regimen? No   D: Delirium CAM-ICU: Negative   E: Early Mobility  Plan for early mobility? Yes   F: Family Engagement Plan for family engagement today? Yes         Prophylaxis:  VTE VTE covered by:    None       Stress Ulcer  not ordered         Significant 24hr Events     24hr events:   Admitted to ICU, given whole blood 1 unit, FFP 1 unit, and PRBC 1 unit     Subjective   Review of Systems   Constitutional:  Positive for fatigue. Negative for chills and fever.   HENT: Negative.     Eyes: Negative.    Respiratory:  Negative for cough, chest tightness, shortness of breath and wheezing.    Cardiovascular:  Negative for chest pain, palpitations and leg swelling.   Gastrointestinal:  Positive for abdominal pain (lower abd pain). Negative for abdominal distention, diarrhea and nausea.   Endocrine: Negative.    Genitourinary:  Positive for pelvic pain and vaginal discharge (post-partum discharge, scant). Negative for difficulty urinating, flank pain and vaginal bleeding.   Musculoskeletal:  Negative for arthralgias and myalgias.    Allergic/Immunologic: Negative.    Neurological:  Negative for dizziness, seizures, weakness and light-headedness.   Hematological:  Negative for adenopathy. Bruises/bleeds easily (on Warfarin at baseline).   Psychiatric/Behavioral: Negative.        Objective                            Vitals I/O      Most Recent Min/Max in 24hrs   Temp 99.9 °F (37.7 °C) Temp  Min: 98 °F (36.7 °C)  Max: 100.6 °F (38.1 °C)   Pulse (!) 135 Pulse  Min: 103  Max: 135   Resp (!) 23 Resp  Min: 18  Max: 27   /71 BP  Min: 110/71  Max: 161/86   O2 Sat 96 % SpO2  Min: 91 %  Max: 100 %      Intake/Output Summary (Last 24 hours) at 5/11/2024 0231  Last data filed at 5/10/2024 2049  Gross per 24 hour   Intake 2025 ml   Output --   Net 2025 ml       Diet NPO; Sips with meds    Invasive Monitoring           Physical Exam   Physical Exam  Vitals and nursing note reviewed.   Eyes:      General: Vision grossly intact.         Right eye: No discharge.         Left eye: No discharge.      Extraocular Movements: Extraocular movements intact.      Conjunctiva/sclera: Conjunctivae normal.      Pupils: Pupils are equal, round, and reactive to light.   Skin:     General: Skin is warm, dry and not mottled extremities.      Capillary Refill: Capillary refill takes less than 2 seconds.      Coloration: Skin is not jaundiced or pale.      Findings: No lesion, rash or wound.   HENT:      Head: Normocephalic and atraumatic.      Right Ear: Hearing normal. No drainage.      Left Ear: Hearing normal. No drainage.      Nose: No nasal deformity, nasal tenderness, congestion or epistaxis.      Mouth/Throat:      Mouth: Mucous membranes are moist. No injury or angioedema.      Dentition: Normal dentition.      Pharynx: No oropharyngeal exudate.   Neck:      Vascular: Central line present. No JVD.   no midline tenderness Cardiovascular:      Rate and Rhythm: Regular rhythm. Tachycardia present.      Pulses: No decreased pulses.      Heart sounds: Murmur heard.       No friction rub. No gallop.      Comments: Mechanical click  Musculoskeletal:         General: No deformity or signs of injury. Normal range of motion.      Right lower leg: No edema.      Left lower leg: No edema.   Abdominal: General: There is distension (lower abd distension).     Palpations: Abdomen is soft.      Tenderness: There is abdominal tenderness (lower abd tender).   Constitutional:       General: She is not in acute distress.     Appearance: She is well-developed and well-nourished. She is ill-appearing. She is not toxic-appearing.      Interventions: She is not sedated, not intubated and not restrained.  Pulmonary:      Effort: Pulmonary effort is normal. No accessory muscle usage, respiratory distress or accessory muscle usage. She is not intubated.      Breath sounds: Normal breath sounds. No wheezing, rhonchi or rales.   Psychiatric:         Mood and Affect:  mood and affect abnormal.        Speech: Speech is not no receptive aphasia or no expressive aphasia.   Neurological:      General: No focal deficit present.      Mental Status: She is alert and oriented to person, place and time. Mental status is at baseline. She is CAM ICU negative.      Cranial Nerves: No dysarthria or facial asymmetry.      Sensory: No sensory deficit.      Motor: Strength full and intact in all extremities. No pronator drift or motor deficit.            Diagnostic Studies      EKG: sinus tachycardia  Imagin/10 CT a/p:  There is a large infraumbilical rectus sheath hematoma. It measures approximately 17.6 x 9.6 x 18.1 cm (CC x AP x transverse). The rectus abdominis muscles are not clearly visualized separate from the blood components and   are included in the aforementioned measurement. There is no extravasation of IV contrast noted, although evaluation is somewhat limited without a delayed phase. A droplet of air is seen within the hematoma on the right.  5/10 CTA a/p Recent postoperative change reflecting   section with stable large infraumbilical abdominal wall rectus sheath hematoma. No definite contrast extravasation to indicate active hemorrhage, though evaluation is slightly limited due to artifact as   well as the presence of previously administered intravenous contrast. Stable amount of high attenuation material in the prevesical space may reflect some hemorrhagic components and/or postoperative change, not appreciably changed from the earlier study.  I have personally reviewed pertinent reports.   and I have personally reviewed pertinent films in PACS     Medications:  Scheduled PRN   acetaminophen, 1,000 mg, Q8H  chlorhexidine, 15 mL, Q12H JAMES  escitalopram, 20 mg, Daily  polyethylene glycol, 17 g, Daily      HYDROmorphone, 0.5 mg, Q2H PRN       Continuous          Labs:    CBC    Recent Labs     05/10/24  1945 24  0045   WBC 8.78 12.55*   HGB 7.7* 6.3*   HCT 24.2* 20.2*    362     BMP    Recent Labs     05/10/24  1454   SODIUM 138   K 4.0      CO2 22   AGAP 10   BUN 14   CREATININE 0.45*   CALCIUM 8.1*       Coags    Recent Labs     05/10/24  1454 24  0101   INR 3.64* 2.38*   PTT 85*  --         Additional Electrolytes  No recent results       Blood Gas    No recent results  No recent results LFTs  Recent Labs     05/10/24  1454   ALT 12   AST 25   ALKPHOS 102   ALB 2.8*   TBILI 2.47*       Infectious  No recent results  Glucose  Recent Labs     05/10/24  1454   GLUC 82               WANDA Lozada

## 2024-05-11 NOTE — UTILIZATION REVIEW
Initial Clinical Review    Admission: Date/Time/Statement:   Admission Orders (From admission, onward)       Ordered        05/10/24 1628  INPATIENT ADMISSION  Once                          Orders Placed This Encounter   Procedures    INPATIENT ADMISSION     Standing Status:   Standing     Number of Occurrences:   1     Order Specific Question:   Level of Care     Answer:   Critical Care [15]     Order Specific Question:   Estimated length of stay     Answer:   More than 2 Midnights     Order Specific Question:   Certification     Answer:   I certify that inpatient services are medically necessary for this patient for a duration of greater than two midnights. See H&P and MD Progress Notes for additional information about the patient's course of treatment.     ED Arrival Information       Expected   5/10/2024     Arrival   5/10/2024 12:24    Acuity   Emergent              Means of arrival   Walk-In    Escorted by   Self    Service   Critical Care/ICU    Admission type   Emergency              Arrival complaint   Hematoma of surgical wound following  section             Chief Complaint   Patient presents with    Post-op Problem     Patient had a  on , reports she noticed some bruising on Monday that has gotten worse throughout the week, referred to ED by OB due to concern for hematoma, patient is on coumadin       Initial Presentation: 28 y.o. female with hx AVR on Coumadin , s/p C section POD #8 who presents to ED from OB/GYN office with   worsening and persistent lower abdominal incisional pain and bruising. Pain uncontrolled on tylenol, ibuprofen and oxy at home. On exam, skin pale, bruising R flank, diffuse abdominal tenderness lower abdomen along incision Surgical incision well approximated and healing well . Pt tachycardic  Imaging demonstrated 18.1 x 17.6 x 9.6 infraumbilical rectus sheath hematoma with several additional small subcutaneous hematomas measuring up to 2.5 x 1.9 cm. Labs : Hgb  "5.  INR 3.64 , T bili 2.47  , WBC 14.12  Endorses sob with activity, fatigue, lightheadedness when she stands and pallor.  Pt given IV analgesics, IV lasix in ED. Pt admitted as Inpatient to ICU/ critical care with rectus sheat hematoma, Acute blood loss anemia. S/p Low transverse c section 5/2/24. Plan - OB/GYN consult, cardiology consult. Transfuse 1 unit whole blood, 1 unit pRBCs, 2 units FFP  . Avoiding complete/rapid reversal in the setting of mechanical valve . F/U TEG . CTA A/P . CBC w/ plt q4h. NPO. Hold home warfarin Hold home iron tablet while NPO .    OB/GYN consult- rectus sheath hematoma . ABLA complicated by chronic coumadin use . CTA abdomen pelvis ordered . S/p 1u whole blood, 1 FFP, plan for additional unit of FFP and pRBC once whole blood complete . Breast pump - okay for opioids for pain management, no indications for \"pump and dump\"  .Reports increased fatigue, decreased breast milk supply over the last few days. She reports normal lochia since delivery.   Cardiology consult- Supratherapeutic INR 3.6 in the setting of being on warfarin. 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 Will need the shortest amount of time possible off of anticoagulation Due to her critical state, agree with reversal of anticoagulation. Blood products as needed. Would recommend initiating a heparin infusion as soon as safe from a bleeding standpoint. She will need parenteral anticoagulation until  her INR becomes therapeutic again     Anticipated Length of Stay/Certification Statement: > 2 midnights     Date: 5/11 POD #9  Day 2:   Downgraded to level 2 SD/ICU today   Critical care- pt has received whole blood 1 unit, FFP 2 unit, and PRBC 2 unit since admission . Central line in place .Hgb 6.3 this am from 7.7 last night. INR 2.38  CTA A/P 5/10 shows stable large infraumbilical abdominal wall rectus sheath hematoma. No definite contrast extravasation to indicate active " hemorrhage  . On exam, lower abdominal tenderness, lower abdomen distention  , vaginal discharge (post-partum discharge, scant). Pt fatigued , tachycardic this am . Daily INR . CBC, platelets q6h . Keep hgb >7 . Ordered 1 U PRBC ..  Coumadin remains on hold. Keep NPO for possible IR for embolization   Cardiology - Received a dose of IV Lasix yesterday with the blood products. She has mild crackles today and oxygen saturation in the mid 90s. Can give 20 mg p.o. Lasix today. INR today is 2.6. She did not have full reversal    OB GYN Hgb  from 6.5 this am to 7.8 s/p 1 U PRBC today . Continue to transfuse as needed Abdominal binder ordered  . Consider IR intervention . Reports some discomfort at the level of the hematoma but reports that it is controlled  .Hematoma palpated to level of umbilicus and throughout lower portion of abdomen, extravasation noted to the mons pubis        5/10    5/10     5/10     ED Triage Vitals   Temperature Pulse Respirations Blood Pressure SpO2   05/10/24 1237 05/10/24 1237 05/10/24 1237 05/10/24 1237 05/10/24 1237   98 °F (36.7 °C) (!) 112 18 113/80 100 %      Temp Source Heart Rate Source Patient Position - Orthostatic VS BP Location FiO2 (%)   05/10/24 1237 05/10/24 1515 05/10/24 1237 05/10/24 1237 --   Oral Monitor Sitting Left arm       Pain Score       05/10/24 1237       7          Wt Readings from Last 1 Encounters:   05/11/24 79.8 kg (175 lb 14.8 oz)     Additional Vital Signs:   Date/Time Temp Pulse Resp BP MAP (mmHg) SpO2 Calculated FIO2 (%) - Nasal Cannula Nasal Cannula O2 Flow Rate (L/min) O2 Device   05/11/24 1600 -- -- -- -- -- -- -- -- None (Room air)   05/11/24 1500 98.2 °F (36.8 °C) 87 26 Abnormal  117/76 92 96 % -- -- --   05/11/24 1242 98.5 °F (36.9 °C) -- -- 117/76 92 -- -- -- None (Room air)   05/11/24 1100 -- 80 23 Abnormal  136/81 102 96 % -- -- --   05/11/24 1015 -- 87 30 Abnormal  119/70 -- 94 % -- -- --   05/11/24 1000 -- 92 28 Abnormal  122/70 89 92 % -- -- --    05/11/24 0900 -- 83 25 Abnormal  108/67 84 -- -- -- --   05/11/24 0815 98.5 °F (36.9 °C) 91 20 111/66 -- 92 % -- -- --   05/11/24 0800 98.6 °F (37 °C) 91 20 112/65 -- 93 % -- -- --   05/11/24 0739 98.8 °F (37.1 °C) 98 22 112/68 -- -- -- -- --   05/11/24 0600 100.1 °F (37.8 °C) 98 28 Abnormal  108/64 81 93 % -- -- --   05/11/24 0528 100.5 °F (38.1 °C) 110 Abnormal  28 Abnormal  116/70 -- -- -- -- --   05/11/24 0500 -- 100 27 Abnormal  116/69 87 93 % -- -- --   05/11/24 0400 -- 104 25 Abnormal  114/72 89 94 % -- -- --   05/11/24 0300 99.2 °F (37.3 °C) 104 26 Abnormal  111/70 86 94 % -- -- --   05/11/24 0245 -- 104 27 Abnormal  116/71 88 95 % -- -- --   05/11/24 0235 -- 104 -- 117/71 -- -- -- -- --   05/11/24 0228 99.9 °F (37.7 °C) 135 Abnormal  23 Abnormal  121/71 -- -- -- -- --   05/11/24 0000 -- 108 Abnormal  22 119/77 93 96 % -- -- --   05/10/24 2300 98.8 °F (37.1 °C) 104 19 116/71 89 94 % -- -- --   05/10/24 2200 -- 103 22 131/82 101 98 % -- -- --   05/10/24 2100 -- 106 Abnormal  27 Abnormal  125/75 94 95 % -- -- --   05/10/24 2049 -- 111 Abnormal  24 Abnormal  118/68 -- 95 % -- -- --   05/10/24 2034 -- 109 Abnormal  24 Abnormal  128/73 -- 97 % -- -- --   05/10/24 2030 98.2 °F (36.8 °C) -- -- -- -- -- -- -- --   05/10/24 2022 98.5 °F (36.9 °C) 109 Abnormal  20 128/78 -- -- -- -- --   05/10/24 1946 98.2 °F (36.8 °C) 108 Abnormal  22 112/72 88 98 % 28 2 L/min Nasal cannula   05/10/24 1920 98.4 °F (36.9 °C) 106 Abnormal  18 124/79 -- 100 % 28 2 L/min Nasal cannula   05/10/24 1915 98.9 °F (37.2 °C) 113 Abnormal  18 120/75 -- 99 % 28 2 L/min Nasal cannula   05/10/24 1910 99.2 °F (37.3 °C) 119 Abnormal  18 133/75 -- 99 % 28 2 L/min Nasal cannula   05/10/24 1904 98.9 °F (37.2 °C) 112 Abnormal  18 125/76 -- -- -- -- --   05/10/24 1900 -- 109 Abnormal  18 125/76 96 99 % 28 2 L/min Nasal cannula   05/10/24 1845 -- 118 Abnormal  18 122/75 93 99 % 28 2 L/min Nasal cannula   05/10/24 1830 -- 112 Abnormal  18 123/75 94 99 %  28 2 L/min Nasal cannula   05/10/24 1815 -- 113 Abnormal  18 125/79 95 98 % 28 2 L/min Nasal cannula   05/10/24 1806 99.7 °F (37.6 °C) 117 Abnormal  18 124/78 97 91 % -- -- None (Room air)   05/10/24 1800 -- 122 Abnormal  -- 131/84 102 -- -- -- --   05/10/24 1755 98.6 °F (37 °C) 118 Abnormal  18 130/83 -- 94 % -- -- None (Room air)   05/10/24 1748 99.1 °F (37.3 °C) 121 Abnormal  18 137/82 -- -- -- -- --   05/10/24 1745 -- 121 Abnormal  18 137/82 104 -- -- -- --   05/10/24 1730 -- 118 Abnormal  18 133/80 101 93 % -- -- None (Room air)   05/10/24 1725 100.6 °F (38.1 °C) Abnormal  110 Abnormal  18 121/76 -- 99 % -- -- None (Room air)   05/10/24 1645 -- 104 18 115/73 88 99 % -- -- None (Room air)   05/10/24 1630 99.4 °F (37.4 °C) 103 18 117/73 -- 100 % -- -- None (Room air)   05/10/24 1622 99.5 °F (37.5 °C) 107 Abnormal  18 125/69 -- 99 % -- -- None (Room air)   05/10/24 1612 100.3 °F (37.9 °C)  121 Abnormal  18 161/86 -- -- -- -- --   Temp: provider aware at 05/10/24 1612   05/10/24 1515 -- 105 18 110/71 86 99 % -- -- None (Room air)     Pertinent Labs/Diagnostic Test Results:    5/10 ECG- Sinus tachycardia  Possible Left atrial enlargement  Nonspecific ST abnormality  XR chest portable   Final Result by Eugenia Parrish MD ([id number])      No acute cardiopulmonary disease.            Workstation performed: [id number]         CTA abdomen pelvis w wo contrast   Final Result by Evan Bond MD (05/10 1851)         1. Recent postoperative change reflecting  section with stable large infraumbilical abdominal wall rectus sheath hematoma. No definite contrast extravasation to indicate active hemorrhage, though evaluation is slightly limited due to artifact as    well as the presence of previously administered intravenous contrast. Stable amount of high attenuation material in the prevesical space may reflect some hemorrhagic components and/or postoperative change, not appreciably changed from the earlier  study.   2. Additional stable findings as noted.      The study was marked in EPIC for immediate notification.         Workstation performed: [id number]         CT abdomen pelvis with contrast   Final Result by Mallory Burnett MD (05/10 1658)      1) 18.1 x 17.6 x 9.6 infraumbilical rectus sheath hematoma. No extravasation of IV contrast to suggest an active hemorrhage, although evaluation somewhat limited without a delayed phase. If there is high clinical suspicion for an active hemorrhage, this    would be best assessed with CTA abdomen/pelvis (including an arterial and delayed phase).      2) Free fluid with density suggestive of blood products also seen in the prevesical space, most likely related to the aforementioned hematoma. No evidence of other intra-abdominal/pelvic hematoma.      3) Subcutaneous tissue stranding in the bilateral flanks, and in the infraumbilical anterior abdominal and pelvic wall, likely related to ecchymosis with several additional small subcutaneous hematomas measuring up to 2.5 x 1.9 cm.      4) No biliary ductal dilatation. No radiopaque cholelithiasis or choledocholithiasis.      5) Additional findings as above.      I personally discussed this study with BRET PEREZ on 5/10/2024 at 4:44 PM.                     Workstation performed: [id number]               Results from last 7 days   Lab Units 05/11/24  1210 05/11/24  0634 05/11/24  0045 05/10/24  1945 05/10/24  1454   WBC Thousand/uL 11.96* 11.56* 12.55* 8.78 14.12*   HEMOGLOBIN g/dL 7.8* 6.5* 6.3* 7.7* 5.0*   HEMATOCRIT % 23.9* 19.8* 20.2* 24.2* 16.2*   PLATELETS Thousands/uL 376 337 362 364 716*   TOTAL NEUT ABS Thousands/µL  --   --   --   --  11.54*   BANDS PCT %  --  2  --   --   --          Results from last 7 days   Lab Units 05/11/24  0546 05/10/24  1454   SODIUM mmol/L 137 138   POTASSIUM mmol/L 3.5 4.0   CHLORIDE mmol/L 104 106   CO2 mmol/L 23 22   ANION GAP mmol/L 10 10   BUN mg/dL 11 14   CREATININE mg/dL 0.53*  0.45*   EGFR ml/min/1.73sq m 127 134   CALCIUM mg/dL 8.0* 8.1*   CALCIUM, IONIZED mmol/L 1.05*  --    MAGNESIUM mg/dL 1.7*  --    PHOSPHORUS mg/dL 3.0  --      Results from last 7 days   Lab Units 05/11/24  0546 05/10/24  1454   AST U/L 22 25   ALT U/L 13 12   ALK PHOS U/L 89 102   TOTAL PROTEIN g/dL 6.1* 5.6*   ALBUMIN g/dL 2.9* 2.8*   TOTAL BILIRUBIN mg/dL 2.65* 2.47*   BILIRUBIN DIRECT mg/dL  --  0.33*         Results from last 7 days   Lab Units 05/11/24  0546 05/10/24  1454   GLUCOSE RANDOM mg/dL 85 82               Results from last 7 days   Lab Units 05/11/24  1210 05/11/24  0546 05/11/24  0101 05/10/24  1454   PROTIME seconds 20.9* 29.3* 26.9* 37.2*   INR  1.72* 2.66* 2.38* 3.64*   PTT seconds  --   --   --  85*               Results from last 7 days   Lab Units 05/11/24  0838 05/11/24  0722 05/11/24  0547   UNIT PRODUCT CODE  [id number]  [id number]  [id number]  [id number]  [id number]   UNIT NUMBER  [id number]  [id number]  [id number]  [id number]  [id number]   UNITABO  O O A  A  O  O  O   UNITRH  POS POS NEG  NEG  POS  POS  POS   CROSSMATCH  Compatible Compatible Compatible  Compatible  Compatible   UNIT DISPENSE STATUS  Return to Inv Issued Presumed Trans  Presumed Trans  Presumed Trans  Presumed Trans  Presumed Trans   UNIT PRODUCT VOL ml 350 350 280  280  500  350  350                         Results from last 7 days   Lab Units 05/10/24  1933   CLARITY UA  Clear   COLOR UA  Yellow   SPEC GRAV UA  >=1.050*   PH UA  6.0   GLUCOSE UA mg/dl Negative   KETONES UA mg/dl 60 (2+)*   BLOOD UA  Small*   PROTEIN UA mg/dl Trace*   NITRITE UA  Negative   BILIRUBIN UA  Negative   UROBILINOGEN UA (BE) mg/dl 2.0*   LEUKOCYTES UA  Negative   WBC UA /hpf 1-2   RBC UA /hpf 1-2   BACTERIA UA /hpf None Seen   EPITHELIAL CELLS WET PREP /hpf Occasional                 Results from last 7 days   Lab Units 05/10/24  1454   ETHANOL LVL mg/dL  "<10   ACETAMINOPHEN LVL ug/mL <2*   SALICYLATE LVL mg/dL <5             ED Treatment:   Medication Administration from 05/10/2024 1145 to 05/10/2024 1940         Date/Time Order Dose Route Action     05/10/2024 1526 EDT fentaNYL injection 25 mcg 25 mcg Intravenous Given     05/10/2024 1648 EDT sodium chloride 0.9 % bolus 500 mL 0 mL Intravenous Stopped     05/10/2024 1609 EDT sodium chloride 0.9 % bolus 500 mL 500 mL Intravenous New Bag     05/10/2024 1552 EDT iohexol (OMNIPAQUE) 350 MG/ML injection (SINGLE-DOSE) 80 mL 80 mL Intravenous Given     05/10/2024 1724 EDT HYDROmorphone (DILAUDID) injection 2 mg 2 mg Intravenous Given     05/10/2024 1703 EDT iohexol (OMNIPAQUE) 350 MG/ML injection (SINGLE-DOSE) 80 mL 80 mL Intravenous Given     05/10/2024 1901 EDT acetaminophen (Ofirmev) injection 1,000 mg 0 mg Intravenous Stopped     05/10/2024 1843 EDT acetaminophen (Ofirmev) injection 1,000 mg 1,000 mg Intravenous New Bag     05/10/2024 1839 EDT furosemide (LASIX) injection 20 mg 20 mg Intravenous Given          Past Medical History:   Diagnosis Date    Anxiety     Bicuspid aortic valve     s/p mechanical AVR    Coronary artery disease 1993    Moderate aortic insufficiency     s/p mechanical AVR    Motion sickness     Patent ductus arteriosus     s/p repair    PONV (postoperative nausea and vomiting)     S/P ascending aortic aneurysm repair     Stroke (HCC) 2022    no residual effects per pt--initially couldnt speak at first--\"feels back to normal now\"    Varicella     hx as child, and vaccine     Present on Admission:   Rectus sheath hematoma   ABLA (acute blood loss anemia)   Anxiety      Admitting Diagnosis: Acute blood loss anemia [D62]  Post-operative complication [T81.9XXA]  Hematoma of surgical wound following  section [O90.2]  Age/Sex: 28 y.o. female  Admission Orders:  Scheduled Medications:  acetaminophen, 1,000 mg, Intravenous, Q8H  chlorhexidine, 15 mL, Mouth/Throat, Q12H JAMES  escitalopram, " 20 mg, Oral, Daily  polyethylene glycol, 17 g, Oral, Daily    prothrombin complex concentrate (human) (Kcentra) 1,500 Units  Dose: 1,500 Units  Freq: Once Route: IV  Start: 05/11/24 1000 End: 05/11/24 0953   ondansetron (ZOFRAN) injection 4 mg  Dose: 4 mg  Freq: Once Route: IV  x1 5/10 @ 2052  magnesium sulfate IVPB (premix) SOLN 1 g  Dose: 1 g  Freq: Once Route: IV  x1 5/11 @ 0724  furosemide (LASIX) tablet 20 mg  Dose: 20 mg  Freq: Once Route: PO x1 5/11 @ 1004   calcium gluconate 2 g in sodium chloride 0.9% 100 mL (premix)  Dose: 2 g  Freq: Once Route: IV  x1 5/11 @ 1004          Continuous IV Infusions:     PRN Meds:  HYDROmorphone, 0.5 mg, Intravenous, Q2H PRN x1 5/10, x 2 5/11    NPO--->Reg diet   CBC, platelets q6h    Cardiopulm monitoring   SCD   OOB to chair TID          IP CONSULT TO CARDIOLOGY  IP CONSULT TO OB GYN  IP CONSULT TO CASE MANAGEMENT    Network Utilization Review Department  ATTENTION: Please call with any questions or concerns to [phone] and carefully listen to the prompts so that you are directed to the right person. All voicemails are confidential.   For Discharge needs, contact Care Management DC Support Team at [phone] opt. 2  Send all requests for admission clinical reviews, approved or denied determinations and any other requests to dedicated fax number below belonging to the campus where the patient is receiving treatment. List of dedicated fax numbers for the Facilities:  FACILITY NAME UR FAX NUMBER   ADMISSION DENIALS (Administrative/Medical Necessity) [phone]   DISCHARGE SUPPORT TEAM (NETWORK) [phone]   PARENT CHILD HEALTH (Maternity/NICU/Pediatrics) [phone]   [facility] [phone]   [facility] [phone]   [facility] [phone]   [facility] [phone]   [facility] [phone]   [facility]  [phone]   [facility] [phone]   [facility] [phone]   [facility] [phone]   [facility] [phone]   General acute hospital [phone]   [facility] [phone]

## 2024-05-12 LAB
ABO GROUP BLD BPU: NORMAL
ANION GAP SERPL CALCULATED.3IONS-SCNC: 8 MMOL/L (ref 4–13)
BPU ID: NORMAL
BUN SERPL-MCNC: 12 MG/DL (ref 5–25)
CA-I BLD-SCNC: 1.11 MMOL/L (ref 1.12–1.32)
CALCIUM SERPL-MCNC: 8.1 MG/DL (ref 8.4–10.2)
CHLORIDE SERPL-SCNC: 103 MMOL/L (ref 96–108)
CO2 SERPL-SCNC: 23 MMOL/L (ref 21–32)
CREAT SERPL-MCNC: 0.45 MG/DL (ref 0.6–1.3)
CROSSMATCH: NORMAL
ERYTHROCYTE [DISTWIDTH] IN BLOOD BY AUTOMATED COUNT: 15.8 % (ref 11.6–15.1)
GFR SERPL CREATININE-BSD FRML MDRD: 134 ML/MIN/1.73SQ M
GLUCOSE SERPL-MCNC: 86 MG/DL (ref 65–140)
HCT VFR BLD AUTO: 23.5 % (ref 34.8–46.1)
HCT VFR BLD AUTO: 24.3 % (ref 34.8–46.1)
HCT VFR BLD AUTO: 24.8 % (ref 34.8–46.1)
HCT VFR BLD AUTO: 25.2 % (ref 34.8–46.1)
HGB BLD-MCNC: 7.7 G/DL (ref 11.5–15.4)
HGB BLD-MCNC: 8.1 G/DL (ref 11.5–15.4)
HGB BLD-MCNC: 8.2 G/DL (ref 11.5–15.4)
HGB BLD-MCNC: 8.3 G/DL (ref 11.5–15.4)
INR PPP: 2.47 (ref 0.84–1.19)
MAGNESIUM SERPL-MCNC: 1.8 MG/DL (ref 1.9–2.7)
MCH RBC QN AUTO: 29 PG (ref 26.8–34.3)
MCHC RBC AUTO-ENTMCNC: 33.3 G/DL (ref 31.4–37.4)
MCV RBC AUTO: 87 FL (ref 82–98)
PHOSPHATE SERPL-MCNC: 3.3 MG/DL (ref 2.7–4.5)
PLATELET # BLD AUTO: 374 THOUSANDS/UL (ref 149–390)
PMV BLD AUTO: 7.9 FL (ref 8.9–12.7)
POTASSIUM SERPL-SCNC: 3.6 MMOL/L (ref 3.5–5.3)
PROTHROMBIN TIME: 27.7 SECONDS (ref 11.6–14.5)
RBC # BLD AUTO: 2.79 MILLION/UL (ref 3.81–5.12)
SODIUM SERPL-SCNC: 134 MMOL/L (ref 135–147)
UNIT DISPENSE STATUS: NORMAL
UNIT PRODUCT CODE: NORMAL
UNIT PRODUCT VOLUME: 350 ML
UNIT RH: NORMAL
WBC # BLD AUTO: 12.2 THOUSAND/UL (ref 4.31–10.16)

## 2024-05-12 PROCEDURE — 99232 SBSQ HOSP IP/OBS MODERATE 35: CPT | Performed by: INTERNAL MEDICINE

## 2024-05-12 PROCEDURE — 85610 PROTHROMBIN TIME: CPT

## 2024-05-12 PROCEDURE — 82330 ASSAY OF CALCIUM: CPT

## 2024-05-12 PROCEDURE — 99291 CRITICAL CARE FIRST HOUR: CPT | Performed by: NURSE PRACTITIONER

## 2024-05-12 PROCEDURE — 85018 HEMOGLOBIN: CPT

## 2024-05-12 PROCEDURE — 84100 ASSAY OF PHOSPHORUS: CPT

## 2024-05-12 PROCEDURE — 83735 ASSAY OF MAGNESIUM: CPT

## 2024-05-12 PROCEDURE — 85014 HEMATOCRIT: CPT

## 2024-05-12 PROCEDURE — 80048 BASIC METABOLIC PNL TOTAL CA: CPT

## 2024-05-12 PROCEDURE — 99232 SBSQ HOSP IP/OBS MODERATE 35: CPT | Performed by: OBSTETRICS & GYNECOLOGY

## 2024-05-12 PROCEDURE — 85027 COMPLETE CBC AUTOMATED: CPT

## 2024-05-12 PROCEDURE — NC001 PR NO CHARGE

## 2024-05-12 RX ORDER — LANOLIN ALCOHOL/MO/W.PET/CERES
800 CREAM (GRAM) TOPICAL ONCE
Status: COMPLETED | OUTPATIENT
Start: 2024-05-12 | End: 2024-05-12

## 2024-05-12 RX ORDER — ACETAMINOPHEN 325 MG/1
975 TABLET ORAL EVERY 6 HOURS PRN
Status: DISCONTINUED | OUTPATIENT
Start: 2024-05-12 | End: 2024-05-14 | Stop reason: HOSPADM

## 2024-05-12 RX ORDER — HYDROMORPHONE HCL IN WATER/PF 6 MG/30 ML
0.2 PATIENT CONTROLLED ANALGESIA SYRINGE INTRAVENOUS
Status: DISCONTINUED | OUTPATIENT
Start: 2024-05-12 | End: 2024-05-14 | Stop reason: HOSPADM

## 2024-05-12 RX ORDER — OXYCODONE HYDROCHLORIDE 5 MG/1
5 TABLET ORAL EVERY 4 HOURS PRN
Status: DISCONTINUED | OUTPATIENT
Start: 2024-05-12 | End: 2024-05-14 | Stop reason: HOSPADM

## 2024-05-12 RX ADMIN — POLYETHYLENE GLYCOL 3350 17 G: 17 POWDER, FOR SOLUTION ORAL at 09:01

## 2024-05-12 RX ADMIN — OXYCODONE HYDROCHLORIDE 5 MG: 5 TABLET ORAL at 18:26

## 2024-05-12 RX ADMIN — CHLORHEXIDINE GLUCONATE 15 ML: 1.2 RINSE ORAL at 20:51

## 2024-05-12 RX ADMIN — ESCITALOPRAM OXALATE 20 MG: 20 TABLET ORAL at 09:01

## 2024-05-12 RX ADMIN — CHLORHEXIDINE GLUCONATE 15 ML: 1.2 RINSE ORAL at 09:01

## 2024-05-12 RX ADMIN — ACETAMINOPHEN 1000 MG: 10 INJECTION INTRAVENOUS at 10:00

## 2024-05-12 RX ADMIN — ACETAMINOPHEN 1000 MG: 10 INJECTION INTRAVENOUS at 02:21

## 2024-05-12 RX ADMIN — Medication 800 MG: at 09:01

## 2024-05-12 NOTE — ASSESSMENT & PLAN NOTE
POD#10 from , presented to ED with worsening incision pain and bruising, found to have rectus sheath hematoma  Complicated by daily warfarin use for mechanical valve  Hgb 5.0 on admission; was 9.6 post op    BP 110s/80s, HR 110s, afebrile, [id number]% on RA  Total blood products  Whole blood 1 unit, FFP 2 units, PRBC 3 units   KCentra 2/2 additional transfusions required    Plan:  Transfuse 1 unit whole blood, 1 unit pRBCs, 2 units FFP  Monitor for transfusion reaction  Avoiding complete/rapid reversal in the setting of mechanical valve   CTA abd/pelvis: abdominal rectus sheath hematoma  H/h q6h  Hold home warfarin -- if hgb remains stable will likely need heparin gtt while restarting warfarin

## 2024-05-12 NOTE — ASSESSMENT & PLAN NOTE
S/p mechanical aortic valve placement with repair of ascending aortic aneurysm in 2015   On Warfarin, 5mg daily with goal INR 2.5-3.5  Most recent echo Jan 2024 with LVEF 65%  5/11 received KCentra 2/2 required additional blood products    Plan:  Avoid full AC reversal in the setting of previous CVA when AC was held in the past  Daily INR  Cardiology consulted, appreciate recommendations  Will likely need heparin gtt bridge while restarting warfarin

## 2024-05-12 NOTE — PROGRESS NOTES
Progress Note - Cardiology   Suze Leigh 28 y.o. female MRN: [id number]  Unit/Bed#: ICU 15 Encounter: [id number]    Assessment:  Principal Problem:    Rectus sheath hematoma  Active Problems:    History of mechanical aortic valve replacement    Anxiety    Status post primary low transverse  section    ABLA (acute blood loss anemia)    Hematoma post . Anticoagulation with warfarin due to mechanical aortic valve.  History of CVA in  when she was off of anticoagulation for surgery (albeit for a more prolonged period of time then)  Supratherapeutic INR 3.6 in the setting of being on warfarin on admission.  Partial reversal of anticoagulation, resuscitation of anemia with blood products.    Plan:    Discussed with ICU team. Hoping to hold off on any further reversal agents. INR is therapeutic.    Received a dose of Lasix yesterday for some fluid retention. Can hold off on today. As needed diuretics.    Known to have increased gradient across the valve. Last echo was in January. Stable. Murmur on auscultation.    Follows with Dr. Shell in the office.    Subjective/Objective     Subjective:   INR currently remains therapeutic at 2.47. Her hemoglobin has increased to 8. Reports some improvement in how she feels. She did have good response to Lasix yesterday.    Objective:    Vitals: /68   Pulse 74   Temp 97.7 °F (36.5 °C)   Resp 15   Wt 86.1 kg (189 lb 12.7 oz)   LMP 2023 (Exact Date) Comment: negative pregnancy test  SpO2 94%   Breastfeeding Yes   BMI 33.62 kg/m²   Vitals:    24 0303 24 0600   Weight: 86.1 kg (189 lb 13.1 oz) 86.1 kg (189 lb 12.7 oz)     Orthostatic Blood Pressures      Flowsheet Row Most Recent Value   Blood Pressure 118/68 filed at 2024 0400   Patient Position - Orthostatic VS Lying filed at 2024 1242              Intake/Output Summary (Last 24 hours) at 2024 0926  Last data filed at 2024 0401  Gross per 24 hour   Intake 1530  ml   Output --   Net 1530 ml     Physical Exam:  GEN: Suze Leigh appears well, alert and oriented x 3, pleasant and cooperative   HEENT: pupils equal, round, and reactive to light; extraocular muscles intact  NECK: supple, no carotid bruits   HEART: regular. Mechanical click. + 3-4 JOIE  LUNGS: improved rales, R>L  ABDOMEN: normal bowel sounds, soft, no tenderness, no distention  EXTREMITIES: mild LE edema  NEURO: no focal findings   SKIN: normal without suspicious lesions on exposed skin  Medications:    Current Facility-Administered Medications:     acetaminophen (Ofirmev) injection 1,000 mg, 1,000 mg, Intravenous, Q8H, Debbi Darling MD, Last Rate: 400 mL/hr at 05/12/24 0221, 1,000 mg at 05/12/24 0221    chlorhexidine (PERIDEX) 0.12 % oral rinse 15 mL, 15 mL, Mouth/Throat, Q12H JAMES, Debbi Darling MD, 15 mL at 05/12/24 0901    escitalopram (LEXAPRO) tablet 20 mg, 20 mg, Oral, Daily, Debbi Darling MD, 20 mg at 05/12/24 0901    HYDROmorphone (DILAUDID) injection 0.5 mg, 0.5 mg, Intravenous, Q2H PRN, Debbi Darling MD, 0.5 mg at 05/11/24 2036    polyethylene glycol (MIRALAX) packet 17 g, 17 g, Oral, Daily, Leilani Yang, WANDA, 17 g at 05/12/24 0901    Lab Results:      Results from last 7 days   Lab Units 05/12/24  0612 05/11/24  2356 05/11/24  1802 05/11/24  1210   WBC Thousand/uL 12.20*  --  13.64* 11.96*   HEMOGLOBIN g/dL 8.1* 7.7* 7.7* 7.8*   HEMATOCRIT % 24.3* 23.5* 23.5* 23.9*   PLATELETS Thousands/uL 374  --  367 376         Results from last 7 days   Lab Units 05/12/24  0612 05/11/24  0546 05/10/24  1454   SODIUM mmol/L 134* 137 138   POTASSIUM mmol/L 3.6 3.5 4.0   CHLORIDE mmol/L 103 104 106   CO2 mmol/L 23 23 22   BUN mg/dL 12 11 14   CREATININE mg/dL 0.45* 0.53* 0.45*   CALCIUM mg/dL 8.1* 8.0* 8.1*   ALK PHOS U/L  --  89 102   ALT U/L  --  13 12   AST U/L  --  22 25     Results from last 7 days   Lab Units 05/12/24  0612 05/11/24  1210 05/11/24  0546 05/11/24  0101  05/10/24  1454   INR  2.47* 1.72* 2.66*   < > 3.64*   PTT seconds  --   --   --   --  85*    < > = values in this interval not displayed.     Results from last 7 days   Lab Units 05/12/24  0612 05/11/24  0546   MAGNESIUM mg/dL 1.8* 1.7*     Telemetry: Personally reviewed. Sinus rhythm/tachycardia. No significant arrhythmias.    Echo:  Left Ventricle: Left ventricular cavity size is normal. Wall thickness is mildly increased. The left ventricular ejection fraction is 65%. Systolic function is normal. Wall motion is normal. Diastolic function is normal.    IVS: There is abnormal septal motion consistent with post-operative status.    Aortic Valve: The aortic valve was not well visualized. There is a tilting disc mechanical valve. The prosthetic valve appears to be functioning adequately but there are elevated gradients.  The valve leaflets appear to open on the parasternal long axis, but not well-visualized in other images. There is no evidence of paravalvular regurgitation. There is mild transvalvular regurgitation. 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  Therefore this study is inadequate to determine if this is actually valve leaflet dysfunction/MS formation versus more elevated gradients simply due to increased flow. The aortic valve velocity is increased in the setting of stenosis and increased flow.    Mitral Valve: There is trace regurgitation.    Tricuspid Valve: There is mild regurgitation.    Aorta: There is a prosthetic graft present in the ascending aorta.    If clinically indicated suggest JOSE weight aortic valve replacement better.

## 2024-05-12 NOTE — PROGRESS NOTES
Critical Care Interval Transfer Note:    Please refer to progress note from earlier today for full details.     HPI: Suze Leigh is a 28 y.o. with PMH of aortic valve replacement on warfarin who is POD #10 (24) from  who presents with worsening and persistent lower abdominal incisional pain and bruising. Pain uncontrolled on tylenol, ibuprofen and oxy at home. She was evaluated at outpatient OB office and recommended to come to ED for further evaluation, where imaging demonstrated rectus sheath hematoma and Hgb 5. Endorses sob with activity, fatigue, lightheadedness when she stands and pallor. Confirms a level 1 code status. Denies any known allergies to medications.    She received 1 unit whole blood, 1 PRBC's and 2 FFP on arrival repeat Hgb remains low, additional 1 units PRBC's. Despite transfusion Hgb remained in the 6's, INR remained 2.66. She was given Kcentra 1500 units with improvement in INR to 1.7. 3rd unit PRBCs given with improvement in Hgb to 8. Further H/H check remain stable   Total Products:  1 whole blood  3 PRBC's  2 FFP    Changes made today:   De-escalated H/H to q12h    Follow up:   H/H q12h (next draw 1800)  INR in AM  Restart Warfarin when appropriate      Consults: IP CONSULT TO CARDIOLOGY  IP CONSULT TO OB GYN  IP CONSULT TO CASE MANAGEMENT     Discharge Plan: Anticipate discharge in 48-72 hrs to home.    Patient seen and evaluated by Critical Care today and deemed to be appropriate for transfer to Med Surg. Spoke to Dr. Marvin Renteria from General Surgery to accept transfer. Critical care can be contacted via [facility] with any questions or concerns.

## 2024-05-12 NOTE — PROGRESS NOTES
[facility]  Progress Note  Name: Suze Leigh I  MRN: [id number]  Unit/Bed#: ICU 15 I Date of Admission: 5/10/2024   Date of Service: 2024 I Hospital Day: 2    Assessment/Plan   * Rectus sheath hematoma  Assessment & Plan  POD#10 s/p primary low-transverse  section on    Presented today with worsening lower abdominal incision pain  Found to have rectus sheath hematoma  Hx of mechanical valve replacement on warfarin  5/10 CT abd/pelvis: 18.1 x 17.6 x 9.6 infraumbilical rectus sheath hematoma. No extravasation of IV contrast to suggest an active hemorrhage. Subcutaneous tissue stranding in the bilateral flanks, and in the infraumbilical anterior abdominal and pelvic wall, likely related to ecchymosis with several additional small subcutaneous hematomas measuring up to 2.5 x 1.9 cm.   OBGYN aware, consulted    Plan:  Trend h/h  Transfuse as needed   Trend INR -- avoid full reversal of AC given previous hx of CVA when off AC     ABLA (acute blood loss anemia)  Assessment & Plan  POD#10 from , presented to ED with worsening incision pain and bruising, found to have rectus sheath hematoma  Complicated by daily warfarin use for mechanical valve  Hgb 5.0 on admission; was 9.6 post op    BP 110s/80s, HR 110s, afebrile, [id number]% on RA  Total blood products  Whole blood 1 unit, FFP 2 units, PRBC 3 units  / KCentra 2/2 additional transfusions required    Plan:  Transfuse 1 unit whole blood, 1 unit pRBCs, 2 units FFP  Monitor for transfusion reaction  Avoiding complete/rapid reversal in the setting of mechanical valve   CTA abd/pelvis: abdominal rectus sheath hematoma  H/h q6h  Hold home warfarin -- if hgb remains stable will likely need heparin gtt while restarting warfarin    Status post primary low transverse  section  Assessment & Plan  28 y.o.  now POD#9 s/p primary low-transverse  section on   Presented to outpatient OB appointment  reporting persistent lower abdominal pain incisional pain, was encouraged to present to the ED for further evaluation    Plan:  OBGYN consulted, appreciate recommendations.    Anxiety  Assessment & Plan  Takes lexapro 20mg daily at home  Continue home med    History of mechanical aortic valve replacement  Assessment & Plan  S/p mechanical aortic valve placement with repair of ascending aortic aneurysm in 2015   On Warfarin, 5mg daily with goal INR 2.5-3.5  Most recent echo Jan 2024 with LVEF 65%  5/11 received KCentra 2/2 required additional blood products    Plan:  Avoid full AC reversal in the setting of previous CVA when AC was held in the past  Daily INR  Cardiology consulted, appreciate recommendations  Will likely need heparin gtt bridge while restarting warfarin             Disposition: Stepdown Level 1    ICU Core Measures     A: Assess, Prevent, and Manage Pain Has pain been assessed? Yes  Need for changes to pain regimen? No   B: Both SAT/SAT  N/A   C: Choice of Sedation RASS Goal: N/A patient not on sedation  Need for changes to sedation or analgesia regimen? No   D: Delirium CAM-ICU: Negative   E: Early Mobility  Plan for early mobility? Yes   F: Family Engagement Plan for family engagement today? Yes         Prophylaxis:  VTE VTE covered by:    None       Stress Ulcer  not ordered         Significant 24hr Events     24hr events:   Yesterday received KCentra 2/2 requiring additional PRBC transfusions.  Overnight Hgb stable.  No acute events overnight.      Subjective   Review of Systems   Constitutional:  Negative for chills, fatigue and fever.   HENT: Negative.     Eyes: Negative.    Respiratory:  Negative for cough, chest tightness, shortness of breath and wheezing.    Cardiovascular:  Negative for chest pain, palpitations and leg swelling.   Gastrointestinal:  Positive for abdominal distention (lower abd) and abdominal pain (lower abd.). Negative for diarrhea, nausea and rectal pain.   Endocrine:  Negative.    Genitourinary:  Negative for decreased urine volume, difficulty urinating, frequency and hematuria.   Musculoskeletal:  Negative for arthralgias and myalgias.   Skin:  Positive for wound (low transverse Csection). Negative for color change, pallor and rash.   Allergic/Immunologic: Negative.    Neurological: Negative.    Hematological:  Bruises/bleeds easily.   Psychiatric/Behavioral: Negative.        Objective                            Vitals I/O      Most Recent Min/Max in 24hrs   Temp 98.3 °F (36.8 °C) Temp  Min: 98.2 °F (36.8 °C)  Max: 100.5 °F (38.1 °C)   Pulse 75 Pulse  Min: 75  Max: 135   Resp 21 Resp  Min: 16  Max: 30   /75 BP  Min: 108/67  Max: 136/81   O2 Sat 95 % SpO2  Min: 92 %  Max: 96 %      Intake/Output Summary (Last 24 hours) at 5/12/2024 0200  Last data filed at 5/11/2024 1731  Gross per 24 hour   Intake 1620 ml   Output --   Net 1620 ml       Diet Regular; Regular House    Invasive Monitoring           Physical Exam   Physical Exam  Vitals and nursing note reviewed.   Eyes:      General: Vision grossly intact. NeglectNo scleral icterus.        Right eye: No discharge.         Left eye: No discharge.      Extraocular Movements: Extraocular movements intact.      Conjunctiva/sclera: Conjunctivae normal.   Skin:     General: Skin is warm, dry and not mottled extremities.      Capillary Refill: Capillary refill takes less than 2 seconds.      Coloration: Skin is pale. Skin is not jaundiced.      Findings: No lesion, rash or wound.          HENT:      Head: Normocephalic and atraumatic.      Right Ear: Hearing normal. No drainage.      Left Ear: Hearing normal. No drainage.      Nose: No nasal deformity, nasal tenderness, congestion, rhinorrhea, epistaxis or nasogastric tube.      Mouth/Throat:      Mouth: Mucous membranes are moist. No injury or angioedema.      Dentition: Normal dentition.      Pharynx: No oropharyngeal exudate.   Neck:      Vascular: No JVD.   no midline  tenderness Cardiovascular:      Rate and Rhythm: Normal rate and regular rhythm.      Pulses: No decreased pulses.      Heart sounds: No murmur heard.     No friction rub. No gallop.   Musculoskeletal:         General: No swelling, tenderness, deformity or signs of injury.      Right lower leg: No edema.      Left lower leg: No edema.   Abdominal: General: Bowel sounds are normal. There is no distension.      Palpations: Abdomen is soft.      Tenderness: There is abdominal tenderness (appropriately tender across lower abd).   Constitutional:       General: She is not in acute distress.     Appearance: She is well-developed and well-nourished. She is not ill-appearing or toxic-appearing.      Interventions: She is not sedated, not intubated and not restrained.  Pulmonary:      Effort: Pulmonary effort is normal. No accessory muscle usage, respiratory distress or accessory muscle usage. She is not intubated.      Breath sounds: Normal breath sounds. No wheezing, rhonchi or rales.   Psychiatric:         Mood and Affect:  mood and affect abnormal.        Speech: Speech is not no receptive aphasia or no expressive aphasia.   Neurological:      General: No focal deficit present.      Mental Status: She is alert and oriented to person, place and time. Mental status is at baseline. She is CAM ICU negative.      Cranial Nerves: No dysarthria or facial asymmetry.      Sensory: Sensation is intact.      Motor: gross motor function is at baseline for patient. No pronator drift or motor deficit.            Diagnostic Studies      EKG: sinus rhythm   Imagin/10 CT abdominal rectus sheath hematoma   I have personally reviewed pertinent reports.   and I have personally reviewed pertinent films in PACS     Medications:  Scheduled PRN   acetaminophen, 1,000 mg, Q8H  chlorhexidine, 15 mL, Q12H JAMES  escitalopram, 20 mg, Daily  polyethylene glycol, 17 g, Daily      HYDROmorphone, 0.5 mg, Q2H PRN       Continuous           Labs:    CBC    Recent Labs     05/11/24  0634 05/11/24  1210 05/11/24  1802 05/11/24  2356   WBC 11.56* 11.96* 13.64*  --    HGB 6.5* 7.8* 7.7* 7.7*   HCT 19.8* 23.9* 23.5* 23.5*    376 367  --    BANDSPCT 2  --   --   --      BMP    Recent Labs     05/10/24  1454 05/11/24  0546   SODIUM 138 137   K 4.0 3.5    104   CO2 22 23   AGAP 10 10   BUN 14 11   CREATININE 0.45* 0.53*   CALCIUM 8.1* 8.0*       Coags    Recent Labs     05/10/24  1454 05/11/24  0101 05/11/24  0546 05/11/24  1210   INR 3.64*   < > 2.66* 1.72*   PTT 85*  --   --   --     < > = values in this interval not displayed.        Additional Electrolytes  Recent Labs     05/11/24  0546   MG 1.7*   PHOS 3.0   CAIONIZED 1.05*          Blood Gas    No recent results  No recent results LFTs  Recent Labs     05/10/24  1454 05/11/24  0546   ALT 12 13   AST 25 22   ALKPHOS 102 89   ALB 2.8* 2.9*   TBILI 2.47* 2.65*       Infectious  No recent results  Glucose  Recent Labs     05/10/24  1454 05/11/24  0546   GLUC 82 85               WANDA Lozada

## 2024-05-12 NOTE — PROGRESS NOTES
"OB/GYN Progress note   Suze Leigh 28 y.o. female MRN: [id number]  Unit/Bed#: ICU 15 Encounter: [id number]      Assessment: 28 y.o. POD#10 from [facility] readmitted with rectus sheath hematoma and coagulopathy with acute blood loss anemia.       Plan:    * Rectus sheath hematoma  Assessment & Plan  CTAP 5/10/24 notable for \"18.1 x 17.6 x 9.6 infraumbilical rectus sheath hematoma. No extravasation of IV contrast to suggest an active hemorrhage, although evaluation somewhat limited without a delayed phase\"  Abdominal binder ordered  - See plan under ABLA  - Consider IR intervention    ABLA (acute blood loss anemia)  Assessment & Plan  Hgb 5 on admission, 2/2 large rectus sheath hematoma complicated by coagulopathy/chronic coumadin use  Total products during admission: Whole blood 1 unit, FFP 2 units, PRBC 3 units     Lab Results   Component Value Date    HGB 8.1 (L) 2024     Coagulopathy improving, s/p KCentra  Continue to transfuse as needed  ICU primary, appreciate care    Status post primary low transverse  section  Assessment & Plan  Post op day #10 from [facility] for maternal request on 24, in setting of mechanical heart valve  Managed on warfarin since discharge.  Routine postoperative care  Abdominal binder  Breast pump - okay for opioids for pain management, no indications for \"pump and dump\"  OBGYN will continue to follow, appreciate ICU care      History of mechanical aortic valve replacement  Assessment & Plan  Underwent mechanical aortic valve placement with repair of ascending aortic aneurysm in  in the setting of bicuspid aortic valve with aortic stenosis and expanding ascending aortic aneurysm.  Current regimen at time of discharge: Coumadin 2.5 mg Tues/Thurs/Sat, 3.75 mg all other days   Per primary team, avoid full AC reversal in the setting of previous CVA when AC was held in the past. Did receive K-Centra yesterday as hemoglobin was downtrending after transfusion  Daily " INR  Cardiology consulted, appreciate recommendations for anticoagulation dosage  Coumadin currently held, plan for heparin gtt bridge to restart coumadin if stable per ICU            Subjective:    Patient reports feeling improved. Pain currently controlled. +voiding/flatus/BM. She is ambulating. Tolerating PO. Denies n/v, fevers, chills, chest pain, SOB, leg pain. Pumping, notes supply recovering. Excited for baby to visit today.       Objective:  /68   Pulse 74   Temp 99.3 °F (37.4 °C) (Oral)   Resp 15   Wt 86.1 kg (189 lb 13.1 oz)   LMP 08/11/2023 (Exact Date) Comment: negative pregnancy test  SpO2 94%   Breastfeeding Yes   BMI 33.62 kg/m²     Physical Exam  Constitutional:       General: She is not in acute distress.     Appearance: Normal appearance. She is well-developed. She is not ill-appearing, toxic-appearing or diaphoretic.   HENT:      Head: Normocephalic and atraumatic.   Cardiovascular:      Rate and Rhythm: Normal rate.   Pulmonary:      Effort: Pulmonary effort is normal. No respiratory distress.   Abdominal:      General: There is no distension.      Tenderness: There is abdominal tenderness. There is no guarding.      Comments: Palpable firm hematoma superior to pfannenstiel incision. Abdominal exam overall nontender. Pfannenstiel incision c/d/I, no erythema or drainage. Improving ecchymosis over mons.   Skin:     General: Skin is warm and dry.      Comments: Overall improvement in pallor and jaundice   Neurological:      General: No focal deficit present.      Mental Status: She is alert and oriented to person, place, and time.   Psychiatric:         Mood and Affect: Mood normal.         Behavior: Behavior normal.           I/O last 3 completed shifts:  In: 3645 [P.O.:720; Blood:2125; IV Piggyback:800]  Out: -   I/O this shift:  In: 260 [P.O.:240; I.V.:20]  Out: -     Lab Results   Component Value Date    WBC 12.20 (H) 05/12/2024    HGB 8.1 (L) 05/12/2024    HCT 24.3 (L) 05/12/2024     MCV 87 05/12/2024     05/12/2024       Lab Results   Component Value Date    GLUCOSE 80 04/02/2024    CALCIUM 8.0 (L) 05/11/2024     (L) 06/03/2015    K 3.5 05/11/2024    CO2 23 05/11/2024     05/11/2024    BUN 11 05/11/2024    CREATININE 0.53 (L) 05/11/2024           Steph Traore MD  OB/GYN PGY-4  5/12/2024  6:46 AM

## 2024-05-12 NOTE — PLAN OF CARE
Problem: PAIN - ADULT  Goal: Verbalizes/displays adequate comfort level or baseline comfort level  Description: Interventions:  - Encourage patient to monitor pain and request assistance  - Assess pain using appropriate pain scale  - Administer analgesics based on type and severity of pain and evaluate response  - Implement non-pharmacological measures as appropriate and evaluate response  - Consider cultural and social influences on pain and pain management  - Notify physician/advanced practitioner if interventions unsuccessful or patient reports new pain  Outcome: Progressing     Problem: INFECTION - ADULT  Goal: Absence or prevention of progression during hospitalization  Description: INTERVENTIONS:  - Assess and monitor for signs and symptoms of infection  - Monitor lab/diagnostic results  - Monitor all insertion sites, i.e. indwelling lines, tubes, and drains  - Monitor endotracheal if appropriate and nasal secretions for changes in amount and color  - [place] appropriate cooling/warming therapies per order  - Administer medications as ordered  - Instruct and encourage patient and family to use good hand hygiene technique  - Identify and instruct in appropriate isolation precautions for identified infection/condition  Outcome: Progressing  Goal: Absence of fever/infection during neutropenic period  Description: INTERVENTIONS:  - Monitor WBC    Outcome: Progressing     Problem: SAFETY ADULT  Goal: Patient will remain free of falls  Description: INTERVENTIONS:  - Educate patient/family on patient safety including physical limitations  - Instruct patient to call for assistance with activity   - Consult OT/PT to assist with strengthening/mobility   - Keep Call bell within reach  - Keep bed low and locked with side rails adjusted as appropriate  - Keep care items and personal belongings within reach  - Initiate and maintain comfort rounds  - Make Fall Risk Sign visible to staff  - Offer Toileting every  Hours,  in advance of need  - Initiate/Maintain alarm  - Obtain necessary fall risk management equipment:   - Apply yellow socks and bracelet for high fall risk patients  - Consider moving patient to room near nurses station  Outcome: Progressing  Goal: Maintain or return to baseline ADL function  Description: INTERVENTIONS:  -  Assess patient's ability to carry out ADLs; assess patient's baseline for ADL function and identify physical deficits which impact ability to perform ADLs (bathing, care of mouth/teeth, toileting, grooming, dressing, etc.)  - Assess/evaluate cause of self-care deficits   - Assess range of motion  - Assess patient's mobility; develop plan if impaired  - Assess patient's need for assistive devices and provide as appropriate  - Encourage maximum independence but intervene and supervise when necessary  - Involve family in performance of ADLs  - Assess for home care needs following discharge   - Consider OT consult to assist with ADL evaluation and planning for discharge  - Provide patient education as appropriate  Outcome: Progressing  Goal: Maintains/Returns to pre admission functional level  Description: INTERVENTIONS:  - Perform AM-PAC 6 Click Basic Mobility/ Daily Activity assessment daily.  - Set and communicate daily mobility goal to care team and patient/family/caregiver.   - Collaborate with rehabilitation services on mobility goals if consulted  - Perform Range of Motion  times a day.  - Reposition patient every  hours.  - Dangle patient  times a day  - Stand patient  times a day  - Ambulate patient  times a day  - Out of bed to chair  times a day   - Out of bed for meals  times a day  - Out of bed for toileting  - Record patient progress and toleration of activity level   Outcome: Progressing     Problem: DISCHARGE PLANNING  Goal: Discharge to home or other facility with appropriate resources  Description: INTERVENTIONS:  - Identify barriers to discharge w/patient and caregiver  - Arrange for  needed discharge resources and transportation as appropriate  - Identify discharge learning needs (meds, wound care, etc.)  - Arrange for interpretive services to assist at discharge as needed  - Refer to Case Management Department for coordinating discharge planning if the patient needs post-hospital services based on physician/advanced practitioner order or complex needs related to functional status, cognitive ability, or social support system  Outcome: Progressing     Problem: Knowledge Deficit  Goal: Patient/family/caregiver demonstrates understanding of disease process, treatment plan, medications, and discharge instructions  Description: Complete learning assessment and assess knowledge base.  Interventions:  - Provide teaching at level of understanding  - Provide teaching via preferred learning methods  Outcome: Progressing     Problem: Nutrition/Hydration-ADULT  Goal: Nutrient/Hydration intake appropriate for improving, restoring or maintaining nutritional needs  Description: Monitor and assess patient's nutrition/hydration status for malnutrition. Collaborate with interdisciplinary team and initiate plan and interventions as ordered.  Monitor patient's weight and dietary intake as ordered or per policy. Utilize nutrition screening tool and intervene as necessary. Determine patient's food preferences and provide high-protein, high-caloric foods as appropriate.     INTERVENTIONS:  - Monitor oral intake, urinary output, labs, and treatment plans  - Assess nutrition and hydration status and recommend course of action  - Evaluate amount of meals eaten  - Assist patient with eating if necessary   - Allow adequate time for meals  - Recommend/ encourage appropriate diets, oral nutritional supplements, and vitamin/mineral supplements  - Order, calculate, and assess calorie counts as needed  - Recommend, monitor, and adjust tube feedings and TPN/PPN based on assessed needs  - Assess need for intravenous fluids  -  Provide specific nutrition/hydration education as appropriate  - Include patient/family/caregiver in decisions related to nutrition  Outcome: Progressing

## 2024-05-12 NOTE — UTILIZATION REVIEW
NOTIFICATION OF INPATIENT ADMISSION   AUTHORIZATION REQUEST   SERVICING FACILITY:   [place] [address]  Tax ID: [id number]  NPI: [id number]   ATTENDING PROVIDER:  Attending Name and NPI#: Tip Lomeli Do [[id number]]  Address: [address] [place] [address]  Phone: [phone]     ADMISSION INFORMATION:  Place of Service: Inpatient [facility]  Place of Service Code: 21  Inpatient Admission Date/Time: 5/10/24  4:28 PM  Discharge Date/Time: No discharge date for patient encounter.  Admitting Diagnosis Code/Description:  Acute blood loss anemia [D62]  Post-operative complication [T81.9XXA]  Hematoma of surgical wound following  section [O90.2]     UTILIZATION REVIEW CONTACT:  Lalita Aguayo Utilization   Network Utilization Review Department  Phone: [phone]  Fax: [phone]  Email: [email or web address]  Contact for approvals/pending authorizations, clinical reviews, and discharge.     PHYSICIAN ADVISORY SERVICES:  Medical Necessity Denial & Hpdf-bs-Mieg Review  Phone: [phone]  Fax: [phone]  Email: [email or web address]     DISCHARGE SUPPORT TEAM:  For Patients Discharge Needs & Updates  Phone: [phone] opt. 2 Fax: [phone]  Email: [email or web address]

## 2024-05-12 NOTE — ASSESSMENT & PLAN NOTE
28 y.o.  now POD#9 s/p primary low-transverse  section on   Presented to outpatient OB appointment reporting persistent lower abdominal pain incisional pain, was encouraged to present to the ED for further evaluation    Plan:  OBGYN consulted, appreciate recommendations.

## 2024-05-12 NOTE — OCCUPATIONAL THERAPY NOTE
Occupational Therapy Screen Note        Patient Name: Suze Leigh  Today's Date: 5/12/2024 05/12/24 0751   OT Last Visit   OT Visit Date 05/12/24   Note Type   Note type Screen   Additional Comments OT orders received.  Chart review performed. Based on conversation with PT Shannon, pt appears to be performing at functional baseline.  Pt has been (I) in room and getting to/from bathroom without assistance. OT performed screen and discussed results with patient.  Pt does not demonstrate need for skilled OT at this time.  Pt will not be seen further by OT services. DC OT orders. If pt's functional status declines please re-consult.     Dorita Flores MS, OTR/L

## 2024-05-12 NOTE — ASSESSMENT & PLAN NOTE
POD#10 s/p primary low-transverse  section on    Presented today with worsening lower abdominal incision pain  Found to have rectus sheath hematoma  Hx of mechanical valve replacement on warfarin  5/10 CT abd/pelvis: 18.1 x 17.6 x 9.6 infraumbilical rectus sheath hematoma. No extravasation of IV contrast to suggest an active hemorrhage. Subcutaneous tissue stranding in the bilateral flanks, and in the infraumbilical anterior abdominal and pelvic wall, likely related to ecchymosis with several additional small subcutaneous hematomas measuring up to 2.5 x 1.9 cm.   OBGYN aware, consulted    Plan:  Trend h/h  Transfuse as needed   Trend INR -- avoid full reversal of AC given previous hx of CVA when off AC

## 2024-05-13 PROBLEM — R50.9 FEVER: Status: ACTIVE | Noted: 2024-05-13

## 2024-05-13 LAB
ANION GAP SERPL CALCULATED.3IONS-SCNC: 9 MMOL/L (ref 4–13)
BUN SERPL-MCNC: 11 MG/DL (ref 5–25)
CA-I BLD-SCNC: 1.11 MMOL/L (ref 1.12–1.32)
CALCIUM SERPL-MCNC: 8.2 MG/DL (ref 8.4–10.2)
CHLORIDE SERPL-SCNC: 105 MMOL/L (ref 96–108)
CO2 SERPL-SCNC: 23 MMOL/L (ref 21–32)
CREAT SERPL-MCNC: 0.45 MG/DL (ref 0.6–1.3)
ERYTHROCYTE [DISTWIDTH] IN BLOOD BY AUTOMATED COUNT: 15.7 % (ref 11.6–15.1)
GFR SERPL CREATININE-BSD FRML MDRD: 134 ML/MIN/1.73SQ M
GLUCOSE SERPL-MCNC: 82 MG/DL (ref 65–140)
HCT VFR BLD AUTO: 24.3 % (ref 34.8–46.1)
HCT VFR BLD AUTO: 25.2 % (ref 34.8–46.1)
HGB BLD-MCNC: 7.9 G/DL (ref 11.5–15.4)
HGB BLD-MCNC: 8.1 G/DL (ref 11.5–15.4)
INR PPP: 1.61 (ref 0.84–1.19)
MAGNESIUM SERPL-MCNC: 1.7 MG/DL (ref 1.9–2.7)
MCH RBC QN AUTO: 28.7 PG (ref 26.8–34.3)
MCHC RBC AUTO-ENTMCNC: 32.5 G/DL (ref 31.4–37.4)
MCV RBC AUTO: 88 FL (ref 82–98)
PLATELET # BLD AUTO: 447 THOUSANDS/UL (ref 149–390)
PMV BLD AUTO: 7.9 FL (ref 8.9–12.7)
POTASSIUM SERPL-SCNC: 3.6 MMOL/L (ref 3.5–5.3)
PROTHROMBIN TIME: 19.9 SECONDS (ref 11.6–14.5)
RBC # BLD AUTO: 2.75 MILLION/UL (ref 3.81–5.12)
SODIUM SERPL-SCNC: 137 MMOL/L (ref 135–147)
WBC # BLD AUTO: 11.96 THOUSAND/UL (ref 4.31–10.16)

## 2024-05-13 PROCEDURE — 83735 ASSAY OF MAGNESIUM: CPT | Performed by: SURGERY

## 2024-05-13 PROCEDURE — 85027 COMPLETE CBC AUTOMATED: CPT | Performed by: SURGERY

## 2024-05-13 PROCEDURE — 99232 SBSQ HOSP IP/OBS MODERATE 35: CPT | Performed by: INTERNAL MEDICINE

## 2024-05-13 PROCEDURE — 80048 BASIC METABOLIC PNL TOTAL CA: CPT | Performed by: SURGERY

## 2024-05-13 PROCEDURE — 85018 HEMOGLOBIN: CPT | Performed by: PHYSICIAN ASSISTANT

## 2024-05-13 PROCEDURE — 85014 HEMATOCRIT: CPT | Performed by: PHYSICIAN ASSISTANT

## 2024-05-13 PROCEDURE — 99232 SBSQ HOSP IP/OBS MODERATE 35: CPT | Performed by: SURGERY

## 2024-05-13 PROCEDURE — 82330 ASSAY OF CALCIUM: CPT | Performed by: SURGERY

## 2024-05-13 PROCEDURE — 85610 PROTHROMBIN TIME: CPT | Performed by: SURGERY

## 2024-05-13 RX ORDER — FERROUS SULFATE 325(65) MG
325 TABLET ORAL
Status: DISCONTINUED | OUTPATIENT
Start: 2024-05-14 | End: 2024-05-14 | Stop reason: HOSPADM

## 2024-05-13 RX ORDER — LANOLIN ALCOHOL/MO/W.PET/CERES
800 CREAM (GRAM) TOPICAL 2 TIMES DAILY
Status: COMPLETED | OUTPATIENT
Start: 2024-05-13 | End: 2024-05-13

## 2024-05-13 RX ORDER — ESCITALOPRAM OXALATE 20 MG/1
20 TABLET ORAL DAILY
Status: DISCONTINUED | OUTPATIENT
Start: 2024-05-13 | End: 2024-05-13

## 2024-05-13 RX ORDER — METOPROLOL SUCCINATE 25 MG/1
25 TABLET, EXTENDED RELEASE ORAL DAILY
Status: DISCONTINUED | OUTPATIENT
Start: 2024-05-13 | End: 2024-05-14 | Stop reason: HOSPADM

## 2024-05-13 RX ORDER — ENOXAPARIN SODIUM 100 MG/ML
1 INJECTION SUBCUTANEOUS EVERY 12 HOURS SCHEDULED
Status: DISCONTINUED | OUTPATIENT
Start: 2024-05-13 | End: 2024-05-14 | Stop reason: HOSPADM

## 2024-05-13 RX ORDER — POTASSIUM CHLORIDE 20 MEQ/1
40 TABLET, EXTENDED RELEASE ORAL ONCE
Status: COMPLETED | OUTPATIENT
Start: 2024-05-13 | End: 2024-05-13

## 2024-05-13 RX ORDER — WARFARIN SODIUM 7.5 MG/1
3.75 TABLET ORAL
Status: DISCONTINUED | OUTPATIENT
Start: 2024-05-13 | End: 2024-05-14 | Stop reason: HOSPADM

## 2024-05-13 RX ORDER — ESCITALOPRAM OXALATE 20 MG/1
20 TABLET ORAL DAILY
Status: DISCONTINUED | OUTPATIENT
Start: 2024-05-14 | End: 2024-05-14 | Stop reason: HOSPADM

## 2024-05-13 RX ADMIN — WARFARIN SODIUM 3.75 MG: 7.5 TABLET ORAL at 17:21

## 2024-05-13 RX ADMIN — CHLORHEXIDINE GLUCONATE 15 ML: 1.2 RINSE ORAL at 20:14

## 2024-05-13 RX ADMIN — CHLORHEXIDINE GLUCONATE 15 ML: 1.2 RINSE ORAL at 09:33

## 2024-05-13 RX ADMIN — ENOXAPARIN SODIUM 90 MG: 100 INJECTION SUBCUTANEOUS at 09:33

## 2024-05-13 RX ADMIN — Medication 800 MG: at 17:21

## 2024-05-13 RX ADMIN — Medication 800 MG: at 09:33

## 2024-05-13 RX ADMIN — ENOXAPARIN SODIUM 90 MG: 100 INJECTION SUBCUTANEOUS at 20:14

## 2024-05-13 RX ADMIN — ACETAMINOPHEN 975 MG: 325 TABLET ORAL at 23:35

## 2024-05-13 RX ADMIN — ESCITALOPRAM OXALATE 20 MG: 20 TABLET ORAL at 09:33

## 2024-05-13 RX ADMIN — POLYETHYLENE GLYCOL 3350 17 G: 17 POWDER, FOR SOLUTION ORAL at 09:33

## 2024-05-13 RX ADMIN — METOPROLOL SUCCINATE 25 MG: 25 TABLET, EXTENDED RELEASE ORAL at 11:31

## 2024-05-13 RX ADMIN — POTASSIUM CHLORIDE 40 MEQ: 1500 TABLET, EXTENDED RELEASE ORAL at 09:33

## 2024-05-13 NOTE — PROGRESS NOTES
Progress Note - General Surgery  : [facility] Surgery Resident on TigUnruly Leigh 28 y.o. female MRN: [id number]  Unit/Bed#: S MS [id number] Encounter: [id number]      Assessment:  28 y.o. female with mechanical AVR on warfarin with rectus sheath hematoma s/p LTCS. Hgb and vitals stable, downgraded from ICU       Plan:  Regular diet  F/u am hgb / INR   Hopefully avoid further transfusion  Restart warfarin today  Dispo planning, tentatively bridge with Lovenox and goal discharge tomorrow        Subjective: No acute complaints, tolerating PO.      Objective:     Physical Exam:  GEN: NAD   Ab: NT/ND, some induration in RLQ / suprapubic area  Lung: Normal effort   CV: RRR   Extrem: No CCE   Neuro: A+Ox3       I/O         05/11 0701 05/12 0700 05/12 0701 05/13 0700    P.O. 960     I.V. (mL/kg) 20 (0.2)     Blood 350     IV Piggyback 200     Total Intake(mL/kg) 1530 (17.8)     Net +1530           Unmeasured Urine Occurrence 5 x     Unmeasured Stool Occurrence 1 x             Lab, Imaging and other studies: I have personally reviewed pertinent reports.  , CBC with diff:   Lab Results   Component Value Date    WBC 12.20 (H) 05/12/2024    HGB 8.2 (L) 05/12/2024    HCT 25.2 (L) 05/12/2024    MCV 87 05/12/2024     05/12/2024    RBC 2.79 (L) 05/12/2024    MCH 29.0 05/12/2024    MCHC 33.3 05/12/2024    RDW 15.8 (H) 05/12/2024    MPV 7.9 (L) 05/12/2024   , BMP/CMP:   Lab Results   Component Value Date    SODIUM 134 (L) 05/12/2024    K 3.6 05/12/2024     05/12/2024    CO2 23 05/12/2024    BUN 12 05/12/2024    CREATININE 0.45 (L) 05/12/2024    CALCIUM 8.1 (L) 05/12/2024    EGFR 134 05/12/2024         VTE Pharmacologic Prophylaxis: Sequential compression device (Venodyne)       Marvin Renteria MD  5/12/2024 8:16 PM

## 2024-05-13 NOTE — PLAN OF CARE
Problem: PAIN - ADULT  Goal: Verbalizes/displays adequate comfort level or baseline comfort level  Description: Interventions:  - Encourage patient to monitor pain and request assistance  - Assess pain using appropriate pain scale  - Administer analgesics based on type and severity of pain and evaluate response  - Implement non-pharmacological measures as appropriate and evaluate response  - Consider cultural and social influences on pain and pain management  - Notify physician/advanced practitioner if interventions unsuccessful or patient reports new pain  Outcome: Progressing     Problem: INFECTION - ADULT  Goal: Absence or prevention of progression during hospitalization  Description: INTERVENTIONS:  - Assess and monitor for signs and symptoms of infection  - Monitor lab/diagnostic results  - Monitor all insertion sites, i.e. indwelling lines, tubes, and drains  - Monitor endotracheal if appropriate and nasal secretions for changes in amount and color  - [place] appropriate cooling/warming therapies per order  - Administer medications as ordered  - Instruct and encourage patient and family to use good hand hygiene technique  - Identify and instruct in appropriate isolation precautions for identified infection/condition  Outcome: Progressing  Goal: Absence of fever/infection during neutropenic period  Description: INTERVENTIONS:  - Monitor WBC    Outcome: Progressing     Problem: SAFETY ADULT  Goal: Patient will remain free of falls  Description: INTERVENTIONS:  - Educate patient/family on patient safety including physical limitations  - Instruct patient to call for assistance with activity   - Consult OT/PT to assist with strengthening/mobility   - Keep Call bell within reach  - Keep bed low and locked with side rails adjusted as appropriate  - Keep care items and personal belongings within reach  - Initiate and maintain comfort rounds  - Make Fall Risk Sign visible to staff  - Obtain necessary fall risk  management equipment  - Apply yellow socks and bracelet for high fall risk patients  - Consider moving patient to room near nurses station  Outcome: Progressing  Goal: Maintain or return to baseline ADL function  Description: INTERVENTIONS:  -  Assess patient's ability to carry out ADLs; assess patient's baseline for ADL function and identify physical deficits which impact ability to perform ADLs (bathing, care of mouth/teeth, toileting, grooming, dressing, etc.)  - Assess/evaluate cause of self-care deficits   - Assess range of motion  - Assess patient's mobility; develop plan if impaired  - Assess patient's need for assistive devices and provide as appropriate  - Encourage maximum independence but intervene and supervise when necessary  - Involve family in performance of ADLs  - Assess for home care needs following discharge   - Consider OT consult to assist with ADL evaluation and planning for discharge  - Provide patient education as appropriate  Outcome: Progressing  Goal: Maintains/Returns to pre admission functional level  Description: INTERVENTIONS:  - Perform AM-PAC 6 Click Basic Mobility/ Daily Activity assessment daily.  - Set and communicate daily mobility goal to care team and patient/family/caregiver.   - Collaborate with rehabilitation services on mobility goals if consulted  - Out of bed for toileting  - Record patient progress and toleration of activity level   Outcome: Progressing     Problem: DISCHARGE PLANNING  Goal: Discharge to home or other facility with appropriate resources  Description: INTERVENTIONS:  - Identify barriers to discharge w/patient and caregiver  - Arrange for needed discharge resources and transportation as appropriate  - Identify discharge learning needs (meds, wound care, etc.)  - Arrange for interpretive services to assist at discharge as needed  - Refer to Case Management Department for coordinating discharge planning if the patient needs post-hospital services based on  physician/advanced practitioner order or complex needs related to functional status, cognitive ability, or social support system  Outcome: Progressing     Problem: Knowledge Deficit  Goal: Patient/family/caregiver demonstrates understanding of disease process, treatment plan, medications, and discharge instructions  Description: Complete learning assessment and assess knowledge base.  Interventions:  - Provide teaching at level of understanding  - Provide teaching via preferred learning methods  Outcome: Progressing     Problem: Nutrition/Hydration-ADULT  Goal: Nutrient/Hydration intake appropriate for improving, restoring or maintaining nutritional needs  Description: Monitor and assess patient's nutrition/hydration status for malnutrition. Collaborate with interdisciplinary team and initiate plan and interventions as ordered.  Monitor patient's weight and dietary intake as ordered or per policy. Utilize nutrition screening tool and intervene as necessary. Determine patient's food preferences and provide high-protein, high-caloric foods as appropriate.     INTERVENTIONS:  - Monitor oral intake, urinary output, labs, and treatment plans  - Assess nutrition and hydration status and recommend course of action  - Evaluate amount of meals eaten  - Assist patient with eating if necessary   - Allow adequate time for meals  - Recommend/ encourage appropriate diets, oral nutritional supplements, and vitamin/mineral supplements  - Order, calculate, and assess calorie counts as needed  - Recommend, monitor, and adjust tube feedings and TPN/PPN based on assessed needs  - Assess need for intravenous fluids  - Provide specific nutrition/hydration education as appropriate  - Include patient/family/caregiver in decisions related to nutrition  Outcome: Progressing

## 2024-05-13 NOTE — PROGRESS NOTES
Progress Note - Cardiology Team 1  Suze Leigh 28 y.o. female MRN: [id number]  Unit/Bed#: S -01 Encounter: [id number]        Principal Problem:    Rectus sheath hematoma  Active Problems:    History of mechanical aortic valve replacement    Anxiety    Status post primary low transverse  section    ABLA (acute blood loss anemia)    Fever      Assessment/Plan    Mechanical AVR        INR 1.6. Warfarin dose for tonight- 3.75mg.         On lovenox 1mg/kg/SQ every 12 hours         Presented with INR 3.6. received K centra . 2FFP         Generally takes 2.5mg 5 days a week and 3.75mg 2 days a week with         goal INR 2.5-3.0  Rectus sheath hematoma- H.H stable  S/p C section POD #11  History of embolic CVA  while off AC for surgery  Fluid retention post op- s/p IV lasix  ABLA- s/p transfusion 3UPRBC's. Presented with HGB 5.     Subjective/Objective   Chief Complaint/subjective  No cp, sob        Vitals: /81   Pulse 92   Temp 99 °F (37.2 °C)   Resp 16   Wt 86.1 kg (189 lb 13.1 oz)   LMP 2023 (Exact Date) Comment: negative pregnancy test  SpO2 92%   Breastfeeding Yes   BMI 33.62 kg/m²     Vitals:    24 0600 24 0600   Weight: 86.1 kg (189 lb 12.7 oz) 86.1 kg (189 lb 13.1 oz)     Orthostatic Blood Pressures      Flowsheet Row Most Recent Value   Blood Pressure 129/81 filed at 2024 1131   Patient Position - Orthostatic VS Lying filed at 2024 1242            No intake or output data in the 24 hours ending 24 1217    Invasive Devices       Peripheral Intravenous Line  Duration             Peripheral IV 05/10/24 Distal;Right;Ventral (anterior) Forearm 2 days    Peripheral IV 05/10/24 Right Antecubital 2 days              Drain  Duration             External Urinary Catheter 2 days                    Current Facility-Administered Medications   Medication Dose Route Frequency    acetaminophen (TYLENOL) tablet 975 mg  975 mg Oral Q6H PRN    chlorhexidine  (PERIDEX) 0.12 % oral rinse 15 mL  15 mL Mouth/Throat Q12H JAMES    enoxaparin (LOVENOX) subcutaneous injection 90 mg  1 mg/kg Subcutaneous Q12H JAMES    [START ON 5/14/2024] escitalopram (LEXAPRO) tablet 20 mg  20 mg Oral Daily    [START ON 5/14/2024] ferrous sulfate tablet 325 mg  325 mg Oral Daily With Breakfast    HYDROmorphone HCl (DILAUDID) injection 0.2 mg  0.2 mg Intravenous Q3H PRN    magnesium Oxide (MAG-OX) tablet 800 mg  800 mg Oral BID    metoprolol succinate (TOPROL-XL) 24 hr tablet 25 mg  25 mg Oral Daily    oxyCODONE (ROXICODONE) IR tablet 5 mg  5 mg Oral Q4H PRN    oxyCODONE (ROXICODONE) split tablet 2.5 mg  2.5 mg Oral Q4H PRN    polyethylene glycol (MIRALAX) packet 17 g  17 g Oral Daily    warfarin (COUMADIN) tablet 3.75 mg  3.75 mg Oral Daily (warfarin)         Physical Exam: /81   Pulse 92   Temp 99 °F (37.2 °C)   Resp 16   Wt 86.1 kg (189 lb 13.1 oz)   LMP 08/11/2023 (Exact Date) Comment: negative pregnancy test  SpO2 92%   Breastfeeding Yes   BMI 33.62 kg/m²     General Appearance:    Alert, cooperative, no distress, appears stated age   Head:    Normocephalic, no scleral icterus   Eyes:    PERRL   Nose:   Nares normal, septum midline, no drainage    Throat:   Lips, mucosa, and tongue normal   Neck:   Supple, symmetrical, trachea midline,             Lungs:     Clear to auscultation bilaterally, respirations unlabored   Chest Wall:    No tenderness or deformity    Heart:    Regular rate and rhythm, S1 and S2 normal, +click       Extremities:   Extremities normal, atraumatic, no cyanosis or edema   Pulses:   2+ and symmetric all extremities   Skin:   Skin color, texture, turgor normal, no rashes or lesions   Neurologic:   Alert and oriented to person place and time, no focal deficits                 Lab Results:   Recent Results (from the past 72 hour(s))   CBC and differential    Collection Time: 05/10/24  2:54 PM   Result Value Ref Range    WBC 14.12 (H) 4.31 - 10.16 Thousand/uL     RBC 1.77 (L) 3.81 - 5.12 Million/uL    Hemoglobin 5.0 (LL) 11.5 - 15.4 g/dL    Hematocrit 16.2 (L) 34.8 - 46.1 %    MCV 92 82 - 98 fL    MCH 28.2 26.8 - 34.3 pg    MCHC 30.9 (L) 31.4 - 37.4 g/dL    RDW 14.2 11.6 - 15.1 %    MPV 8.4 (L) 8.9 - 12.7 fL    Platelets 716 (H) 149 - 390 Thousands/uL    nRBC 1 /100 WBCs    Segmented % 81 (H) 43 - 75 %    Immature Grans % 2 0 - 2 %    Lymphocytes % 9 (L) 14 - 44 %    Monocytes % 6 4 - 12 %    Eosinophils Relative 2 0 - 6 %    Basophils Relative 0 0 - 1 %    Absolute Neutrophils 11.54 (H) 1.85 - 7.62 Thousands/µL    Absolute Immature Grans 0.23 (H) 0.00 - 0.20 Thousand/uL    Absolute Lymphocytes 1.31 0.60 - 4.47 Thousands/µL    Absolute Monocytes 0.81 0.17 - 1.22 Thousand/µL    Eosinophils Absolute 0.21 0.00 - 0.61 Thousand/µL    Basophils Absolute 0.02 0.00 - 0.10 Thousands/µL   Protime-INR    Collection Time: 05/10/24  2:54 PM   Result Value Ref Range    Protime 37.2 (H) 11.6 - 14.5 seconds    INR 3.64 (H) 0.84 - 1.19   APTT    Collection Time: 05/10/24  2:54 PM   Result Value Ref Range    PTT 85 (H) 23 - 37 seconds   Type and screen    Collection Time: 05/10/24  2:54 PM   Result Value Ref Range    ABO Grouping O     Rh Factor Positive     Antibody Screen Negative     Specimen Expiration Date [id number]    Basic metabolic panel    Collection Time: 05/10/24  2:54 PM   Result Value Ref Range    Sodium 138 135 - 147 mmol/L    Potassium 4.0 3.5 - 5.3 mmol/L    Chloride 106 96 - 108 mmol/L    CO2 22 21 - 32 mmol/L    ANION GAP 10 4 - 13 mmol/L    BUN 14 5 - 25 mg/dL    Creatinine 0.45 (L) 0.60 - 1.30 mg/dL    Glucose 82 65 - 140 mg/dL    Calcium 8.1 (L) 8.4 - 10.2 mg/dL    eGFR 134 ml/min/1.73sq m   Hepatic function panel    Collection Time: 05/10/24  2:54 PM   Result Value Ref Range    Total Bilirubin 2.47 (H) 0.20 - 1.00 mg/dL    Bilirubin, Direct 0.33 (H) 0.00 - 0.20 mg/dL    Alkaline Phosphatase 102 34 - 104 U/L    AST 25 13 - 39 U/L    ALT 12 7 - 52 U/L    Total Protein 5.6  (L) 6.4 - 8.4 g/dL    Albumin 2.8 (L) 3.5 - 5.0 g/dL   Ethanol    Collection Time: 05/10/24  2:54 PM   Result Value Ref Range    Ethanol Lvl <10 <10 mg/dL   Salicylate level    Collection Time: 05/10/24  2:54 PM   Result Value Ref Range    Salicylate Lvl <5 3 - 20 mg/dL   Acetaminophen level-If concentration is detectable, please discuss with medical  on call.    Collection Time: 05/10/24  2:54 PM   Result Value Ref Range    Acetaminophen Level <2 (L) 10 - 20 ug/mL   Fibrinogen    Collection Time: 05/10/24  2:54 PM   Result Value Ref Range    Fibrinogen 1,149 (H) 207 - 520 mg/dL   ECG 12 lead    Collection Time: 05/10/24  3:17 PM   Result Value Ref Range    Ventricular Rate 101 BPM    Atrial Rate 101 BPM    VT Interval 128 ms    QRSD Interval 74 ms    QT Interval 310 ms    QTC Interval 401 ms    P Axis 20 degrees    QRS Axis 60 degrees    T Wave Axis 50 degrees   TEG 6 Global Hemostasis with Lysis    Collection Time: 05/10/24  4:24 PM   Result Value Ref Range    CKR(REACTION TIME) >17.0 (H) 4.6 - 9.1 Min    CKLY30 0.0 0.0 - 2.6 %    CRTMA(RAPIDTEG MAX AMPLITUDE) >75 (H) 52 - 70 mm    CFFMA (FUNCTIONAL FIBRINOGEN MAX AMPLITUDE) >52 (H) 15 - 32 mm   UA w Reflex to Microscopic w Reflex to Culture    Collection Time: 05/10/24  7:33 PM    Specimen: Urine, Clean Catch   Result Value Ref Range    Color, UA Yellow     Clarity, UA Clear     Specific Gravity, UA >=1.050 (H) 1.003 - 1.030    pH, UA 6.0 4.5, 5.0, 5.5, 6.0, 6.5, 7.0, 7.5, 8.0    Leukocytes, UA Negative Negative    Nitrite, UA Negative Negative    Protein, UA Trace (A) Negative mg/dl    Glucose, UA Negative Negative mg/dl    Ketones, UA 60 (2+) (A) Negative mg/dl    Urobilinogen, UA 2.0 (A) <2.0 mg/dl mg/dl    Bilirubin, UA Negative Negative    Occult Blood, UA Small (A) Negative   Urine Microscopic    Collection Time: 05/10/24  7:33 PM   Result Value Ref Range    RBC, UA 1-2 None Seen, 1-2 /hpf    WBC, UA 1-2 None Seen, 1-2 /hpf    Epithelial Cells  Occasional None Seen, Occasional /hpf    Bacteria, UA None Seen None Seen, Occasional /hpf   CBC and Platelet    Collection Time: 05/10/24  7:45 PM   Result Value Ref Range    WBC 8.78 4.31 - 10.16 Thousand/uL    RBC 2.63 (L) 3.81 - 5.12 Million/uL    Hemoglobin 7.7 (L) 11.5 - 15.4 g/dL    Hematocrit 24.2 (L) 34.8 - 46.1 %    MCV 92 82 - 98 fL    MCH 29.3 26.8 - 34.3 pg    MCHC 31.8 31.4 - 37.4 g/dL    RDW 14.6 11.6 - 15.1 %    Platelets 364 149 - 390 Thousands/uL    MPV 8.1 (L) 8.9 - 12.7 fL   CBC and Platelet    Collection Time: 05/11/24 12:45 AM   Result Value Ref Range    WBC 12.55 (H) 4.31 - 10.16 Thousand/uL    RBC 2.23 (L) 3.81 - 5.12 Million/uL    Hemoglobin 6.3 (L) 11.5 - 15.4 g/dL    Hematocrit 20.2 (L) 34.8 - 46.1 %    MCV 91 82 - 98 fL    MCH 28.3 26.8 - 34.3 pg    MCHC 31.2 (L) 31.4 - 37.4 g/dL    RDW 14.8 11.6 - 15.1 %    Platelets 362 149 - 390 Thousands/uL    MPV 7.9 (L) 8.9 - 12.7 fL   Protime-INR    Collection Time: 05/11/24  1:01 AM   Result Value Ref Range    Protime 26.9 (H) 11.6 - 14.5 seconds    INR 2.38 (H) 0.84 - 1.19   Comprehensive metabolic panel    Collection Time: 05/11/24  5:46 AM   Result Value Ref Range    Sodium 137 135 - 147 mmol/L    Potassium 3.5 3.5 - 5.3 mmol/L    Chloride 104 96 - 108 mmol/L    CO2 23 21 - 32 mmol/L    ANION GAP 10 4 - 13 mmol/L    BUN 11 5 - 25 mg/dL    Creatinine 0.53 (L) 0.60 - 1.30 mg/dL    Glucose 85 65 - 140 mg/dL    Calcium 8.0 (L) 8.4 - 10.2 mg/dL    Corrected Calcium 8.9 8.3 - 10.1 mg/dL    AST 22 13 - 39 U/L    ALT 13 7 - 52 U/L    Alkaline Phosphatase 89 34 - 104 U/L    Total Protein 6.1 (L) 6.4 - 8.4 g/dL    Albumin 2.9 (L) 3.5 - 5.0 g/dL    Total Bilirubin 2.65 (H) 0.20 - 1.00 mg/dL    eGFR 127 ml/min/1.73sq m   Magnesium    Collection Time: 05/11/24  5:46 AM   Result Value Ref Range    Magnesium 1.7 (L) 1.9 - 2.7 mg/dL   Phosphorus    Collection Time: 05/11/24  5:46 AM   Result Value Ref Range    Phosphorus 3.0 2.7 - 4.5 mg/dL   Protime-INR     Collection Time: 05/11/24  5:46 AM   Result Value Ref Range    Protime 29.3 (H) 11.6 - 14.5 seconds    INR 2.66 (H) 0.84 - 1.19   Calcium, ionized    Collection Time: 05/11/24  5:46 AM   Result Value Ref Range    Calcium, Ionized 1.05 (L) 1.12 - 1.32 mmol/L   Prepare Leukoreduced RBC: 2 Units    Collection Time: 05/11/24  5:47 AM   Result Value Ref Range    Unit Product Code L0901M94     Unit Number [id number]     Unit ABO O     Unit RH POS     Crossmatch Compatible     Unit Dispense Status Presumed Trans     Unit Product Volume 350 ml    Unit Product Code S9400I57     Unit Number [id number]     Unit ABO O     Unit RH POS     Crossmatch Compatible     Unit Dispense Status Presumed Trans     Unit Product Volume 350 ml   Prepare Leukoreduced Whole Blood: 1 Units    Collection Time: 05/11/24  5:47 AM   Result Value Ref Range    Unit Product Code E9281X90     Unit Number [id number]     Unit ABO O     Unit RH POS     Crossmatch Compatible     Unit Dispense Status Presumed Trans     Unit Product Volume 500 mL   Prepare Plasma: 2 Units    Collection Time: 05/11/24  5:47 AM   Result Value Ref Range    Unit Product Code U5550X73     Unit Number [id number]     Unit ABO A     Unit RH NEG     Unit Dispense Status Presumed Trans     Unit Product Volume 280 ml    Unit Product Code F4074E81     Unit Number [id number]     Unit ABO A     Unit RH NEG     Unit Dispense Status Presumed Trans     Unit Product Volume 280 ml   CBC and differential    Collection Time: 05/11/24  6:34 AM   Result Value Ref Range    WBC 11.56 (H) 4.31 - 10.16 Thousand/uL    RBC 2.28 (L) 3.81 - 5.12 Million/uL    Hemoglobin 6.5 (L) 11.5 - 15.4 g/dL    Hematocrit 19.8 (L) 34.8 - 46.1 %    MCV 87 82 - 98 fL    MCH 28.5 26.8 - 34.3 pg    MCHC 32.8 31.4 - 37.4 g/dL    RDW 15.6 (H) 11.6 - 15.1 %    MPV 8.0 (L) 8.9 - 12.7 fL    Platelets 337 149 - 390 Thousands/uL   Manual Differential(PHLEBS Do Not Order)    Collection Time: 05/11/24  6:34  AM   Result Value Ref Range    Segmented % 68 43 - 75 %    Bands % 2 0 - 8 %    Lymphocytes % 19 14 - 44 %    Monocytes % 6 4 - 12 %    Eosinophils % 3 0 - 6 %    Basophils % 0 0 - 1 %    Metamyelocytes % 1 0 - 1 %    Myelocytes % 1 0 - 1 %    Absolute Neutrophils 8.09 (H) 1.85 - 7.62 Thousand/uL    Absolute Lymphocytes 2.20 0.60 - 4.47 Thousand/uL    Absolute Monocytes 0.69 0.00 - 1.22 Thousand/uL    Absolute Eosinophils 0.35 0.00 - 0.40 Thousand/uL    Absolute Basophils 0.00 0.00 - 0.10 Thousand/uL    Absolute Metamyelocytes 0.12 (H) 0.00 - 0.10 Thousand/uL    Absolute Myelocytes 0.12 (H) 0.00 - 0.10 Thousand/uL    Total Counted      nRBC 1 0 - 2 /100 WBC    RBC Morphology Present     Platelet Estimate Adequate Adequate    Anisocytosis Present     Hypochromia Present     Polychromasia Present    Prepare Leukoreduced RBC: 1 Units    Collection Time: 05/11/24  8:38 AM   Result Value Ref Range    Unit Product Code B1278U36     Unit Number [id number]     Unit ABO O     Unit RH POS     Crossmatch Compatible     Unit Dispense Status Return to Inv     Unit Product Volume 350 ml   CBC and Platelet    Collection Time: 05/11/24 12:10 PM   Result Value Ref Range    WBC 11.96 (H) 4.31 - 10.16 Thousand/uL    RBC 2.73 (L) 3.81 - 5.12 Million/uL    Hemoglobin 7.8 (L) 11.5 - 15.4 g/dL    Hematocrit 23.9 (L) 34.8 - 46.1 %    MCV 88 82 - 98 fL    MCH 28.6 26.8 - 34.3 pg    MCHC 32.6 31.4 - 37.4 g/dL    RDW 15.6 (H) 11.6 - 15.1 %    Platelets 376 149 - 390 Thousands/uL    MPV 8.3 (L) 8.9 - 12.7 fL   Protime-INR    Collection Time: 05/11/24 12:10 PM   Result Value Ref Range    Protime 20.9 (H) 11.6 - 14.5 seconds    INR 1.72 (H) 0.84 - 1.19   CBC and Platelet    Collection Time: 05/11/24  6:02 PM   Result Value Ref Range    WBC 13.64 (H) 4.31 - 10.16 Thousand/uL    RBC 2.72 (L) 3.81 - 5.12 Million/uL    Hemoglobin 7.7 (L) 11.5 - 15.4 g/dL    Hematocrit 23.5 (L) 34.8 - 46.1 %    MCV 86 82 - 98 fL    MCH 28.3 26.8 - 34.3 pg     MCHC 32.8 31.4 - 37.4 g/dL    RDW 15.6 (H) 11.6 - 15.1 %    Platelets 367 149 - 390 Thousands/uL    MPV 7.9 (L) 8.9 - 12.7 fL   Hemoglobin and hematocrit, blood    Collection Time: 05/11/24 11:56 PM   Result Value Ref Range    Hemoglobin 7.7 (L) 11.5 - 15.4 g/dL    Hematocrit 23.5 (L) 34.8 - 46.1 %   Prepare Leukoreduced RBC: 1 Units    Collection Time: 05/12/24  5:47 AM   Result Value Ref Range    Unit Product Code K3179R13     Unit Number [id number]     Unit ABO O     Unit RH POS     Crossmatch Compatible     Unit Dispense Status Presumed Trans     Unit Product Volume 350 ml   Basic metabolic panel    Collection Time: 05/12/24  6:12 AM   Result Value Ref Range    Sodium 134 (L) 135 - 147 mmol/L    Potassium 3.6 3.5 - 5.3 mmol/L    Chloride 103 96 - 108 mmol/L    CO2 23 21 - 32 mmol/L    ANION GAP 8 4 - 13 mmol/L    BUN 12 5 - 25 mg/dL    Creatinine 0.45 (L) 0.60 - 1.30 mg/dL    Glucose 86 65 - 140 mg/dL    Calcium 8.1 (L) 8.4 - 10.2 mg/dL    eGFR 134 ml/min/1.73sq m   Calcium, ionized    Collection Time: 05/12/24  6:12 AM   Result Value Ref Range    Calcium, Ionized 1.11 (L) 1.12 - 1.32 mmol/L   CBC    Collection Time: 05/12/24  6:12 AM   Result Value Ref Range    WBC 12.20 (H) 4.31 - 10.16 Thousand/uL    RBC 2.79 (L) 3.81 - 5.12 Million/uL    Hemoglobin 8.1 (L) 11.5 - 15.4 g/dL    Hematocrit 24.3 (L) 34.8 - 46.1 %    MCV 87 82 - 98 fL    MCH 29.0 26.8 - 34.3 pg    MCHC 33.3 31.4 - 37.4 g/dL    RDW 15.8 (H) 11.6 - 15.1 %    Platelets 374 149 - 390 Thousands/uL    MPV 7.9 (L) 8.9 - 12.7 fL   Magnesium    Collection Time: 05/12/24  6:12 AM   Result Value Ref Range    Magnesium 1.8 (L) 1.9 - 2.7 mg/dL   Phosphorus    Collection Time: 05/12/24  6:12 AM   Result Value Ref Range    Phosphorus 3.3 2.7 - 4.5 mg/dL   Protime-INR    Collection Time: 05/12/24  6:12 AM   Result Value Ref Range    Protime 27.7 (H) 11.6 - 14.5 seconds    INR 2.47 (H) 0.84 - 1.19   Hemoglobin and hematocrit, blood    Collection Time:  05/12/24 12:34 PM   Result Value Ref Range    Hemoglobin 8.3 (L) 11.5 - 15.4 g/dL    Hematocrit 24.8 (L) 34.8 - 46.1 %   Hemoglobin and hematocrit, blood    Collection Time: 05/12/24  5:16 PM   Result Value Ref Range    Hemoglobin 8.2 (L) 11.5 - 15.4 g/dL    Hematocrit 25.2 (L) 34.8 - 46.1 %   Basic metabolic panel    Collection Time: 05/13/24  5:02 AM   Result Value Ref Range    Sodium 137 135 - 147 mmol/L    Potassium 3.6 3.5 - 5.3 mmol/L    Chloride 105 96 - 108 mmol/L    CO2 23 21 - 32 mmol/L    ANION GAP 9 4 - 13 mmol/L    BUN 11 5 - 25 mg/dL    Creatinine 0.45 (L) 0.60 - 1.30 mg/dL    Glucose 82 65 - 140 mg/dL    Calcium 8.2 (L) 8.4 - 10.2 mg/dL    eGFR 134 ml/min/1.73sq m   Calcium, ionized    Collection Time: 05/13/24  5:02 AM   Result Value Ref Range    Calcium, Ionized 1.11 (L) 1.12 - 1.32 mmol/L   CBC    Collection Time: 05/13/24  5:02 AM   Result Value Ref Range    WBC 11.96 (H) 4.31 - 10.16 Thousand/uL    RBC 2.75 (L) 3.81 - 5.12 Million/uL    Hemoglobin 7.9 (L) 11.5 - 15.4 g/dL    Hematocrit 24.3 (L) 34.8 - 46.1 %    MCV 88 82 - 98 fL    MCH 28.7 26.8 - 34.3 pg    MCHC 32.5 31.4 - 37.4 g/dL    RDW 15.7 (H) 11.6 - 15.1 %    Platelets 447 (H) 149 - 390 Thousands/uL    MPV 7.9 (L) 8.9 - 12.7 fL   Magnesium    Collection Time: 05/13/24  5:02 AM   Result Value Ref Range    Magnesium 1.7 (L) 1.9 - 2.7 mg/dL   Protime-INR    Collection Time: 05/13/24  5:02 AM   Result Value Ref Range    Protime 19.9 (H) 11.6 - 14.5 seconds    INR 1.61 (H) 0.84 - 1.19     Imaging: I have personally reviewed pertinent reports.         Counseling / Coordination of Care  Total time spent today 25 minutes. Greater than 50% of total time was spent with the patient and / or family counseling and / or coordination of care.

## 2024-05-13 NOTE — PROGRESS NOTES
"OB/GYN Progress note   Suze Leigh 28 y.o. female MRN: [id number]  Unit/Bed#: S -01 Encounter: [id number]      Assessment: 28 y.o. POD#11 from [facility] readmitted with rectus sheath hematoma and coagulopathy with acute blood loss anemia. Stable and downgraded to Medsurg.      Plan:    Fever  Assessment & Plan  Tmax of 100.8 on  , isolated with no other signs of infectious source. Likely secondary to large embolus    WBC   Date Value Ref Range Status   2024 11.96 (H) 4.31 - 10.16 Thousand/uL Final   2015 10.63 (H) 4.31 - 10.16 Thousand/uL Final     Continue to monitor     ABLA (acute blood loss anemia)  Assessment & Plan  Hgb 5 on admission, 2/2 large rectus sheath hematoma complicated by coagulopathy/chronic coumadin use  Total products during admission: Whole blood 1 unit, FFP 2 units, PRBC 3 units     Lab Results   Component Value Date    HGB 7.9 (L) 2024     Coagulopathy improving, s/p KCentra  Continue to transfuse as needed  General surgery primary, appreciate care    Status post primary low transverse  section  Assessment & Plan  Post op day #10 from [facility] for maternal request on 24, in setting of mechanical heart valve  Managed on warfarin since discharge.  Routine postoperative care  Abdominal binder  Breast pump - okay for opioids for pain management, no indications for \"pump and dump\"  OBGYN will continue to follow        History of mechanical aortic valve replacement  Assessment & Plan  Underwent mechanical aortic valve placement with repair of ascending aortic aneurysm in  in the setting of bicuspid aortic valve with aortic stenosis and expanding ascending aortic aneurysm.  Current regimen at time of discharge: Coumadin 2.5 mg Tues/Thurs/Sat, 3.75 mg all other days   Per primary team, avoid full AC reversal in the setting of previous CVA when AC was held in the past. Did receive K-Centra yesterday as hemoglobin was downtrending after transfusion  Daily " "INR  Cardiology consulted, appreciate recommendations for anticoagulation dosage  Coumadin currently held, plan for heparin gtt bridge to restart coumadin if stable per general surgery    * Rectus sheath hematoma  Assessment & Plan  CTAP 5/10/24 notable for \"18.1 x 17.6 x 9.6 infraumbilical rectus sheath hematoma. No extravasation of IV contrast to suggest an active hemorrhage, although evaluation somewhat limited without a delayed phase\"  Abdominal binder ordered  - See plan under ABLA  - hgb stable and no clinical signs of ongoing bleeding              Subjective:    Patient reports significant improvement in pain and fatigue. Lochia otherwise minimal. She is voiding spontaneously, passing flatus. She is pumping and looking forward to having her daughter visit today.     Objective:  /80   Pulse 92   Temp 99 °F (37.2 °C)   Resp 16   Wt 86.1 kg (189 lb 13.1 oz)   LMP 08/11/2023 (Exact Date) Comment: negative pregnancy test  SpO2 95%   Breastfeeding Yes   BMI 33.62 kg/m²     Physical Exam  Constitutional:       General: She is not in acute distress.     Appearance: Normal appearance. She is well-developed. She is not ill-appearing, toxic-appearing or diaphoretic.   HENT:      Head: Normocephalic and atraumatic.   Cardiovascular:      Rate and Rhythm: Normal rate.   Pulmonary:      Effort: Pulmonary effort is normal. No respiratory distress.   Abdominal:      General: There is no distension.      Tenderness: There is abdominal tenderness. There is no guarding.      Comments: Palpable firm hematoma superior to pfannenstiel incision. Abdominal exam overall nontender. Pfannenstiel incision c/d/I, no erythema or drainage. Improving ecchymosis over mons.   Skin:     General: Skin is warm and dry.      Comments: Overall improvement in pallor and jaundice   Neurological:      General: No focal deficit present.      Mental Status: She is alert and oriented to person, place, and time.   Psychiatric:         Mood " and Affect: Mood normal.         Behavior: Behavior normal.           I/O last 3 completed shifts:  In: 260 [P.O.:240; I.V.:20]  Out: -   No intake/output data recorded.    Lab Results   Component Value Date    WBC 11.96 (H) 05/13/2024    HGB 7.9 (L) 05/13/2024    HCT 24.3 (L) 05/13/2024    MCV 88 05/13/2024     (H) 05/13/2024       Lab Results   Component Value Date    GLUCOSE 80 04/02/2024    CALCIUM 8.2 (L) 05/13/2024     (L) 06/03/2015    K 3.6 05/13/2024    CO2 23 05/13/2024     05/13/2024    BUN 11 05/13/2024    CREATININE 0.45 (L) 05/13/2024           Renetta Salamanca MD  OB/GYN PGY-4  5/13/2024  7:35 AM

## 2024-05-13 NOTE — ASSESSMENT & PLAN NOTE
Tmax of 100.8 on 05/13 , isolated with no other signs of infectious source. Likely secondary to large embolus    WBC   Date Value Ref Range Status   05/14/2024 9.44 4.31 - 10.16 Thousand/uL Preliminary   06/04/2015 10.63 (H) 4.31 - 10.16 Thousand/uL Final     Continue to monitor

## 2024-05-14 VITALS
TEMPERATURE: 98.8 F | BODY MASS INDEX: 31.55 KG/M2 | OXYGEN SATURATION: 97 % | HEART RATE: 80 BPM | DIASTOLIC BLOOD PRESSURE: 86 MMHG | RESPIRATION RATE: 16 BRPM | WEIGHT: 178.13 LBS | SYSTOLIC BLOOD PRESSURE: 127 MMHG

## 2024-05-14 LAB
ANION GAP SERPL CALCULATED.3IONS-SCNC: 7 MMOL/L (ref 4–13)
BASOPHILS # BLD MANUAL: 0 THOUSAND/UL (ref 0–0.1)
BASOPHILS NFR MAR MANUAL: 0 % (ref 0–1)
BUN SERPL-MCNC: 8 MG/DL (ref 5–25)
CALCIUM SERPL-MCNC: 8.4 MG/DL (ref 8.4–10.2)
CHLORIDE SERPL-SCNC: 104 MMOL/L (ref 96–108)
CO2 SERPL-SCNC: 23 MMOL/L (ref 21–32)
CREAT SERPL-MCNC: 0.34 MG/DL (ref 0.6–1.3)
EOSINOPHIL # BLD MANUAL: 0.09 THOUSAND/UL (ref 0–0.4)
EOSINOPHIL NFR BLD MANUAL: 1 % (ref 0–6)
ERYTHROCYTE [DISTWIDTH] IN BLOOD BY AUTOMATED COUNT: 15.4 % (ref 11.6–15.1)
GFR SERPL CREATININE-BSD FRML MDRD: 147 ML/MIN/1.73SQ M
GLUCOSE SERPL-MCNC: 79 MG/DL (ref 65–140)
HCT VFR BLD AUTO: 26.2 % (ref 34.8–46.1)
HGB BLD-MCNC: 8.4 G/DL (ref 11.5–15.4)
INR PPP: 1.6 (ref 0.84–1.19)
LYMPHOCYTES # BLD AUTO: 2.17 THOUSAND/UL (ref 0.6–4.47)
LYMPHOCYTES # BLD AUTO: 23 % (ref 14–44)
MAGNESIUM SERPL-MCNC: 1.8 MG/DL (ref 1.9–2.7)
MCH RBC QN AUTO: 28.5 PG (ref 26.8–34.3)
MCHC RBC AUTO-ENTMCNC: 32.1 G/DL (ref 31.4–37.4)
MCV RBC AUTO: 89 FL (ref 82–98)
MONOCYTES # BLD AUTO: 0.76 THOUSAND/UL (ref 0–1.22)
MONOCYTES NFR BLD: 8 % (ref 4–12)
NEUTROPHILS # BLD MANUAL: 6.42 THOUSAND/UL (ref 1.85–7.62)
NEUTS SEG NFR BLD AUTO: 68 % (ref 43–75)
PLATELET # BLD AUTO: 485 THOUSANDS/UL (ref 149–390)
PLATELET BLD QL SMEAR: ABNORMAL
PMV BLD AUTO: 8 FL (ref 8.9–12.7)
POTASSIUM SERPL-SCNC: 3.9 MMOL/L (ref 3.5–5.3)
PROTHROMBIN TIME: 19.8 SECONDS (ref 11.6–14.5)
RBC # BLD AUTO: 2.95 MILLION/UL (ref 3.81–5.12)
RBC MORPH BLD: NORMAL
SODIUM SERPL-SCNC: 134 MMOL/L (ref 135–147)
WBC # BLD AUTO: 9.44 THOUSAND/UL (ref 4.31–10.16)

## 2024-05-14 PROCEDURE — 85007 BL SMEAR W/DIFF WBC COUNT: CPT | Performed by: PHYSICIAN ASSISTANT

## 2024-05-14 PROCEDURE — 99232 SBSQ HOSP IP/OBS MODERATE 35: CPT | Performed by: SURGERY

## 2024-05-14 PROCEDURE — 80048 BASIC METABOLIC PNL TOTAL CA: CPT | Performed by: PHYSICIAN ASSISTANT

## 2024-05-14 PROCEDURE — 85610 PROTHROMBIN TIME: CPT | Performed by: STUDENT IN AN ORGANIZED HEALTH CARE EDUCATION/TRAINING PROGRAM

## 2024-05-14 PROCEDURE — 85027 COMPLETE CBC AUTOMATED: CPT | Performed by: PHYSICIAN ASSISTANT

## 2024-05-14 PROCEDURE — 99232 SBSQ HOSP IP/OBS MODERATE 35: CPT | Performed by: OBSTETRICS & GYNECOLOGY

## 2024-05-14 PROCEDURE — 83735 ASSAY OF MAGNESIUM: CPT | Performed by: PHYSICIAN ASSISTANT

## 2024-05-14 PROCEDURE — 99238 HOSP IP/OBS DSCHRG MGMT 30/<: CPT | Performed by: PHYSICIAN ASSISTANT

## 2024-05-14 RX ORDER — MAGNESIUM SULFATE HEPTAHYDRATE 40 MG/ML
2 INJECTION, SOLUTION INTRAVENOUS ONCE
Status: COMPLETED | OUTPATIENT
Start: 2024-05-14 | End: 2024-05-14

## 2024-05-14 RX ORDER — ENOXAPARIN SODIUM 100 MG/ML
1 INJECTION SUBCUTANEOUS EVERY 12 HOURS SCHEDULED
Qty: 10 ML | Refills: 0 | Status: SHIPPED | OUTPATIENT
Start: 2024-05-14 | End: 2024-05-15

## 2024-05-14 RX ORDER — ENOXAPARIN SODIUM 100 MG/ML
1 INJECTION SUBCUTANEOUS EVERY 12 HOURS SCHEDULED
Qty: 10 ML | Refills: 0 | Status: SHIPPED | OUTPATIENT
Start: 2024-05-14 | End: 2024-05-14

## 2024-05-14 RX ADMIN — POLYETHYLENE GLYCOL 3350 17 G: 17 POWDER, FOR SOLUTION ORAL at 09:49

## 2024-05-14 RX ADMIN — ESCITALOPRAM OXALATE 20 MG: 20 TABLET ORAL at 09:49

## 2024-05-14 RX ADMIN — METOPROLOL SUCCINATE 25 MG: 25 TABLET, EXTENDED RELEASE ORAL at 09:49

## 2024-05-14 RX ADMIN — MAGNESIUM SULFATE HEPTAHYDRATE 2 G: 40 INJECTION, SOLUTION INTRAVENOUS at 09:49

## 2024-05-14 RX ADMIN — FERROUS SULFATE TAB 325 MG (65 MG ELEMENTAL FE) 325 MG: 325 (65 FE) TAB at 09:48

## 2024-05-14 RX ADMIN — ENOXAPARIN SODIUM 90 MG: 100 INJECTION SUBCUTANEOUS at 09:49

## 2024-05-14 NOTE — PROGRESS NOTES
Progress Note - General Surgery   Suze Leigh 28 y.o. female MRN: [id number]  Unit/Bed#: S -01 Encounter: [id number]    Assessment:  28 y.o. female with mechanical AVR on warfarin with rectus sheath hematoma s/p LTCS. Hgb and vitals stable, downgraded from ICU     AVSS on RA    Plan:  Regular diet  F/u am hgb / INR  continue warfarin today  Dispo planning, tentatively bridge with Lovenox and goal discharge     Subjective/Objective     Subjective:   No acute events overnight. Pain controlled. No nausea or vomiting. Having bowel function.     Objective:     Blood pressure 133/84, pulse 82, temperature 98.2 °F (36.8 °C), resp. rate 16, weight 86.1 kg (189 lb 13.1 oz), last menstrual period 08/11/2023, SpO2 96%, currently breastfeeding.,Body mass index is 33.62 kg/m².    No intake or output data in the 24 hours ending 05/13/24 2103    Invasive Devices       Peripheral Intravenous Line  Duration             Peripheral IV 05/10/24 Distal;Right;Ventral (anterior) Forearm 3 days    Peripheral IV 05/10/24 Right Antecubital 3 days                    Physical Exam:   Gen: NAD, Comfortable  Neuro: A&O, No focal deficits  Head: Normal Cephalic, Atraumatic  Eye: EOMI, PERRLA, No scleral icterus  Neck: Supple, No JVD, Midline trachea  CV: RRR, Cap refill <2 sec  Pulm: Normal work of breathing, no respiratory distress  Abd: Soft, Distended, Non-Tender  Ext: No edema, Non-tender  Skin: warm, dry, intact

## 2024-05-14 NOTE — CASE MANAGEMENT
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    Status post primary low transverse  section 10/19/2023    Obesity affecting pregnancy in third trimester 10/18/2023    Maternal congenital cardiac anomaly affecting pregnancy, antepartum 10/18/2023    History of maternal cardiac surgery 10/18/2023    Mental disorder affecting pregnancy 10/18/2023    Abscess 2023    PONV (postoperative nausea and vomiting)     Anxiety     Mass of subcutaneous tissue of back 2022    Aphasia as late effect of cerebrovascular accident 03/15/2022    History of stroke 2022    Cellulitis of back except buttock 2021    History of mechanical aortic valve replacement 2018    Hx of repair of ascending aorta 2018    Cervical high risk HPV (human papillomavirus) test positive 2017    ENDER I (cervical intraepithelial neoplasia I) 2017    Moderate aortic insufficiency 2014    Bicuspid aortic valve 2014      LOS (days): 4  Geometric Mean LOS (GMLOS) (days):   Days to GMLOS:     OBJECTIVE:  Risk of Unplanned Readmission Score: 10.82         Current admission status: Inpatient   Preferred Pharmacy:   [id number] [place] - [address]  [address]  [place] [address]  Phone: [phone] Fax: [phone]    Primary Care Provider: Huber  MD Oumar    Primary Insurance: [employer]  Secondary Insurance:     DISCHARGE DETAILS:    Discharge planning discussed with:: Surgery team                                     Other Referral/Resources/Interventions Provided:  Interventions: Prescription Price Check         Treatment Team Recommendation: Home  Discharge Destination Plan:: Home  Transport at Discharge : Family                                         CM notified that Lovenox was ordered for patient at [facility].  CM called patient's [facility] pharmacy [phone] who reported that patient's co-pay will be $121.50.  They are unable to see if patient has any deductible left on her plan.    [facility] also reported that they needed to order this medication so it will not be available until tomorrow.    CM sent the above information to the surgery team.

## 2024-05-14 NOTE — DISCHARGE SUMMARY
"Discharge Summary   Suze Leigh 28 y.o. female MRN: [id number]  Unit/Bed#: S -01 Encounter: [id number]    Admission Date: 5/10/2024     Discharge Date:24    Attending:Jone Mensah,*     Consultants:     Admitting Diagnosis: Acute blood loss anemia [D62]  Post-operative complication [T81.9XXA]  Hematoma of surgical wound following  section [O90.2]    Principle/ Secondary Diagnosis:  Past Medical History:   Diagnosis Date    Anxiety     Bicuspid aortic valve     s/p mechanical AVR    Coronary artery disease 1993    Moderate aortic insufficiency     s/p mechanical AVR    Motion sickness     Patent ductus arteriosus     s/p repair    PONV (postoperative nausea and vomiting)     S/P ascending aortic aneurysm repair     Stroke (HCC) 2022    no residual effects per pt--initially couldnt speak at first--\"feels back to normal now\"    Varicella     hx as child, and vaccine     Past Surgical History:   Procedure Laterality Date    ASCENDING AORTIC ANEURYSM REPAIR      with\" Aortic valve surgery\"    MECHANICAL AORTIC VALVE REPLACEMENT      PATENT DUCTUS ARTERIOUS LIGATION      per pt had this surgery age 1    MI  DELIVERY ONLY N/A 2024    Procedure:  SECTION ();  Surgeon: Sanjay Thompson MD;  Location: [facility] ;  Service: Obstetrics    MI EXC B9 LESION MRGN XCP SK TG T/A/L 1.1-2.0 CM Right 2022    Procedure: EXCISION  BIOPSY LESION/MASS BACK;  Surgeon: David Cloud MD;  Location: [facility];  Service: General    THROMBECTOMY W/ EMBOLECTOMY      Percutanoeous embolectomy in setting of stroke    WISDOM TOOTH EXTRACTION         Procedures Performed:   Orders Placed This Encounter   Procedures    Critical Care    ED ECG Documentation Only     No admission procedures for hospital encounter.  Procedure name not found.      Laboratory data at discharge:   Results from last 7 days   Lab Units 24  0522 24  1811 24  0502 " "24  1234 24  0612   WBC Thousand/uL 9.44  --  11.96*  --  12.20*   HEMOGLOBIN g/dL 8.4* 8.1* 7.9*   < > 8.1*   HEMATOCRIT % 26.2* 25.2* 24.3*   < > 24.3*   PLATELETS Thousands/uL 485*  --  447*  --  374    < > = values in this interval not displayed.     Results from last 7 days   Lab Units 24  0524  0502 24  0612   POTASSIUM mmol/L 3.9 3.6 3.6   CHLORIDE mmol/L 104 105 103   CO2 mmol/L 23 23 23   BUN mg/dL 8 11 12   CREATININE mg/dL 0.34* 0.45* 0.45*   CALCIUM mg/dL 8.4 8.2* 8.1*     Results from last 7 days   Lab Units 24  0522 24  0502 24  0612 24  0101 05/10/24  1454   INR  1.60* 1.61* 2.47*   < > 3.64*   PTT seconds  --   --   --   --  85*    < > = values in this interval not displayed.           Discharge instructions/Information to patient and family:   See after visit summary for information provided to patient and family.     Discharge Medications:  See after visit summary for reconciled discharge medications provided to patient and family.      HPI per Debbi Darling MD \"Suze Leigh is a 28 y.o. with PMH of aortic valve replacement on warfarin who is POD8 from  who presents with worsening and persistent lower abdominal incisional pain and bruising. Pain uncontrolled on tylenol, ibuprofen and oxy at home. She was evaluated at outpatient OB office and recommended to come to ED for further evaluation, where imaging demonstrated rectus sheath hematoma and Hgb 5. Endorses sob with activity, fatigue, lightheadedness when she stands and pallor. Confirms a level 1 code status. Denies any known allergies to medications. \"    Hospital Course:   Patient was admitted to the ICU for close monitoring, she received 1 unit whole blood, 1 unit pRBC and 2 units of FFP. Hemoglobin was trended very closely along with her abdominal exam. Cardiology was consulted, given her history of mechanical AVR on coumadin and prior CVA in the setting of AC being " held. On hospital day 3 patient's hemoglobin remained stable and she was transferred out of the ICU. Hospital day 4 therapeutic Lovenox was started, hemoglobin remained stable throughout the day, so her home dose of Warfarin was given that evening. Hospital day 5 INR remained subtherapeutic but white count normalized and hemoglobin remained stable. Discussed with cardiology who recommended continuing with another dose of 3.75mg warfarin this evening, then 2.5 mg daily along with continued Lovenox bridge. INR check was ordered for 2 days from discharge with close follow up with coumadin clinic. Some mild erythema was noted on the right lateral aspect of patient's  incision, GYN recommended monitoring the area for spreading/systemic symptoms and warm compresses. The area was outlined with a skin marker and patient was instructed to call her OB office with questions or concerns.   Discharge plans were discussed with cardiology and OB which were all in agreement for discharge home.     Hospital course was complicated by the following: none      Prior to discharge, the patient was given instructions for outpatient care and follow-up.  The patient has been instructed to call w/ any questions, changes, or concerns for the medical condition.    For further details of the hospitalization, please refer to the medical record.    Condition at Discharge: stable     Provisions for Follow-Up Care:  See after visit summary for information related to follow-up care and any pertinent home health orders.      Disposition: Home    Planned Readmission: No    Discharge Statement   I spent 30 minutes discharging the patient. This time was spent on the day of discharge. I had direct contact with the patient on the day of discharge. Additional documentation is required if more than 30 minutes were spent on discharge.     Kaitlynn Adam PA-C  2024      This text is generated with voice recognition software. There may be  translation, syntax,  or grammatical errors. If you have any questions, please contact the dictating provider.

## 2024-05-14 NOTE — PROGRESS NOTES
"OB/GYN Progress note   Suze Leigh 28 y.o. female MRN: [id number]  Unit/Bed#: S -01 Encounter: [id number]      Assessment: 28 y.o. w/ a hx of mechanical heart valve POD#12 from [facility] readmitted with rectus sheath hematoma and acute blood loss anemia. Stable and downgraded to Medsurg.      Plan:    * Rectus sheath hematoma  Assessment & Plan  CTAP 5/10/24 notable for \"18.1 x 17.6 x 9.6 infraumbilical rectus sheath hematoma. No extravasation of IV contrast to suggest an active hemorrhage, although evaluation somewhat limited without a delayed phase\"  Abdominal binder ordered  - See plan under ABLA  - hgb stable and no clinical signs of ongoing bleeding      ABLA (acute blood loss anemia)  Assessment & Plan  Hgb 5 on admission, 2/2 large rectus sheath hematoma complicated by coagulopathy/chronic coumadin use  Total products during admission: Whole blood 1 unit, FFP 2 units, PRBC 3 units     Lab Results   Component Value Date    HGB 8.4 (L) 2024       Management per Primary Surgery team and Cardiology:  Coagulopathy improving, s/p KCentra  Continue to transfuse as needed  Lovenox ongoing, Warfarin resumed     Status post primary low transverse  section  Assessment & Plan  Post op day #10 from [facility] for maternal request on 24, in setting of mechanical heart valve  Managed on warfarin since discharge.  Routine postoperative care  Abdominal binder  Breast pump - okay for opioids for pain management, no indications for \"pump and dump\"  OBGYN will continue to follow        Fever  Assessment & Plan  Tmax of 100.8 on  , isolated with no other signs of infectious source. Likely secondary to large embolus    WBC   Date Value Ref Range Status   2024 9.44 4.31 - 10.16 Thousand/uL Preliminary   2015 10.63 (H) 4.31 - 10.16 Thousand/uL Final     Continue to monitor     History of mechanical aortic valve replacement  Assessment & Plan  Underwent mechanical aortic valve placement with " repair of ascending aortic aneurysm in 2015 in the setting of bicuspid aortic valve with aortic stenosis and expanding ascending aortic aneurysm.    Per primary team, avoid full AC reversal in the setting of previous CVA when AC was held in the past. Did receive K-Centra  as hemoglobin was downtrending after transfusion  Daily INR  Cardiology consulted, appreciate recommendations for anticoagulation dosage  Warfarin restarted 05/13/2024 per primary team  Current regimen at time of discharge: Coumadin increased to 3.75 mg             Subjective:    Patient  states her pain is well controlled and she feels much better overall. She is voiding spontaneously, passing flatus. She is hoping to be discharged home today.    Objective:  /83   Pulse 98   Temp 100.3 °F (37.9 °C)   Resp 17   Wt 80.8 kg (178 lb 2.1 oz)   LMP 08/11/2023 (Exact Date) Comment: negative pregnancy test  SpO2 98%   Breastfeeding Yes   BMI 31.55 kg/m²     Physical Exam  Constitutional:       General: She is not in acute distress.     Appearance: Normal appearance. She is well-developed. She is not ill-appearing, toxic-appearing or diaphoretic.   HENT:      Head: Normocephalic and atraumatic.   Cardiovascular:      Rate and Rhythm: Normal rate.   Pulmonary:      Effort: Pulmonary effort is normal. No respiratory distress.   Abdominal:      General: There is no distension.      Tenderness: There is abdominal tenderness. There is no guarding.      Comments: Palpable firm hematoma superior to pfannenstiel incision. Abdominal exam overall nontender. Pfannenstiel incision c/d/I, no erythema or drainage. Improving ecchymosis over mons.   Skin:     General: Skin is warm and dry.      Comments: Overall improvement in pallor and jaundice   Neurological:      General: No focal deficit present.      Mental Status: She is alert and oriented to person, place, and time.   Psychiatric:         Mood and Affect: Mood normal.         Behavior: Behavior  normal.           No intake/output data recorded.  No intake/output data recorded.    Lab Results   Component Value Date    WBC 9.44 05/14/2024    HGB 8.4 (L) 05/14/2024    HCT 26.2 (L) 05/14/2024    MCV 89 05/14/2024     (H) 05/14/2024       Lab Results   Component Value Date    GLUCOSE 80 04/02/2024    CALCIUM 8.4 05/14/2024     (L) 06/03/2015    K 3.9 05/14/2024    CO2 23 05/14/2024     05/14/2024    BUN 8 05/14/2024    CREATININE 0.34 (L) 05/14/2024           Renetta Salamanca MD  OB/GYN PGY-4  5/14/2024  6:30 AM

## 2024-05-14 NOTE — DISCHARGE INSTR - AVS FIRST PAGE
Please take 3.75 mg of coumadin tonight () and then continue on 2.5 mg daily. Please have your INR checked on Thursday. Continue Lovenox injections twice a day until they have run out or you are told to stop by your cardiologist.     Please arrange regular follow up with your OBGYN and cardiologist.     There was some redness noticed around your  incision. Monitor the area for worsening redness, increased warmth, tenderness, drainage, or you have fevers or chills. Apply warm compresses to the area 4x a day. Call your OB office with any concerns.

## 2024-05-14 NOTE — QUICK NOTE
CBC stable. INR 1.8. advise 3.75 mg warfarin tonight followed by 2.5mg Wednesday INR Thursday with result to [facility] ( I will inform warfarin clinic). Continue lovenox bridging until therapeutic. Goal INR 2.5-3.0. I spoke with patient. Relayed to primary team.

## 2024-05-14 NOTE — QUICK NOTE
SURGERY QUICK NOTE    Notified by CM patient's preferred pharmacy out of stock of Lovenox dose, would have to order and would not be available until tomorrow. Ordered for BID dosing, requiring PM dose this evening (5/14).   Was able to locate nearby pharmacy with dosage in stock and available this evening. Prescription resent to new pharmacy and patient was called on her personal cell phone to update her on new location for prescription to be filled and then dosed this evening.     Kaitlynn Adam  05/14/24 4:07 PM

## 2024-05-15 ENCOUNTER — TELEPHONE (OUTPATIENT)
Dept: CARDIOLOGY CLINIC | Facility: CLINIC | Age: 29
End: 2024-05-15

## 2024-05-15 ENCOUNTER — APPOINTMENT (OUTPATIENT)
Dept: LAB | Facility: HOSPITAL | Age: 29
End: 2024-05-15
Payer: COMMERCIAL

## 2024-05-15 ENCOUNTER — POSTPARTUM VISIT (OUTPATIENT)
Dept: OBGYN CLINIC | Facility: CLINIC | Age: 29
End: 2024-05-15

## 2024-05-15 ENCOUNTER — ANTICOAG VISIT (OUTPATIENT)
Dept: CARDIOLOGY CLINIC | Facility: CLINIC | Age: 29
End: 2024-05-15

## 2024-05-15 VITALS
WEIGHT: 173 LBS | BODY MASS INDEX: 30.65 KG/M2 | HEIGHT: 63 IN | SYSTOLIC BLOOD PRESSURE: 132 MMHG | DIASTOLIC BLOOD PRESSURE: 84 MMHG

## 2024-05-15 DIAGNOSIS — Z51.89 ENCOUNTER FOR WOUND RE-CHECK: ICD-10-CM

## 2024-05-15 DIAGNOSIS — S30.1XXD HEMATOMA OF RECTUS SHEATH, SUBSEQUENT ENCOUNTER: Primary | ICD-10-CM

## 2024-05-15 DIAGNOSIS — Z95.2 HISTORY OF MECHANICAL AORTIC VALVE REPLACEMENT: ICD-10-CM

## 2024-05-15 DIAGNOSIS — Z95.2 HISTORY OF MECHANICAL AORTIC VALVE REPLACEMENT: Primary | ICD-10-CM

## 2024-05-15 LAB
INR PPP: 1.84 (ref 0.84–1.19)
PROTHROMBIN TIME: 21.8 SECONDS (ref 11.6–14.5)

## 2024-05-15 PROCEDURE — 85610 PROTHROMBIN TIME: CPT

## 2024-05-15 PROCEDURE — 36415 COLL VENOUS BLD VENIPUNCTURE: CPT

## 2024-05-15 PROCEDURE — 99024 POSTOP FOLLOW-UP VISIT: CPT | Performed by: STUDENT IN AN ORGANIZED HEALTH CARE EDUCATION/TRAINING PROGRAM

## 2024-05-15 NOTE — ASSESSMENT & PLAN NOTE
- Patient continues to improve following discharge from hospital. Clinically stable without signs/symptoms of expanding hematoma.   - Transitioning back to warfarin under direction of Cardiology.

## 2024-05-15 NOTE — PROGRESS NOTES
Patient did home monitor INR today, advised since she is on Lovenox, reading is not accurate, will need to go to lab for INR. She will go to [facility] today.   Short standing INR order placed.

## 2024-05-15 NOTE — ASSESSMENT & PLAN NOTE
- Wound is stable from prior. Erythema again noted at right apex of incision, but has retracted somewhat from the outlined border. Suspect some discoloration is due to underlying hematoma. No overt evidence of cellulitis or wound infection. Will maintain low threshold to initiate antibiotics.   - Patient instructed to call if any expansion of redness, fever, or drainage.   - Return to office in 1 week for recheck and routine postpartum visit.

## 2024-05-15 NOTE — PROGRESS NOTES
[facility] [address], [address], [place] [address]    Assessment/Plan:  1. Hematoma of rectus sheath, subsequent encounter  Assessment & Plan:  - Patient continues to improve following discharge from hospital. Clinically stable without signs/symptoms of expanding hematoma.   - Transitioning back to warfarin under direction of Cardiology.   2. Encounter for wound re-check  Assessment & Plan:  - Wound is stable from prior. Erythema again noted at right apex of incision, but has retracted somewhat from the outlined border. Suspect some discoloration is due to underlying hematoma. No overt evidence of cellulitis or wound infection. Will maintain low threshold to initiate antibiotics.   - Patient instructed to call if any expansion of redness, fever, or drainage.   - Return to office in 1 week for recheck and routine postpartum visit.       Subjective:   Suze Leigh is a 28 y.o.  now POD#13 s/p primary low-transverse  section and readmission for large rectus sheath hematoma who presents for wound check visit   CC:   Chief Complaint   Patient presents with    Postpartum Care     Incision check       Date of surgery: 2024  Date or readmission: 5/10/2024. Discharged to home 2024      HPI: Patient presents for follow up after discharge from [place] after readmission for large rectus sheath hematoma. She did not require surgical exploration. Patient was transfused and monitored closely with imaging and serial exams. Today, she reports that her pain is improved. Her weakness and fatigue have resolved.     ROS: Negative except as noted in HPI    The following portions of the patient's history were reviewed and updated as appropriate:   Past Medical History:   Diagnosis Date    Anxiety     Bicuspid aortic valve     s/p mechanical AVR    Coronary artery disease 1993    Moderate aortic insufficiency     s/p mechanical AVR    Motion sickness     Patent ductus arteriosus   "   s/p repair    PONV (postoperative nausea and vomiting)     S/P ascending aortic aneurysm repair     Stroke (HCC) 2022    no residual effects per pt--initially couldnt speak at first--\"feels back to normal now\"    Varicella     hx as child, and vaccine     Past Surgical History:   Procedure Laterality Date    ASCENDING AORTIC ANEURYSM REPAIR      with\" Aortic valve surgery\"    MECHANICAL AORTIC VALVE REPLACEMENT      PATENT DUCTUS ARTERIOUS LIGATION      per pt had this surgery age 1    DE  DELIVERY ONLY N/A 2024    Procedure:  SECTION ();  Surgeon: Sanjay Thompson MD;  Location: [facility] LD;  Service: Obstetrics    DE EXC B9 LESION MRGN XCP SK TG T/A/L 1.1-2.0 CM Right 2022    Procedure: EXCISION  BIOPSY LESION/MASS BACK;  Surgeon: David Cloud MD;  Location: [facility];  Service: General    THROMBECTOMY W/ EMBOLECTOMY      Percutanoeous embolectomy in setting of stroke    WISDOM TOOTH EXTRACTION       Family History   Problem Relation Age of Onset    Aneurysm Father         Aortic aneurysm    Other Father         Bicuspid AV    Heart disease Father     Aortic stenosis Maternal Grandmother     Breast cancer Family     Colon cancer Family     Diabetes Family     Sudden death Paternal Grandfather     Depression Mother     Anxiety disorder Mother     Drug abuse Brother     Schizoaffective Disorder  Brother      Social History     Socioeconomic History    Marital status: Single     Spouse name: Not on file    Number of children: Not on file    Years of education: Not on file    Highest education level: Not on file   Occupational History    Not on file   Tobacco Use    Smoking status: Never     Passive exposure: Never    Smokeless tobacco: Never   Vaping Use    Vaping status: Never Used   Substance and Sexual Activity    Alcohol use: Not Currently     Alcohol/week: 1.0 standard drink of alcohol     Types: 1 Glasses of wine per week    Drug use: No    Sexual activity: Yes " "    Partners: Male     Birth control/protection: OCP     Comment: Birth control pill   Other Topics Concern    Not on file   Social History Narrative    Not on file     Social Determinants of Health     Financial Resource Strain: Not on file   Food Insecurity: No Food Insecurity (5/15/2024)    Hunger Vital Sign     Worried About Running Out of Food in the Last Year: Never true     Ran Out of Food in the Last Year: Never true   Transportation Needs: No Transportation Needs (5/15/2024)    PRAPARE - Transportation     Lack of Transportation (Medical): No     Lack of Transportation (Non-Medical): No   Physical Activity: Not on file   Stress: Not on file   Social Connections: Not on file   Intimate Partner Violence: Not on file   Housing Stability: Low Risk  (5/15/2024)    Housing Stability Vital Sign     Unable to Pay for Housing in the Last Year: No     Number of Times Moved in the Last Year: 0     Homeless in the Last Year: No     Outpatient Medications Marked as Taking for the 5/15/24 encounter (Postpartum Visit) with Sanjay Thompson MD   Medication    escitalopram (LEXAPRO) 10 mg tablet    ferrous sulfate 325 (65 FE) MG EC tablet    metoprolol succinate (TOPROL-XL) 25 mg 24 hr tablet    warfarin (COUMADIN) 5 mg tablet     No Known Allergies        Objective:  /84 (BP Location: Right arm, Patient Position: Sitting, Cuff Size: Standard)   Ht 5' 3\" (1.6 m)   Wt 78.5 kg (173 lb)   LMP 08/11/2023 (Exact Date)   Breastfeeding Yes   BMI 30.65 kg/m²        General Appearance: alert and oriented, in no acute distress.   Abdomen: Soft, non-tender, non-distended, no masses, no rebound or guarding. Pfannenstiel incision is clean, dry, and intact. No drainage. Ecchymosis is noted. A small region of erythema is noted at the right apex of the skin incision, but remains within the borders outlined in marker yesterday.   Extremities: Normal range of motion.   Skin: normal, no rash or abnormalities  Neurologic: alert, " oriented x3  Psychiatric: Appropriate affect, mood stable, cooperative with exam.        Sanjay Thompson MD  5/15/2024 7:10 PM

## 2024-05-15 NOTE — UTILIZATION REVIEW
NOTIFICATION OF ADMISSION DISCHARGE   This is a Notification of Discharge from [facility]. Please be advised that this patient has been discharge from our facility. Below you will find the admission and discharge date and time including the patient’s disposition.   UTILIZATION REVIEW CONTACT:  Lalita Aguayo  Utilization   Network Utilization Review Department  Phone: [phone] carefully listen to the prompts. All voicemails are confidential.  Email: [email or web address]     ADMISSION INFORMATION  PRESENTATION DATE: 5/10/2024  2:30 PM  OBERVATION ADMISSION DATE:   INPATIENT ADMISSION DATE: 5/10/24  4:28 PM   DISCHARGE DATE: 5/14/2024  4:04 PM   DISPOSITION:Home/Self Care    Network Utilization Review Department  ATTENTION: Please call with any questions or concerns to [phone] and carefully listen to the prompts so that you are directed to the right person. All voicemails are confidential.   For Discharge needs, contact Care Management DC Support Team at [phone] opt. [phone]  Send all requests for admission clinical reviews, approved or denied determinations and any other requests to dedicated fax number below belonging to the campus where the patient is receiving treatment. List of dedicated fax numbers for the Facilities:  FACILITY NAME UR FAX NUMBER   ADMISSION DENIALS (Administrative/Medical Necessity) [phone]   DISCHARGE SUPPORT TEAM ([facility]) [phone]   PARENT CHILD HEALTH (Maternity/NICU/Pediatrics) [phone]   General acute hospital [phone]   [facility] [phone]   [facility] [phone]   [facility] [phone]   [facility] [phone]   [facility] [phone]   [facility] [phone]   [facility] [phone]    [facility] [phone]   [facility] [phone]   [facility] [phone]   [facility] [phone]

## 2024-05-16 ENCOUNTER — TRANSITIONAL CARE MANAGEMENT (OUTPATIENT)
Dept: FAMILY MEDICINE CLINIC | Facility: CLINIC | Age: 29
End: 2024-05-16

## 2024-05-16 ENCOUNTER — ANTICOAG VISIT (OUTPATIENT)
Dept: CARDIOLOGY CLINIC | Facility: CLINIC | Age: 29
End: 2024-05-16

## 2024-05-16 DIAGNOSIS — Z95.2 HISTORY OF MECHANICAL AORTIC VALVE REPLACEMENT: Primary | ICD-10-CM

## 2024-05-16 NOTE — PROGRESS NOTES
Left message for patient, advised INR still low, advised to take 2.5 mg tonight and get INR tomorrow AM, advised to continue Lovenox injections until INR.

## 2024-05-17 ENCOUNTER — APPOINTMENT (OUTPATIENT)
Dept: LAB | Facility: HOSPITAL | Age: 29
End: 2024-05-17
Payer: COMMERCIAL

## 2024-05-17 ENCOUNTER — ANTICOAG VISIT (OUTPATIENT)
Dept: CARDIOLOGY CLINIC | Facility: CLINIC | Age: 29
End: 2024-05-17

## 2024-05-17 ENCOUNTER — PATIENT MESSAGE (OUTPATIENT)
Dept: OBGYN CLINIC | Facility: CLINIC | Age: 29
End: 2024-05-17

## 2024-05-17 DIAGNOSIS — Z95.2 HISTORY OF MECHANICAL AORTIC VALVE REPLACEMENT: Primary | ICD-10-CM

## 2024-05-17 DIAGNOSIS — Z95.2 HISTORY OF MECHANICAL AORTIC VALVE REPLACEMENT: ICD-10-CM

## 2024-05-17 LAB
INR PPP: 2.29 (ref 0.84–1.19)
PROTHROMBIN TIME: 24.8 SECONDS (ref 11.6–14.5)

## 2024-05-17 PROCEDURE — 36415 COLL VENOUS BLD VENIPUNCTURE: CPT

## 2024-05-17 PROCEDURE — 85610 PROTHROMBIN TIME: CPT

## 2024-05-17 NOTE — PROGRESS NOTES
Discussed with Dr Cedillo  Spoke with patient, advised to stop Lovenox injections, continue 2.5 mg Tues Thurs Sat, 3.75 mg all other days, will recheck home meter Mon 5/20/24

## 2024-05-19 ENCOUNTER — NURSE TRIAGE (OUTPATIENT)
Dept: OTHER | Facility: OTHER | Age: 29
End: 2024-05-19

## 2024-05-19 ENCOUNTER — HOSPITAL ENCOUNTER (EMERGENCY)
Facility: HOSPITAL | Age: 29
Discharge: HOME | End: 2024-05-19
Attending: EMERGENCY MEDICINE
Payer: COMMERCIAL

## 2024-05-19 ENCOUNTER — APPOINTMENT (EMERGENCY)
Dept: CT IMAGING | Facility: HOSPITAL | Age: 29
End: 2024-05-19
Payer: COMMERCIAL

## 2024-05-19 VITALS
SYSTOLIC BLOOD PRESSURE: 128 MMHG | DIASTOLIC BLOOD PRESSURE: 83 MMHG | OXYGEN SATURATION: 97 % | RESPIRATION RATE: 18 BRPM | TEMPERATURE: 98.2 F | HEART RATE: 74 BPM

## 2024-05-19 DIAGNOSIS — Z79.01 ANTICOAGULATED ON WARFARIN: ICD-10-CM

## 2024-05-19 DIAGNOSIS — Z95.2 H/O MECHANICAL AORTIC VALVE REPLACEMENT: ICD-10-CM

## 2024-05-19 LAB
ABO GROUP BLD: NORMAL
ANION GAP SERPL CALCULATED.3IONS-SCNC: 7 MMOL/L (ref 4–13)
APTT PPP: 54 SECONDS (ref 23–37)
BASOPHILS # BLD AUTO: 0.06 THOUSANDS/ÂΜL (ref 0–0.1)
BASOPHILS NFR BLD AUTO: 1 % (ref 0–1)
BLD GP AB SCN SERPL QL: NEGATIVE
BUN SERPL-MCNC: 9 MG/DL (ref 5–25)
CALCIUM SERPL-MCNC: 9.4 MG/DL (ref 8.4–10.2)
CHLORIDE SERPL-SCNC: 104 MMOL/L (ref 96–108)
CO2 SERPL-SCNC: 26 MMOL/L (ref 21–32)
CREAT SERPL-MCNC: 0.44 MG/DL (ref 0.6–1.3)
EOSINOPHIL # BLD AUTO: 0.35 THOUSAND/ÂΜL (ref 0–0.61)
EOSINOPHIL NFR BLD AUTO: 5 % (ref 0–6)
ERYTHROCYTE [DISTWIDTH] IN BLOOD BY AUTOMATED COUNT: 15 % (ref 11.6–15.1)
GFR SERPL CREATININE-BSD FRML MDRD: 135 ML/MIN/1.73SQ M
GLUCOSE SERPL-MCNC: 80 MG/DL (ref 65–140)
HCT VFR BLD AUTO: 32 % (ref 34.8–46.1)
HGB BLD-MCNC: 9.9 G/DL (ref 11.5–15.4)
IMM GRANULOCYTES # BLD AUTO: 0.15 THOUSAND/UL (ref 0–0.2)
IMM GRANULOCYTES NFR BLD AUTO: 2 % (ref 0–2)
INR PPP: 1.94 (ref 0.84–1.19)
LYMPHOCYTES # BLD AUTO: 1.07 THOUSANDS/ÂΜL (ref 0.6–4.47)
LYMPHOCYTES NFR BLD AUTO: 16 % (ref 14–44)
MCH RBC QN AUTO: 28.1 PG (ref 26.8–34.3)
MCHC RBC AUTO-ENTMCNC: 30.9 G/DL (ref 31.4–37.4)
MCV RBC AUTO: 91 FL (ref 82–98)
MONOCYTES # BLD AUTO: 0.69 THOUSAND/ÂΜL (ref 0.17–1.22)
MONOCYTES NFR BLD AUTO: 10 % (ref 4–12)
NEUTROPHILS # BLD AUTO: 4.45 THOUSANDS/ÂΜL (ref 1.85–7.62)
NEUTS SEG NFR BLD AUTO: 66 % (ref 43–75)
NRBC BLD AUTO-RTO: 0 /100 WBCS
PLATELET # BLD AUTO: 557 THOUSANDS/UL (ref 149–390)
PMV BLD AUTO: 8.4 FL (ref 8.9–12.7)
POTASSIUM SERPL-SCNC: 4 MMOL/L (ref 3.5–5.3)
PROTHROMBIN TIME: 22.6 SECONDS (ref 11.6–14.5)
RBC # BLD AUTO: 3.52 MILLION/UL (ref 3.81–5.12)
RH BLD: POSITIVE
SODIUM SERPL-SCNC: 137 MMOL/L (ref 135–147)
SPECIMEN EXPIRATION DATE: NORMAL
WBC # BLD AUTO: 6.77 THOUSAND/UL (ref 4.31–10.16)

## 2024-05-19 PROCEDURE — 86901 BLOOD TYPING SEROLOGIC RH(D): CPT | Performed by: EMERGENCY MEDICINE

## 2024-05-19 PROCEDURE — 10140 I&D HMTMA SEROMA/FLUID COLLJ: CPT | Performed by: PHYSICIAN ASSISTANT

## 2024-05-19 PROCEDURE — NC001 PR NO CHARGE: Performed by: PHYSICIAN ASSISTANT

## 2024-05-19 PROCEDURE — 86900 BLOOD TYPING SEROLOGIC ABO: CPT | Performed by: EMERGENCY MEDICINE

## 2024-05-19 PROCEDURE — 80048 BASIC METABOLIC PNL TOTAL CA: CPT | Performed by: EMERGENCY MEDICINE

## 2024-05-19 PROCEDURE — 10061 I&D ABSCESS COMP/MULTIPLE: CPT | Performed by: EMERGENCY MEDICINE

## 2024-05-19 PROCEDURE — 36415 COLL VENOUS BLD VENIPUNCTURE: CPT | Performed by: EMERGENCY MEDICINE

## 2024-05-19 PROCEDURE — 99285 EMERGENCY DEPT VISIT HI MDM: CPT | Performed by: EMERGENCY MEDICINE

## 2024-05-19 PROCEDURE — 86850 RBC ANTIBODY SCREEN: CPT | Performed by: EMERGENCY MEDICINE

## 2024-05-19 PROCEDURE — 99284 EMERGENCY DEPT VISIT MOD MDM: CPT

## 2024-05-19 PROCEDURE — 74174 CTA ABD&PLVS W/CONTRAST: CPT

## 2024-05-19 PROCEDURE — 85610 PROTHROMBIN TIME: CPT | Performed by: EMERGENCY MEDICINE

## 2024-05-19 PROCEDURE — 85025 COMPLETE CBC W/AUTO DIFF WBC: CPT | Performed by: EMERGENCY MEDICINE

## 2024-05-19 PROCEDURE — 85730 THROMBOPLASTIN TIME PARTIAL: CPT | Performed by: EMERGENCY MEDICINE

## 2024-05-19 PROCEDURE — NC001 PR NO CHARGE: Performed by: OBSTETRICS & GYNECOLOGY

## 2024-05-19 RX ORDER — LIDOCAINE HYDROCHLORIDE AND EPINEPHRINE 10; 10 MG/ML; UG/ML
10 INJECTION, SOLUTION INFILTRATION; PERINEURAL ONCE
Status: COMPLETED | OUTPATIENT
Start: 2024-05-19 | End: 2024-05-19

## 2024-05-19 RX ADMIN — LIDOCAINE HYDROCHLORIDE,EPINEPHRINE BITARTRATE 10 ML: 10; .01 INJECTION, SOLUTION INFILTRATION; PERINEURAL at 13:41

## 2024-05-19 RX ADMIN — IOHEXOL 100 ML: 350 INJECTION, SOLUTION INTRAVENOUS at 09:25

## 2024-05-19 NOTE — ED PROVIDER NOTES
"History  Chief Complaint   Patient presents with    Post-op Problem     Patient reports to ED c/o increased bleeding from  section incision since last night. Patient recently admitted and discharged for the same. Reports soaking through a pad within 15 minutes. Denies any other symptoms.      28-year-old female status post  at [facility] on  presents for bleeding from her  section site.  The patient has been having a rectus sheath hematoma and has been oozing from the central part of the incision site since last evening.  The patient describes dark blood without pain.  Patient is on Coumadin status post aortic valve replacement.  Was recently hospitalized at [facility] for the rectus sheath hematoma          Prior to Admission Medications   Prescriptions Last Dose Informant Patient Reported? Taking?   escitalopram (LEXAPRO) 10 mg tablet   No No   Sig: Take 2 tablets (20 mg total) by mouth daily   ferrous sulfate 325 (65 FE) MG EC tablet   Yes No   Sig: Take 325 mg by mouth daily with breakfast   metoprolol succinate (TOPROL-XL) 25 mg 24 hr tablet   No No   Sig: Take 1 tablet (25 mg total) by mouth daily   warfarin (COUMADIN) 5 mg tablet   No No   Sig: Take one tablet daily until INR at least 2.0.  Then review with your cardiology.      Facility-Administered Medications: None       Past Medical History:   Diagnosis Date    Anxiety     Bicuspid aortic valve     s/p mechanical AVR    Coronary artery disease 1993    Moderate aortic insufficiency     s/p mechanical AVR    Motion sickness     Patent ductus arteriosus     s/p repair    PONV (postoperative nausea and vomiting)     S/P ascending aortic aneurysm repair     Stroke (HCC) 2022    no residual effects per pt--initially couldnt speak at first--\"feels back to normal now\"    Varicella     hx as child, and vaccine       Past Surgical History:   Procedure Laterality Date    ASCENDING AORTIC ANEURYSM REPAIR      with\" " "Aortic valve surgery\"    MECHANICAL AORTIC VALVE REPLACEMENT      PATENT DUCTUS ARTERIOUS LIGATION      per pt had this surgery age 1    MS  DELIVERY ONLY N/A 2024    Procedure:  SECTION ();  Surgeon: Sanjay Thompson MD;  Location: [facility] LD;  Service: Obstetrics    MS EXC B9 LESION MRGN XCP SK TG T/A/L 1.1-2.0 CM Right 2022    Procedure: EXCISION  BIOPSY LESION/MASS BACK;  Surgeon: David Cloud MD;  Location: [facility] OR;  Service: General    THROMBECTOMY W/ EMBOLECTOMY      Percutanoeous embolectomy in setting of stroke    WISDOM TOOTH EXTRACTION         Family History   Problem Relation Age of Onset    Aneurysm Father         Aortic aneurysm    Other Father         Bicuspid AV    Heart disease Father     Aortic stenosis Maternal Grandmother     Breast cancer Family     Colon cancer Family     Diabetes Family     Sudden death Paternal Grandfather     Depression Mother     Anxiety disorder Mother     Drug abuse Brother     Schizoaffective Disorder  Brother      I have reviewed and agree with the history as documented.    E-Cigarette/Vaping    E-Cigarette Use Never User      E-Cigarette/Vaping Substances    Nicotine No     THC No     CBD No     Flavoring No     Other No     Unknown No      Social History     Tobacco Use    Smoking status: Never     Passive exposure: Never    Smokeless tobacco: Never   Vaping Use    Vaping status: Never Used   Substance Use Topics    Alcohol use: Not Currently     Alcohol/week: 1.0 standard drink of alcohol     Types: 1 Glasses of wine per week    Drug use: No       Review of Systems    Physical Exam  Physical Exam    Vital Signs  ED Triage Vitals   Temperature Pulse Respirations Blood Pressure SpO2   24 0853 24 0844 24 0844 24 0844 24 0844   98.2 °F (36.8 °C) 65 18 135/74 98 %      Temp Source Heart Rate Source Patient Position - Orthostatic VS BP Location FiO2 (%)   24 0853 24 0844 24 0844 " 05/19/24 0844 --   Oral Monitor Sitting Right arm       Pain Score       05/19/24 0844       1           Vitals:    05/19/24 1030 05/19/24 1245 05/19/24 1300 05/19/24 1330   BP: 145/70 110/65 130/74 128/83   Pulse: 56 72 63 74   Patient Position - Orthostatic VS:             Visual Acuity      ED Medications  Medications   iohexol (OMNIPAQUE) 350 MG/ML injection (MULTI-DOSE) 100 mL (100 mL Intravenous Given 5/19/24 0925)   lidocaine-epinephrine (XYLOCAINE/EPINEPHRINE) 1 %-1:100,000 injection 10 mL (10 mL Infiltration Given by Other 5/19/24 1341)       Diagnostic Studies  Results Reviewed       Procedure Component Value Units Date/Time    Protime-INR [[id number]]  (Abnormal) Collected: 05/19/24 0853    Lab Status: Final result Specimen: Blood from Arm, Right Updated: 05/19/24 0941     Protime 22.6 seconds      INR 1.94    APTT [[id number]]  (Abnormal) Collected: 05/19/24 0853    Lab Status: Final result Specimen: Blood from Arm, Right Updated: 05/19/24 0941     PTT 54 seconds     Basic metabolic panel [[id number]]  (Abnormal) Collected: 05/19/24 0853    Lab Status: Final result Specimen: Blood from Arm, Right Updated: 05/19/24 0917     Sodium 137 mmol/L      Potassium 4.0 mmol/L      Chloride 104 mmol/L      CO2 26 mmol/L      ANION GAP 7 mmol/L      BUN 9 mg/dL      Creatinine 0.44 mg/dL      Glucose 80 mg/dL      Calcium 9.4 mg/dL      eGFR 135 ml/min/1.73sq m     Narrative:      National Kidney Disease Foundation guidelines for Chronic Kidney Disease (CKD):     Stage 1 with normal or high GFR (GFR > 90 mL/min/1.73 square meters)    Stage 2 Mild CKD (GFR = 60-89 mL/min/1.73 square meters)    Stage 3A Moderate CKD (GFR = 45-59 mL/min/1.73 square meters)    Stage 3B Moderate CKD (GFR = 30-44 mL/min/1.73 square meters)    Stage 4 Severe CKD (GFR = 15-29 mL/min/1.73 square meters)    Stage 5 End Stage CKD (GFR <15 mL/min/1.73 square meters)  Note: GFR calculation is accurate only with a steady state creatinine    CBC and  differential [[id number]]  (Abnormal) Collected: 24 0853    Lab Status: Final result Specimen: Blood from Arm, Right Updated: 24 0859     WBC 6.77 Thousand/uL      RBC 3.52 Million/uL      Hemoglobin 9.9 g/dL      Hematocrit 32.0 %      MCV 91 fL      MCH 28.1 pg      MCHC 30.9 g/dL      RDW 15.0 %      MPV 8.4 fL      Platelets 557 Thousands/uL      nRBC 0 /100 WBCs      Segmented % 66 %      Immature Grans % 2 %      Lymphocytes % 16 %      Monocytes % 10 %      Eosinophils Relative 5 %      Basophils Relative 1 %      Absolute Neutrophils 4.45 Thousands/µL      Absolute Immature Grans 0.15 Thousand/uL      Absolute Lymphocytes 1.07 Thousands/µL      Absolute Monocytes 0.69 Thousand/µL      Eosinophils Absolute 0.35 Thousand/µL      Basophils Absolute 0.06 Thousands/µL                    CTA abdomen pelvis w wo contrast   Final Result by Nathan Colon MD ( 114)      1. Redemonstrated recent postoperative change from  section. Large heterogeneous infraumbilical rectus sheath hematoma measuring 15.8 x 8.6 x 16.2 cm, slightly decreased from prior when it measured 19.4 x 10.1 x 17.9 cm. No evidence of active    extravasation.      2. Grossly similar small volume pelvic fluid and blood products may be related to abdominal wall hematoma and/or recent surgery. Small area of loculated fluid anterior to the uterus measures 2.3 x 1.4 cm, likely postsurgical.      3. Increased areas of fluid within the anterior abdominal wall subcutaneous fat along the incision site compared to 5/10/2024 with some areas of mild loculation, likely postoperative edema/seromas.      4. 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 Recommend vascular surgery consultation.         The study was marked in EPIC for immediate notification.            Workstation performed: [id number]                    Procedures  Procedures         ED Course  ED Course as of 24 1427   Sun May 19, 2024   0824  GYN paged   0902 Hemoglobin(!): 9.9  Imrpvoed from 8.4 five days ago   0911 Discussed the case with Dr. Nath from GYN, advised CT A/P then she will see in ED   1100 Discussed case with Dr. Fraser in the ED after her evaluation.  Awaiting CT read.  She believes that this is old blood from resolving hematoma.   1219 Discussed INR dosing with Dr. Hodge from cardiology who stated to hold coumadin today and resume on monday   1225 Discussed with wound with Kassandra from general surgery, plan for bedside packing then discharge home   1421 Patient seen and packed by surgery                               SBIRT 22yo+      Flowsheet Row Most Recent Value   Initial Alcohol Screen: US AUDIT-C     1. How often do you have a drink containing alcohol? 0 Filed at: 05/19/2024 0844   2. How many drinks containing alcohol do you have on a typical day you are drinking?  0 Filed at: 05/19/2024 0844   3a. Male UNDER 65: How often do you have five or more drinks on one occasion? 0 Filed at: 05/19/2024 0844   3b. FEMALE Any Age, or MALE 65+: How often do you have 4 or more drinks on one occassion? 0 Filed at: 05/19/2024 0844   Audit-C Score 0 Filed at: 05/19/2024 0844   JUNE: How many times in the past year have you...    Used an illegal drug or used a prescription medication for non-medical reasons? Never Filed at: 05/19/2024 0844                      Medical Decision Making  Differential diagnosis: Abdominal wall hematoma, new extravasation, resolving fluid collection    Plan for CTA of abdomen to rule out expansion of known hematoma.  Will discuss the case with GYN and general surgery    Patient seen and evaluated by gynecology as well as general surgery with packing the wound and wound care instructions provided to patient and mother (who is a nurse).  Plan is for discharge and outpatient follow-up.  Patient was also advised to hold her Coumadin dosing tonight per my discussion with cardiology and she understands that she will call  her cardiology group tomorrow for further anticoagulation management.    The patient (and any family present) verbalized understanding of the discharge instructions and warnings that would necessitate return to the Emergency Department.    All questions were answered prior to discharge.    Amount and/or Complexity of Data Reviewed  External Data Reviewed: notes.     Details: Recent discharge summary from [facility] reviewed  Labs: ordered. Decision-making details documented in ED Course.  Radiology: ordered.    Risk  Prescription drug management.             Disposition  Final diagnoses:   Postoperative complication of  section   Hematoma of surgical wound following  section   H/O mechanical aortic valve replacement   Anticoagulated on warfarin     Time reflects when diagnosis was documented in both MDM as applicable and the Disposition within this note       Time User Action Codes Description Comment    2024 11:00 AM Mick Camp Add [O75.4] Postoperative complication of  section     2024 12:25 PM Mick Camp Add [O90.2] Hematoma of surgical wound following  section     2024  1:49 PM Mick Camp Add [Z95.2] H/O mechanical aortic valve replacement     2024  1:50 PM Mick Camp Add [Z79.01] Anticoagulated on warfarin     2024  2:22 PM Jessica Jimenez Add [O90.89]  section wound seroma, postpartum           ED Disposition       ED Disposition   Discharge    Condition   Stable    Date/Time   Sun May 19, 2024 7862    Comment   Suze Leigh discharge to home/self care.                   Follow-up Information       Follow up With Specialties Details Why Contact Info Additional Information    David Cloud MD General Surgery, Wound Care Schedule an appointment as soon as possible for a visit  For further evaluation, if not improved [address] [place] [address]  [phone]       [facility]  [facility] Call in 1 day for coumadin management [address] [facility] [address] [phone] [facility], [address] [place], [place], [address] [phone]            Patient's Medications   Discharge Prescriptions    No medications on file       No discharge procedures on file.    PDMP Review       None            ED Provider  Electronically Signed by             Mick Camp,   05/19/24 8028

## 2024-05-19 NOTE — PROCEDURES
"Incision and drain    Date/Time: 5/19/2024 1:45 PM    Performed by: Jessica Jimenez PA-C  Authorized by: Jessica Jimenez PA-C  [place] Protocol:  Procedure performed by: (Dr. David Cloud)  Risks and benefits: risks, benefits and alternatives were discussed  Consent given by: patient  Time out: Immediately prior to procedure a \"time out\" was called to verify the correct patient, procedure, equipment, support staff and site/side marked as required.  Patient understanding: patient states understanding of the procedure being performed  Site marked: the operative site was marked  Radiology Images displayed and confirmed. If images not available, report reviewed: imaging studies available  Required items: required blood products, implants, devices, and special equipment available  Patient identity confirmed: verbally with patient and arm band    Patient location:  ED  Location:     Type:  Fluid collection    Location:  Trunk    Trunk location:  Abdomen  Pre-procedure details:     Skin preparation:  Betadine  Anesthesia (see MAR for exact dosages):     Anesthesia method:  Local infiltration    Local anesthetic:  Lidocaine 1% WITH epi (5 ml)  Procedure details:     Complexity:  Intermediate    Needle aspiration: no      Incision types:  Single straight (2 incisions made midline and left lateral incision)    Scalpel blade:  11    Approach:  Open    Incision depth:  Subcutaneous    Wound management:  Probed and deloculated and irrigated with saline    Drainage:  Serosanguinous (dark bloody drainage)    Drainage amount:  Copious    Wound treatment:  Packing placed    Packing materials:  1/2 in gauze (2 in guaze)    Amount 1/2\":  10 cm 1/2 inch lateral site, 10 cm 2 in at midline site  Post-procedure details:     Patient tolerance of procedure:  Tolerated well, no immediate complications  Comments:      Large amount of dark bloody serosanguineous drainage from wound sites, consistent with hematoma.  No signs of " active bleeding.  No signs of infection.  No purulence.  Packing placed in wound and wound covered with 4 x 4 and ABD dressing.    Wound care reviewed with patient and mother at bedside.  All questions answered    Patient to follow-up in surgical office this week.        Jessica Jimenez

## 2024-05-19 NOTE — TELEPHONE ENCOUNTER
"Regarding: post /bleeding incision  ----- Message from Tiana SMITH sent at 2024  7:48 AM EDT -----  Pt stated, \"I recently had a  and was admitted to the ICU for a large hematoma. Right now I am bleeding through the incision.\"    "

## 2024-05-19 NOTE — CONSULTS
Consultation - General Surgery   Suze Leigh 28 y.o. female MRN: [id number]  Unit/Bed#: ED 08 Encounter: [id number]    Assessment & Plan     28-year-old postpartum female with history of bicuspid aortic valve and mechanical AVR on Coumadin anticoagulation status post delivery of her first child on 2024 complicated by postoperative bleeding and hematoma in rectus sheath who presents to the emergency department with bloody drainage from  incision site.     Postoperative/ hematoma, seroma  -CT scan reviewed, large hematoma in rectus sheath, no active extravasation.   -HGB stable at 9.9 today.  Last check 8.4   -Dark blood-tinged serous drainage draining from midline wound  -Wound with resolving ecchymosis without signs of infection.  No erythema, induration, tenderness, purulent drainage.  -No leukocytosis, fevers.  Stable VS  Bedside incision and drainage of underlying seroma performed.  Discussed with patient in detail as well as risks and complications.  Verbal consent obtained.   Incision probed open with long sterile Q-tip, extended minimally with 11 blade due to concurrent Coumadin and INR 1.92.  As of dark blood-tinged serous drainage consistent with seroma.  Packing placed at 2 different sites.  Wound care reviewed in detail with patient and mother who is a retired nurse at bedside.  Mother will be able to assist with wound care packing at home.  See discharge orders  Continue to monitor wound site for signs of developing infection.  Call with any increasing redness, swelling, purulent drainage, foul odor or developing fever.  Instructed to return to the ED with any bright red bloody output  All questions answered.  Okay to use for discharge from surgical standpoint.  Okay to continue Coumadin. Will need to call for office follow-up later this week.  Incision continues to drain or difficultly healing may need to discuss further wound exploration with evacuation of hematoma and  placement of VAC dressing.  Patient would need to be admitted for heparin drip with reversal of INR prior to surgical intervention  Findings and plan discussed with obstetrics physician on-call , Dr. Nath          History of Present Illness     HPI:  Suze Leigh is a 28 y.o. postpartum female with significant cardiac history including bicuspid aortic valve status post mechanical AVR with delivery of baby girl on 2024 complicated by bleeding after  with development of large rectus sheath hematoma.  Bleeding was stabilized and patient was restarted on her Coumadin anticoagulation and discharged home.  She presents to the emergency department today with complaints of dark bloody oozing from her  site.  Denies associated pain, fevers or chills, purulence.  No nausea or vomiting.  Patient is on Coumadin anticoagulation.  Lovenox has been stopped at this point.  INR today 1.92.    Consults    Review of Systems   Constitutional: Negative.  Negative for appetite change, chills and fever.   HENT: Negative.     Eyes: Negative.    Respiratory: Negative.  Negative for cough and shortness of breath.    Cardiovascular: Negative.  Negative for chest pain and palpitations.   Gastrointestinal: Negative.  Negative for abdominal pain, nausea and vomiting.   Endocrine: Negative.    Genitourinary: Negative.  Negative for difficulty urinating and dysuria.   Musculoskeletal: Negative.    Skin:  Positive for wound.   Allergic/Immunologic: Negative.    Neurological: Negative.  Negative for dizziness, weakness and light-headedness.   Hematological:  Bruises/bleeds easily.   Psychiatric/Behavioral: Negative.     All other systems reviewed and are negative.      Historical Information   Past Medical History:   Diagnosis Date    Anxiety     Bicuspid aortic valve     s/p mechanical AVR    Coronary artery disease 1993    Moderate aortic insufficiency     s/p mechanical AVR    Motion sickness     Patent ductus  "arteriosus     s/p repair    PONV (postoperative nausea and vomiting)     S/P ascending aortic aneurysm repair     Stroke (HCC) 2022    no residual effects per pt--initially couldnt speak at first--\"feels back to normal now\"    Varicella     hx as child, and vaccine     Past Surgical History:   Procedure Laterality Date    ASCENDING AORTIC ANEURYSM REPAIR      with\" Aortic valve surgery\"    MECHANICAL AORTIC VALVE REPLACEMENT      PATENT DUCTUS ARTERIOUS LIGATION      per pt had this surgery age 1    DE  DELIVERY ONLY N/A 2024    Procedure:  SECTION ();  Surgeon: Sanjay Thompson MD;  Location: [facility] LD;  Service: Obstetrics    DE EXC B9 LESION MRGN XCP SK TG T/A/L 1.1-2.0 CM Right 2022    Procedure: EXCISION  BIOPSY LESION/MASS BACK;  Surgeon: David Cloud MD;  Location: [facility];  Service: General    THROMBECTOMY W/ EMBOLECTOMY      Percutanoeous embolectomy in setting of stroke    WISDOM TOOTH EXTRACTION       Social History   Social History     Substance and Sexual Activity   Alcohol Use Not Currently    Alcohol/week: 1.0 standard drink of alcohol    Types: 1 Glasses of wine per week     Social History     Substance and Sexual Activity   Drug Use No     E-Cigarette/Vaping    E-Cigarette Use Never User      E-Cigarette/Vaping Substances    Nicotine No     THC No     CBD No     Flavoring No     Other No     Unknown No      Social History     Tobacco Use   Smoking Status Never    Passive exposure: Never   Smokeless Tobacco Never     Family History: non-contributory    Meds/Allergies   all current active meds have been reviewed  No Known Allergies    Objective   First Vitals:   Blood Pressure: 135/74 (24 0844)  Pulse: 65 (24 0844)  Temperature: 98.2 °F (36.8 °C) (24 0853)  Temp Source: Oral (24 0853)  Respirations: 18 (24 0844)  SpO2: 98 % (24 0844)    Current Vitals:   Blood Pressure: 128/83 (24 1330)  Pulse: 74 (24 " 1330)  Temperature: 98.2 °F (36.8 °C) (24 0853)  Temp Source: Oral (24 0853)  Respirations: 18 (24 1330)  SpO2: 97 % (24 1330)    No intake or output data in the 24 hours ending 24 1353    Invasive Devices       Peripheral Intravenous Line  Duration             Peripheral IV 05/10/24 Distal;Right;Ventral (anterior) Forearm 8 days    Peripheral IV 05/10/24 Right Antecubital 8 days    Peripheral IV 24 Right Antecubital <1 day                    Physical Exam  Constitutional:       General: She is not in acute distress.     Appearance: She is well-developed. She is not ill-appearing or diaphoretic.   HENT:      Head: Normocephalic and atraumatic.      Mouth/Throat:      Mouth: Mucous membranes are moist.   Eyes:      General: No scleral icterus.     Conjunctiva/sclera: Conjunctivae normal.   Neck:      Vascular: No JVD.   Cardiovascular:      Rate and Rhythm: Normal rate and regular rhythm.   Pulmonary:      Effort: Pulmonary effort is normal.      Breath sounds: Normal breath sounds.   Abdominal:      General: Bowel sounds are normal. There is no distension.      Palpations: Abdomen is soft. Abdomen is not rigid.      Tenderness: There is no abdominal tenderness.   Musculoskeletal:         General: Normal range of motion.      Cervical back: Normal range of motion and neck supple.   Skin:     General: Skin is warm and dry.      Coloration: Skin is pale.      Comments:  wound with resolving ecchymosis with midline wound opening with drainage of dark bloody serous drainage.  No surrounding erythema, induration or tenderness.  No purulence or foul odor.  No signs of active infection.   Neurological:      Mental Status: She is alert and oriented to person, place, and time.      Cranial Nerves: No cranial nerve deficit.         Lab Results: I have personally reviewed pertinent lab results.  , CBC:   Lab Results   Component Value Date    WBC 6.77 2024    HGB 9.9 (L)  05/19/2024    HCT 32.0 (L) 05/19/2024    MCV 91 05/19/2024     (H) 05/19/2024    RBC 3.52 (L) 05/19/2024    MCH 28.1 05/19/2024    MCHC 30.9 (L) 05/19/2024    RDW 15.0 05/19/2024    MPV 8.4 (L) 05/19/2024    NRBC 0 05/19/2024   , CMP:   Lab Results   Component Value Date    SODIUM 137 05/19/2024    K 4.0 05/19/2024     05/19/2024    CO2 26 05/19/2024    BUN 9 05/19/2024    CREATININE 0.44 (L) 05/19/2024    CALCIUM 9.4 05/19/2024    EGFR 135 05/19/2024   , Coagulation:   Lab Results   Component Value Date    INR 1.94 (H) 05/19/2024     Imaging: I have personally reviewed pertinent reports.    EKG, Pathology, and Other Studies: I have personally reviewed pertinent reports.      Counseling / Coordination of Care  Total floor / unit time spent today 30 minutes.  Greater than 50% of total time was spent with the patient and / or family counseling and / or coordination of care.  A description of the counseling / coordination of care    Jessica Jimenez

## 2024-05-19 NOTE — CONSULTS
"OBGYN Consult  DOS:  24  Location:   ED8    Suze Leigh is a 28 y.o.  who is POD #17 s/p  section and presents with bloody drainage from incision. She has known large rectus sheath hematoma and was readmitted 5/10- to general surgery at [facility]. She received blood transfusion during that admission.     Drainage started last night and is very dark in color, enough to saturate a postpartum pad and multiple other dressings. She denies increased pain, lightheadedness.      Surgical history is significant for mechanical aortic valve replacement in  at time of ascending aortic aneurysm repair. She is on lifelong anticoagulation and follows with her primary cardiology, Dr. Shell. 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     Past Medical History:   Diagnosis Date    Anxiety     Bicuspid aortic valve     s/p mechanical AVR    Coronary artery disease 1993    Moderate aortic insufficiency     s/p mechanical AVR    Motion sickness     Patent ductus arteriosus     s/p repair    PONV (postoperative nausea and vomiting)     S/P ascending aortic aneurysm repair     Stroke (HCC) 2022    no residual effects per pt--initially couldnt speak at first--\"feels back to normal now\"    Varicella     hx as child, and vaccine     Past Surgical History:   Procedure Laterality Date    ASCENDING AORTIC ANEURYSM REPAIR      with\" Aortic valve surgery\"    MECHANICAL AORTIC VALVE REPLACEMENT      PATENT DUCTUS ARTERIOUS LIGATION      per pt had this surgery age 1    [facility]  DEL[facility] N/A 2024    Procedure:  SECTION ();  Surgeon: Sanjay Thompson MD;  Location: [facility];  Service: Obstetrics    VA EXC B9 LESION MRGN XCP SK TG T/A/L 1.1-2.0 CM Right 2022    Procedure: EXCISION  BIOPSY LESION/MASS BACK;  Surgeon: David Cloud MD;  Location: [facility] OR;  Service: General    THROMBECTOMY W/ EMBOLECTOMY      " Percutanoeous embolectomy in setting of stroke    WISDOM TOOTH EXTRACTION       Past OB/Gyn History:  Primary  section .     Family History   Problem Relation Age of Onset    Aneurysm Father         Aortic aneurysm    Other Father         Bicuspid AV    Heart disease Father     Aortic stenosis Maternal Grandmother     Breast cancer Family     Colon cancer Family     Diabetes Family     Sudden death Paternal Grandfather     Depression Mother     Anxiety disorder Mother     Drug abuse Brother     Schizoaffective Disorder  Brother      Social History:  Social History     Socioeconomic History    Marital status: Single     Spouse name: Not on file    Number of children: Not on file    Years of education: Not on file    Highest education level: Not on file   Occupational History    Not on file   Tobacco Use    Smoking status: Never     Passive exposure: Never    Smokeless tobacco: Never   Vaping Use    Vaping status: Never Used   Substance and Sexual Activity    Alcohol use: Not Currently     Alcohol/week: 1.0 standard drink of alcohol     Types: 1 Glasses of wine per week    Drug use: No    Sexual activity: Yes     Partners: Male     Birth control/protection: OCP     Comment: Birth control pill   Other Topics Concern    Not on file   Social History Narrative    Not on file     Social Determinants of Health     Financial Resource Strain: Not on file   Food Insecurity: No Food Insecurity (5/15/2024)    Hunger Vital Sign     Worried About Running Out of Food in the Last Year: Never true     Ran Out of Food in the Last Year: Never true   Transportation Needs: No Transportation Needs (5/15/2024)    PRAPARE - Transportation     Lack of Transportation (Medical): No     Lack of Transportation (Non-Medical): No   Physical Activity: Not on file   Stress: Not on file   Social Connections: Not on file   Intimate Partner Violence: Not on file   Housing Stability: Low Risk  (5/15/2024)    Housing Stability Vital Sign      Unable to Pay for Housing in the Last Year: No     Number of Times Moved in the Last Year: 0     Homeless in the Last Year: No     No Known Allergies    Current Facility-Administered Medications:     lidocaine-epinephrine (XYLOCAINE/EPINEPHRINE) 1 %-1:100,000 injection 10 mL, 10 mL, Infiltration, Once, Jessiac Jimenez PA-C    Current Outpatient Medications:     escitalopram (LEXAPRO) 10 mg tablet, Take 2 tablets (20 mg total) by mouth daily, Disp: 90 tablet, Rfl: 0    ferrous sulfate 325 (65 FE) MG EC tablet, Take 325 mg by mouth daily with breakfast, Disp: , Rfl:     metoprolol succinate (TOPROL-XL) 25 mg 24 hr tablet, Take 1 tablet (25 mg total) by mouth daily, Disp: , Rfl:     warfarin (COUMADIN) 5 mg tablet, Take one tablet daily until INR at least 2.0.  Then review with your cardiology., Disp: 30 tablet, Rfl: 0    Review of Systems:  A complete review of systems was performed and was negative, except as listed.    Physical Exam:  /83   Pulse 74   Temp 98.2 °F (36.8 °C) (Oral)   Resp 18   LMP 08/11/2023 (Exact Date)   SpO2 97%   Breastfeeding Yes   Physical Exam  Constitutional:       Appearance: Normal appearance.   HENT:      Head: Normocephalic and atraumatic.   Pulmonary:      Effort: Pulmonary effort is normal.   Abdominal:      Palpations: Abdomen is soft. There is no mass.      Tenderness: There is no abdominal tenderness. There is no guarding or rebound.   Musculoskeletal:         General: Normal range of motion.   Neurological:      Mental Status: She is alert and oriented to person, place, and time.   Skin:     General: Skin is warm and dry.   Psychiatric:         Mood and Affect: Mood normal.         Behavior: Behavior normal.         Thought Content: Thought content normal.         Judgment: Judgment normal.     Wound: dark maroon drainage from center of incision through very small defect <0.5cm. Slow pooling of drainage under observation. Ecchymosis seen around wound. No erythema or  tenderness.     Lab Results   Component Value Date    WBC 6.77 2024    HGB 9.9 (L) 2024    HCT 32.0 (L) 2024    MCV 91 2024     (H) 2024     Lab Results   Component Value Date    INR 1.94 (H) 2024    INR 2.29 (H) 2024    INR 1.84 (H) 05/15/2024    PROTIME 22.6 (H) 2024    PROTIME 24.8 (H) 2024    PROTIME 21.8 (H) 05/15/2024     Lab Results   Component Value Date    PTT 54 (H) 2024     CT ANGIOGRAM OF THE ABDOMEN AND PELVIS WITH AND WITHOUT IV CONTRAST     INDICATION: bleeding from  site, recent abdominal wall hematoma.     COMPARISON: CT abdomen pelvis 5/10/2024     TECHNIQUE: CT angiogram examination of the abdomen and pelvis was performed according to standard protocol.     This examination, like all CT scans performed in the [facility], was performed utilizing techniques to minimize radiation dose exposure, including the use of iterative reconstruction and automated exposure control. Contrast as well as   noncontrast images were obtained. Rad dose 691.24 mGy-cm     IV Contrast: 100 mL of iohexol (OMNIPAQUE)  Enteric Contrast: Not administered.     FINDINGS:     VASCULAR STRUCTURES: The abdominal aorta and major branches are patent. 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 Recommend vascular surgery consultation.     OTHER FINDINGS     ABDOMEN     LOWER CHEST: No clinically significant abnormality in the visualized lower chest.     LIVER/BILIARY TREE: Unremarkable.     GALLBLADDER: No calcified gallstones. No pericholecystic inflammatory change.     SPLEEN: Unremarkable.     PANCREAS: Unremarkable.     ADRENAL GLANDS: Unremarkable.     KIDNEYS/URETERS: Unremarkable. No hydronephrosis.     STOMACH AND BOWEL: Unremarkable.     APPENDIX: No findings to suggest appendicitis.     ABDOMINOPELVIC CAVITY: Grossly similar small volume pelvic fluid and blood products may be related to abdominal wall  hematoma and/or recent surgery. No pneumoperitoneum. No lymphadenopathy.     PELVIS     REPRODUCTIVE ORGANS: Postpartum uterus. Small area of loculated fluid anterior to the uterus measures 2.3 x 1.4 cm (series 602 image 114), likely postsurgical.     URINARY BLADDER: Unremarkable.     ABDOMINAL WALL/INGUINAL REGIONS: Redemonstrated recent postoperative change from  section. Large heterogeneous infraumbilical rectus sheath hematoma measuring 15.8 x 8.6 x 16.2 cm (series 3 image 132), slightly decreased from prior when it   measured 19.4 x 10.1 x 17.9 cm. No evidence of active extravasation.     Increased areas of fluid within the anterior abdominal wall subcutaneous fat along the incision site compared to 5/10/2024 with some areas of mild loculation. For example measuring 3.4 x 2.5 cm on the left (series 3 image 143), previously 2.6 x 2.0 cm.   Likely postoperative edema/seromas.     BONES: No acute fracture or suspicious osseous lesion.     IMPRESSION:     1. Redemonstrated recent postoperative change from  section. Large heterogeneous infraumbilical rectus sheath hematoma measuring 15.8 x 8.6 x 16.2 cm, slightly decreased from prior when it measured 19.4 x 10.1 x 17.9 cm. No evidence of active   extravasation.     2. Grossly similar small volume pelvic fluid and blood products may be related to abdominal wall hematoma and/or recent surgery. Small area of loculated fluid anterior to the uterus measures 2.3 x 1.4 cm, likely postsurgical.     3. Increased areas of fluid within the anterior abdominal wall subcutaneous fat along the incision site compared to 5/10/2024 with some areas of mild loculation, likely postoperative edema/seromas.     4. 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 Recommend vascular surgery consultation.       Assessment & Plan: Suze Leigh is a 28 y.o.  with complex medical history. Rectus hematoma s/p , need for lifelong  anticoagulation for mechanical aortic valve.     Clinical hx consistent with drainage of known collection. No concern for active bleeding at this time.     Appreciate General Surgery consult for complex wound management.   Recommend Cardiology consultation for anticoagulation management.

## 2024-05-19 NOTE — DISCHARGE INSTR - AVS FIRST PAGE
Wound care  Remove packing daily and cleanse wound site with soap and water.  May shower over wound site.  After cleansing replace packing at 2 sites.  Placed to base of wound.  Do not over pack wound.  Leave 1 inch of packing outside wound for wicking.  Use 4 x 4's and ABD for outer dressing. Change outer dressing daily and as needed for drainage.  Monitor for signs of infection, redness, swelling, purulent drainage, odor, developing fevers or chills.  Call office for any questions or concerns.  Return to ED with any bright red bleeding. Follow-up later this week in office.

## 2024-05-20 ENCOUNTER — TELEMEDICINE (OUTPATIENT)
Dept: FAMILY MEDICINE CLINIC | Facility: CLINIC | Age: 29
End: 2024-05-20
Payer: COMMERCIAL

## 2024-05-20 PROCEDURE — 99495 TRANSJ CARE MGMT MOD F2F 14D: CPT | Performed by: FAMILY MEDICINE

## 2024-05-20 NOTE — PROGRESS NOTES
Virtual TCM Visit:    Verification of patient location:    Patient is located at Home in the following state in which I hold an active license [place]    Assessment & Plan   1. Hematoma of surgical wound following  section         Encounter provider Huber Oseguera MD     Provider located at [facility]  [address]  [place] [address]    Recent Visits  No visits were found meeting these conditions.  Showing recent visits within past 7 days and meeting all other requirements  Today's Visits  Date Type Provider Dept   24 Telemedicine Huber Oseguera MD [facility]   Showing today's visits and meeting all other requirements  Future Appointments  No visits were found meeting these conditions.  Showing future appointments within next 150 days and meeting all other requirements       After connecting through televideo, the patient was identified by name and date of birth. Suze Leigh was informed that this is a telemedicine visit and that the visit is being conducted through the Epic Embedded platform. She agrees to proceed..  My office door was closed. No one else was in the room.  She acknowledged consent and understanding of privacy and security of the video platform. The patient has agreed to participate and understands they can discontinue the visit at any time.    Patient is aware this is a billable service.    History of Present Illness     Transitional Care Management Review:   Suze Leigh is a 28 y.o. female here for TCM follow up.    During the TCM phone call patient stated:  TCM Call       Date and time call was made  2024 10:48 AM    Hospital care reviewed  Records reviewed    Patient was hospitialized at  [facility]    Date of Admission  05/10/24    Date of discharge  24    Diagnosis  Rectus sheath hematoma    Disposition  Home    Were the patients medications reviewed and updated  Yes     Current Symptoms  None          TCM Call       Post hospital issues  None    Scheduled for follow up?  Yes    Patients specialists  Other (comment)    Other specialists names  OB/GYN    Did you obtain your prescribed medications  Yes    Do you need help managing your prescriptions or medications  No    Is transportation to your appointment needed  No    I have advised the patient to call PCP with any new or worsening symptoms  Michelle CLAIRE MA.          TCM for rectus sheath hematoma--hosp records rev  Review of Systems   Constitutional:  Negative for fatigue, fever and unexpected weight change.   HENT:  Negative for congestion, sinus pain and sore throat.    Eyes:  Negative for visual disturbance.   Respiratory:  Negative for shortness of breath and wheezing.    Cardiovascular:  Negative for chest pain and palpitations.   Gastrointestinal:  Negative for abdominal pain, nausea and vomiting.   Musculoskeletal: Negative.  Negative for arthralgias and myalgias.   Neurological:  Negative for syncope, weakness and numbness.   Psychiatric/Behavioral: Negative.  Negative for confusion, dysphoric mood and suicidal ideas.      Objective     LMP 08/11/2023 (Exact Date)     Physical Exam  Pulmonary:      Effort: Pulmonary effort is normal.   Neurological:      General: No focal deficit present.      Mental Status: She is oriented to person, place, and time.       Medications have been reviewed by provider in current encounter    Administrative Statements   I have spent a total time of 15 minutes on 05/20/24 In caring for this patient including Diagnostic results.        Huber Oseguera MD      VIRTUAL VISIT DISCLAIMER    Suze Leigh verbally agrees to participate in Virtual Care Services. Pt is aware that Virtual Care Services could be limited without vital signs or the ability to perform a full hands-on physical exam. Suze Leigh understands she or the provider may request at any time to terminate the video visit and  request the patient to seek care or treatment in person.

## 2024-05-23 ENCOUNTER — ANTICOAG VISIT (OUTPATIENT)
Dept: CARDIOLOGY CLINIC | Facility: CLINIC | Age: 29
End: 2024-05-23

## 2024-05-23 DIAGNOSIS — Z95.2 HISTORY OF MECHANICAL AORTIC VALVE REPLACEMENT: Primary | ICD-10-CM

## 2024-05-23 LAB — INR PPP: 2.4 (ref 0.84–1.19)

## 2024-05-23 NOTE — PROGRESS NOTES
Left message for patient, advised INR almost at goal, advised to continue 2.5 mg Tues Thurs Sat, 3.75 mg all other days, asking to recheck Tues 5/28/24  Advised to call with questions or concerns    Patient is a home elvia

## 2024-05-24 ENCOUNTER — POSTPARTUM VISIT (OUTPATIENT)
Dept: OBGYN CLINIC | Facility: CLINIC | Age: 29
End: 2024-05-24

## 2024-05-24 VITALS
DIASTOLIC BLOOD PRESSURE: 76 MMHG | WEIGHT: 161.4 LBS | SYSTOLIC BLOOD PRESSURE: 114 MMHG | BODY MASS INDEX: 28.6 KG/M2 | HEIGHT: 63 IN

## 2024-05-24 DIAGNOSIS — T81.49XA POSTOPERATIVE CELLULITIS OF SURGICAL WOUND: ICD-10-CM

## 2024-05-24 DIAGNOSIS — S30.1XXD HEMATOMA OF RECTUS SHEATH, SUBSEQUENT ENCOUNTER: ICD-10-CM

## 2024-05-24 PROCEDURE — 99024 POSTOP FOLLOW-UP VISIT: CPT | Performed by: STUDENT IN AN ORGANIZED HEALTH CARE EDUCATION/TRAINING PROGRAM

## 2024-05-24 RX ORDER — CEPHALEXIN 500 MG/1
500 CAPSULE ORAL EVERY 6 HOURS SCHEDULED
Qty: 56 CAPSULE | Refills: 0 | Status: SHIPPED | OUTPATIENT
Start: 2024-05-24 | End: 2024-06-07

## 2024-05-24 NOTE — ASSESSMENT & PLAN NOTE
- s/p drainage in ER by General Surgery. Continue home wound care with packing change, which is currently being performed by her mother, who is a nurse.   - Packing was not changed on exam, as this was replaced earlier today.   - Follow up scheduled with Dr. Braton on 6/4.

## 2024-05-24 NOTE — ASSESSMENT & PLAN NOTE
- Erythema noted around packing today as well as small amount of whitish exudate, consistent with early cellulitis. Will treat with Keflex 500 mg PO q6h x 14 days.   - Return to office in 1 week for wound check.

## 2024-05-24 NOTE — PROGRESS NOTES
[facility] [address], [address], [place] [address]    Assessment/Plan:  Suze is a 28 y.o. year old  who presents for postpartum visit.    Routine Postpartum Care  Normal postpartum exam  Contraception:  Options discussed. She desires Mirena.   Depression Screen: Low risk  Feeding: Breast with formula supplement.   Cervical cancer screening Up to Date.   Follow up for wound check in 1 week, then IUD insertion and wound check in 3 weeks    Additional Problems:  1. Postpartum examination following  delivery  2. Postoperative cellulitis of surgical wound  Assessment & Plan:  - Erythema noted around packing today as well as small amount of whitish exudate, consistent with early cellulitis. Will treat with Keflex 500 mg PO q6h x 14 days.   - Return to office in 1 week for wound check.   Orders:  -     cephalexin (KEFLEX) 500 mg capsule; Take 1 capsule (500 mg total) by mouth every 6 (six) hours for 14 days  3. Hematoma of surgical wound following  section  Assessment & Plan:  - s/p drainage in ER by General Surgery. Continue home wound care with packing change, which is currently being performed by her mother, who is a nurse.   - Packing was not changed on exam, as this was replaced earlier today.   - Follow up scheduled with Dr. Barton on .   4. Hematoma of rectus sheath, subsequent encounter  Assessment & Plan:  - Clinically stable. Decreased in size on most recent CT scan in ER.         Subjective:     CC: Postpartum visit    Suze Leigh is a 28 y.o. y.o. female  who presents for a postpartum visit.     She is 3 weeks postpartum following a primary  section on 2024 at 37 weeks. Postpartum course has been complicated by rectus sheath hematoma in setting of supra-therapeutic INR on warfarin requiring ICU admission and transfusion. Most recently, she had incisional hematoma that was opened and drained in ER by General Surgery.      Bleeding  "staining only. Bowel function is normal. Bladder function is normal. Patient is not sexually active.     Postpartum Depression: Low Risk  (2024)    [place]  Depression Scale     Last EPDS Total Score: 1     Last EPDS Self Harm Result: Never   Recent Concern: Postpartum Depression - Medium Risk (5/15/2024)    [place]  Depression Scale     Last EPDS Total Score: 5     Last EPDS Self Harm Result: Never       The following portions of the patient's history were reviewed and updated as appropriate: allergies, current medications, past family history, past medical history, obstetric history, gynecologic history, past social history, past surgical history and problem list.    Objective:  /76 (BP Location: Left arm, Patient Position: Sitting, Cuff Size: Standard)   Ht 5' 3\" (1.6 m)   Wt 73.2 kg (161 lb 6.4 oz)   LMP 2023 (Exact Date)   Breastfeeding Yes   BMI 28.59 kg/m²   Pregravid Weight/BMI: 85.3 kg (188 lb) (BMI 33.31)  Current Weight: 73.2 kg (161 lb 6.4 oz)   Total Weight Gain: -1.814 kg (-4 lb)   Pre- Vitals      Flowsheet Row Most Recent Value   Prenatal Assessment    Prenatal Vitals    Blood Pressure 114/76   Weight - Scale 73.2 kg (161 lb 6.4 oz)   Urine Albumin/Glucose    Dilation/Effacement/Station    Vaginal Drainage    Edema              Chaperone present? Yes: Jacy Latif MA.    General Appearance: alert and oriented, in no acute distress.   Abdomen: Soft, non-tender, non-distended, no masses, no rebound or guarding. Pfannenstiel incision with 2-3 cm opening at midline with packing in place and a second 1.5 cm opening on left with packing present. Mild erythema noted as well as a small amount of white exudate.    Pelvic:       External genitalia: Normal appearance, no abnormal pigmentation, no lesions or masses. Normal [place]'s and [place]'s.      Urinary system: Urethral meatus normal, bladder non-tender.      Vaginal: normal mucosa without prolapse or " lesions. Normal-appearing physiologic discharge.      Cervix: Normal-appearing, well-epithelialized, no gross lesions or masses. No cervical motion tenderness.      Adnexa: No adnexal masses or tenderness noted.      Uterus: Normal-sized, regular contour, midline, mobile, no uterine tenderness.   Extremities: Normal range of motion.   Skin: normal, no rash or abnormalities  Neurologic: alert, oriented x3  Psychiatric: Appropriate affect, mood stable, cooperative with exam.        Sanjay Thompson MD  5/24/2024 4:09 PM

## 2024-05-27 ENCOUNTER — PATIENT MESSAGE (OUTPATIENT)
Dept: FAMILY MEDICINE CLINIC | Facility: CLINIC | Age: 29
End: 2024-05-27

## 2024-05-27 DIAGNOSIS — I72.2 RENAL ARTERY ANEURYSM (HCC): Primary | ICD-10-CM

## 2024-05-30 DIAGNOSIS — F41.9 ANXIETY: ICD-10-CM

## 2024-05-30 RX ORDER — ESCITALOPRAM OXALATE 10 MG/1
20 TABLET ORAL DAILY
Qty: 180 TABLET | Refills: 1 | Status: SHIPPED | OUTPATIENT
Start: 2024-05-30

## 2024-05-31 ENCOUNTER — POSTPARTUM VISIT (OUTPATIENT)
Dept: OBGYN CLINIC | Facility: CLINIC | Age: 29
End: 2024-05-31
Payer: COMMERCIAL

## 2024-05-31 VITALS
DIASTOLIC BLOOD PRESSURE: 72 MMHG | WEIGHT: 161.6 LBS | BODY MASS INDEX: 28.63 KG/M2 | HEIGHT: 63 IN | SYSTOLIC BLOOD PRESSURE: 120 MMHG

## 2024-05-31 DIAGNOSIS — O92.79 CLOGGED DUCT, POSTPARTUM: ICD-10-CM

## 2024-05-31 PROCEDURE — 87070 CULTURE OTHR SPECIMN AEROBIC: CPT | Performed by: OBSTETRICS & GYNECOLOGY

## 2024-05-31 PROCEDURE — 87077 CULTURE AEROBIC IDENTIFY: CPT | Performed by: OBSTETRICS & GYNECOLOGY

## 2024-05-31 PROCEDURE — 99213 OFFICE O/P EST LOW 20 MIN: CPT | Performed by: OBSTETRICS & GYNECOLOGY

## 2024-05-31 PROCEDURE — 87205 SMEAR GRAM STAIN: CPT | Performed by: OBSTETRICS & GYNECOLOGY

## 2024-05-31 PROCEDURE — 87186 SC STD MICRODIL/AGAR DIL: CPT | Performed by: OBSTETRICS & GYNECOLOGY

## 2024-05-31 NOTE — PROGRESS NOTES
"[facility] - [address], [address], [place] [address]    Assessment/Plan:  1. Hematoma of surgical wound following  section  Assessment & Plan:  Healing well but culture collected due to new discharge. Discussed may just show normal skin jackie and no further antibiotics would be indicated.   2. Clogged duct, postpartum  Assessment & Plan:  Reviewed how to release clog and signs of mastitis.       Subjective:   Suze Leigh is a 28 y.o.  .  CC:   Chief Complaint   Patient presents with    Postpartum Care     Wound check. Green tinge seen when changing packing.       HPI: 27yo female presents for follow up on incision. She reports it has improved significantly since starting the keflex. She also reports a clogged duct of her right breast. No fevers or chills. Seeing general surgery next week.     ROS: Negative except as noted in HPI    Patient's last menstrual period was 2023 (exact date).       She  reports being sexually active and has had partner(s) who are male. She reports using the following method of birth control/protection: OCP.       The following portions of the patient's history were reviewed and updated as appropriate:   Past Medical History:   Diagnosis Date    Anxiety     Bicuspid aortic valve     s/p mechanical AVR    Coronary artery disease 1993    Moderate aortic insufficiency     s/p mechanical AVR    Motion sickness     Patent ductus arteriosus     s/p repair    PONV (postoperative nausea and vomiting)     S/P ascending aortic aneurysm repair     Stroke (HCC) 2022    no residual effects per pt--initially couldnt speak at first--\"feels back to normal now\"    Varicella     hx as child, and vaccine     Past Surgical History:   Procedure Laterality Date    ASCENDING AORTIC ANEURYSM REPAIR      with\" Aortic valve surgery\"    MECHANICAL AORTIC VALVE REPLACEMENT      PATENT DUCTUS ARTERIOUS LIGATION      per pt had this surgery age 1    AR "  DELIVERY ONLY N/A 2024    Procedure:  SECTION ();  Surgeon: Sanjay Thompson MD;  [facility];  Service: Obstetrics    TN EXC B9 LESION MRGN XCP SK TG T/A/L 1.1-2.0 CM Right 2022    Procedure: EXCISION  BIOPSY LESION/MASS BACK;  Surgeon: David Cloud MD;  Location: [facility];  Service: General    THROMBECTOMY W/ EMBOLECTOMY      Percutanoeous embolectomy in setting of stroke    WISDOM TOOTH EXTRACTION       Family History   Problem Relation Age of Onset    Aneurysm Father         Aortic aneurysm    Other Father         Bicuspid AV    Heart disease Father     Aortic stenosis Maternal Grandmother     Breast cancer Family     Colon cancer Family     Diabetes Family     Sudden death Paternal Grandfather     Depression Mother     Anxiety disorder Mother     Drug abuse Brother     Schizoaffective Disorder  Brother      Social History     Socioeconomic History    Marital status: Single     Spouse name: None    Number of children: None    Years of education: None    Highest education level: None   Occupational History    None   Tobacco Use    Smoking status: Never     Passive exposure: Never    Smokeless tobacco: Never   Vaping Use    Vaping status: Never Used   Substance and Sexual Activity    Alcohol use: Not Currently     Alcohol/week: 1.0 standard drink of alcohol     Types: 1 Glasses of wine per week    Drug use: No    Sexual activity: Yes     Partners: Male     Birth control/protection: OCP     Comment: Birth control pill   Other Topics Concern    None   Social History Narrative    None     Social Determinants of Health     Financial Resource Strain: Not on file   Food Insecurity: No Food Insecurity (5/15/2024)    Hunger Vital Sign     Worried About Running Out of Food in the Last Year: Never true     Ran Out of Food in the Last Year: Never true   Transportation Needs: No Transportation Needs (5/15/2024)    PRAPARE - Transportation     Lack of Transportation (Medical): No  "    Lack of Transportation (Non-Medical): No   Physical Activity: Not on file   Stress: Not on file   Social Connections: Not on file   Intimate Partner Violence: Not on file   Housing Stability: Low Risk  (5/15/2024)    Housing Stability Vital Sign     Unable to Pay for Housing in the Last Year: No     Number of Times Moved in the Last Year: 0     Homeless in the Last Year: No     Outpatient Medications Marked as Taking for the 5/31/24 encounter (Postpartum Visit) with Dora MARTINEZ DO   Medication    cephalexin (KEFLEX) 500 mg capsule    escitalopram (LEXAPRO) 10 mg tablet    ferrous sulfate 325 (65 FE) MG EC tablet    metoprolol succinate (TOPROL-XL) 25 mg 24 hr tablet    warfarin (COUMADIN) 5 mg tablet     No Known Allergies        Objective:  /72   Ht 5' 3\" (1.6 m)   Wt 73.3 kg (161 lb 9.6 oz)   LMP 08/11/2023 (Exact Date)   Breastfeeding Yes Comment: and formula  BMI 28.63 kg/m²          General Appearance: alert and oriented, in no acute distress.   Breast: right breast with clogged duct  Abdomen: Soft, non-tender, non-distended, no masses, no rebound or guarding.   Incision with 2cm opening at midline and second 1cm to the left. Packing in place. Collection of fluid on right side of infection. Green tinge discharge noted on packing. No erythema and rest of incision well approximated.   Extremities: Normal range of motion.   Skin: normal, no rash or abnormalities  Neurologic: alert, oriented x3  Psychiatric: Appropriate affect, mood stable, cooperative with exam.        Dora Grissom DO  5/31/2024 11:41 AM   "

## 2024-05-31 NOTE — ASSESSMENT & PLAN NOTE
Healing well but culture collected due to new discharge. Discussed may just show normal skin jackie and no further antibiotics would be indicated.

## 2024-06-02 LAB
BACTERIA TISS AEROBE CULT: ABNORMAL
GRAM STN SPEC: ABNORMAL

## 2024-06-03 ENCOUNTER — ANTICOAG VISIT (OUTPATIENT)
Dept: CARDIOLOGY CLINIC | Facility: CLINIC | Age: 29
End: 2024-06-03

## 2024-06-03 ENCOUNTER — TELEPHONE (OUTPATIENT)
Age: 29
End: 2024-06-03

## 2024-06-03 DIAGNOSIS — Z95.2 HISTORY OF MECHANICAL AORTIC VALVE REPLACEMENT: Primary | ICD-10-CM

## 2024-06-03 DIAGNOSIS — Z51.89 ENCOUNTER FOR WOUND RE-CHECK: Primary | ICD-10-CM

## 2024-06-03 DIAGNOSIS — Z51.89 ENCOUNTER FOR WOUND RE-CHECK: ICD-10-CM

## 2024-06-03 LAB
BACTERIA TISS AEROBE CULT: ABNORMAL
GRAM STN SPEC: ABNORMAL
INR PPP: 2.2 (ref 0.84–1.19)

## 2024-06-03 RX ORDER — CLINDAMYCIN HYDROCHLORIDE 300 MG/1
300 CAPSULE ORAL 3 TIMES DAILY
Qty: 21 CAPSULE | Refills: 0 | Status: SHIPPED | OUTPATIENT
Start: 2024-06-03 | End: 2024-06-06 | Stop reason: SDUPTHER

## 2024-06-03 RX ORDER — CIPROFLOXACIN 500 MG/1
500 TABLET, FILM COATED ORAL EVERY 12 HOURS SCHEDULED
Qty: 14 TABLET | Refills: 0 | Status: SHIPPED | OUTPATIENT
Start: 2024-06-03 | End: 2024-06-03 | Stop reason: SDUPTHER

## 2024-06-03 RX ORDER — CIPROFLOXACIN 500 MG/1
500 TABLET, FILM COATED ORAL EVERY 12 HOURS SCHEDULED
Qty: 14 TABLET | Refills: 0 | Status: SHIPPED | OUTPATIENT
Start: 2024-06-03 | End: 2024-06-10

## 2024-06-03 RX ORDER — SULFAMETHOXAZOLE AND TRIMETHOPRIM 400; 80 MG/1; MG/1
1 TABLET ORAL EVERY 12 HOURS SCHEDULED
Qty: 14 TABLET | Refills: 0 | Status: SHIPPED | OUTPATIENT
Start: 2024-06-03 | End: 2024-06-03

## 2024-06-03 RX ORDER — CIPROFLOXACIN 500 MG/1
500 TABLET, FILM COATED ORAL EVERY 12 HOURS SCHEDULED
Qty: 14 TABLET | Refills: 0 | Status: SHIPPED | OUTPATIENT
Start: 2024-06-03 | End: 2024-06-03 | Stop reason: CLARIF

## 2024-06-03 NOTE — PROGRESS NOTES
Spoke with patient, advised INR still a little low, states she was on an antibiotic that ended Fri 5/31/24  Advised to take 5 mg tonight, then go back to 2.5 mg Tues Thurs, 3.75 mg all other days, will recheck next week 6/9/24    Patient is a home elvia

## 2024-06-04 ENCOUNTER — OFFICE VISIT (OUTPATIENT)
Dept: SURGERY | Facility: CLINIC | Age: 29
End: 2024-06-04
Payer: COMMERCIAL

## 2024-06-04 VITALS
WEIGHT: 162 LBS | SYSTOLIC BLOOD PRESSURE: 114 MMHG | HEART RATE: 63 BPM | DIASTOLIC BLOOD PRESSURE: 76 MMHG | HEIGHT: 63 IN | BODY MASS INDEX: 28.7 KG/M2

## 2024-06-04 PROCEDURE — 99212 OFFICE O/P EST SF 10 MIN: CPT | Performed by: SURGERY

## 2024-06-04 PROCEDURE — 10140 I&D HMTMA SEROMA/FLUID COLLJ: CPT | Performed by: SURGERY

## 2024-06-05 NOTE — ASSESSMENT & PLAN NOTE
A new area was opened and drained and a fair amount of liquefied hematoma was drained.  This area was packed.  The 2 wounds are improving and will continue packing.  Will keep follow-up until the wounds are healed

## 2024-06-05 NOTE — PROGRESS NOTES
Assessment/Plan:    Hematoma of surgical wound following  section  A new area was opened and drained and a fair amount of liquefied hematoma was drained.  This area was packed.  The 2 wounds are improving and will continue packing.  Will keep follow-up until the wounds are healed       Diagnoses and all orders for this visit:    Hematoma of surgical wound following  section    Other orders  -     Incision and Drainage            Subjective:      Patient ID: Suze Leigh is a 28 y.o. female.    Ms. Leigh   Is a 27-year-old female here for evaluation of a sebaceous cyst to her right back.  She has had the cyst for years but it became infected requiring incision and drainage 2021. She was then scheduled to have it excised in 2022. She has a significant cardiac history and is status post valve replacement with mechanical valve.  She was holding her Coumadin for the procedure and had a CVA.  Since then she has recovered back to her baseline still has some difficulty finding an occasional word.    In regards tothe cyst still bothersome as long the bra line and it does still intermittently drain.    2024.  Overall she has been doing well.  The 2 open areas are decreased in size.  She had some change in drainage and had the wound cultured and started antibiotics per her OB/GYN as they grew out MRSA.            The following portions of the patient's history were reviewed and updated as appropriate: allergies, current medications, past family history, past medical history, past social history, past surgical history and problem list.    Review of Systems   Constitutional:  Negative for chills and fever.   HENT:  Negative for ear pain and sore throat.    Eyes:  Negative for pain and visual disturbance.   Respiratory:  Negative for cough and shortness of breath.    Cardiovascular:  Negative for chest pain and palpitations.   Gastrointestinal:  Negative for abdominal pain and  "vomiting.   Genitourinary:  Negative for dysuria and hematuria.   Musculoskeletal:  Negative for arthralgias and back pain.   Skin:  Positive for wound. Negative for color change and rash.   Neurological:  Negative for seizures and syncope.   Psychiatric/Behavioral:  Negative for agitation and behavioral problems.    All other systems reviewed and are negative.        Objective:      /76 (BP Location: Left arm, Patient Position: Sitting, Cuff Size: Standard)   Pulse 63   Ht 5' 3\" (1.6 m)   Wt 73.5 kg (162 lb)   LMP 08/11/2023 (Exact Date)   BMI 28.70 kg/m²          Physical Exam  Vitals and nursing note reviewed.   Constitutional:       General: She is not in acute distress.     Appearance: She is well-developed. She is not diaphoretic.   HENT:      Head: Normocephalic and atraumatic.   Eyes:      Pupils: Pupils are equal, round, and reactive to light.   Cardiovascular:      Rate and Rhythm: Normal rate and regular rhythm.   Pulmonary:      Effort: Pulmonary effort is normal. No respiratory distress.   Abdominal:      General: Bowel sounds are normal.      Palpations: Abdomen is soft.      Comments: The 2 open areas packing was removed the wounds were probed and changed.  Both them are decreasing in size and improving.  However on the right lateral side there is a area that is soft and is likely liquefied hematoma underneath.   Musculoskeletal:         General: Normal range of motion.      Cervical back: Normal range of motion and neck supple.   Skin:     General: Skin is warm and dry.      Comments:  Right lateral back along the bra line there is a approximate 1.5 cm recurrent sebaceous cyst with central pore.   Neurological:      Mental Status: She is alert and oriented to person, place, and time.   Psychiatric:         Behavior: Behavior normal.       Incision and Drainage    Date/Time: 6/4/2024 3:30 PM    Performed by: David Cloud MD  Authorized by: David Cloud MD  Universal " Protocol:  Consent: Verbal consent obtained.  Risks and benefits: risks, benefits and alternatives were discussed  Consent given by: patient    Location:     Type:  Hematoma    Location:  Trunk    Trunk location:  Abdomen  Pre-procedure details:     Skin preparation:  Antiseptic wash  Anesthesia (see MAR for exact dosages):     Anesthesia method:  Local infiltration    Local anesthetic:  Lidocaine 1% WITH epi  Procedure details:     Complexity:  Simple    Incision types:  Elliptical    Scalpel blade:  15    Incision depth:  Subcutaneous    Wound management:  Probed and deloculated    Drainage:  Bloody    Drainage amount:  Moderate    Wound treatment:  Packing placed    Packing materials:  1/4 in gauze  Post-procedure details:     Patient tolerance of procedure:  Tolerated well, no immediate complications

## 2024-06-06 DIAGNOSIS — Z51.89 ENCOUNTER FOR WOUND RE-CHECK: ICD-10-CM

## 2024-06-06 RX ORDER — CLINDAMYCIN HYDROCHLORIDE 300 MG/1
300 CAPSULE ORAL 3 TIMES DAILY
Qty: 42 CAPSULE | Refills: 0 | Status: SHIPPED | OUTPATIENT
Start: 2024-06-06 | End: 2024-06-20

## 2024-06-12 PROBLEM — R50.9 FEVER: Status: RESOLVED | Noted: 2024-05-13 | Resolved: 2024-06-12

## 2024-06-13 ENCOUNTER — ANTICOAG VISIT (OUTPATIENT)
Dept: CARDIOLOGY CLINIC | Facility: CLINIC | Age: 29
End: 2024-06-13

## 2024-06-13 DIAGNOSIS — Z95.2 HISTORY OF MECHANICAL AORTIC VALVE REPLACEMENT: Primary | ICD-10-CM

## 2024-06-13 LAB — INR PPP: 2.8 (ref 0.84–1.19)

## 2024-06-13 NOTE — PROGRESS NOTES
Left message for patient, advised INR good, continue 2.5 mg Tues Thurs Sat, 3.75 mg all other days, will recheck next week. Advised to call with questions or concerns.    Patient is a home elvia

## 2024-06-14 ENCOUNTER — POSTPARTUM VISIT (OUTPATIENT)
Dept: OBGYN CLINIC | Facility: CLINIC | Age: 29
End: 2024-06-14
Payer: COMMERCIAL

## 2024-06-14 VITALS
BODY MASS INDEX: 28.7 KG/M2 | DIASTOLIC BLOOD PRESSURE: 74 MMHG | HEIGHT: 63 IN | WEIGHT: 162 LBS | SYSTOLIC BLOOD PRESSURE: 110 MMHG

## 2024-06-14 DIAGNOSIS — Z30.430 ENCOUNTER FOR INSERTION OF MIRENA IUD: Primary | ICD-10-CM

## 2024-06-14 DIAGNOSIS — Z32.02 NEGATIVE PREGNANCY TEST: ICD-10-CM

## 2024-06-14 PROBLEM — Z97.5 IUD (INTRAUTERINE DEVICE) IN PLACE: Status: ACTIVE | Noted: 2024-06-14

## 2024-06-14 LAB — SL AMB POCT URINE HCG: NORMAL

## 2024-06-14 PROCEDURE — 81025 URINE PREGNANCY TEST: CPT | Performed by: STUDENT IN AN ORGANIZED HEALTH CARE EDUCATION/TRAINING PROGRAM

## 2024-06-14 PROCEDURE — 58300 INSERT INTRAUTERINE DEVICE: CPT | Performed by: STUDENT IN AN ORGANIZED HEALTH CARE EDUCATION/TRAINING PROGRAM

## 2024-06-14 PROCEDURE — 99024 POSTOP FOLLOW-UP VISIT: CPT | Performed by: STUDENT IN AN ORGANIZED HEALTH CARE EDUCATION/TRAINING PROGRAM

## 2024-06-14 NOTE — ASSESSMENT & PLAN NOTE
- Wound healing appropriately. Cellulitis has resolved. Continue packing change at home. Patient has follow up with General Surgery next week. Return to office PRN.

## 2024-06-14 NOTE — PROGRESS NOTES
"[facility] [address], [address], [place] [address]    Assessment/Plan:  1. Encounter for insertion of mirena IUD  Assessment & Plan:  - Mirena inserted without difficulty. See procedure note below.   Orders:  -     Iud insertions  2. Negative pregnancy test  -     POCT urine HCG  3. Hematoma of surgical wound following  section  Assessment & Plan:  - Wound healing appropriately. Cellulitis has resolved. Continue packing change at home. Patient has follow up with General Surgery next week. Return to office PRN.       Subjective:   Suze Leigh is a 28 y.o.  .  CC:   Chief Complaint   Patient presents with    Contraception     Incision check/Mirena IUD insertion       HPI: Patient presents for wound check and insertion of Mirena. She reports that wound has been improving. Redness and drainage have resolved.     ROS: Negative except as noted in HPI    Patient's last menstrual period was 2023 (exact date).       She  reports being sexually active and has had partner(s) who are male. She reports using the following method of birth control/protection: OCP.       The following portions of the patient's history were reviewed and updated as appropriate:   Past Medical History:   Diagnosis Date    Anxiety     Bicuspid aortic valve     s/p mechanical AVR    Coronary artery disease 1993    Moderate aortic insufficiency     s/p mechanical AVR    Motion sickness     Patent ductus arteriosus     s/p repair    PONV (postoperative nausea and vomiting)     S/P ascending aortic aneurysm repair     Stroke (HCC) 2022    no residual effects per pt--initially couldnt speak at first--\"feels back to normal now\"    Varicella     hx as child, and vaccine     Past Surgical History:   Procedure Laterality Date    ASCENDING AORTIC ANEURYSM REPAIR      with\" Aortic valve surgery\"    MECHANICAL AORTIC VALVE REPLACEMENT      PATENT DUCTUS ARTERIOUS LIGATION      per pt had this " surgery age 1    MS  DELIVERY ONLY N/A 2024    Procedure:  SECTION ();  Surgeon: Sanjay Thompson MD;  [facility];  Service: Obstetrics    MS EXC B9 LESION MRGN XCP SK TG T/A/L 1.1-2.0 CM Right 2022    Procedure: EXCISION  BIOPSY LESION/MASS BACK;  Surgeon: David Cloud MD;  [facility];  Service: General    THROMBECTOMY W/ EMBOLECTOMY      Percutanoeous embolectomy in setting of stroke    WISDOM TOOTH EXTRACTION       Family History   Problem Relation Age of Onset    Aneurysm Father         Aortic aneurysm    Other Father         Bicuspid AV    Heart disease Father     Aortic stenosis Maternal Grandmother     Breast cancer Family     Colon cancer Family     Diabetes Family     Sudden death Paternal Grandfather     Depression Mother     Anxiety disorder Mother     Drug abuse Brother     Schizoaffective Disorder  Brother      Social History     Socioeconomic History    Marital status: Single     Spouse name: Not on file    Number of children: Not on file    Years of education: Not on file    Highest education level: Not on file   Occupational History    Not on file   Tobacco Use    Smoking status: Never     Passive exposure: Never    Smokeless tobacco: Never   Vaping Use    Vaping status: Never Used   Substance and Sexual Activity    Alcohol use: Not Currently     Alcohol/week: 1.0 standard drink of alcohol     Types: 1 Glasses of wine per week    Drug use: No    Sexual activity: Yes     Partners: Male     Birth control/protection: OCP     Comment: Birth control pill   Other Topics Concern    Not on file   Social History Narrative    Not on file     Social Determinants of Health     Financial Resource Strain: Not on file   Food Insecurity: No Food Insecurity (5/15/2024)    Hunger Vital Sign     Worried About Running Out of Food in the Last Year: Never true     Ran Out of Food in the Last Year: Never true   Transportation Needs: No Transportation Needs (5/15/2024)  "   PRAPARE - Transportation     Lack of Transportation (Medical): No     Lack of Transportation (Non-Medical): No   Physical Activity: Not on file   Stress: Not on file   Social Connections: Not on file   Intimate Partner Violence: Not on file   Housing Stability: Low Risk  (5/15/2024)    Housing Stability Vital Sign     Unable to Pay for Housing in the Last Year: No     Number of Times Moved in the Last Year: 0     Homeless in the Last Year: No     Outpatient Medications Marked as Taking for the 6/14/24 encounter (Postpartum Visit) with Sanjay Thompson MD   Medication    clindamycin (CLEOCIN) 300 MG capsule    escitalopram (LEXAPRO) 10 mg tablet    ferrous sulfate 325 (65 FE) MG EC tablet    metoprolol succinate (TOPROL-XL) 25 mg 24 hr tablet    warfarin (COUMADIN) 5 mg tablet     No Known Allergies        Objective:  /74 (BP Location: Right arm, Patient Position: Sitting, Cuff Size: Standard)   Ht 5' 3\" (1.6 m)   Wt 73.5 kg (162 lb)   LMP 08/11/2023 (Exact Date)   Breastfeeding Yes   BMI 28.70 kg/m²        Chaperone present? Yes: Barbara Barros MA.    General Appearance: alert and oriented, in no acute distress.   Abdomen: Soft, non-tender, non-distended, no masses, no rebound or guarding. Pfannenstiel incision without erythema. Packing present at two 2-3 cm openings along incision. There is no purulent drainage.   Pelvic:       External genitalia: Normal appearance, no abnormal pigmentation, no lesions or masses. Normal Bartholin's and Cushman's.      Urinary system: Urethral meatus normal, bladder non-tender.      Vaginal: normal mucosa without prolapse or lesions. Normal-appearing physiologic discharge.      Cervix: Normal-appearing, well-epithelialized, no gross lesions or masses. No cervical motion tenderness.      Adnexa: No adnexal masses or tenderness noted.      Uterus: Normal-sized, regular contour, midline, mobile, no uterine tenderness.   Extremities: Normal range of motion.   Skin: normal, no " rash or abnormalities  Neurologic: alert, oriented x3  Psychiatric: Appropriate affect, mood stable, cooperative with exam.        Sanjay Thompson MD  6/14/2024 3:16 PM  Iud insertions    Performed by: Sanjay Thompson MD  Authorized by: Sanjay Thompson MD    Procedure: IUD insertion    Consent obtained by patient, parent, or legal power of  - including discussion of procedure risks and benefits, patient questions answered, and patient education provided.: yes    Pregnancy risk: reasonably certain the patient is not pregnant    Date/Time of Insertion:  6/14/2024 3:00 PM  Pelvic exam performed: yes    Speculum placed in vagina: yes    Cervix cleaned and prepped: yes    Tenaculum/Allis/Ring Forceps applied to cervix: yes    Uterus sound depth (cm):  8  Cervix dilated: no    IUD inserted without complications: yes    IUD type:  Levonorgestrel 20 MCG/DAY  Strings trimmed to (cm):  3  Patient tolerated procedure well: yes    Estimated blood loss (mL):  1  Intended removal date: 8 years      Sanjay Thompson MD  6/14/2024 3:16 PM         negative...

## 2024-06-19 ENCOUNTER — OFFICE VISIT (OUTPATIENT)
Dept: SURGERY | Facility: CLINIC | Age: 29
End: 2024-06-19
Payer: COMMERCIAL

## 2024-06-19 VITALS
SYSTOLIC BLOOD PRESSURE: 111 MMHG | HEIGHT: 63 IN | WEIGHT: 162.2 LBS | DIASTOLIC BLOOD PRESSURE: 71 MMHG | BODY MASS INDEX: 28.74 KG/M2 | HEART RATE: 80 BPM

## 2024-06-19 DIAGNOSIS — Z51.89 ENCOUNTER FOR WOUND RE-CHECK: ICD-10-CM

## 2024-06-19 DIAGNOSIS — N61.0 MASTITIS: Primary | ICD-10-CM

## 2024-06-19 PROCEDURE — 99212 OFFICE O/P EST SF 10 MIN: CPT | Performed by: SURGERY

## 2024-06-19 RX ORDER — SULFAMETHOXAZOLE AND TRIMETHOPRIM 800; 160 MG/1; MG/1
1 TABLET ORAL EVERY 12 HOURS SCHEDULED
Qty: 14 TABLET | Refills: 0 | Status: SHIPPED | OUTPATIENT
Start: 2024-06-19 | End: 2024-06-26

## 2024-06-20 PROBLEM — N61.0 MASTITIS: Status: ACTIVE | Noted: 2024-06-20

## 2024-06-20 NOTE — ASSESSMENT & PLAN NOTE
She has been complaining of mastitis.  I will order antibiotics for her to help expedite things but I explained she needs to call her obstetrician to ensure it is safe while breast-feeding.  If they get clearance from obstetrician then she can start antibiotics..  The cultures from her abdominal wound were sensitive to Bactrim

## 2024-06-20 NOTE — PROGRESS NOTES
Assessment/Plan:    Encounter for wound re-check  The wounds are healing very well.  Will stop packing and switch to silver wound gel.  The wound should heal in the next week or so.  Follow-up as needed    Mastitis  She has been complaining of mastitis.  I will order antibiotics for her to help expedite things but I explained she needs to call her obstetrician to ensure it is safe while breast-feeding.  If they get clearance from obstetrician then she can start antibiotics..  The cultures from her abdominal wound were sensitive to Bactrim         Diagnoses and all orders for this visit:    Mastitis  -     sulfamethoxazole-trimethoprim (BACTRIM DS) 800-160 mg per tablet; Take 1 tablet by mouth every 12 (twelve) hours for 7 days    Encounter for wound re-check            Subjective:      Patient ID: Suze Leigh is a 28 y.o. female.    Ms. Leigh   Is a 27-year-old female here for evaluation of a sebaceous cyst to her right back.  She has had the cyst for years but it became infected requiring incision and drainage September 2021. She was then scheduled to have it excised in February of 2022. She has a significant cardiac history and is status post valve replacement with mechanical valve.  She was holding her Coumadin for the procedure and had a CVA.  Since then she has recovered back to her baseline still has some difficulty finding an occasional word.    In regards tothe cyst still bothersome as long the bra line and it does still intermittently drain.    6/5/2024.  Overall she has been doing well.  The 2 open areas are decreased in size.  She had some change in drainage and had the wound cultured and started antibiotics per her OB/GYN as they grew out MRSA.    6/19/24 been doing well.  1 wound is healed.  Having difficulty getting packing and the other 2 ones.  She is complaining of mastitis as well            The following portions of the patient's history were reviewed and updated as appropriate: allergies,  "current medications, past family history, past medical history, past social history, past surgical history and problem list.    Review of Systems   Constitutional:  Negative for chills and fever.   HENT:  Negative for ear pain and sore throat.    Eyes:  Negative for pain and visual disturbance.   Respiratory:  Negative for cough and shortness of breath.    Cardiovascular:  Negative for chest pain and palpitations.   Gastrointestinal:  Negative for abdominal pain and vomiting.   Genitourinary:  Negative for dysuria and hematuria.   Musculoskeletal:  Negative for arthralgias and back pain.   Skin:  Positive for wound. Negative for color change and rash.   Neurological:  Negative for seizures and syncope.   Psychiatric/Behavioral:  Negative for agitation and behavioral problems.    All other systems reviewed and are negative.        Objective:      /71 (BP Location: Left arm, Patient Position: Sitting, Cuff Size: Standard)   Pulse 80   Ht 5' 3\" (1.6 m)   Wt 73.6 kg (162 lb 3.2 oz)   LMP 08/11/2023 (Exact Date) Comment: negative pregnancy test  BMI 28.73 kg/m²          Physical Exam  Vitals and nursing note reviewed.   Constitutional:       General: She is not in acute distress.     Appearance: She is well-developed. She is not diaphoretic.   HENT:      Head: Normocephalic and atraumatic.   Eyes:      Pupils: Pupils are equal, round, and reactive to light.   Cardiovascular:      Rate and Rhythm: Normal rate and regular rhythm.   Pulmonary:      Effort: Pulmonary effort is normal. No respiratory distress.   Abdominal:      General: Bowel sounds are normal.      Palpations: Abdomen is soft.      Comments: The left-sided wound is closed.  The 2 openings are barely a few millimeters across and a few millimeters deep   Musculoskeletal:         General: Normal range of motion.      Cervical back: Normal range of motion and neck supple.   Skin:     General: Skin is warm and dry.      Comments:  Right lateral back " along the bra line there is a approximate 1.5 cm recurrent sebaceous cyst with central pore.   Neurological:      Mental Status: She is alert and oriented to person, place, and time.   Psychiatric:         Behavior: Behavior normal.       Procedures

## 2024-06-20 NOTE — ASSESSMENT & PLAN NOTE
The wounds are healing very well.  Will stop packing and switch to silver wound gel.  The wound should heal in the next week or so.  Follow-up as needed

## 2024-06-28 ENCOUNTER — ANTICOAG VISIT (OUTPATIENT)
Dept: CARDIOLOGY CLINIC | Facility: CLINIC | Age: 29
End: 2024-06-28

## 2024-06-28 DIAGNOSIS — Z95.2 HISTORY OF MECHANICAL AORTIC VALVE REPLACEMENT: Primary | ICD-10-CM

## 2024-06-28 LAB — INR PPP: 2.3 (ref 0.84–1.19)

## 2024-06-28 NOTE — PROGRESS NOTES
Left message for patient, advised INR a little low, advised to take 2.5 mg Tues Thurs, 3.75 mg all other days, will recheck in 2 weeks 7/12/24  Advised to call with questions or concerns.    Patient is a home elvia

## 2024-07-05 ENCOUNTER — NURSE TRIAGE (OUTPATIENT)
Age: 29
End: 2024-07-05

## 2024-07-05 NOTE — TELEPHONE ENCOUNTER
Call to pt and scheduled her for 7/8/24 with Dr. Thompson. Pt aware to call back with fever, body aches, redness. Pt thankful.

## 2024-07-05 NOTE — TELEPHONE ENCOUNTER
Please have her schedule office visit early next week for exam. Call if any fever, body aches, or redness.     Sanjay Thompson MD  7/5/2024 3:13 PM

## 2024-07-05 NOTE — TELEPHONE ENCOUNTER
"Pt called in with concerns of mastitis. States that off and on for the last 3 weeks she has been experiencing a lump in her right breast above her nipple. States that it was going down but now getting larger again and at this time it is larger than a quarter. States that the lump is red, hard and painful. States that she has a constant pain of 1-2/10 made worse during breastfeeding. Denies any fever or areas that are warm to touch. Advised pt to to continue to monitor and call back with worsening pain, redness, any hot to touch area, fever. Advised it sounds like a clogged duct and to frequently breastfeed with that breast, apply moist heat and massage the area. Pt verbalized understanding and is thankful.     Reason for Disposition   Normal postpartum breast pain and engorgement    Answer Assessment - Initial Assessment Questions  1. PAIN: \"How bad is the pain?\"  (Scale 1-10; or mild, moderate, severe)    - MILD - doesn't interfere with normal activities     - MODERATE - interferes with normal activities or awakens from sleep     - SEVERE - excruciating pain, unable to do any normal activities       1-2/10  2. SKIN: \"Does the skin appear red?\"        Redness around lump  3. LOCATION: \"Which breast?\" (e.g., left, right, both)      Right breast  4. DELIVERY: \"When was the baby born?\"       5/2/24  5. BREASTFEEDING: \"Have you been breastfeeding?\" If yes, ask: \"When did you stop?\"      yes  6. ONSET: \"When did the breast pain start?\" (e.g., hours, days)      On and off the last 3 weeks  7. FEVER: \"Do you have a fever?\" If Yes, ask: \"What is it, how was it measured, and when did it start?\"      denies  8. OTHER SYMPTOMS: \"Do you have any other symptoms?\" (e.g., abdominal pain, feeling sad or depressed, weakness)      denies    Protocols used: Postpartum - Breast Pain and Engorgement-ADULT-[place]    "

## 2024-07-08 ENCOUNTER — OFFICE VISIT (OUTPATIENT)
Dept: OBGYN CLINIC | Facility: CLINIC | Age: 29
End: 2024-07-08
Payer: COMMERCIAL

## 2024-07-08 VITALS
WEIGHT: 161.4 LBS | HEIGHT: 63 IN | BODY MASS INDEX: 28.6 KG/M2 | SYSTOLIC BLOOD PRESSURE: 108 MMHG | DIASTOLIC BLOOD PRESSURE: 70 MMHG

## 2024-07-08 DIAGNOSIS — N61.1 BREAST ABSCESS OF FEMALE: Primary | ICD-10-CM

## 2024-07-08 PROCEDURE — 99213 OFFICE O/P EST LOW 20 MIN: CPT | Performed by: STUDENT IN AN ORGANIZED HEALTH CARE EDUCATION/TRAINING PROGRAM

## 2024-07-08 RX ORDER — SULFAMETHOXAZOLE AND TRIMETHOPRIM 800; 160 MG/1; MG/1
1 TABLET ORAL EVERY 12 HOURS SCHEDULED
Qty: 28 TABLET | Refills: 0 | Status: SHIPPED | OUTPATIENT
Start: 2024-07-08 | End: 2024-07-15 | Stop reason: SDUPTHER

## 2024-07-08 NOTE — ASSESSMENT & PLAN NOTE
- Clinical exam is concerning for abscess of right breast. She is overall clinically stable and afebrile today.   - Will obtain STAT ultrasound of right breast. Recommend consultation with Breast Surgery. Referral provided today.   - In the setting of recent MRSA infection, will start Bactrim DS PO BID x 14 days.   - Return to office in 1 week for re-evaluation.   - Discussed that if worsening or if fever occurs > 24 hours after starting antibiotics, she should proceed to ER for further evaluation.

## 2024-07-08 NOTE — PROGRESS NOTES
[facility] [place]  [address], [place] [address]    Assessment/Plan:  1. Breast abscess of female  Assessment & Plan:  - Clinical exam is concerning for abscess of right breast. She is overall clinically stable and afebrile today.   - Will obtain STAT ultrasound of right breast. Recommend consultation with Breast Surgery. Referral provided today.   - In the setting of recent MRSA infection, will start Bactrim DS PO BID x 14 days.   - Return to office in 1 week for re-evaluation.   - Discussed that if worsening or if fever occurs > 24 hours after starting antibiotics, she should proceed to ER for further evaluation.   Orders:  -     Ambulatory Referral to Breast Surgery; Future  -     US breast right limited (diagnostic); Future; Expected date: 2024  -     sulfamethoxazole-trimethoprim (BACTRIM DS) 800-160 mg per tablet; Take 1 tablet by mouth every 12 (twelve) hours for 14 days      Subjective:   Suze Leigh is a 29 y.o.  presenting for evaluation of right breast lump, redness, and pain.     CC:   Chief Complaint   Patient presents with    Breast Mass     Pt states she has a lump on her right breast more painful when she its full , less painful when after breastfeeding.        HPI: Patient presents for evaluation of right breast pain, redness, and a palpable lump for the past 2-3 weeks. She was treated for presumed mastitis by General Surgery on . Patient states that pain and fullness are worse prior to breastfeeding. She denies fever or chills. Pain is overall mild.     ROS: Negative except as noted in HPI    The following portions of the patient's history were reviewed and updated as appropriate:   Past Medical History:   Diagnosis Date    Anxiety     Bicuspid aortic valve     s/p mechanical AVR    Coronary artery disease 1993    Moderate aortic insufficiency     s/p mechanical AVR    Motion sickness     Patent ductus arteriosus     s/p repair    PONV (postoperative  "nausea and vomiting)     S/P ascending aortic aneurysm repair     Stroke ([facility]CC) 2022    no residual effects per pt--initially couldnt speak at first--\"feels back to normal now\"    Varicella     hx as child, and vaccine     Past Surgical History:   Procedure Laterality Date    ASCENDING AORTIC ANEURYSM REPAIR      with\" Aortic valve surgery\"    MECHANICAL AORTIC VALVE REPLACEMENT      PATENT DUCTUS ARTERIOUS LIGATION      per pt had this surgery age 1    [place]  DELIV[place] ONLY N/A 2024    Procedure:  SECTION ();  Surgeon: Sanjay Thompson MD;  [facility]: [facility];  Service: Obstetrics    [place] EXC B9 LESION MRGN XCP SK TG T/A/L 1.1-2.0 CM Right 2022    Procedure: EXCISION  BIOPSY LESION/MASS BACK;  Surgeon: David Cloud MD;  [facility];  Service: General    THROMBECTOMY W/ EMBOLECTOMY      Percutanoeous embolectomy in setting of stroke    WISDOM TOOTH EXTRACTION       Family History   Problem Relation Age of Onset    Aneurysm Father         Aortic aneurysm    Other Father         Bicuspid AV    Heart disease Father     Aortic stenosis Maternal Grandmother     Breast cancer Family     Colon cancer Family     Diabetes Family     Sudden death Paternal Grandfather     Depression Mother     Anxiety disorder Mother     Drug abuse Brother     Schizoaffective Disorder  Brother      Social History     Socioeconomic History    Marital status: Single     Spouse name: Not on file    Number of children: Not on file    Years of education: Not on file    Highest education level: Not on file   Occupational History    Not on file   Tobacco Use    Smoking status: Never     Passive exposure: Never    Smokeless tobacco: Never   Vaping Use    Vaping status: Never Used   Substance and Sexual Activity    Alcohol use: Not Currently     Alcohol/week: 1.0 standard drink of alcohol     Types: 1 Glasses of wine per week    Drug use: No    Sexual activity: Yes     Partners: Male     Birth " "control/protection: OCP     Comment: Birth control pill   Other Topics Concern    Not on file   Social History Narrative    Not on file     Social Determinants of Health     Financial Resource Strain: Not on file   Food Insecurity: No Food Insecurity (5/15/2024)    Hunger Vital Sign     Worried About Running Out of Food in the Last Year: Never true     Ran Out of Food in the Last Year: Never true   Transportation Needs: No Transportation Needs (5/15/2024)    PRAPARE - Transportation     Lack of Transportation (Medical): No     Lack of Transportation (Non-Medical): No   Physical Activity: Not on file   Stress: Not on file   Social Connections: Not on file   Intimate Partner Violence: Not on file   Housing Stability: Low Risk  (5/15/2024)    Housing Stability Vital Sign     Unable to Pay for Housing in the Last Year: No     Number of Times Moved in the Last Year: 0     Homeless in the Last Year: No     Outpatient Medications Marked as Taking for the 7/8/24 encounter (Office Visit) with Sanjay Thompson MD   Medication    escitalopram (LEXAPRO) 10 mg tablet    ferrous sulfate 325 (65 FE) MG EC tablet    metoprolol succinate (TOPROL-XL) 25 mg 24 hr tablet    sulfamethoxazole-trimethoprim (BACTRIM DS) 800-160 mg per tablet    warfarin (COUMADIN) 5 mg tablet     No Known Allergies        Objective:  /70 (BP Location: Left arm, Patient Position: Sitting, Cuff Size: Standard)   Ht 5' 3\" (1.6 m)   Wt 73.2 kg (161 lb 6.4 oz)   LMP  (LMP Unknown)   Breastfeeding Yes   BMI 28.59 kg/m²        Chaperone present? Yes: Dara Palladino, MA.    General Appearance: alert and oriented, in no acute distress.   Breast Exam:   Right: From 8 o'clock to 11 o'clock position, there is a 6 x 7 cm fluctuant mass with overlying erythema. Mildly tender to palpation. No axillary or supraclavicular nodes.   Left: No dimpling, nipple retraction or discharge. No lumps or masses. No axillary or supraclavicular nodes.   Extremities: Normal " range of motion.   Skin: normal, no rash or abnormalities  Neurologic: alert, oriented x3  Psychiatric: Appropriate affect, mood stable, cooperative with exam.        Sanjay Thompson MD  7/8/2024 12:23 PM

## 2024-07-09 ENCOUNTER — PATIENT MESSAGE (OUTPATIENT)
Dept: OBGYN CLINIC | Facility: CLINIC | Age: 29
End: 2024-07-09

## 2024-07-09 ENCOUNTER — HOSPITAL ENCOUNTER (OUTPATIENT)
Dept: ULTRASOUND IMAGING | Facility: CLINIC | Age: 29
Discharge: HOME | End: 2024-07-09
Payer: COMMERCIAL

## 2024-07-09 VITALS — HEART RATE: 57 BPM | SYSTOLIC BLOOD PRESSURE: 100 MMHG | DIASTOLIC BLOOD PRESSURE: 63 MMHG

## 2024-07-09 DIAGNOSIS — R92.8 ABNORMAL FINDING ON BREAST IMAGING: ICD-10-CM

## 2024-07-09 DIAGNOSIS — N61.1 BREAST ABSCESS OF FEMALE: ICD-10-CM

## 2024-07-09 PROCEDURE — 19000 PUNCTURE ASPIR CYST BREAST: CPT

## 2024-07-09 PROCEDURE — 87147 CULTURE TYPE IMMUNOLOGIC: CPT | Performed by: STUDENT IN AN ORGANIZED HEALTH CARE EDUCATION/TRAINING PROGRAM

## 2024-07-09 PROCEDURE — 76642 ULTRASOUND BREAST LIMITED: CPT

## 2024-07-09 PROCEDURE — 87205 SMEAR GRAM STAIN: CPT | Performed by: STUDENT IN AN ORGANIZED HEALTH CARE EDUCATION/TRAINING PROGRAM

## 2024-07-09 PROCEDURE — 76942 ECHO GUIDE FOR BIOPSY: CPT

## 2024-07-09 PROCEDURE — 87186 SC STD MICRODIL/AGAR DIL: CPT | Performed by: STUDENT IN AN ORGANIZED HEALTH CARE EDUCATION/TRAINING PROGRAM

## 2024-07-09 PROCEDURE — 87070 CULTURE OTHR SPECIMN AEROBIC: CPT | Performed by: STUDENT IN AN ORGANIZED HEALTH CARE EDUCATION/TRAINING PROGRAM

## 2024-07-09 RX ORDER — LIDOCAINE HYDROCHLORIDE 10 MG/ML
5 INJECTION, SOLUTION EPIDURAL; INFILTRATION; INTRACAUDAL; PERINEURAL ONCE
Status: COMPLETED | OUTPATIENT
Start: 2024-07-09 | End: 2024-07-09

## 2024-07-09 RX ADMIN — LIDOCAINE HYDROCHLORIDE 5 ML: 10 INJECTION, SOLUTION EPIDURAL; INFILTRATION; INTRACAUDAL; PERINEURAL at 14:25

## 2024-07-09 NOTE — PROGRESS NOTES
Met with patient and Dr. Joseph regarding recommendation for;    ___x__ RIGHT ______LEFT      ____x_Ultrasound guided  ______Stereotactic cyst aspiration      __X___Verbalized understanding.      Blood thinners:  No: _____ Yes: __x____ What: Coumadin                Biopsy teaching sheet given:  Yes: ___X___ No: ________    Pt given contact information and adv to call with any questions/needs

## 2024-07-09 NOTE — PROGRESS NOTES
Procedure type: Cyst Aspiration    ___X__ultrasound guided _____stereotactic    Breast:    _____Left ___X__Right    Location: 10 o'clock 4 cmfn    Needle: 14g Introducer    # of passes: 28 cc of greenish fluid (16 cc fluid sent to lab)    Clip: None    Performed by: Dr. Menchaca    Pressure held for 5 minutes by: Gabby Perrin Strips:    ___X__yes _____no    Band aid:    __X___yes_____no    Tolerated procedure:    __X___yes _____no

## 2024-07-09 NOTE — PROGRESS NOTES
Patient arrived via:    _x____ambulatory    _____wheelchair    _____stretcher      Domestic violence screen    ___x___negative______positive    Breast Implants:    _______yes ____x____no

## 2024-07-10 NOTE — PROGRESS NOTES
Post procedure call completed    Bleeding: _____yes __X___no    Pain: _____yes ___X___no    Redness/Swelling: ___x___yes, patient reports some swelling. Will monitor and call with any concerns.     Band aid removed: _____yes ___X__no (discussed removing when she showers)      Pt with no questions at this time, adv will call when results available, adv to call with any questions or concerns, has name/# for contact

## 2024-07-11 LAB
BACTERIA WND AEROBE CULT: ABNORMAL
GRAM STN SPEC: ABNORMAL
GRAM STN SPEC: ABNORMAL

## 2024-07-15 ENCOUNTER — OFFICE VISIT (OUTPATIENT)
Dept: OBGYN CLINIC | Facility: CLINIC | Age: 29
End: 2024-07-15
Payer: COMMERCIAL

## 2024-07-15 VITALS
DIASTOLIC BLOOD PRESSURE: 68 MMHG | WEIGHT: 162.8 LBS | HEIGHT: 63 IN | SYSTOLIC BLOOD PRESSURE: 106 MMHG | BODY MASS INDEX: 28.84 KG/M2

## 2024-07-15 DIAGNOSIS — N61.1 BREAST ABSCESS OF FEMALE: Primary | ICD-10-CM

## 2024-07-15 PROCEDURE — 99213 OFFICE O/P EST LOW 20 MIN: CPT | Performed by: STUDENT IN AN ORGANIZED HEALTH CARE EDUCATION/TRAINING PROGRAM

## 2024-07-15 RX ORDER — SULFAMETHOXAZOLE AND TRIMETHOPRIM 800; 160 MG/1; MG/1
1 TABLET ORAL EVERY 12 HOURS SCHEDULED
Qty: 14 TABLET | Refills: 0 | Status: SHIPPED | OUTPATIENT
Start: 2024-07-15 | End: 2024-07-22

## 2024-07-15 NOTE — PROGRESS NOTES
[facility] - [place]  [address] [place] [address]    Assessment/Plan:  1. Breast abscess of female  Assessment & Plan:  - Based on residual erythema an induration, will extend antibiotic course for an additional 7 days to complete 21 days of treatment.   - Return to office in 1 week for exam.   - Plan for repeat breast ultrasound in 1-2 months, as recommended by Radiology. Order provided.   Orders:  -     US breast right limited (diagnostic); Future; Expected date: 08/15/2024  -     sulfamethoxazole-trimethoprim (BACTRIM DS) 800-160 mg per tablet; Take 1 tablet by mouth every 12 (twelve) hours for 7 days To complete 21 days of treatment.      Subjective:   Suze Leigh is a 29 y.o.  presenting for breast check following drainage of right breast abscess.     CC:   Chief Complaint   Patient presents with    Follow-up     Breast check - pt states the abscess was drained but still feels hard around the area        HPI: Patient presents for follow up of breast abscess. [facility] Culture of abscess fluid grew MRSA, which is sensitive to the Bactrim that was prescribed on . She has now completed 7 days of antibiotic therapy. Imaging results were Bi-Rads-3. Today, patient reports persistent redness and firmness of right breast, but improvement in pain and slight reduction in size. She continues to breastfeed. She denies fever or chills.     ROS: Negative except as noted in HPI    The following portions of the patient's history were reviewed and updated as appropriate:   Past Medical History:   Diagnosis Date    Anxiety     Bicuspid aortic valve     s/p mechanical AVR    Coronary artery disease 1993    Moderate aortic insufficiency     s/p mechanical AVR    Motion sickness     Patent ductus arteriosus     s/p repair    PONV (postoperative nausea and vomiting)     S/P ascending aortic aneurysm repair     Stroke  "([facility]) 2022    no residual effects per pt--initially couldnt speak at first--\"feels back to normal now\"    Varicella     hx as child, and vaccine     Past Surgical History:   Procedure Laterality Date    ASCENDING AORTIC ANEURYSM REPAIR      with\" Aortic valve surgery\"    MECHANICAL AORTIC VALVE REPLACEMENT      PATENT DUCTUS ARTERIOUS LIGATION      per pt had this surgery age 1    [place]  DELIVERY ONLY N/A 2024    Procedure:  SECTION ();  Surgeon: Sanjay Thompson MD;  [facility];  Service: Obstetrics    [place] EXC B9 LESION MRGN XCP SK TG T/A/L 1.1-2.0 CM Right 2022    Procedure: EXCISION  BIOPSY LESION/MASS BACK;  Surgeon: David Cloud MD;  [facility];  Service: General    THROMBECTOMY W/ EMBOLECTOMY      Percutanoeous embolectomy in setting of stroke     BREAST CYST ASPIRATION RIGHT INITIAL Right 2024    WISDOM TOOTH EXTRACTION       Family History   Problem Relation Age of Onset    Aneurysm Father         Aortic aneurysm    Other Father         Bicuspid AV    Heart disease Father     Aortic stenosis Maternal Grandmother     Breast cancer Family     Colon cancer Family     Diabetes Family     Sudden death Paternal Grandfather     Depression Mother     Anxiety disorder Mother     Drug abuse Brother     Schizoaffective Disorder  Brother      Social History     Socioeconomic History    Marital status: Single     Spouse name: Not on file    Number of children: Not on file    Years of education: Not on file    Highest education level: Not on file   Occupational History    Not on file   Tobacco Use    Smoking status: Never     Passive exposure: Never    Smokeless tobacco: Never   Vaping Use    Vaping status: Never Used   Substance and Sexual Activity    Alcohol use: Not Currently     Alcohol/week: 1.0 standard drink of alcohol     Types: 1 Glasses of wine per week    Drug use: No    Sexual activity: Yes     Partners: Male     Birth control/protection: OCP " "    Comment: Birth control pill   Other Topics Concern    Not on file   Social History Narrative    Not on file     Social Determinants of Health     Financial Resource Strain: Not on file   Food Insecurity: No Food Insecurity (5/15/2024)    Hunger Vital Sign     Worried About Running Out of Food in the Last Year: Never true     Ran Out of Food in the Last Year: Never true   Transportation Needs: No Transportation Needs (5/15/2024)    PRAPARE - Transportation     Lack of Transportation (Medical): No     Lack of Transportation (Non-Medical): No   Physical Activity: Not on file   Stress: Not on file   Social Connections: Not on file   Intimate Partner Violence: Not on file   Housing Stability: Low Risk  (5/15/2024)    Housing Stability Vital Sign     Unable to Pay for Housing in the Last Year: No     Number of Times Moved in the Last Year: 0     Homeless in the Last Year: No     Outpatient Medications Marked as Taking for the 7/15/24 encounter (Office Visit) with Sanjay Thompson MD   Medication    escitalopram (LEXAPRO) 10 mg tablet    ferrous sulfate 325 (65 FE) MG EC tablet    metoprolol succinate (TOPROL-XL) 25 mg 24 hr tablet    sulfamethoxazole-trimethoprim (BACTRIM DS) 800-160 mg per tablet    warfarin (COUMADIN) 5 mg tablet    [DISCONTINUED] sulfamethoxazole-trimethoprim (BACTRIM DS) 800-160 mg per tablet     No Known Allergies        Objective:  /68 (BP Location: Right arm, Patient Position: Sitting, Cuff Size: Standard)   Ht 5' 3\" (1.6 m)   Wt 73.8 kg (162 lb 12.8 oz)   LMP  (LMP Unknown)   Breastfeeding Yes   BMI 28.84 kg/m²        Chaperone present? Yes: Jacy Latif MA.    General Appearance: alert and oriented, in no acute distress.   Breast Exam:   Right: From 8 o'clock to 10 o'clock position, there is a 5 x 5 cm region of erythema and induration, reduced in size from prior. No definitive fluctuance. No axillary or supraclavicular nodes.   Left: No dimpling, nipple retraction or discharge. " No lumps or masses.   Extremities: Normal range of motion.   Skin: normal, no rash or abnormalities  Neurologic: alert, oriented x3  Psychiatric: Appropriate affect, mood stable, cooperative with exam.        Sanjay Thompson MD  7/15/2024 2:19 PM

## 2024-07-15 NOTE — ASSESSMENT & PLAN NOTE
- Based on residual erythema an induration, will extend antibiotic course for an additional 7 days to complete 21 days of treatment.   - Return to office in 1 week for exam.   - Plan for repeat breast ultrasound in 1-2 months, as recommended by Radiology. Order provided.

## 2024-07-16 NOTE — PROGRESS NOTES
"    ALEXIS Ceron  W ENT [facility] EAR NOSE & THROAT  [address], [address]  [facility] [address]  Fax [phone]  Phone [phone]      Visit Type: NEW PATIENT   Chief Complaint   Patient presents with    Establish Care     Throat fullness, Pt states when she swallows she feels like there is something in her throat. 0/10 pain.           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  They have been present for several months.  Symptoms include globus sensation. She denies dysphagia, dysphonia, sore throat or known strep. State she did see PCP for this when first started and treated with Antibiotics but did not help symptoms. She reports symptoms are ongoing still. Does seem to get worse later in the day.     Former smoker- quit about 25 years ago, 1-2 packs per week.  Drinks alcohol regularly, couple of glasses of wine per day.   Denies known reflux history  Denies known sleep apnea.   Does report \"seasonal allergies but states is mild. Denies chronic post nasal drainage.         Past Medical History:   Diagnosis Date    Allergic     Breast cancer 2000    right    Eczema     History of transfusion     Hx of radiation therapy 2000    Hypertension        Past Surgical History:   Procedure Laterality Date    BREAST BIOPSY Right 2000    cancer    BREAST LUMPECTOMY Right 2000    COLONOSCOPY      KNEE ARTHROSCOPY Left 03/03/2021    Procedure: arthroscopy of left knee with debridement of medial and lateral meniscus;  Surgeon: Jamil Lee MD;  Location: [facility] OR;  Service: Orthopedics;  Laterality: Left;    KNEE SURGERY Left     PARATHYROIDECTOMY      THYROIDECTOMY, PARTIAL Right     TUBAL ABDOMINAL LIGATION      WIDE EXCISION LESION/MASS TRUNK Right 6/19/2023    Procedure: 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     Social History: She  reports that she quit smoking about 24 years ago. Her smoking use included cigarettes. She started smoking about 44 years ago. She has a 5 pack-year smoking history. She has been exposed to tobacco smoke. She has never used smokeless tobacco. She reports current alcohol use of about 10.0 standard drinks of alcohol per week. She reports that she does not use drugs.    Home Medications:  Cholecalciferol, Loteprednol Etabonate, desonide, fluticasone, losartan, omeprazole, pimecrolimus, and triamcinolone    Allergies:  She has No Known Allergies.       Vital Signs:   Temp:  [97.3 °F (36.3 °C)] 97.3 °F (36.3 °C)  Heart Rate:  [97] 97  Resp:  [19] 19  BP: (167)/(110) 167/110  ENT Physical Exam  Constitutional  Appearance: patient appears well-developed, well-nourished and well-groomed,  Communication/Voice: communication appropriate for developmental age; vocal quality normal;  Head and Face  Appearance: head appears normal, face appears normal and face appears atraumatic;  Ear  Hearing: intact;  Auricles: bilateral auricles normal;  External Mastoids: bilateral external mastoids normal;  Ear Canals: bilateral ear canals normal;  Tympanic Membranes: bilateral tympanic membranes normal;  Nose  External Nose: nares patent bilaterally; external nose normal;  Internal Nose: bilateral intranasal mucosa erythematous; bilateral inferior turbinates normal;  Oral Cavity/Oropharynx  Lips: normal;  Teeth: normal;  Gums: gingiva normal;  Tongue: normal;  Oral mucosa: normal;  Hard palate: normal;  Soft palate: normal;  Tonsils: normal;  Base of Tongue: normal;  Posterior pharyngeal wall: appearance is erythematous and inflamed;  Neck  Neck: neck normal; neck  palpation normal;  Respiratory  Inspection: breathing unlabored; normal breathing rate;  Cardiovascular  Inspection: extremities are warm and well perfused;  Lymphatic  Palpation: lymph nodes normal;  Neurovestibular  Mental Status: alert and oriented;       Laryngoscopy    Date/Time: 7/16/2024 2:03 PM    Performed by: Ananth Burnette APRN  Authorized by: Ananth Burnette APRN    Consent:     Consent obtained:  Verbal    Consent given by:  Patient    Risks discussed:  Bleeding, infection, nerve damage and pain    Alternatives discussed:  No treatment, delayed treatment, observation and alternative treatment  Universal protocol:     Procedure explained and questions answered to patient or proxy's satisfaction: yes      Immediately prior to procedure, a time out was called: yes      Patient identity confirmed:  Verbally with patient  Anesthesia (see MAR for exact dosages):     Anesthesia method:  Topical application    Topical anesthetic:  Tetracaine  Procedure details:     Indications: evaluation of larynx      Medication:  Quang-Synephrine 2%    Instrument: flexible fiberoptic laryngoscope      Scope location: left nare    Oropharynx/ Supraglottis:     Posterior pharyngeal wall: inflamed      Vallecula: inflammation       comment:  Mild pooling of secretions    Base of tongue: lingual tonsillar hypertrophy      Epiglottis: inflammation       comment:  Inflammatin, mildly stiff  Larynx/ Hypopharynx:     Arytenoids: interarytenoid edema      Pyriform sinus: inflammation      Pyriform sinus: no lesion and no pooling of secretions      False vocal cords: no lesion      True vocal cords: no reduced mobility, no lesion, no vocal cord atrophy and no nodules    Post-procedure details:     Patient tolerance of procedure:  Tolerated well         Result Review       RESULTS REVIEW    I have reviewed the patients old records in the chart.   The following results/records were reviewed:            Assessment & Plan  Globus  sensation    History of tobacco use    Former smoker       Orders Placed This Encounter   Procedures    Flexible laryngoscopy      New Medications Ordered This Visit   Medications    omeprazole (priLOSEC) 40 MG capsule     Sig: Take 1 capsule by mouth Daily. Take 30 minutes to 1 hour before a meal     Dispense:  60 capsule     Refill:  3    fluticasone (FLONASE) 50 MCG/ACT nasal spray     Sig: Instill 2 sprays in each nostril as directed by provider Daily.     Dispense:  16 g     Refill:  3         Medical and surgical options were discussed including observation, continued medical management, medication modification, and surgical management. Risks, benefits and alternatives were discussed and questions were answered. After considering the options, the patient decided to proceed with medication modification.  Increase water/ non caffeinated drink intake to at least 6-8 glasses a day. Decrease caffeine intake. Plain Mucinex over the counter as needed to thin out secretions.  Sleep with the head of bed on an incline. No large meals 1-2 hrs prior to sleep. Avoid fatty meals and caffine or alcohol prior to sleep.   Take 3-4 tums (Calcium Carbonate) right before going to bed to neutralize stomach acid.  Take proton pump inhibitor (Omeprazole, Prilosec, Prevacid, Protonix etc) 30-60 minutes prior o the last meal of the day.  Start floanse  Start prilosec  Antacids  Saline spray  Reflux precautions including diet and exercise    Call with questions or concerns  Return in about 6 weeks (around 8/27/2024) for Recheck with flex scope.        Electronically signed by ALEXIS Ceron 07/16/24 2:05 PM CDT.

## 2024-07-22 ENCOUNTER — CONSULT (OUTPATIENT)
Dept: VASCULAR SURGERY | Facility: CLINIC | Age: 29
End: 2024-07-22
Payer: COMMERCIAL

## 2024-07-22 ENCOUNTER — TELEPHONE (OUTPATIENT)
Age: 29
End: 2024-07-22

## 2024-07-22 ENCOUNTER — ANTICOAG VISIT (OUTPATIENT)
Dept: CARDIOLOGY CLINIC | Facility: CLINIC | Age: 29
End: 2024-07-22

## 2024-07-22 VITALS
BODY MASS INDEX: 29.41 KG/M2 | SYSTOLIC BLOOD PRESSURE: 114 MMHG | WEIGHT: 166 LBS | HEART RATE: 61 BPM | OXYGEN SATURATION: 99 % | HEIGHT: 63 IN | DIASTOLIC BLOOD PRESSURE: 72 MMHG

## 2024-07-22 DIAGNOSIS — Z95.2 HISTORY OF MECHANICAL AORTIC VALVE REPLACEMENT: Primary | ICD-10-CM

## 2024-07-22 DIAGNOSIS — I72.2 RENAL ARTERY ANEURYSM (HCC): Primary | ICD-10-CM

## 2024-07-22 LAB — INR PPP: 2.2 (ref 0.84–1.19)

## 2024-07-22 PROCEDURE — 99204 OFFICE O/P NEW MOD 45 MIN: CPT | Performed by: SURGERY

## 2024-07-22 NOTE — ASSESSMENT & PLAN NOTE
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 Denies flank pain. Personal and family history of ascending aortic aneurysm.    CTA a/p reviewed - isolated left upper pole branch renal artery aneurysm, main renal artery and branch points appear normal in caliber, no evidence of aortoiliac aneurysmal degeneration, normal right renal arteries    -We discussed the pathophysiology of renal artery aneurysmal disease, available treatment options and indications for treatment. Discussed criteria for intervention at all sizes in females of childbearing age. Recommendations in the general population include repair at size>=3cm, uncontrolled HTN, declining kidney function, and rupture.  -Discussed signs and symptoms of renal artery aneurysm rupture and advised to go to the ED if she experiences any symptoms.  -Discussed treatment options. Recommend repair as soon as possible due to increased risk for rupture. Recommend endovascular repair with coil embolization of the upper pole branch left renal artery aneurysm. This will likely result in renal infarct of the upper pole of the left kidney but the remaining 2 branches perfusing the majority of the kidney would be preserved. Open repair would also involve ligation of the upper pole branch as the small branch would not be revascularized but would be a much more invasive procedure. She is also just recently recovered from infection of her  surgical site and is still getting treated for mastitis.  -Discussed possible plan above with IR (Dr. Hayes). Will place referral for intervention ASAP.  -Will need coumadin held for surgery and bridging with lovenox therapy.

## 2024-07-22 NOTE — TELEPHONE ENCOUNTER
Patients mother Tiffany calling with concerns about abscess in the Right Breast and non healing patient has been seeing OB/Gyn and has been referred to Surg/Elijah due to mechanical heart valve, with fear of spread of infection. Patient has been trying to be soon ASAP with no success and mother is concerned due to father having same condition and passing away. Patient would like a call to see if she can get in as early as tomorrow, given appointment for 07/24/24 3:00 pm with Sophia due to other appointments being to far out. Please call [phone] with a sooner option if available

## 2024-07-22 NOTE — PATIENT COMMUNICATION
Please facilitate follow up with Breast Surgery ASAP. I placed a referral order on 7/8. Please coordinate with Breast Care Nurse navigator. This likely needs to be drained by Breast Surgery. She should be seen by them within days.     Sanjay Thompson MD  7/22/2024 11:57 AM

## 2024-07-22 NOTE — LETTER
2024     Radhika Torres, WANDA  [address] [address]    Patient: Suze Leigh   YOB: 1995   Date of Visit: 2024       Dear Dr. Torres:    Thank you for referring Suze Leigh to me for evaluation. Below are the relevant portions of my assessment and plan of care.     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 Denies flank pain. Personal and family history of ascending aortic aneurysm.     CTA a/p reviewed - isolated left upper pole branch renal artery aneurysm, main renal artery and branch points appear normal in caliber, no evidence of aortoiliac aneurysmal degeneration, normal right renal arteries     -We discussed the pathophysiology of renal artery aneurysmal disease, available treatment options and indications for treatment. Discussed criteria for intervention at all sizes in females of childbearing age. Recommendations in the general population include repair at size>=3cm, uncontrolled HTN, declining kidney function, and rupture.  -Discussed signs and symptoms of renal artery aneurysm rupture and advised to go to the ED if she experiences any symptoms.  -Discussed treatment options. Recommend repair as soon as possible due to increased risk for rupture. Recommend endovascular repair with coil embolization of the upper pole branch left renal artery aneurysm. This will likely result in renal infarct of the upper pole of the left kidney but the remaining 2 branches perfusing the majority of the kidney would be preserved. Open repair would also involve ligation of the upper pole branch as the small branch would not be revascularized but would be a much more invasive procedure. She is also just recently recovered from infection of her  surgical site and is still getting treated for mastitis.  -Discussed possible plan above with IR (Dr. Hayes). Will place  referral for intervention ASAP.  -Will need coumadin held for surgery and bridging with lovenox therapy.     Orders:  -     Ambulatory Referral to Vascular Surgery  -     Ambulatory Referral to Interventional Radiology; Future       If you have questions, please do not hesitate to call me. I look forward to following Suze along with you.         Sincerely,        Arlette Monsivais MD        CC: Huber Oseguera MD

## 2024-07-22 NOTE — PROGRESS NOTES
Ambulatory Visit  Name: Suze Leigh      : 1995      MRN: [id number]  Encounter Provider: Arlette Monsivais MD  Encounter Date: 2024   Encounter department: [facility] VASCULAR [facility]    Assessment & Plan   1. 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 Denies flank pain. Personal and family history of ascending aortic aneurysm.    CTA a/p reviewed - isolated left upper pole branch renal artery aneurysm, main renal artery and branch points appear normal in caliber, no evidence of aortoiliac aneurysmal degeneration, normal right renal arteries    -We discussed the pathophysiology of renal artery aneurysmal disease, available treatment options and indications for treatment. Discussed criteria for intervention at all sizes in females of childbearing age. Recommendations in the general population include repair at size>=3cm, uncontrolled HTN, declining kidney function, and rupture.  -Discussed signs and symptoms of renal artery aneurysm rupture and advised to go to the ED if she experiences any symptoms.  -Discussed treatment options. Recommend repair as soon as possible due to increased risk for rupture. Recommend endovascular repair with coil embolization of the upper pole branch left renal artery aneurysm. This will likely result in renal infarct of the upper pole of the left kidney but the remaining 2 branches perfusing the majority of the kidney would be preserved. Open repair would also involve ligation of the upper pole branch as the small branch would not be revascularized but would be a much more invasive procedure. She is also just recently recovered from infection of her  surgical site and is still getting treated for mastitis.  -Discussed possible plan above with IR (Dr. Hayes). Will place referral for intervention ASAP.  -Will need coumadin held for surgery and bridging with lovenox  "therapy.    Orders:  -     Ambulatory Referral to Vascular Surgery  -     Ambulatory Referral to Interventional Radiology; Future      History of Present Illness     Patient is new to our office. She was referred here by Dr. Huber Oseguera. She is here today to review results of a CTA ABD/Pelvis w/wo contrast done 2024. Patient denies any pain. She is taking Coumadin. Patient is a non-smoker.         Suze Leigh is a 29 y.o. 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 She denies any abdominal or back pain. She has ongoing mastitis.    I have reviewed and made appropriate changes to the review of systems input by the medical assistant.    Vitals:    24 1511   BP: 114/72   BP Location: Left arm   Patient Position: Sitting   Cuff Size: Standard   Pulse: 61   SpO2: 99%   Weight: 75.3 kg (166 lb)   Height: 5' 3\" (1.6 m)       Patient Active Problem List   Diagnosis    Moderate aortic insufficiency    Bicuspid aortic valve    History of mechanical aortic valve replacement    Hx of repair of ascending aorta    Cervical high risk HPV (human papillomavirus) test positive    ENDER I (cervical intraepithelial neoplasia I)    Cellulitis of back except buttock    History of stroke    Aphasia as late effect of cerebrovascular accident    Mass of subcutaneous tissue of back    PONV (postoperative nausea and vomiting)    Anxiety    Abscess    Obesity affecting pregnancy in third trimester    Maternal congenital cardiac anomaly affecting pregnancy, antepartum    History of maternal cardiac surgery    Mental disorder affecting " "pregnancy    History of embolic stroke    Status post primary low transverse  section    Abnormal glucose tolerance test in pregnancy    ABLA (acute blood loss anemia)    33 weeks gestation of pregnancy    Trauma during pregnancy    Preeclampsia    Hematoma of surgical wound following  section    Rectus sheath hematoma    Encounter for wound re-check    Postoperative cellulitis of surgical wound    Clogged duct, postpartum    Mirena IUD inserted 2024    Encounter for insertion of mirena IUD    Mastitis    Breast abscess of female    Renal artery aneurysm (HCC)       Past Surgical History:   Procedure Laterality Date    ASCENDING AORTIC ANEURYSM REPAIR      with\" Aortic valve surgery\"    MECHANICAL AORTIC VALVE REPLACEMENT      PATENT DUCTUS ARTERIOUS LIGATION      per pt had this surgery age 1    NC  DELIVERY ONLY N/A 2024    Procedure:  SECTION ();  Surgeon: Sanjay Thompson MD;  Location: [facility];  Service: Obstetrics    NC EXC B9 LESION MRGN XCP SK TG T/A/L 1.1-2.0 CM Right 2022    Procedure: EXCISION  BIOPSY LESION/MASS BACK;  Surgeon: David Cloud MD;  Location: [facility] OR;  Service: General    THROMBECTOMY W/ EMBOLECTOMY      Percutanoeous embolectomy in setting of stroke     BREAST CYST ASPIRATION RIGHT INITIAL Right 2024    WISDOM TOOTH EXTRACTION         Family History   Problem Relation Age of Onset    Aneurysm Father         Aortic aneurysm    Other Father         Bicuspid AV    Heart disease Father     Aortic stenosis Maternal Grandmother     Breast cancer Family     Colon cancer Family     Diabetes Family     Sudden death Paternal Grandfather     Depression Mother     Anxiety disorder Mother     Drug abuse Brother     Schizoaffective Disorder  Brother        Social History     Socioeconomic History    Marital status: Single     Spouse name: Not on file    Number of children: Not on file    Years of education: Not on file    " Highest education level: Not on file   Occupational History    Not on file   Tobacco Use    Smoking status: Never     Passive exposure: Never    Smokeless tobacco: Never   Vaping Use    Vaping status: Never Used   Substance and Sexual Activity    Alcohol use: Not Currently     Alcohol/week: 1.0 standard drink of alcohol     Types: 1 Glasses of wine per week    Drug use: No    Sexual activity: Yes     Partners: Male     Birth control/protection: OCP     Comment: Birth control pill   Other Topics Concern    Not on file   Social History Narrative    Not on file     Social Determinants of Health     Financial Resource Strain: Not on file   Food Insecurity: No Food Insecurity (5/15/2024)    Hunger Vital Sign     Worried About Running Out of Food in the Last Year: Never true     Ran Out of Food in the Last Year: Never true   Transportation Needs: No Transportation Needs (5/15/2024)    PRAPARE - Transportation     Lack of Transportation (Medical): No     Lack of Transportation (Non-Medical): No   Physical Activity: Not on file   Stress: Not on file   Social Connections: Not on file   Intimate Partner Violence: Not on file   Housing Stability: Low Risk  (5/15/2024)    Housing Stability Vital Sign     Unable to Pay for Housing in the Last Year: No     Number of Times Moved in the Last Year: 0     Homeless in the Last Year: No       No Known Allergies      Current Outpatient Medications:     escitalopram (LEXAPRO) 10 mg tablet, TAKE 2 TABLETS BY MOUTH EVERY DAY, Disp: 180 tablet, Rfl: 1    ferrous sulfate 325 (65 FE) MG EC tablet, Take 325 mg by mouth daily with breakfast, Disp: , Rfl:     metoprolol succinate (TOPROL-XL) 25 mg 24 hr tablet, Take 1 tablet (25 mg total) by mouth daily, Disp: , Rfl:     sulfamethoxazole-trimethoprim (BACTRIM DS) 800-160 mg per tablet, Take 1 tablet by mouth every 12 (twelve) hours for 7 days To complete 21 days of treatment., Disp: 14 tablet, Rfl: 0    warfarin (COUMADIN) 5 mg tablet, Take one  "tablet daily until INR at least 2.0.  Then review with your cardiology., Disp: 30 tablet, Rfl: 0      Review of Systems   Constitutional: Negative.    HENT: Negative.     Eyes: Negative.    Respiratory: Negative.     Cardiovascular: Negative.    Gastrointestinal: Negative.    Endocrine: Negative.    Genitourinary: Negative.    Musculoskeletal:         Breast infection- right   Skin: Negative.    Allergic/Immunologic: Negative.    Neurological: Negative.    Hematological: Negative.    Psychiatric/Behavioral: Negative.       I have personally reviewed the ROS entered by MA and agree as documented.    Objective     /72 (BP Location: Left arm, Patient Position: Sitting, Cuff Size: Standard)   Pulse 61   Ht 5' 3\" (1.6 m)   Wt 75.3 kg (166 lb)   LMP  (LMP Unknown)   SpO2 99%   BMI 29.41 kg/m²     Physical Exam  Constitutional:       Appearance: Normal appearance.   HENT:      Head: Normocephalic and atraumatic.   Cardiovascular:      Rate and Rhythm: Normal rate.      Pulses: Normal pulses.   Pulmonary:      Effort: Pulmonary effort is normal.   Abdominal:      General: There is no distension.      Palpations: Abdomen is soft.      Tenderness: There is no abdominal tenderness.   Musculoskeletal:         General: Normal range of motion.      Cervical back: Normal range of motion and neck supple.   Skin:     General: Skin is warm and dry.      Capillary Refill: Capillary refill takes less than 2 seconds.   Neurological:      General: No focal deficit present.      Mental Status: She is alert and oriented to person, place, and time.   Psychiatric:         Mood and Affect: Mood normal.         Behavior: Behavior normal.         Thought Content: Thought content normal.         Judgment: Judgment normal.       Administrative Statements   I have spent a total time of 45 minutes in caring for this patient on the day of the visit/encounter including Diagnostic results, Prognosis, Risks and benefits of tx options, " Instructions for management, Patient and family education, Importance of tx compliance, Risk factor reductions, Impressions, Counseling / Coordination of care, Documenting in the medical record, Reviewing / ordering tests, medicine, procedures  , Obtaining or reviewing history  , and Communicating with other healthcare professionals .

## 2024-07-22 NOTE — PROGRESS NOTES
Spoke with patient, advised INR still low, no missed doses, was started on Bactrim last week for breast abscess.  Advised to take 5 mg today only instead of 3.75 mg, then go back to 2.5 mg Tues Thurs, 3.75 mg all other days, will recheck next week.  Patient is a home elvia.

## 2024-07-22 NOTE — PATIENT INSTRUCTIONS
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 Denies flank pain. Personal and family history of ascending aortic aneurysm.     CTA a/p reviewed - isolated left upper pole branch renal artery aneurysm, main renal artery and branch points appear normal in caliber, no evidence of aortoiliac aneurysmal degeneration, normal right renal arteries     -We discussed the pathophysiology of renal artery aneurysmal disease, available treatment options and indications for treatment. Discussed criteria for intervention at all sizes in females of childbearing age. Recommendations in the general population include repair at size>=3cm, uncontrolled HTN, declining kidney function, and rupture.  -Discussed signs and symptoms of renal artery aneurysm rupture and advised to go to the ED if she experiences any symptoms.  -Discussed treatment options. Recommend repair as soon as possible due to increased risk for rupture. Recommend endovascular repair with coil embolization of the upper pole branch left renal artery aneurysm. This will likely result in renal infarct of the upper pole of the left kidney but the remaining 2 branches perfusing the majority of the kidney would be preserved. Open repair would also involve ligation of the upper pole branch as the small branch would not be revascularized but would be a much more invasive procedure. She is also just recently recovered from infection of her  surgical site and is still getting treated for mastitis.  -Discussed possible plan above with IR (Dr. Hayes). Will place referral for intervention ASAP.  -Will need coumadin held for surgery and bridging with lovenox therapy.     Orders:  -     Ambulatory Referral to Vascular Surgery  -     Ambulatory Referral to Interventional Radiology; Future

## 2024-07-23 ENCOUNTER — TELEPHONE (OUTPATIENT)
Age: 29
End: 2024-07-23

## 2024-07-23 NOTE — TELEPHONE ENCOUNTER
"Patient's mother called in, patient was scheduled with breast oncology today but was canceled at the last minute, they said it was not appropriate for them to treat her.  Patient has seen Dr Cloud before and would like this taken care ASAP, the family feels like they are getting the \"run around\" and the patient's health is deteriorating due to other under lying issues.  Please call the patient's mother, Tiffany at  as soon as possible to schedule.  Thanks.  "

## 2024-07-24 ENCOUNTER — TELEPHONE (OUTPATIENT)
Dept: OBGYN CLINIC | Facility: CLINIC | Age: 29
End: 2024-07-24

## 2024-07-24 ENCOUNTER — OFFICE VISIT (OUTPATIENT)
Dept: OBGYN CLINIC | Facility: CLINIC | Age: 29
End: 2024-07-24
Payer: COMMERCIAL

## 2024-07-24 ENCOUNTER — TELEPHONE (OUTPATIENT)
Dept: SURGERY | Facility: HOSPITAL | Age: 29
End: 2024-07-24

## 2024-07-24 VITALS
WEIGHT: 163.4 LBS | BODY MASS INDEX: 28.95 KG/M2 | DIASTOLIC BLOOD PRESSURE: 72 MMHG | HEIGHT: 63 IN | SYSTOLIC BLOOD PRESSURE: 120 MMHG

## 2024-07-24 DIAGNOSIS — N61.1 BREAST ABSCESS OF FEMALE: Primary | ICD-10-CM

## 2024-07-24 PROCEDURE — 99213 OFFICE O/P EST LOW 20 MIN: CPT | Performed by: OBSTETRICS & GYNECOLOGY

## 2024-07-24 RX ORDER — SULFAMETHOXAZOLE AND TRIMETHOPRIM 800; 160 MG/1; MG/1
1 TABLET ORAL EVERY 12 HOURS SCHEDULED
COMMUNITY
Start: 2024-07-08 | End: 2024-07-26 | Stop reason: SDUPTHER

## 2024-07-24 NOTE — ASSESSMENT & PLAN NOTE
Breast abscess since early July - [facility] and on antibiotics.  Patient has appt with General Surgery on Friday morning.  States breast is stable over last few days, no increase in size or pain.  No fluctuance on exam. Some small drainage today.  No fever.  No signs of systemic infection.  On appropriate antibiotics given culture after drainage. Has Gen surgery appt.  Recom continue antibiotics, warm compresses to encourage drainage and keep generally surgery appointment.  If breast worsening or develops fever prior to appointment, recom go to ER for evaluation and possible need for IV antibiotics.

## 2024-07-24 NOTE — TELEPHONE ENCOUNTER
Called patient to follow up-- I checked with ID, who agrees that continuing Bactrim for now is appropriate while awaiting drainage with Surgery. We discussed role of MRSA decolonization after acute infection has been treated. Discussed CHG soap and mupirocin applied to nares. I am happy to prescribe once her abscess has been treated. For now, will await further management with Surgery. Reassured patient that Surgery team is coordinating follow up appointment and she should be hearing from them soon with a follow up plan.     Sanjay Thompson MD  7/24/2024 9:32 AM

## 2024-07-24 NOTE — TELEPHONE ENCOUNTER
Pt called in early this morning to see about getting an appointment as soon as possible. I told pt we did receive her message and is working on getting her schedule. I told pt someone will contact her today to get schedule with the correct provider.

## 2024-07-24 NOTE — PROGRESS NOTES
[facility] [address], [address], [place] [address]    Assessment/Plan:  1. Breast abscess of female  Assessment & Plan:  Breast abscess since early July - treated with drainage at [facility] and on antibiotics.  Patient has appt with General Surgery on Friday morning.  States breast is stable over last few days, no increase in size or pain.  No fluctuance on exam. Some small drainage today.  No fever.  No signs of systemic infection.  On appropriate antibiotics given culture after drainage. Has Gen surgery appt.  Recom continue antibiotics, warm compresses to encourage drainage and keep generally surgery appointment.  If breast worsening or develops fever prior to appointment, recom go to ER for evaluation and possible need for IV antibiotics.      Subjective:   Suze Leigh is a 29 y.o.  female.    HPI:   Suze presents re ongoing breast abscess.    She is postpartum and nursing still (currently pumping and dumping due to antibiotic use making baby sick).    Was treated for mastitis with antibiotics 2024.  Presented to our office 2024 w/ c/o lump and practioner concerned for abscess and sent for STAT ultrasound and started on Bactrim for concern for MRSA.  2024 u/s showed abscess and ultrasound-guided drainage of abscess at the [facility] on 2024. Culture of abscess fluid grew MRSA, which is sensitive to the Bactrim that was prescribed on .     She returned on 7/15/2024 for exam - still erythema but improvement in pain and symptoms.  No fluctuance on exam.  Antibiotics extended to 21 days of Bactrim.  She was given order for u/s in 1-2 months for f/u as recommend by [facility] and scheduled for f/u in office 1 week.    Patient called 2024 - stated her mom drained further fluid from her breast on Saturday due to pain.  She was scheduled for follow up with [facility] 2024 (today), but then was called yesterday and told  "she needed to see general surgery.  She has an appointment 2024 (Friday morning).    Today she did not have an appointment but presented to the office with  her mother asking for exam.  I reviewed history.  Patient and mother confirmed that Saturday mom used scalpel to make a small incision in breast and expressed fluid.  Mother said she did this because Suze was in a lot of pain.  Since that abscess did get smaller and less painful.  She is continuing Bactrim bid as prescribed (now on day 17 or 21 day course) and has Gen Surgery appointment 2024.  Denies fevers.  She continues to pump and dump.  Taking Tylenol for pain.            Gyn History  No LMP recorded (lmp unknown).       Last pap smear: 2023    She  reports being sexually active and has had partner(s) who are male. She reports using the following method of birth control/protection: OCP.       OB History      Past Medical History:  No date: Anxiety  No date: Bicuspid aortic valve      Comment:  s/p mechanical AVR  1993: Coronary artery disease  No date: Moderate aortic insufficiency      Comment:  s/p mechanical AVR  No date: Motion sickness  No date: Patent ductus arteriosus      Comment:  s/p repair  No date: PONV (postoperative nausea and vomiting)  No date: S/P ascending aortic aneurysm repair  2022: Stroke (HCC)      Comment:  no residual effects per pt--initially couldnt speak at                first--\"feels back to normal now\"  No date: Varicella      Comment:  hx as child, and vaccine     Past Surgical History:  2015: ASCENDING AORTIC ANEURYSM REPAIR      Comment:  with\" Aortic valve surgery\"  2015: MECHANICAL AORTIC VALVE REPLACEMENT  No date: PATENT DUCTUS ARTERIOUS LIGATION      Comment:  per pt had this surgery age 1  2024: [place]  DELIVERY ONLY; N/A      Comment:  Procedure:  SECTION ();  Surgeon:                Sanjay Thompson MD;  Location: [facility] ;  Service:                " "Obstetrics  12/05/2022: IN EXC B9 LESION MRGN XCP SK TG T/A/L 1.1-2.0 CM; Right      Comment:  Procedure: EXCISION  BIOPSY LESION/MASS BACK;  Surgeon:                David Cloud MD;  Location: [facility];  Service:                General  2022: THROMBECTOMY W/ EMBOLECTOMY      Comment:  Percutanoeous embolectomy in setting of stroke  7/9/2024: US BREAST CYST ASPIRATION RIGHT INITIAL; Right  No date: WISDOM TOOTH EXTRACTION     Social History     Tobacco Use    Smoking status: Never     Passive exposure: Never    Smokeless tobacco: Never   Vaping Use    Vaping status: Never Used   Substance Use Topics    Alcohol use: Not Currently     Alcohol/week: 1.0 standard drink of alcohol     Types: 1 Glasses of wine per week    Drug use: No          Current Outpatient Medications:     escitalopram (LEXAPRO) 10 mg tablet, TAKE 2 TABLETS BY MOUTH EVERY DAY, Disp: 180 tablet, Rfl: 1    ferrous sulfate 325 (65 FE) MG EC tablet, Take 325 mg by mouth daily with breakfast, Disp: , Rfl:     metoprolol succinate (TOPROL-XL) 25 mg 24 hr tablet, Take 1 tablet (25 mg total) by mouth daily, Disp: , Rfl:     sulfamethoxazole-trimethoprim (BACTRIM DS) 800-160 mg per tablet, Take 1 tablet by mouth every 12 (twelve) hours, Disp: , Rfl:     warfarin (COUMADIN) 5 mg tablet, Take one tablet daily until INR at least 2.0.  Then review with your cardiology., Disp: 30 tablet, Rfl: 0    She has No Known Allergies..    ROS: Review of Systems   Constitutional: Negative.    Gastrointestinal: Negative.    Genitourinary: Negative.    Skin:  Positive for wound (right breast abscess, swelling, infection).   Psychiatric/Behavioral: Negative.         Objective:  /72 (BP Location: Left arm, Patient Position: Sitting, Cuff Size: Standard)   Ht 5' 3\" (1.6 m)   Wt 74.1 kg (163 lb 6.4 oz)   LMP  (LMP Unknown)   Breastfeeding Yes   BMI 28.95 kg/m²      Physical Exam  Constitutional:       Appearance: Normal appearance.   Chest:   Breasts:     Right: " Swelling, skin change and tenderness present.      Left: Normal.          Comments: Right breast approx 7x5cm area of erythema and induration c/w infection.  No fluctuance.  Site of drainage in center with small amount of fluid draining, cannot express any further on exam.  Tenderness to palpation.  Neurological:      Mental Status: She is alert.

## 2024-07-25 ENCOUNTER — PREP FOR PROCEDURE (OUTPATIENT)
Dept: INTERVENTIONAL RADIOLOGY/VASCULAR | Facility: CLINIC | Age: 29
End: 2024-07-25

## 2024-07-25 DIAGNOSIS — I72.2 RENAL ARTERY ANEURYSM (HCC): Primary | ICD-10-CM

## 2024-07-25 NOTE — PROGRESS NOTES
Ambulatory Visit  Name: Suze Leigh      : 1995      MRN: [id number]  Encounter Provider: Sebastian Marmolejo MD  Encounter Date: 2024   Encounter department: [facility] [place]    Assessment & Plan   1. Breast abscess of female  Assessment & Plan:  Patient presents to our general surgery practice regarding a right breast abscess that has been treated with antibiotic therapy, percutaneous aspiration at the breast center and subsequent incision and drainage by her mother a retired nurse 1 week ago.    The bulk of the history was provided by the mother.  The patient does report a general improvement in the appearance of her right breast following the antibiotic therapy and subsequent drainage procedure by her mother.    Patient reports that she is breast-feeding.    On physical examination she is a well-nourished well-appearing female.  Pleasant competent and reliable as a historian.  Inspection and palpation of the right breast reveals a 1 cm open wound in the upper outer quadrant.  There is no surrounding induration or erythema that would suggest the presence of an active soft tissue infection.  There is a firmness to the breast on palpation specifically in the upper outer quadrant consistent with a recent resolving breast abscess.  No other suspicious findings on palpation or inspection of her right breast.    The upper outer quadrant breast wound was probed with a Q-tip.  It is very superficial not excepting the full tip of the Q-tip.    I did explain the potential for the development of a milk fistula as the patient is actively breast-feeding her  baby.    The patient has a resolving upper outer quadrant right breast abscess.    I have advised continued conservative management.  She is been instructed to cleanse the wound daily with soap and water.      I have asked that she return to the office in 3 weeks time to reassess her symptom complex after which we will make  additional diagnostic and therapeutic treatment recommendations.        She has been encouraged to follow-up sooner on an as-needed basis.      History of Present Illness     Suze Leigh is a 29 y.o. female who presents with a right Breast abscess. She states that this has being on for 2 months with swelling and redness, drainage    Review of Systems   Constitutional:  Negative for chills and fever.   HENT:  Negative for ear pain and sore throat.    Eyes:  Negative for pain and visual disturbance.   Respiratory:  Negative for cough and shortness of breath.    Cardiovascular:  Negative for chest pain and palpitations.   Gastrointestinal:  Negative for abdominal pain and vomiting.   Genitourinary:  Negative for dysuria and hematuria.   Musculoskeletal:  Negative for arthralgias and back pain.   Skin:  Negative for color change and rash.   Neurological:  Negative for seizures and syncope.   All other systems reviewed and are negative.      Objective     /80   Pulse (!) 53   Temp (!) 96.6 °F (35.9 °C) (Temporal)   Resp 18   LMP  (LMP Unknown)   SpO2 98%   Breastfeeding Yes     Physical Exam  Vitals and nursing note reviewed.   Constitutional:       General: She is not in acute distress.     Appearance: She is well-developed.   HENT:      Head: Normocephalic and atraumatic.   Eyes:      Conjunctiva/sclera: Conjunctivae normal.   Cardiovascular:      Rate and Rhythm: Normal rate and regular rhythm.      Heart sounds: No murmur heard.  Pulmonary:      Effort: Pulmonary effort is normal. No respiratory distress.      Breath sounds: Normal breath sounds.   Abdominal:      Palpations: Abdomen is soft.      Tenderness: There is no abdominal tenderness.   Musculoskeletal:         General: No swelling.      Cervical back: Neck supple.   Skin:     General: Skin is warm and dry.      Capillary Refill: Capillary refill takes less than 2 seconds.      Comments: Breast exam as described above   Neurological:       Mental Status: She is alert.   Psychiatric:         Mood and Affect: Mood normal.       Administrative Statements

## 2024-07-26 ENCOUNTER — TELEPHONE (OUTPATIENT)
Dept: OBGYN CLINIC | Facility: CLINIC | Age: 29
End: 2024-07-26

## 2024-07-26 ENCOUNTER — OFFICE VISIT (OUTPATIENT)
Dept: SURGERY | Facility: CLINIC | Age: 29
End: 2024-07-26
Payer: COMMERCIAL

## 2024-07-26 VITALS
RESPIRATION RATE: 18 BRPM | DIASTOLIC BLOOD PRESSURE: 80 MMHG | SYSTOLIC BLOOD PRESSURE: 116 MMHG | OXYGEN SATURATION: 98 % | TEMPERATURE: 96.6 F | HEART RATE: 53 BPM

## 2024-07-26 DIAGNOSIS — N61.1 BREAST ABSCESS OF FEMALE: Primary | ICD-10-CM

## 2024-07-26 DIAGNOSIS — J01.00 ACUTE NON-RECURRENT MAXILLARY SINUSITIS: Primary | ICD-10-CM

## 2024-07-26 PROCEDURE — 99203 OFFICE O/P NEW LOW 30 MIN: CPT | Performed by: SURGERY

## 2024-07-26 NOTE — TELEPHONE ENCOUNTER
I saw Suze saw Dr. Sebastian Marmolejo in Gen Surgery today.  His note indicates he felt the right breast abscess is resolving and recommended conservative management with daily cleaning with soap and water.  She will return in 3 weeks for evaluation.      I saw Suze had sent a request for Abx refill to her PCP.    I called Suze to review.  She said she will finish her Bactrim course of 2 weeks this Sunday.  She said Dr. Marmolejo did not mention further antibiotics but states she did not ask him directly.    I sent an Epic Chat msg to Dr. Marmolejo for his opinion on if further antibiotics beyond Sunday are necessary.  He responded he did not feel treatment beyond the 2 week course was necessary.      I called Suze again and told her I specifically inquired with Dr. Marmolejo and he feels finishing the course on Sunday is sufficient.  She thanked me for the call.

## 2024-07-26 NOTE — ASSESSMENT & PLAN NOTE
Patient presents to our general surgery practice regarding a right breast abscess that has been treated with antibiotic therapy, percutaneous aspiration at the breast center and subsequent incision and drainage by her mother a retired nurse 1 week ago.    The bulk of the history was provided by the mother.  The patient does report a general improvement in the appearance of her right breast following the antibiotic therapy and subsequent drainage procedure by her mother.    Patient reports that she is breast-feeding.    On physical examination she is a well-nourished well-appearing female.  Pleasant competent and reliable as a historian.  Inspection and palpation of the right breast reveals a 1 cm open wound in the upper outer quadrant.  There is no surrounding induration or erythema that would suggest the presence of an active soft tissue infection.  There is a firmness to the breast on palpation specifically in the upper outer quadrant consistent with a recent resolving breast abscess.  No other suspicious findings on palpation or inspection of her right breast.    The upper outer quadrant breast wound was probed with a Q-tip.  It is very superficial not excepting the full tip of the Q-tip.    I did explain the potential for the development of a milk fistula as the patient is actively breast-feeding her  baby.    The patient has a resolving upper outer quadrant right breast abscess.    I have advised continued conservative management.  She is been instructed to cleanse the wound daily with soap and water.      I have asked that she return to the office in 3 weeks time to reassess her symptom complex after which we will make additional diagnostic and therapeutic treatment recommendations.        She has been encouraged to follow-up sooner on an as-needed basis.

## 2024-07-27 RX ORDER — SULFAMETHOXAZOLE AND TRIMETHOPRIM 800; 160 MG/1; MG/1
1 TABLET ORAL EVERY 12 HOURS SCHEDULED
Qty: 40 TABLET | Refills: 0 | Status: SHIPPED | OUTPATIENT
Start: 2024-07-27 | End: 2024-08-16

## 2024-08-01 ENCOUNTER — TELEPHONE (OUTPATIENT)
Dept: CARDIOLOGY CLINIC | Facility: CLINIC | Age: 29
End: 2024-08-01

## 2024-08-01 DIAGNOSIS — Z95.2 HISTORY OF MECHANICAL AORTIC VALVE REPLACEMENT: Primary | ICD-10-CM

## 2024-08-01 NOTE — TELEPHONE ENCOUNTER
Per Marisel Garber RN for INR: Patient will be having renal arteriogram on 8/13/24, will need to hold Coumadin for 5 days and can restart the next day.    Dr Shell, when do you want her to start Lovenox?  Should she get Lovenox the night of the procedure? Her weight is 74 kg, how much Lovenox would you like her to get?  Thanks

## 2024-08-02 DIAGNOSIS — Z95.2 HISTORY OF MECHANICAL AORTIC VALVE REPLACEMENT: Primary | ICD-10-CM

## 2024-08-02 RX ORDER — ENOXAPARIN SODIUM 100 MG/ML
1 INJECTION SUBCUTANEOUS EVERY 12 HOURS
Qty: 14 ML | Refills: 1 | Status: SHIPPED | OUTPATIENT
Start: 2024-08-02

## 2024-08-02 NOTE — TELEPHONE ENCOUNTER
I checked with Marisel Garber RN from IR, patient will need to hold Lovenox for 24 hours prior to procedure.    Please verify these instructions:  8/8 No Coumadin  8/9 No Coumadin, will have her check INR (does home testing) if less than 2.5 start Lovenox every 12 hours, if INR still greater than 2.5 will recheck tomorrow  8/10 No Coumadin, if needed recheck INR, if less than 2.5  start Lovenox every 12 hours.  8/11 No Coumadin, Lovenox every 12 hours  8/12 No Coumadin, Lovenox only in AM  8/13 procedure day, No Lovenox AM, start Coumadin and Lovenox in PM if ok with IR physician  8/14 Coumadin normal dose, Lovenox every 12 hours  8/15 Coumadin normal dose, Lovenox every 12 hours  8/16 INR must go to lab, Coumadin normal dose, Lovenox every 12 hours, if INR greater than 2.5 may discontinue Lovenox

## 2024-08-02 NOTE — TELEPHONE ENCOUNTER
Spoke with patient, reviewed above bridging instructions.  Will email to patient to have reference.   Advised script for Lovenox was sent to [facility]    Also advised that she is overdue for INR, states she will complete.    INR script placed

## 2024-08-05 ENCOUNTER — ANTICOAG VISIT (OUTPATIENT)
Dept: CARDIOLOGY CLINIC | Facility: CLINIC | Age: 29
End: 2024-08-05

## 2024-08-05 DIAGNOSIS — Z95.2 HISTORY OF MECHANICAL AORTIC VALVE REPLACEMENT: Primary | ICD-10-CM

## 2024-08-05 LAB — INR PPP: 2 (ref 0.85–1.19)

## 2024-08-05 NOTE — PROGRESS NOTES
Left message for patient, advised INR low, advised to take 5 mg tonight only, then go back to 2.5 mg Tues Thurs, 3.75 mg all other days.   Advised she will start holding Thurs 8/8/24 for IR procedure 8/13/24  Asked to call office with any questions on instructions that were emailed Fri.   Advised to get repeat INR 8/16/24 at lab.  This was a home test.    8/8 No Coumadin  8/9 No Coumadin, will have her check INR (does home testing) if less than 2.5 start Lovenox every 12 hours, if INR still greater than 2.5 will recheck tomorrow  8/10 No Coumadin, if needed recheck INR, if less than 2.5  start Lovenox every 12 hours.  8/11 No Coumadin, Lovenox every 12 hours  8/12 No Coumadin, Lovenox only in AM  8/13 procedure day, No Lovenox AM, start Coumadin and Lovenox in PM if ok with IR physician  8/14 Coumadin normal dose, Lovenox every 12 hours  8/15 Coumadin normal dose, Lovenox every 12 hours  8/16 INR must go to lab, Coumadin normal dose, Lovenox every 12 hours, if INR greater than 2.5 may discontinue Lovenox

## 2024-08-06 ENCOUNTER — TELEPHONE (OUTPATIENT)
Dept: RADIOLOGY | Facility: HOSPITAL | Age: 29
End: 2024-08-06

## 2024-08-06 RX ORDER — SODIUM CHLORIDE 9 MG/ML
75 INJECTION, SOLUTION INTRAVENOUS CONTINUOUS
Status: CANCELLED | OUTPATIENT
Start: 2024-08-06

## 2024-08-07 ENCOUNTER — TELEPHONE (OUTPATIENT)
Dept: RADIOLOGY | Facility: HOSPITAL | Age: 29
End: 2024-08-07

## 2024-08-07 NOTE — PRE-PROCEDURE INSTRUCTIONS
Pre-procedure Instructions for Interventional Radiology  [address] [address]  INTERVENTIONAL RADIOLOGY [phone]    You are scheduled for a/an Angiogram Embolization of Left Renal Aneurysm.    On Tuesday 8/13/24.    Your tentative arrival time is 7:30 AM.  Short stay will notify you the day before your procedure with the exact arrival time and the location to arrive.    To prepare for your procedure:  Please arrange for someone to drive you home after the procedure and stay with you until the next morning if you are instructed to do so.  This is typically for patients receiving some type of sedative or anesthetic for the procedure.  DO NOT EAT OR DRINK ANYTHING after midnight on the evening before your procedure including candy & gum.  ONLY SIPS OF WATER with your medications are allowed on the morning of your procedure.  TAKE ALL OF YOUR REGULAR MEDICATIONS THE MORNING OF YOUR PROCEDURE with sips of water!  We may call you to stop some of your blood sugar, blood pressure and blood thinning medications depending on the procedure.  Please take all of these medications unless we instruct you to stop them.  If you have an allergy to x-ray dye, please contact Interventional Radiology for an x-ray dye preparation which usually consists of an oral steroid and Benadryl.    The day of your procedure:  Bring a list of the medications you take at home.  Bring medications you take for breathing problems (such as inhalers), medications for chest pain, or both.  Bring a case for your glasses or contacts.  Bring your insurance card and a form of photo ID.  Please leave all valuables such as credit cards and jewelry at home.  Report to the admitting office to the left of the registration desk in the main lobby at the [facility], Entrance B.  You will then be directed to the Short Stay Center.  While your procedure is being performed, your family may wait in the Radiology  Patient assessed at 0830.  Subjective  Pain: Patient denies any pain at the time of assessment. Pain being managed well by pain management protocol  Complaints:  None. Patient refused rhogam due to stating she not having anymore children and difficulties getting pregnant. Patient states this was an IVF pregnancy after several attempts.   Milestones: Alert and orientedx3. Out of bed ambulating. Voiding. Positive bowel movement. Tolerating a regular diet.  Infant feeding:  breastfeeding and formula feeding                              Feeding related issues or concerns: none    Objective   Vital Signs:  Vital Signs Last 24 Hrs  T(C): 36.8 (2019 06:00), Max: 37.1 (2019 17:51)  T(F): 98.2 (2019 06:00), Max: 98.7 (2019 17:51)  HR: 90 (2019 06:00) (90 - 92)  BP: 108/70 (2019 06:00) (108/70 - 117/76)  BP(mean): --  RR: 17 (2019 06:00) (17 - 18)  SpO2: 100% (2019 06:00) (99% - 100%)    Labs:            12.5   15.63 )-----------( 182      ( 10 Jerrod 2019 16:25 )             37.3   Blood Type: Onegative/ infant A indeterminate patient refused rhogam    Rubella: Immune  RPR: nonreactive    Medications  MEDICATIONS  (STANDING):  acetaminophen   Tablet .. 650 milliGRAM(s) Oral every 4 hours  diphtheria/tetanus/pertussis (acellular) Vaccine (ADAcel) 0.5 milliLiter(s) IntraMuscular once  ibuprofen  Tablet. 600 milliGRAM(s) Oral every 6 hours  oxyCODONE    IR 5 milliGRAM(s) Oral every 3 hours  prenatal multivitamin 1 Tablet(s) Oral daily  sodium chloride 0.9% lock flush 3 milliLiter(s) IV Push every 8 hours    MEDICATIONS  (PRN):  dibucaine 1% Ointment 1 Application(s) Topical every 4 hours PRN Perineal Discomfort  diphenhydrAMINE 25 milliGRAM(s) Oral every 6 hours PRN Itching  docusate sodium 100 milliGRAM(s) Oral two times a day PRN Stool Softening  glycerin Suppository - Adult 1 Suppository(s) Rectal at bedtime PRN Constipation  lanolin Ointment 1 Application(s) Topical every 6 hours PRN Sore Nipples  magnesium hydroxide Suspension 30 milliLiter(s) Oral two times a day PRN Constipation  oxyCODONE    IR 5 milliGRAM(s) Oral every 4 hours PRN Severe Pain (7 -10)  pramoxine 1%/zinc 5% Cream 1 Application(s) Topical every 4 hours PRN perineal discomfort  simethicone 80 milliGRAM(s) Chew every 6 hours PRN Gas  witch hazel Pads 1 Application(s) Topical every 4 hours PRN Perineal Discomfort  zolpidem 5 milliGRAM(s) Oral at bedtime PRN Insomnia    Assessment  36y/o . Day #2  postpartum .  Past medical history significant for polycystic ovarian syndrome was on metformin, fibroids, history of anxiety refused to social work.   Current Issues:  + 300cc, oozing laceration s/p methergine and cytotec  Lung sound clear bilaterally  Breasts: slightly fernandez, nontender  Nipples: intact  Abdomen: Soft, nontender, nondistended. Fundus firm. Positive bowel sounds  Vagina: Lochia light rubra  Perineum:  slight edema with no erythema noted  Laceration/episiotomy site: 2nd degree laceration  Lower extremities: No edema noted bilaterally of lower extremities. Nontender calves.  Negative Chris's sign. Positive pedal pulses.  Other relevant physical exam findings: none    Plan  Continue routine postpartum care.   Increase ambulation, analgesia PRN and pain medication protocol standing oxycodone, ibuprofen and acetaminophen.  Discussed pericare and sitz bath.  Discussed breastfeeding  Discussed risk and benefits of rhogam. Patient refused rhogam postpartum.  Discharge to home today. Discussed discharge planning. Waiting Room on the 1st floor in Radiology.  if they need to leave, they may provide a number to be called following the procedure.   Be prepared to stay overnight just in case. Sometimes procedures will indicate the need for further observation or treatment.   If you are scheduled for a follow-up visit with the Interventional Radiologist after your procedure, you will be called with a date and time.    Special Instructions (Medications to stop taking before your procedure etc.):  Coumadin last dose 8/7/24.Lovenox and coumadin to be controlled by Cardiology anticoagulation team.

## 2024-08-08 NOTE — PROGRESS NOTES
Ambulatory Visit  Name: Suze Leigh      : 1995      MRN: [id number]  Encounter Provider: Sebastian Marmolejo MD  Encounter Date: 2024   Encounter department: [facility] GENERAL SURGERY [place]    Assessment & Plan   1. Breast abscess of female  Assessment & Plan:  Patient returns for routine scheduled follow-up regarding her recent right breast cellulitis and abscess.  Patient denies new complaints.  She reports closure of the wound.  She continues to breast-feed.    On physical examination she is well-appearing.  Pleasant competent reliable as a historian.  The right breast wound has healed entirely.  She has a resolving inflammatory mass which I expect to fully heal in the weeks and months to come.    She has a breast ultrasound ordered by her gynecologist.  I explained to her that I would expect this to be an abnormal study in light of her recent soft tissue infection.    We discussed the small but real possibility of breast cancer in young women.    Alternatives include simple follow-up in 3 months time which I have asked that she do with our practice.    All questions answered to the satisfaction of the patient.  I look forward to seeing her back in 3 months time or sooner on an as-needed basis.      History of Present Illness     Suze Leigh is a 29 y.o. female who presents for a 3 week f/u on right breast abscess. Patient states the wound is healed with the help of abx and warm compress. She denies redness/irritation, drainage, pain or fevers/chills but the area is still hard. Patient hasn't notice any new lumps, she denies nipple discharge, inverted nipples, or breast pain/swelling. Patient denies any cancer diagnoses with family or herself. She has a Diag US scheduled for 24 and a gyn appt scheduled for later on today. Patient denies any allergies but alternates warfarin and levonox for blood thinners  depending on what procedure she is having. SCARLET Verdugo    Review of Systems  "  Constitutional:  Negative for chills and fever.   HENT:  Negative for ear pain and sore throat.    Eyes:  Negative for pain and visual disturbance.   Respiratory:  Negative for cough and shortness of breath.    Cardiovascular:  Negative for chest pain and palpitations.   Gastrointestinal:  Negative for abdominal pain and vomiting.   Genitourinary:  Negative for dysuria and hematuria.   Musculoskeletal:  Negative for arthralgias and back pain.   Skin:  Negative for color change and rash.   Neurological:  Negative for seizures and syncope.   All other systems reviewed and are negative.      Objective     /60 (BP Location: Left arm, Patient Position: Sitting, Cuff Size: Standard)   Pulse 59   Temp 97.9 °F (36.6 °C) (Temporal)   Resp 18   Ht 5' 3\" (1.6 m)   Wt 78 kg (172 lb)   SpO2 100%   BMI 30.47 kg/m²     Physical Exam  Vitals and nursing note reviewed.   Constitutional:       General: She is not in acute distress.     Appearance: She is well-developed.   HENT:      Head: Normocephalic and atraumatic.   Eyes:      Conjunctiva/sclera: Conjunctivae normal.   Cardiovascular:      Rate and Rhythm: Normal rate and regular rhythm.      Heart sounds: No murmur heard.  Pulmonary:      Effort: Pulmonary effort is normal. No respiratory distress.      Breath sounds: Normal breath sounds.   Abdominal:      Palpations: Abdomen is soft.      Tenderness: There is no abdominal tenderness.   Musculoskeletal:         General: No swelling.      Cervical back: Neck supple.   Skin:     General: Skin is warm and dry.      Capillary Refill: Capillary refill takes less than 2 seconds.      Comments: Breast exam as described above   Neurological:      Mental Status: She is alert.   Psychiatric:         Mood and Affect: Mood normal.       Administrative Statements           "

## 2024-08-12 ENCOUNTER — OFFICE VISIT (OUTPATIENT)
Dept: SURGERY | Facility: CLINIC | Age: 29
End: 2024-08-12
Payer: COMMERCIAL

## 2024-08-12 ENCOUNTER — ANNUAL EXAM (OUTPATIENT)
Dept: OBGYN CLINIC | Facility: CLINIC | Age: 29
End: 2024-08-12
Payer: COMMERCIAL

## 2024-08-12 VITALS
HEART RATE: 59 BPM | SYSTOLIC BLOOD PRESSURE: 100 MMHG | WEIGHT: 172 LBS | DIASTOLIC BLOOD PRESSURE: 60 MMHG | BODY MASS INDEX: 30.48 KG/M2 | OXYGEN SATURATION: 100 % | RESPIRATION RATE: 18 BRPM | TEMPERATURE: 97.9 F | HEIGHT: 63 IN

## 2024-08-12 VITALS
HEIGHT: 63 IN | DIASTOLIC BLOOD PRESSURE: 72 MMHG | WEIGHT: 168.4 LBS | SYSTOLIC BLOOD PRESSURE: 112 MMHG | BODY MASS INDEX: 29.84 KG/M2

## 2024-08-12 DIAGNOSIS — N61.1 BREAST ABSCESS OF FEMALE: Primary | ICD-10-CM

## 2024-08-12 DIAGNOSIS — N61.1 BREAST ABSCESS OF FEMALE: ICD-10-CM

## 2024-08-12 DIAGNOSIS — Z12.4 SCREENING FOR CERVICAL CANCER: ICD-10-CM

## 2024-08-12 DIAGNOSIS — Z01.419 ENCOUNTER FOR ANNUAL ROUTINE GYNECOLOGICAL EXAMINATION: Primary | ICD-10-CM

## 2024-08-12 PROCEDURE — G0145 SCR C/V CYTO,THINLAYER,RESCR: HCPCS | Performed by: STUDENT IN AN ORGANIZED HEALTH CARE EDUCATION/TRAINING PROGRAM

## 2024-08-12 PROCEDURE — G0476 HPV COMBO ASSAY CA SCREEN: HCPCS | Performed by: STUDENT IN AN ORGANIZED HEALTH CARE EDUCATION/TRAINING PROGRAM

## 2024-08-12 PROCEDURE — S0612 ANNUAL GYNECOLOGICAL EXAMINA: HCPCS | Performed by: STUDENT IN AN ORGANIZED HEALTH CARE EDUCATION/TRAINING PROGRAM

## 2024-08-12 PROCEDURE — 99213 OFFICE O/P EST LOW 20 MIN: CPT | Performed by: SURGERY

## 2024-08-12 NOTE — PROGRESS NOTES
[facility]/GY[facility] - [place]  [address] [place] [address]    ASSESSMENT/PLAN: Suze Leigh is a 29 y.o.  who presents for annual gynecologic exam.    Encounter for routine gynecologic examination  - Routine well woman exam completed today.  - Cervical Cancer Screening: Current ASCCP Guidelines reviewed. Last Pap: 2023. History of abnormal: Yes, LSIL  and . Given recent abnormal history, repeat was collected today.   - HPV Vaccination status: Immunization series complete  - STI screening offered including HIV testing: offered, pt declined  - Contraceptive counseling discussed.  Current contraception: IUD, satisfied. :   - The following were reviewed in today's visit: breast self exam, exercise, and healthy diet    Additional problems addressed during this visit:  1. Encounter for annual routine gynecological examination  2. Breast abscess of female  Assessment & Plan:  - Clinically improving. Now s/p antibiotics and drainage by Radiology. Saw General Surgery today for follow up.  - Recommend delaying repeat breast imaging, as her breast has not completely healed. Discussed that repeat breast imaging should still be completed in approximately 3 months to evaluate for underlying suspicious mass that may not be palpable. This should be completed in conjunction with clinical breast exam, which she is scheduled to complete with General Surgery in November.   3. Screening for cervical cancer  -     Liquid-based pap, screening      CC:  Annual Gynecologic Examination    HPI: Suze Leigh is a 29 y.o.  who presents for annual gynecologic examination. She is without complaint today. She reports that her breast abscess is improving. She saw breast surgery today for re-evaluation.     ROS: Negative except as noted in HPI    No LMP recorded (lmp unknown). (Menstrual status: Birth Control).       She  reports being sexually active and has had partner(s) who are male. She reports using  "the following method of birth control/protection: I.U.D..       The following portions of the patient's history were reviewed and updated as appropriate:   Past Medical History:   Diagnosis Date    Anxiety     Bicuspid aortic valve     s/p mechanical AVR    Coronary artery disease 1993    Moderate aortic insufficiency     s/p mechanical AVR    Motion sickness     Patent ductus arteriosus     s/p repair    PONV (postoperative nausea and vomiting)     S/P ascending aortic aneurysm repair     Stroke (HCC) 2022    no residual effects per pt--initially couldnt speak at first--\"feels back to normal now\"    Varicella     hx as child, and vaccine     Past Surgical History:   Procedure Laterality Date    ASCENDING AORTIC ANEURYSM REPAIR      with\" Aortic valve surgery\"    MECHANICAL AORTIC VALVE REPLACEMENT      PATENT DUCTUS ARTERIOUS LIGATION      per pt had this surgery age 1    MT  DELIVERY ONLY N/A 2024    Procedure:  SECTION ();  Surgeon: Sanjay Thompson MD;  Location: [facility] LD;  Service: Obstetrics    MT EXC B9 LESION MRGN XCP SK TG T/A/L 1.1-2.0 CM Right 2022    Procedure: EXCISION  BIOPSY LESION/MASS BACK;  Surgeon: David Cloud MD;  Location: [facility] Main OR;  Service: General    THROMBECTOMY W/ EMBOLECTOMY      Percutanoeous embolectomy in setting of stroke     BREAST CYST ASPIRATION RIGHT INITIAL Right 2024    WISDOM TOOTH EXTRACTION       Family History   Problem Relation Age of Onset    Aneurysm Father         Aortic aneurysm    Other Father         Bicuspid AV    Heart disease Father     Aortic stenosis Maternal Grandmother     Breast cancer Family     Colon cancer Family     Diabetes Family     Sudden death Paternal Grandfather     Depression Mother     Anxiety disorder Mother     Drug abuse Brother     Schizoaffective Disorder  Brother      Social History     Socioeconomic History    Marital status: Single     Spouse name: None    Number of children: " "None    Years of education: None    Highest education level: None   Occupational History    None   Tobacco Use    Smoking status: Never     Passive exposure: Never    Smokeless tobacco: Never   Vaping Use    Vaping status: Never Used   Substance and Sexual Activity    Alcohol use: Not Currently     Alcohol/week: 1.0 standard drink of alcohol     Types: 1 Glasses of wine per week    Drug use: No    Sexual activity: Yes     Partners: Male     Birth control/protection: I.U.D.   Other Topics Concern    None   Social History Narrative    None     Social Determinants of Health     Financial Resource Strain: Not on file   Food Insecurity: No Food Insecurity (5/15/2024)    Hunger Vital Sign     Worried About Running Out of Food in the Last Year: Never true     Ran Out of Food in the Last Year: Never true   Transportation Needs: No Transportation Needs (5/15/2024)    PRAPARE - Transportation     Lack of Transportation (Medical): No     Lack of Transportation (Non-Medical): No   Physical Activity: Not on file   Stress: Not on file   Social Connections: Not on file   Intimate Partner Violence: Not on file   Housing Stability: Low Risk  (5/15/2024)    Housing Stability Vital Sign     Unable to Pay for Housing in the Last Year: No     Number of Times Moved in the Last Year: 0     Homeless in the Last Year: No     Outpatient Medications Marked as Taking for the 8/12/24 encounter (Annual Exam) with Sanjay Thompson MD   Medication    enoxaparin (LOVENOX) 80 mg/0.8 mL    escitalopram (LEXAPRO) 10 mg tablet    ferrous sulfate 325 (65 FE) MG EC tablet    metoprolol succinate (TOPROL-XL) 25 mg 24 hr tablet    warfarin (COUMADIN) 5 mg tablet     No Known Allergies        Objective:  /72 (BP Location: Left arm, Patient Position: Sitting, Cuff Size: Standard)   Ht 5' 3\" (1.6 m)   Wt 76.4 kg (168 lb 6.4 oz)   LMP  (LMP Unknown) Comment: Mirena IUD  Breastfeeding Yes   BMI 29.83 kg/m²        Chaperone present? Yes: Noame " SCARLET Latif.    General Appearance: alert and oriented, in no acute distress.   Neck/Thyroid: No thyromegaly, no thyroid nodules.  Respiratory: Unlabored breathing.  Cardiovascular: Regular rate, no peripheral edema.  Abdomen: Soft, non-tender, non-distended, no masses, no rebound or guarding.  Breast Exam: No dimpling, nipple retraction or discharge. No lumps or masses. No axillary or supraclavicular nodes.   Pelvic:       External genitalia: Normal appearance, no abnormal pigmentation, no lesions or masses. Normal Bartholin's and Stevenson's.      Urinary system: Urethral meatus normal, bladder non-tender.      Vaginal: normal mucosa without prolapse or lesions. Normal-appearing physiologic discharge.      Cervix: Normal-appearing, well-epithelialized, no gross lesions or masses. No cervical motion tenderness. IUD strings visible at os.       Adnexa: No adnexal masses or tenderness noted.      Uterus: Normal-sized, regular contour, midline, mobile, no uterine tenderness.  Extremities: Normal range of motion. Warm, well-perfused, non-tender.   Skin: normal, no rash or abnormalities  Neurologic: alert, oriented x3  Psychiatric: Appropriate affect, mood stable, cooperative with exam.        Sanjay Thompson MD  8/12/2024 1:53 PM

## 2024-08-12 NOTE — ASSESSMENT & PLAN NOTE
- Clinically improving. Now s/p antibiotics and drainage by Radiology. Saw General Surgery today for follow up.  - Recommend delaying repeat breast imaging, as her breast has not completely healed. Discussed that repeat breast imaging should still be completed in approximately 3 months to evaluate for underlying suspicious mass that may not be palpable. This should be completed in conjunction with clinical breast exam, which she is scheduled to complete with General Surgery in November.

## 2024-08-13 ENCOUNTER — ANTICOAG VISIT (OUTPATIENT)
Dept: CARDIOLOGY CLINIC | Facility: CLINIC | Age: 29
End: 2024-08-13

## 2024-08-13 ENCOUNTER — HOSPITAL ENCOUNTER (OUTPATIENT)
Dept: RADIOLOGY | Facility: HOSPITAL | Age: 29
Discharge: HOME | End: 2024-08-13
Attending: RADIOLOGY
Payer: COMMERCIAL

## 2024-08-13 VITALS
TEMPERATURE: 97.4 F | DIASTOLIC BLOOD PRESSURE: 72 MMHG | HEART RATE: 70 BPM | HEIGHT: 63 IN | OXYGEN SATURATION: 98 % | SYSTOLIC BLOOD PRESSURE: 129 MMHG | RESPIRATION RATE: 16 BRPM | BODY MASS INDEX: 29.59 KG/M2 | WEIGHT: 167 LBS

## 2024-08-13 DIAGNOSIS — I72.2 RENAL ARTERY ANEURYSM (HCC): Primary | ICD-10-CM

## 2024-08-13 DIAGNOSIS — Z95.2 HISTORY OF MECHANICAL AORTIC VALVE REPLACEMENT: Primary | ICD-10-CM

## 2024-08-13 LAB
ANION GAP SERPL CALCULATED.3IONS-SCNC: 7 MMOL/L (ref 4–13)
BUN SERPL-MCNC: 9 MG/DL (ref 5–25)
CALCIUM SERPL-MCNC: 9.5 MG/DL (ref 8.4–10.2)
CHLORIDE SERPL-SCNC: 101 MMOL/L (ref 96–108)
CO2 SERPL-SCNC: 29 MMOL/L (ref 21–32)
CREAT SERPL-MCNC: 0.61 MG/DL (ref 0.6–1.3)
ERYTHROCYTE [DISTWIDTH] IN BLOOD BY AUTOMATED COUNT: 13.4 % (ref 11.6–15.1)
EXT PREGNANCY TEST URINE: NEGATIVE
EXT. CONTROL: NORMAL
GFR SERPL CREATININE-BSD FRML MDRD: 121 ML/MIN/1.73SQ M
GLUCOSE P FAST SERPL-MCNC: 88 MG/DL (ref 65–99)
GLUCOSE SERPL-MCNC: 88 MG/DL (ref 65–140)
HCT VFR BLD AUTO: 37.7 % (ref 34.8–46.1)
HGB BLD-MCNC: 12.3 G/DL (ref 11.5–15.4)
HPV HR 12 DNA CVX QL NAA+PROBE: NEGATIVE
HPV16 DNA CVX QL NAA+PROBE: NEGATIVE
HPV18 DNA CVX QL NAA+PROBE: NEGATIVE
INR PPP: 1.01 (ref 0.85–1.19)
MCH RBC QN AUTO: 29.4 PG (ref 26.8–34.3)
MCHC RBC AUTO-ENTMCNC: 32.6 G/DL (ref 31.4–37.4)
MCV RBC AUTO: 90 FL (ref 82–98)
PLATELET # BLD AUTO: 212 THOUSANDS/UL (ref 149–390)
PMV BLD AUTO: 8.9 FL (ref 8.9–12.7)
POTASSIUM SERPL-SCNC: 3.6 MMOL/L (ref 3.5–5.3)
PROTHROMBIN TIME: 13.6 SECONDS (ref 12.3–15)
RBC # BLD AUTO: 4.19 MILLION/UL (ref 3.81–5.12)
SODIUM SERPL-SCNC: 137 MMOL/L (ref 135–147)
WBC # BLD AUTO: 5.18 THOUSAND/UL (ref 4.31–10.16)

## 2024-08-13 PROCEDURE — C1894 INTRO/SHEATH, NON-LASER: HCPCS

## 2024-08-13 PROCEDURE — 81025 URINE PREGNANCY TEST: CPT | Performed by: RADIOLOGY

## 2024-08-13 PROCEDURE — C1760 CLOSURE DEV, VASC: HCPCS

## 2024-08-13 PROCEDURE — 37242 VASC EMBOLIZE/OCCLUDE ARTERY: CPT | Performed by: RADIOLOGY

## 2024-08-13 PROCEDURE — 36247 INS CATH ABD/L-EXT ART 3RD: CPT

## 2024-08-13 PROCEDURE — C1769 GUIDE WIRE: HCPCS

## 2024-08-13 PROCEDURE — 36246 INS CATH ABD/L-EXT ART 2ND: CPT | Performed by: RADIOLOGY

## 2024-08-13 PROCEDURE — 99152 MOD SED SAME PHYS/QHP 5/>YRS: CPT

## 2024-08-13 PROCEDURE — 85610 PROTHROMBIN TIME: CPT | Performed by: RADIOLOGY

## 2024-08-13 PROCEDURE — 37242 VASC EMBOLIZE/OCCLUDE ARTERY: CPT

## 2024-08-13 PROCEDURE — C1887 CATHETER, GUIDING: HCPCS

## 2024-08-13 PROCEDURE — 85027 COMPLETE CBC AUTOMATED: CPT | Performed by: RADIOLOGY

## 2024-08-13 PROCEDURE — 99153 MOD SED SAME PHYS/QHP EA: CPT

## 2024-08-13 PROCEDURE — 80048 BASIC METABOLIC PNL TOTAL CA: CPT | Performed by: RADIOLOGY

## 2024-08-13 PROCEDURE — 99152 MOD SED SAME PHYS/QHP 5/>YRS: CPT | Performed by: RADIOLOGY

## 2024-08-13 PROCEDURE — 76937 US GUIDE VASCULAR ACCESS: CPT | Performed by: RADIOLOGY

## 2024-08-13 RX ORDER — SODIUM CHLORIDE 9 MG/ML
75 INJECTION, SOLUTION INTRAVENOUS CONTINUOUS
Status: DISCONTINUED | OUTPATIENT
Start: 2024-08-13 | End: 2024-08-14 | Stop reason: HOSPADM

## 2024-08-13 RX ORDER — KETOROLAC TROMETHAMINE 30 MG/ML
30 INJECTION, SOLUTION INTRAMUSCULAR; INTRAVENOUS ONCE
Status: DISCONTINUED | OUTPATIENT
Start: 2024-08-13 | End: 2024-08-14 | Stop reason: HOSPADM

## 2024-08-13 RX ORDER — DEXAMETHASONE SODIUM PHOSPHATE 10 MG/ML
INJECTION, SOLUTION INTRAMUSCULAR; INTRAVENOUS AS NEEDED
Status: DISCONTINUED | OUTPATIENT
Start: 2024-08-13 | End: 2024-08-14 | Stop reason: HOSPADM

## 2024-08-13 RX ORDER — CEFAZOLIN SODIUM 1 G/50ML
1000 SOLUTION INTRAVENOUS ONCE
Status: DISCONTINUED | OUTPATIENT
Start: 2024-08-13 | End: 2024-08-14 | Stop reason: HOSPADM

## 2024-08-13 RX ORDER — IODIXANOL 320 MG/ML
300 INJECTION, SOLUTION INTRAVASCULAR
Status: COMPLETED | OUTPATIENT
Start: 2024-08-13 | End: 2024-08-13

## 2024-08-13 RX ORDER — ONDANSETRON 4 MG/1
4 TABLET, FILM COATED ORAL EVERY 8 HOURS PRN
Qty: 20 TABLET | Refills: 0 | Status: SHIPPED | OUTPATIENT
Start: 2024-08-13 | End: 2024-08-19

## 2024-08-13 RX ORDER — CEFAZOLIN SODIUM 1 G/3ML
INJECTION, POWDER, FOR SOLUTION INTRAMUSCULAR; INTRAVENOUS AS NEEDED
Status: DISCONTINUED | OUTPATIENT
Start: 2024-08-13 | End: 2024-08-14 | Stop reason: HOSPADM

## 2024-08-13 RX ORDER — LIDOCAINE WITH 8.4% SOD BICARB 0.9%(10ML)
SYRINGE (ML) INJECTION AS NEEDED
Status: DISCONTINUED | OUTPATIENT
Start: 2024-08-13 | End: 2024-08-14 | Stop reason: HOSPADM

## 2024-08-13 RX ORDER — METOCLOPRAMIDE HYDROCHLORIDE 5 MG/ML
INJECTION INTRAMUSCULAR; INTRAVENOUS AS NEEDED
Status: DISCONTINUED | OUTPATIENT
Start: 2024-08-13 | End: 2024-08-14 | Stop reason: HOSPADM

## 2024-08-13 RX ORDER — ONDANSETRON 2 MG/ML
4 INJECTION INTRAMUSCULAR; INTRAVENOUS EVERY 6 HOURS PRN
Status: DISCONTINUED | OUTPATIENT
Start: 2024-08-13 | End: 2024-08-14 | Stop reason: HOSPADM

## 2024-08-13 RX ORDER — MIDAZOLAM HYDROCHLORIDE 2 MG/2ML
INJECTION, SOLUTION INTRAMUSCULAR; INTRAVENOUS AS NEEDED
Status: DISCONTINUED | OUTPATIENT
Start: 2024-08-13 | End: 2024-08-14 | Stop reason: HOSPADM

## 2024-08-13 RX ORDER — OXYCODONE HYDROCHLORIDE 5 MG/1
5 TABLET ORAL EVERY 4 HOURS PRN
Status: DISCONTINUED | OUTPATIENT
Start: 2024-08-13 | End: 2024-08-14 | Stop reason: HOSPADM

## 2024-08-13 RX ORDER — FENTANYL CITRATE 50 UG/ML
INJECTION, SOLUTION INTRAMUSCULAR; INTRAVENOUS AS NEEDED
Status: DISCONTINUED | OUTPATIENT
Start: 2024-08-13 | End: 2024-08-14 | Stop reason: HOSPADM

## 2024-08-13 RX ORDER — OXYCODONE AND ACETAMINOPHEN 5; 325 MG/1; MG/1
1 TABLET ORAL EVERY 6 HOURS PRN
Qty: 16 TABLET | Refills: 0 | Status: SHIPPED | OUTPATIENT
Start: 2024-08-13 | End: 2024-08-17

## 2024-08-13 RX ORDER — DIAZEPAM 10 MG/2ML
INJECTION, SOLUTION INTRAMUSCULAR; INTRAVENOUS AS NEEDED
Status: DISCONTINUED | OUTPATIENT
Start: 2024-08-13 | End: 2024-08-14 | Stop reason: HOSPADM

## 2024-08-13 RX ADMIN — Medication 10 ML: at 08:58

## 2024-08-13 RX ADMIN — SODIUM CHLORIDE 75 ML/HR: 0.9 INJECTION, SOLUTION INTRAVENOUS at 07:26

## 2024-08-13 RX ADMIN — ONDANSETRON 4 MG: 2 INJECTION INTRAMUSCULAR; INTRAVENOUS at 17:02

## 2024-08-13 RX ADMIN — MIDAZOLAM 1 MG: 1 INJECTION INTRAMUSCULAR; INTRAVENOUS at 08:45

## 2024-08-13 RX ADMIN — OXYCODONE HYDROCHLORIDE 5 MG: 5 TABLET ORAL at 11:13

## 2024-08-13 RX ADMIN — IODIXANOL 120 ML: 320 INJECTION, SOLUTION INTRAVASCULAR at 10:43

## 2024-08-13 RX ADMIN — OXYCODONE HYDROCHLORIDE 5 MG: 5 TABLET ORAL at 16:15

## 2024-08-13 RX ADMIN — DIAZEPAM 5 MG: 10 INJECTION, SOLUTION INTRAMUSCULAR; INTRAVENOUS at 09:45

## 2024-08-13 RX ADMIN — FENTANYL CITRATE 50 MCG: 50 INJECTION INTRAMUSCULAR; INTRAVENOUS at 08:46

## 2024-08-13 RX ADMIN — FENTANYL CITRATE 50 MCG: 50 INJECTION INTRAMUSCULAR; INTRAVENOUS at 09:17

## 2024-08-13 RX ADMIN — DEXAMETHASONE SODIUM PHOSPHATE 10 MG: 10 INJECTION, SOLUTION INTRAMUSCULAR; INTRAVENOUS at 09:19

## 2024-08-13 RX ADMIN — ONDANSETRON 4 MG: 2 INJECTION INTRAMUSCULAR; INTRAVENOUS at 07:45

## 2024-08-13 RX ADMIN — DIAZEPAM 5 MG: 10 INJECTION, SOLUTION INTRAMUSCULAR; INTRAVENOUS at 08:58

## 2024-08-13 RX ADMIN — MIDAZOLAM 1 MG: 1 INJECTION INTRAMUSCULAR; INTRAVENOUS at 08:39

## 2024-08-13 RX ADMIN — CEFAZOLIN 1000 MG: 1 INJECTION, POWDER, FOR SOLUTION INTRAMUSCULAR; INTRAVENOUS at 08:37

## 2024-08-13 RX ADMIN — METOCLOPRAMIDE 10 MG: 5 INJECTION, SOLUTION INTRAMUSCULAR; INTRAVENOUS at 09:48

## 2024-08-13 RX ADMIN — FENTANYL CITRATE 50 MCG: 50 INJECTION INTRAMUSCULAR; INTRAVENOUS at 10:17

## 2024-08-13 RX ADMIN — FENTANYL CITRATE 50 MCG: 50 INJECTION INTRAMUSCULAR; INTRAVENOUS at 08:39

## 2024-08-13 NOTE — SEDATION DOCUMENTATION
Left renal angiogram with embolization by Dr. Hayes. Right CFA access obtained, manual pressure held until hemostasis achieved. Procedure tolerated well by patient, VSS. IR Procedure Bedrest Start Time is 1045.

## 2024-08-13 NOTE — PROGRESS NOTES
Today is her procedure day and she will take lovenox PM and restart her warfarin.  Next Inr check  8/16

## 2024-08-13 NOTE — LETTER
[facility] [place] [facility]AL [facility]OLOG[facility]  [address] [facility] [address]  Dept: [phone]    August 16, 2024     Patient: Suze Leigh   YOB: 1995   Date of Visit: 8/13/2024       To Whom it May Concern:    Suze Leigh is under my professional care. She was seen in the hospital on 8/13/2024. She may return to work on Wednesday 8/21/2024 without limitations.    If you have any questions or concerns, please don't hesitate to call.         Sincerely,          Sanjay Hayes, DO

## 2024-08-13 NOTE — BRIEF OP NOTE (RAD/CATH)
IR EMBOLIZATION (SPECIFY VESSEL OR SITE)  Procedure Note    PATIENT NAME: Suze Leigh  : 1995  MRN: [id number]     Pre-op Diagnosis:   1. Renal artery aneurysm (HCC)      Post-op Diagnosis:   1. Renal artery aneurysm (HCC)        Surgeon:   Sanjay Hayes DO  Assistants:     No qualified resident was available.    Estimated Blood Loss: None  Findings:   R CFA 5F sheath access, Vascade failed, manual pressure x 20 min for hemostasis  Large L renal upper pole branch aneurysm with at least 3 outflow arteries, 1 dominant inflow artery  Coil embolization performed of aneursym and inflow artery.  Suspecting possible upper pole infarction with loss of 40% parenchyma    Specimens: none    Complications:  none    Anesthesia: conscious sedation and local    Sanjay Hayes DO     Date: 2024  Time: 10:36 AM

## 2024-08-13 NOTE — DISCHARGE INSTRUCTIONS
Embolization   AMBULATORY CARE:   What you need to know about embolization: Embolization is a procedure to create a clot, or block, in a blood vessel. This stops blood from flowing to the area. The procedure may be used to treat many conditions. It can help stop heavy bleeding (hemorrhage), or prevent an aneurysm from rupturing. An abnormal connection between arteries can be removed. Embolization can stop blood flow to a tumor, such as a uterine fibroid or a cancer tumor. Chemotherapy medicine may be given during an embolization to treat a cancer tumor. This is called chemoembolization.  How to prepare for the procedure: Embolization is sometimes done as an emergency procedure. This means you will not have time to prepare. For an embolization that is not an emergency, the following are general guidelines for how to prepare:  Tell your provider about all your allergies. This includes if you have ever had an allergic reaction to contrast liquid, anesthesia, or antibiotics. You may be told not to eat or drink anything after midnight the night before your procedure. Arrange to have someone drive you home. The person should stay with you to help you and watch for problems that may develop.     Give your provider a list of your medicines. Include all medicines and supplements you take. You may need to stop taking blood thinners or aspirin several days before your procedure. This will help decrease your risk for bleeding. Do not stop taking medicines unless your healthcare provider tells you to stop. Your provider will tell you which medicines to take or not take on the day of your procedure.     You may need blood tests to check how well your blood clots and to check your kidney function. Depending on the reason for this procedure, you may an MRI, ultrasound, x-ray, or CT scan. These pictures will help your healthcare provider examine the area to be worked on.     If you are a woman, tell your provider if you know or  think you might be pregnant. You may not be able to have certain tests because they may harm an unborn baby. Your provider may need to take extra precautions for other tests.     What will happen during the procedure:   You may be given general anesthesia to keep you asleep and pain-free. You may instead be given moderate sedation. This means you will be awake during the procedure, but you should not feel any pain. Your provider will put numbing medicine on your skin where the procedure will be done. A small incision will be made over an artery. A catheter (thin tube) will be guided into the artery. Contrast liquid will be used to help your healthcare provider see your arteries more easily.     Your provider will use a type of x-ray that gives a moving picture of the arteries. This will help him or her move the catheter into the right place. The catheter is moved up until it reaches the correct artery. Your provider will put medicine or a material into the artery to slow or stop blood from flowing. This may be a coil, foam, beads, a plug, or liquid. The liquid may also contain material that is larger than blood cells.      Your provider will remove the catheter. Pressure will be used to stop any bleeding that happens. The incision area does not need to be closed with stitches. It will be small and close on its own. It will be covered with a bandage to keep it from becoming infected.     What to expect after the procedure:   You may have pain for a few days. Depending on the reason you had this procedure, you may also have a headache or cramps. You may have pain, bleeding, or bruising where the catheter went into your leg. All of these symptoms are normal and should get better soon. You may be given pain medicine through your IV or a pump. A pump allows you to control when the pain medicine is given.     You should expect to stay in the hospital at least overnight. If you had this procedure to treat heavy bleeding,  it may take 24 hours to know if the bleeding stopped.     Healthcare providers will help you walk around after your procedure. This will help prevent blood clots. Do not get up until healthcare providers say it is okay. They may want you to lie in one position for a certain amount of time. When they say it is okay to walk, they will help you stand and walk safely.     Risks of embolization: You may bleed more than expected or develop an infection. The area being treated may be damaged during the procedure. Your artery may be damaged from the catheter, or you may develop a blood clot. Your kidneys may be damaged from the contrast liquid. The material being put into the artery may go to the wrong place. This can stop blood flow to healthy tissue. The procedure may not work, or it may not relieve your symptoms.  Call your doctor or specialist if:   You have a fever higher than 100.4°F (38°C).     You have a fever, pain, and nausea that last longer than 3 days.     You suddenly have severe abdominal pain.     You cannot urinate, or you urinate very little.     You have signs of an infection at the catheter site, such as red streaks, pain, or swelling.     You have new or worsening pain.     You have questions or concerns about your condition or care.     Medicines: You may need any of the following:  NSAIDs help decrease swelling and pain or fever. This medicine is available with or without a doctor's order. NSAIDs can cause stomach bleeding or kidney problems in certain people. If you take blood thinner medicine, always ask your healthcare provider if NSAIDs are safe for you. Always read the medicine label and follow directions.     Prescription pain medicine may be given. Ask your healthcare provider how to take this medicine safely. Some prescription pain medicines contain acetaminophen. Do not take other medicines that contain acetaminophen without talking to your healthcare provider. Too much acetaminophen may  cause liver damage. Prescription pain medicine may cause constipation. Ask your healthcare provider how to prevent or treat constipation.      Take your medicine as directed. Contact your healthcare provider if you think your medicine is not helping or if you have side effects. Tell him or her if you are allergic to any medicine. Keep a list of the medicines, vitamins, and herbs you take. Include the amounts, and when and why you take them. Bring the list or the pill bottles to follow-up visits. Carry your medicine list with you in case of an emergency.     Self-care:   Rest as needed. Rest and sleep will help your body heal.     Follow your healthcare provider's instructions for activity. He or she will tell you when it is okay to return to your normal activities and to start driving. He or she may want you to wait 1 to 2 weeks to return to work.     Care for the catheter site as directed. It is okay to shower after the procedure. You will only have a small cut in your skin from where the catheter went into your leg. Check the catheter site for signs of infection, including red streaks, pain, and swelling.     Treat symptoms of postembolization syndrome. This syndrome is common after an embolization procedure. It usually starts within 72 hours of the procedure and may last a few days. The main symptoms are fever, pain, and nausea. You will probably be able to manage your symptoms at home. Acetaminophen or an NSAID, such as ibuprofen, can reduce a fever and pain. You may need to eat lightly to manage nausea. Drink more liquids for the first week after the procedure to prevent dehydration.   WOUND CARE     Remove band aid/ dressing tomorrow. You may shower 24 hours after your procedure. Shower and wash groin area or wrist area gently with soap and water: beginning tomorrow. Rinse and pat Dry. Apply new water seal band aid. Repeat this process for 5 days.  If there is any drainage from the puncture site, you should  put on a clean bandage. No Powders, creams, lotions or antibiotic ointments for 5 days.  No tub baths, hot tubs or swimming for 5 days.      Follow up with your doctor or specialist as directed: You may need to have more tests to check if the procedure worked. Write down your questions so you remember to ask them during your visits.  © Copyright [employer] 2020 Information is for End User's use only and may not be sold, redistributed or otherwise used for commercial purposes. All illustrations and images included in CareNotes® are the copyrighted property of [employer] or [employer]  The above information is an  only. It is not intended as medical advice for individual conditions or treatments. Talk to your doctor, nurse or pharmacist before following any medical regimen to see if it is safe and effective for you.              Moderate Sedation   WHAT YOU NEED TO KNOW:   Moderate sedation, or conscious sedation, is medicine used during procedures to help you feel relaxed and calm. You will be awake and able to follow directions without anxiety or pain. You will remember little to none of the procedure. You may feel tired, weak, or unsteady on your feet after you get sedation. You may also have trouble concentrating or short-term memory loss. These symptoms should go away in 24 hours or less.   DISCHARGE INSTRUCTIONS:   Call 911 or have someone else call for any of the following:   You have sudden trouble breathing.     You cannot be woken.  Seek care immediately if:   You have a severe headache or dizziness.     Your heart is beating faster than usual.  Contact your healthcare provider if:   You have a fever.     You have nausea or are vomiting for more than 8 hours after the procedure.      Your skin is itchy, swollen, or you have a rash.     You have questions or concerns about your condition or care.  Self-care:   Have someone stay with you for 24 hours. This person can drive you to  errands and help you do things around the house. This person can also watch for problems.      Rest and do quiet activities for 24 hours. Do not exercise, ride a bike, or play sports. Stand up slowly to prevent dizziness and falls. Take short walks around the house with another person. Slowly return to your usual activities the next day.      Do not drive or use dangerous machines or tools for 24 hours. You may injure yourself or others. Examples include a lawnmower, saw, or drill. Do not return to work for 24 hours if you use dangerous machines or tools for work.      Do not make important decisions for 24 hours. For example, do not sign important papers or invest money.      Drink liquids as directed. Liquids help flush the sedation medicine out of your body. Ask how much liquid to drink each day and which liquids are best for you.      Eat small, frequent meals to prevent nausea and vomiting. Start with clear liquids such as juice or broth. If you do not vomit after clear liquids, you can eat your usual foods.      Do not drink alcohol or take medicines that make you drowsy. This includes medicines that help you sleep and anxiety medicines. Ask your healthcare provider if it is safe for you to take pain medicine.  Follow up with your healthcare provider as directed: Write down your questions so you remember to ask them during your visits.   © 2017 [employer] Information is for End User's use only and may not be sold, redistributed or otherwise used for commercial purposes. All illustrations and images included in CareNotes® are the copyrighted property of [employer] or [employer]  The above information is an  only. It is not intended as medical advice for individual conditions or treatments. Talk to your doctor, nurse or pharmacist before following any medical regimen to see if it is safe and effective for you.

## 2024-08-13 NOTE — H&P
"Interventional Radiology  History and Physical 2024     Suze Leigh   1995   [id number]    Assessment/Plan:  Renal artery aneurysm. Here for embolization.     Discussed procedural details, risks and benefits including renal infarction, bleeding, infection.     Informed consent was obtained.     Problem List Items Addressed This Visit          Cardiovascular and Mediastinum    Renal artery aneurysm (HCC)    Relevant Orders    IR embolization (specify vessel or site)          Subjective:     Patient ID: Suze Leigh is a 29 y.o. female.    History of Present Illness  29 y F w/ isolated left upper pole branch renal artery aneurysm. No rupture. No abdominal / flank pain currently. Mild nausea.     Review of Systems   All other systems reviewed and are negative.        Past Medical History:   Diagnosis Date    Anxiety     Bicuspid aortic valve     s/p mechanical AVR    Coronary artery disease 1993    Moderate aortic insufficiency     s/p mechanical AVR    Motion sickness     Patent ductus arteriosus     s/p repair    PONV (postoperative nausea and vomiting)     S/P ascending aortic aneurysm repair     Stroke (HCC) 2022    no residual effects per pt--initially couldnt speak at first--\"feels back to normal now\"    Varicella     hx as child, and vaccine        Past Surgical History:   Procedure Laterality Date    ASCENDING AORTIC ANEURYSM REPAIR      with\" Aortic valve surgery\"    MECHANICAL AORTIC VALVE REPLACEMENT      PATENT DUCTUS ARTERIOUS LIGATION      per pt had this surgery age 1    AK  DELIVERY ONLY N/A 2024    Procedure:  SECTION ();  Surgeon: Sanjay Thompson MD;  Location: [facility] ;  Service: Obstetrics    AK EXC B9 LESION MRGN XCP SK TG T/A/L 1.1-2.0 CM Right 2022    Procedure: EXCISION  BIOPSY LESION/MASS BACK;  Surgeon: David Cloud MD;  Location: [facility];  Service: General    THROMBECTOMY W/ EMBOLECTOMY      Percutanoeous embolectomy " in setting of stroke    US BREAST CYST ASPIRATION RIGHT INITIAL Right 7/9/2024    WISDOM TOOTH EXTRACTION          Social History     Tobacco Use   Smoking Status Never    Passive exposure: Never   Smokeless Tobacco Never        Social History     Substance and Sexual Activity   Alcohol Use Not Currently    Alcohol/week: 1.0 standard drink of alcohol    Types: 1 Glasses of wine per week        Social History     Substance and Sexual Activity   Drug Use No        No Known Allergies    Current Outpatient Medications   Medication Sig Dispense Refill    enoxaparin (LOVENOX) 80 mg/0.8 mL Inject 0.7 mL (70 mg total) under the skin every 12 (twelve) hours 14 mL 1    escitalopram (LEXAPRO) 10 mg tablet TAKE 2 TABLETS BY MOUTH EVERY  tablet 1    ferrous sulfate 325 (65 FE) MG EC tablet Take 325 mg by mouth daily with breakfast      metoprolol succinate (TOPROL-XL) 25 mg 24 hr tablet Take 1 tablet (25 mg total) by mouth daily      warfarin (COUMADIN) 5 mg tablet Take one tablet daily until INR at least 2.0.  Then review with your cardiology. 30 tablet 0     Current Facility-Administered Medications   Medication Dose Route Frequency Provider Last Rate Last Admin    ceFAZolin (ANCEF) injection   Intravenous PRN Sanjay Hayes, DO   1,000 mg at 08/13/24 0837    ceFAZolin (ANCEF) IVPB (premix in dextrose) 1,000 mg 50 mL  1,000 mg Intravenous Once Sanjay Hayes, DO        fentaNYL injection   Intravenous PRN Sanjay Hayes, DO   50 mcg at 08/13/24 0846    midazolam (VERSED) injection    PRN Sanjay Hayes, DO   1 mg at 08/13/24 0845    ondansetron (ZOFRAN) injection 4 mg  4 mg Intravenous Q6H PRN Sanjay Hayes, DO   4 mg at 08/13/24 0745    sodium chloride 0.9 % infusion  75 mL/hr Intravenous Continuous Sanjay Hayes, DO 75 mL/hr at 08/13/24 0726 75 mL/hr at 08/13/24 0726          Objective:    Vitals:    08/13/24 0735 08/13/24 0836 08/13/24 0841 08/13/24 0845   BP:  "113/73 131/67 121/73 112/69   BP Location: Right arm      Pulse: 62 60 60 61   Resp: 16 16 16 16   Temp: (!) 97.3 °F (36.3 °C)      TempSrc: Temporal      SpO2: 98% 99% 99% 97%   Weight: 75.8 kg (167 lb)      Height: 5' 3\" (1.6 m)           Physical Exam  Constitutional:       Appearance: Normal appearance.   HENT:      Head: Normocephalic.      Mouth/Throat:      Mouth: Mucous membranes are moist.   Eyes:      Pupils: Pupils are equal, round, and reactive to light.   Cardiovascular:      Rate and Rhythm: Normal rate.   Pulmonary:      Effort: Pulmonary effort is normal.   Abdominal:      General: Abdomen is flat.   Musculoskeletal:         General: Normal range of motion.      Cervical back: Normal range of motion.   Skin:     General: Skin is warm.   Neurological:      Mental Status: She is alert and oriented to person, place, and time.   Psychiatric:         Mood and Affect: Mood normal.           No results found for: \"BNP\"   Lab Results   Component Value Date    WBC 5.18 08/13/2024    HGB 12.3 08/13/2024    HCT 37.7 08/13/2024    MCV 90 08/13/2024     08/13/2024     Lab Results   Component Value Date    INR 1.01 08/13/2024    INR 2.00 (A) 08/02/2024    INR 2.20 (A) 07/22/2024    PROTIME 13.6 08/13/2024    PROTIME 22.6 (H) 05/19/2024    PROTIME 24.8 (H) 05/17/2024     Lab Results   Component Value Date    PTT 54 (H) 05/19/2024         I have personally reviewed pertinent imaging and laboratory results.     Code Status: Prior  Advance Directive and Living Will:      Power of :    POLST:      This text is generated with voice recognition software. There may be translation, syntax,  or grammatical errors. If you have any questions, please contact the dictating provider.   "

## 2024-08-14 ENCOUNTER — TELEPHONE (OUTPATIENT)
Dept: INTERVENTIONAL RADIOLOGY/VASCULAR | Facility: CLINIC | Age: 29
End: 2024-08-14

## 2024-08-14 ENCOUNTER — TELEPHONE (OUTPATIENT)
Dept: VASCULAR SURGERY | Facility: CLINIC | Age: 29
End: 2024-08-14

## 2024-08-14 DIAGNOSIS — I72.2 RENAL ARTERY ANEURYSM (HCC): Primary | ICD-10-CM

## 2024-08-14 NOTE — TELEPHONE ENCOUNTER
POD#1 Left renal artery aneurysm coiling.     Reports mild L flank pain controlled by Tylenol, improved from overnight. Unable to take Percocet due to nausea which she has routinely at baseline. No other complaints / concerns.     Starting back on Coumadin and pumping/disposing of breast mild / feeding formula to .    Instructed her to call me if there are concerns / questions.

## 2024-08-14 NOTE — TELEPHONE ENCOUNTER
----- Message from Arlette Monsivais MD sent at 8/14/2024  2:12 PM EDT -----  Regarding: followup appt after procedure  Hi all,  This patient just had her renal artery aneurysm coil embolized by Dr. Hayes. Please make sure she has follow-up with me within 2 weeks with a follow-up BMP. I will order the BMP. Thank you.    Arlette

## 2024-08-15 ENCOUNTER — TELEPHONE (OUTPATIENT)
Dept: INTERVENTIONAL RADIOLOGY/VASCULAR | Facility: CLINIC | Age: 29
End: 2024-08-15

## 2024-08-15 DIAGNOSIS — G89.18 POST-OPERATIVE PAIN: Primary | ICD-10-CM

## 2024-08-15 RX ORDER — OXYCODONE AND ACETAMINOPHEN 5; 325 MG/1; MG/1
1 TABLET ORAL EVERY 6 HOURS PRN
Qty: 16 TABLET | Refills: 0 | Status: SHIPPED | OUTPATIENT
Start: 2024-08-15 | End: 2024-08-19

## 2024-08-15 NOTE — TELEPHONE ENCOUNTER
Texted with Ms. Leigh. Pain improving, able to take Percocet now without vomiting / nausea. Unable to work, needs time off due to pain. Requests refill.     Will Rx Percocet for a few more days.

## 2024-08-16 ENCOUNTER — APPOINTMENT (OUTPATIENT)
Dept: LAB | Facility: HOSPITAL | Age: 29
End: 2024-08-16
Payer: COMMERCIAL

## 2024-08-16 ENCOUNTER — ANTICOAG VISIT (OUTPATIENT)
Dept: CARDIOLOGY CLINIC | Facility: CLINIC | Age: 29
End: 2024-08-16

## 2024-08-16 DIAGNOSIS — Z95.2 HISTORY OF MECHANICAL AORTIC VALVE REPLACEMENT: Primary | ICD-10-CM

## 2024-08-16 DIAGNOSIS — Z95.2 HISTORY OF MECHANICAL AORTIC VALVE REPLACEMENT: ICD-10-CM

## 2024-08-16 LAB
INR PPP: 1.49 (ref 0.85–1.19)
PROTHROMBIN TIME: 18.5 SECONDS (ref 12.3–15)

## 2024-08-16 PROCEDURE — 85610 PROTHROMBIN TIME: CPT

## 2024-08-16 PROCEDURE — 36415 COLL VENOUS BLD VENIPUNCTURE: CPT

## 2024-08-16 NOTE — PROGRESS NOTES
PC  to Suze with INR of 1.49.  Per Mirna, patient to stay on Lovenox and continue warfarin.  Advised her to take 3.75 mgs today, Sat and Sun and retest Monday.  Patient did not answer phone so I had to leave this as a message.

## 2024-08-18 DIAGNOSIS — I72.2 RENAL ARTERY ANEURYSM (HCC): ICD-10-CM

## 2024-08-19 ENCOUNTER — ANTICOAG VISIT (OUTPATIENT)
Dept: CARDIOLOGY CLINIC | Facility: CLINIC | Age: 29
End: 2024-08-19

## 2024-08-19 ENCOUNTER — APPOINTMENT (OUTPATIENT)
Dept: LAB | Facility: HOSPITAL | Age: 29
End: 2024-08-19
Payer: COMMERCIAL

## 2024-08-19 DIAGNOSIS — I72.2 RENAL ARTERY ANEURYSM (HCC): ICD-10-CM

## 2024-08-19 DIAGNOSIS — Z95.2 HISTORY OF MECHANICAL AORTIC VALVE REPLACEMENT: Primary | ICD-10-CM

## 2024-08-19 DIAGNOSIS — Z95.2 HISTORY OF MECHANICAL AORTIC VALVE REPLACEMENT: ICD-10-CM

## 2024-08-19 LAB
ANION GAP SERPL CALCULATED.3IONS-SCNC: 11 MMOL/L (ref 4–13)
BUN SERPL-MCNC: 8 MG/DL (ref 5–25)
CALCIUM SERPL-MCNC: 9.3 MG/DL (ref 8.4–10.2)
CHLORIDE SERPL-SCNC: 99 MMOL/L (ref 96–108)
CO2 SERPL-SCNC: 27 MMOL/L (ref 21–32)
CREAT SERPL-MCNC: 0.85 MG/DL (ref 0.6–1.3)
GFR SERPL CREATININE-BSD FRML MDRD: 91 ML/MIN/1.73SQ M
GLUCOSE SERPL-MCNC: 82 MG/DL (ref 65–140)
INR PPP: 2.02 (ref 0.85–1.19)
POTASSIUM SERPL-SCNC: 3.9 MMOL/L (ref 3.5–5.3)
PROTHROMBIN TIME: 23.2 SECONDS (ref 12.3–15)
SODIUM SERPL-SCNC: 137 MMOL/L (ref 135–147)

## 2024-08-19 PROCEDURE — 85610 PROTHROMBIN TIME: CPT

## 2024-08-19 PROCEDURE — 80048 BASIC METABOLIC PNL TOTAL CA: CPT

## 2024-08-19 PROCEDURE — 36415 COLL VENOUS BLD VENIPUNCTURE: CPT

## 2024-08-19 RX ORDER — ONDANSETRON 4 MG/1
TABLET, FILM COATED ORAL
Qty: 20 TABLET | Refills: 0 | Status: SHIPPED | OUTPATIENT
Start: 2024-08-19

## 2024-08-19 NOTE — PROGRESS NOTES
Spoke with patient, states she has a bad week last week, was having pain and nausea after her procedure. Current dose verified, advised to take 2.5 mg Thurs only, 3.75 mg all other days, will recheck INR 8/22/24  Advised to continue Lovenox injections.

## 2024-08-20 LAB
LAB AP GYN PRIMARY INTERPRETATION: NORMAL
Lab: NORMAL

## 2024-08-26 ENCOUNTER — ANTICOAG VISIT (OUTPATIENT)
Dept: CARDIOLOGY CLINIC | Facility: CLINIC | Age: 29
End: 2024-08-26

## 2024-08-26 DIAGNOSIS — Z98.891 STATUS POST PRIMARY LOW TRANSVERSE CESAREAN SECTION: ICD-10-CM

## 2024-08-26 DIAGNOSIS — Z95.2 HISTORY OF MECHANICAL AORTIC VALVE REPLACEMENT: ICD-10-CM

## 2024-08-26 DIAGNOSIS — Z95.2 HISTORY OF MECHANICAL AORTIC VALVE REPLACEMENT: Primary | ICD-10-CM

## 2024-08-26 LAB — INR PPP: 2.3 (ref 0.85–1.19)

## 2024-08-27 RX ORDER — WARFARIN SODIUM 2.5 MG/1
TABLET ORAL
Qty: 180 TABLET | Refills: 1 | Status: SHIPPED | OUTPATIENT
Start: 2024-08-27

## 2024-08-27 NOTE — PROGRESS NOTES
8/27/24~2nd left message for patient to call office to discuss    0939~patient returned my call, states she took the last dose of Lovenox Friday night, did home test on Sat, continues to take 2.5 mg Thurs, 3.75 mg all other days. Advised to continue, would like to recheck INR this Thurs or Fri 8/29 or 8/30.

## 2024-08-27 NOTE — TELEPHONE ENCOUNTER
Changing context and routing.  Hospital last prescribed. Sebastian Shell MD prescribed prior to hospital.

## 2024-08-29 ENCOUNTER — OFFICE VISIT (OUTPATIENT)
Dept: VASCULAR SURGERY | Facility: CLINIC | Age: 29
End: 2024-08-29
Payer: COMMERCIAL

## 2024-08-29 VITALS
OXYGEN SATURATION: 97 % | DIASTOLIC BLOOD PRESSURE: 84 MMHG | WEIGHT: 165 LBS | BODY MASS INDEX: 29.23 KG/M2 | HEIGHT: 63 IN | SYSTOLIC BLOOD PRESSURE: 138 MMHG | HEART RATE: 62 BPM

## 2024-08-29 DIAGNOSIS — I72.2 RENAL ARTERY ANEURYSM (HCC): Primary | ICD-10-CM

## 2024-08-29 PROCEDURE — 99213 OFFICE O/P EST LOW 20 MIN: CPT | Performed by: SURGERY

## 2024-08-29 NOTE — LETTER
August 29, 2024     Huber Oseguera MD  [address] [address]    Patient: Suze Leigh   YOB: 1995   Date of Visit: 8/29/2024       Dear Dr. Oseguera:    Below are the relevant portions of my assessment and plan of care.     Renal artery aneurysm (HCC)  Assessment & Plan:  Upper pole left renal artery aneurysm now s/p successful renal artery angiogram with coil embolization of aneurysm 8/13/24 by Dr. Hayes. She reported left flank pain for the 1st week post-op that has now resolved.     Cr 0.85 (0.61) with GFR 91 (previous 121)  Agram - successful coil embolization of left upper pole renal artery aneurysm with complete exclusion. Lower 2/3rd of left kidney appears perfused via mid and lower pole branches.     -We discussed the expected post-op course after renal artery aneurysm embolization. Expect kidney function to stabilize. Will monitor with repeat BMP in 3 months and renal artery duplex to confirm successful exclusion of left renal artery aneurysm.  -Advised to call the office if experiences any worsening pain.  -Follow-up in 3 months to review duplex study and BMP.     Orders:  -     VAS renal artery complete; Future; Expected date: 11/29/2024  -     Basic metabolic panel; Future; Expected date: 11/29/2024       If you have questions, please do not hesitate to call me. I look forward to following Suze along with you.         Sincerely,        Arlette Monsivais MD        CC: Sanjay Hayes, DO

## 2024-08-29 NOTE — PATIENT INSTRUCTIONS
Renal artery aneurysm (HCC)  Assessment & Plan:  Upper pole left renal artery aneurysm now s/p successful renal artery angiogram with coil embolization of aneurysm 8/13/24 by Dr. Hayes. She reported left flank pain for the 1st week post-op that has now resolved.     Cr 0.85 (0.61) with GFR 91 (previous 121)  Agram - successful coil embolization of left upper pole renal artery aneurysm with complete exclusion. Lower 2/3rd of left kidney appears perfused via mid and lower pole branches.     -We discussed the expected post-op course after renal artery aneurysm embolization. Expect kidney function to stabilize. Will monitor with repeat BMP in 3 months and renal artery duplex to confirm successful exclusion of left renal artery aneurysm.  -Advised to call the office if experiences any worsening pain.  -Follow-up in 3 months to review duplex study and BMP.     Orders:  -     VAS renal artery complete; Future; Expected date: 11/29/2024  -     Basic metabolic panel; Future; Expected date: 11/29/2024

## 2024-08-29 NOTE — PROGRESS NOTES
Ambulatory Visit  Name: Suze Leigh      : 1995      MRN: [id number]  Encounter Provider: Arlette Monsivais MD  Encounter Date: 2024   Encounter department: [facility]    Assessment & Plan   1. Renal artery aneurysm (HCC)  Assessment & Plan:  Upper pole left renal artery aneurysm now s/p successful renal artery angiogram with coil embolization of aneurysm 24 by Dr. Hayes. She reported left flank pain for the 1st week post-op that has now resolved.    Cr 0.85 (0.61) with GFR 91 (previous 121)  Agram - successful coil embolization of left upper pole renal artery aneurysm with complete exclusion. Lower 2/3rd of left kidney appears perfused via mid and lower pole branches.    -We discussed the expected post-op course after renal artery aneurysm embolization. Expect kidney function to stabilize. Will monitor with repeat BMP in 3 months and renal artery duplex to confirm successful exclusion of left renal artery aneurysm.  -Advised to call the office if experiences any worsening pain.  -Follow-up in 3 months to review duplex study and BMP.    Orders:  -     VAS renal artery complete; Future; Expected date: 2024  -     Basic metabolic panel; Future; Expected date: 2024      History of Present Illness     Patient is here today to review results of a renal artery aneurysm coil placement done 2024 by IR, Dr. Hayes. Patient denies any pain, fever or chills. Her right groin site is clean and dry. Patient is taking Coumadin. She is a non-smoker.         See assessment & plan    Review of Systems   Constitutional: Negative.    HENT: Negative.     Eyes: Negative.    Respiratory: Negative.     Cardiovascular: Negative.    Gastrointestinal: Negative.    Endocrine: Negative.    Genitourinary: Negative.    Musculoskeletal: Negative.    Skin: Negative.    Allergic/Immunologic: Negative.    Neurological: Negative.    Hematological: Negative.    Psychiatric/Behavioral: Negative.    "    I have personally reviewed the ROS entered by MA and agree as documented.    Objective     /84 (BP Location: Left arm, Patient Position: Sitting, Cuff Size: Standard)   Pulse 62   Ht 5' 3\" (1.6 m)   Wt 74.8 kg (165 lb)   LMP 07/15/2023 (Approximate) Comment: Mirena IUD  SpO2 97%   BMI 29.23 kg/m²     Physical Exam  Constitutional:       Appearance: Normal appearance.   HENT:      Head: Normocephalic and atraumatic.   Cardiovascular:      Rate and Rhythm: Normal rate.      Pulses: Normal pulses.   Pulmonary:      Effort: Pulmonary effort is normal.   Abdominal:      General: There is no distension.      Palpations: Abdomen is soft.      Tenderness: There is no abdominal tenderness.   Musculoskeletal:         General: Normal range of motion.      Cervical back: Normal range of motion and neck supple.   Skin:     General: Skin is warm and dry.      Capillary Refill: Capillary refill takes less than 2 seconds.      Comments: Right groin access healed   Neurological:      General: No focal deficit present.      Mental Status: She is alert and oriented to person, place, and time.   Psychiatric:         Mood and Affect: Mood normal.         Behavior: Behavior normal.         Thought Content: Thought content normal.         Judgment: Judgment normal.       Administrative Statements   I have spent a total time of 20 minutes in caring for this patient on the day of the visit/encounter including Prognosis, Instructions for management, Patient and family education, Importance of tx compliance, Risk factor reductions, Impressions, Counseling / Coordination of care, Documenting in the medical record, Reviewing / ordering tests, medicine, procedures  , and Obtaining or reviewing history  .        "

## 2024-08-29 NOTE — ASSESSMENT & PLAN NOTE
Upper pole left renal artery aneurysm now s/p successful renal artery angiogram with coil embolization of aneurysm 8/13/24 by Dr. Hayes. She reported left flank pain for the 1st week post-op that has now resolved.    Cr 0.85 (0.61) with GFR 91 (previous 121)  Agram - successful coil embolization of left upper pole renal artery aneurysm with complete exclusion. Lower 2/3rd of left kidney appears perfused via mid and lower pole branches.    -We discussed the expected post-op course after renal artery aneurysm embolization. Expect kidney function to stabilize. Will monitor with repeat BMP in 3 months and renal artery duplex to confirm successful exclusion of left renal artery aneurysm.  -Advised to call the office if experiences any worsening pain.  -Follow-up in 3 months to review duplex study and BMP.

## 2024-09-03 ENCOUNTER — ANTICOAG VISIT (OUTPATIENT)
Dept: CARDIOLOGY CLINIC | Facility: CLINIC | Age: 29
End: 2024-09-03

## 2024-09-03 DIAGNOSIS — Z95.2 HISTORY OF MECHANICAL AORTIC VALVE REPLACEMENT: Primary | ICD-10-CM

## 2024-09-03 LAB — INR PPP: 2.3 (ref 0.85–1.19)

## 2024-09-03 NOTE — PROGRESS NOTES
Let message for patient, advised INR still low, advised I have current dose 2.5 mg Thurs, 3.75 mg all other days, advised to take 3.75 mg every day, will recheck in 2 week s9/14/24  Advised to call with questions or concerns.    Patient is a home elvia.

## 2024-09-15 LAB — INR PPP: 2.2 (ref 0.85–1.19)

## 2024-09-16 ENCOUNTER — ANTICOAG VISIT (OUTPATIENT)
Dept: CARDIOLOGY CLINIC | Facility: CLINIC | Age: 29
End: 2024-09-16

## 2024-09-16 DIAGNOSIS — Z95.2 HISTORY OF MECHANICAL AORTIC VALVE REPLACEMENT: Primary | ICD-10-CM

## 2024-09-16 NOTE — PROGRESS NOTES
Patient called in for INR of 2.2.  Debbi had increased patient to 3.75 qd after last low INR of 2.3.  Patient had gotten the message.  Per Mirna and TERENCE, we are going to increase her to 2.5 mgs today and tomorrow. And restest in two weeks.

## 2024-09-18 ENCOUNTER — TELEPHONE (OUTPATIENT)
Dept: SURGERY | Facility: CLINIC | Age: 29
End: 2024-09-18

## 2024-09-18 NOTE — TELEPHONE ENCOUNTER
Called pt and left message stating if she wanted to reschedule her appt she had for 11/18/24 to different date,

## 2024-10-07 ENCOUNTER — OFFICE VISIT (OUTPATIENT)
Dept: FAMILY MEDICINE CLINIC | Facility: CLINIC | Age: 29
End: 2024-10-07
Payer: COMMERCIAL

## 2024-10-07 ENCOUNTER — ANTICOAG VISIT (OUTPATIENT)
Dept: CARDIOLOGY CLINIC | Facility: CLINIC | Age: 29
End: 2024-10-07

## 2024-10-07 VITALS
WEIGHT: 164.8 LBS | HEART RATE: 78 BPM | SYSTOLIC BLOOD PRESSURE: 138 MMHG | DIASTOLIC BLOOD PRESSURE: 88 MMHG | BODY MASS INDEX: 29.2 KG/M2 | TEMPERATURE: 97.5 F | OXYGEN SATURATION: 99 % | HEIGHT: 63 IN

## 2024-10-07 DIAGNOSIS — I72.2 RENAL ARTERY ANEURYSM (HCC): ICD-10-CM

## 2024-10-07 DIAGNOSIS — F41.9 ANXIETY: ICD-10-CM

## 2024-10-07 DIAGNOSIS — Z95.2 H/O MECHANICAL AORTIC VALVE REPLACEMENT: ICD-10-CM

## 2024-10-07 DIAGNOSIS — Z00.00 WELLNESS EXAMINATION: ICD-10-CM

## 2024-10-07 DIAGNOSIS — Z95.2 H/O MECHANICAL AORTIC VALVE REPLACEMENT: Primary | ICD-10-CM

## 2024-10-07 DIAGNOSIS — Z98.891 STATUS POST PRIMARY LOW TRANSVERSE CESAREAN SECTION: ICD-10-CM

## 2024-10-07 DIAGNOSIS — I69.320 APHASIA AS LATE EFFECT OF CEREBROVASCULAR ACCIDENT: Primary | ICD-10-CM

## 2024-10-07 DIAGNOSIS — Z95.2 HISTORY OF MECHANICAL AORTIC VALVE REPLACEMENT: ICD-10-CM

## 2024-10-07 DIAGNOSIS — Z22.322 MRSA (METHICILLIN RESISTANT STAPH AUREUS) CULTURE POSITIVE: ICD-10-CM

## 2024-10-07 LAB — INR PPP: 2.2 (ref 0.85–1.19)

## 2024-10-07 PROCEDURE — 90656 IIV3 VACC NO PRSV 0.5 ML IM: CPT

## 2024-10-07 PROCEDURE — 99395 PREV VISIT EST AGE 18-39: CPT | Performed by: FAMILY MEDICINE

## 2024-10-07 PROCEDURE — 90471 IMMUNIZATION ADMIN: CPT

## 2024-10-07 PROCEDURE — 99214 OFFICE O/P EST MOD 30 MIN: CPT | Performed by: FAMILY MEDICINE

## 2024-10-07 RX ORDER — METOPROLOL SUCCINATE 25 MG/1
25 TABLET, EXTENDED RELEASE ORAL DAILY
Qty: 90 TABLET | Refills: 3 | Status: SHIPPED | OUTPATIENT
Start: 2024-10-07

## 2024-10-07 RX ORDER — WARFARIN SODIUM 2.5 MG/1
TABLET ORAL
Qty: 180 TABLET | Refills: 3 | Status: SHIPPED | OUTPATIENT
Start: 2024-10-07

## 2024-10-07 RX ORDER — ESCITALOPRAM OXALATE 20 MG/1
20 TABLET ORAL DAILY
Qty: 90 TABLET | Refills: 3 | Status: SHIPPED | OUTPATIENT
Start: 2024-10-07

## 2024-10-07 RX ORDER — ONDANSETRON 4 MG/1
4 TABLET, FILM COATED ORAL EVERY 8 HOURS PRN
Qty: 30 TABLET | Refills: 3 | Status: SHIPPED | OUTPATIENT
Start: 2024-10-07

## 2024-10-07 RX ORDER — MUPIROCIN 20 MG/G
OINTMENT TOPICAL 3 TIMES DAILY
Qty: 30 G | Refills: 3 | Status: SHIPPED | OUTPATIENT
Start: 2024-10-07

## 2024-10-07 NOTE — PROGRESS NOTES
Adult Annual Physical  Name: Suze Leigh      : 1995      MRN: [id number]  Encounter Provider: Huber Oseguera MD  Encounter Date: 10/7/2024   Encounter department: [facility]    Assessment & Plan  Aphasia as late effect of cerebrovascular accident         H/O mechanical aortic valve replacement         Wellness examination    Orders:    influenza vaccine preservative-free 0.5 mL IM (Fluzone, Afluria, Fluarix, Flulaval)    Anxiety    Orders:    escitalopram (LEXAPRO) 20 mg tablet; Take 1 tablet (20 mg total) by mouth daily    History of mechanical aortic valve replacement    Orders:    metoprolol succinate (TOPROL-XL) 25 mg 24 hr tablet; Take 1 tablet (25 mg total) by mouth daily    warfarin (COUMADIN) 2.5 mg tablet; Take 1-2 tablets daily as directed by MD    Renal artery aneurysm (HCC)    Orders:    ondansetron (ZOFRAN) 4 mg tablet; Take 1 tablet (4 mg total) by mouth every 8 (eight) hours as needed for nausea or vomiting    Status post primary low transverse  section    Orders:    warfarin (COUMADIN) 2.5 mg tablet; Take 1-2 tablets daily as directed by MD    MRSA (methicillin resistant staph aureus) culture positive    Orders:    mupirocin (BACTROBAN) 2 % ointment; Apply topically 3 (three) times a day    Immunizations and preventive care screenings were discussed with patient today. Appropriate education was printed on patient's after visit summary.    Counseling:  Alcohol/drug use: discussed moderation in alcohol intake, the recommendations for healthy alcohol use, and avoidance of illicit drug use.      Depression Screening and Follow-up Plan: Patient was screened for depression during today's encounter. They screened negative with a PHQ-9 score of 0.        History of Present Illness     Adult Annual Physical:  Patient presents for annual physical.     Diet and Physical Activity:  - Diet/Nutrition: well balanced diet.  - Exercise: moderate cardiovascular  "exercise, 1-2 times a week on average and 30-60 minutes on average.    Depression Screening:    - PHQ-9 Score: 0    General Health:  - Sleep: 7-8 hours of sleep on average and sleeps well.  - Hearing: normal hearing bilateral ears.  - Vision: goes for regular eye exams and no vision problems.  - Dental: regular dental visits and brushes teeth twice daily.    /GYN Health:  - Follows with GYN: yes.   - Menopause: premenopausal.   - History of STDs: no    Advanced Care Planning:  - Has an advanced directive?: no    - Has a durable medical POA?: no      Review of Systems   Constitutional:  Negative for fatigue, fever and unexpected weight change.   HENT:  Negative for congestion, sinus pain and sore throat.    Eyes:  Negative for visual disturbance.   Respiratory:  Negative for shortness of breath and wheezing.    Cardiovascular:  Negative for chest pain and palpitations.   Gastrointestinal:  Negative for abdominal pain, nausea and vomiting.   Musculoskeletal: Negative.  Negative for arthralgias and myalgias.   Neurological:  Negative for syncope, weakness and numbness.   Psychiatric/Behavioral: Negative.  Negative for confusion, dysphoric mood and suicidal ideas.          Objective     /88 (BP Location: Right arm, Patient Position: Sitting, Cuff Size: Standard)   Pulse 78   Temp 97.5 °F (36.4 °C) (Tympanic)   Ht 5' 3\" (1.6 m)   Wt 74.8 kg (164 lb 12.8 oz)   SpO2 99%   BMI 29.19 kg/m²     Physical Exam  Eyes:      Conjunctiva/sclera: Conjunctivae normal.   Neck:      Thyroid: No thyromegaly.   Cardiovascular:      Rate and Rhythm: Normal rate.      Heart sounds: Normal heart sounds. No murmur heard.  Pulmonary:      Effort: Pulmonary effort is normal.      Breath sounds: Normal breath sounds. No wheezing.   Abdominal:      General: Bowel sounds are normal.      Palpations: Abdomen is soft.      Tenderness: There is no abdominal tenderness.   Musculoskeletal:         General: No tenderness. Normal range of " motion.      Cervical back: Normal range of motion and neck supple.   Lymphadenopathy:      Cervical: No cervical adenopathy.   Neurological:      Cranial Nerves: No cranial nerve deficit.      Deep Tendon Reflexes: Reflexes normal.   Psychiatric:         Behavior: Behavior normal.

## 2024-10-07 NOTE — PROGRESS NOTES
"Assessment/Plan:    No problem-specific Assessment & Plan notes found for this encounter.       Diagnoses and all orders for this visit:    Aphasia as late effect of cerebrovascular accident    H/O mechanical aortic valve replacement    Wellness examination    Anxiety          Subjective:   Chief Complaint   Patient presents with    Physical Exam        Patient ID: Suze Leigh is a 29 y.o. female.    HPI    The following portions of the patient's history were reviewed and updated as appropriate: allergies, current medications, past family history, past medical history, past social history, past surgical history and problem list.    Review of Systems   Constitutional:  Negative for fatigue, fever and unexpected weight change.   HENT:  Negative for congestion, sinus pain and sore throat.    Eyes:  Negative for visual disturbance.   Respiratory:  Negative for shortness of breath and wheezing.    Cardiovascular:  Negative for chest pain and palpitations.   Gastrointestinal:  Negative for abdominal pain, nausea and vomiting.   Musculoskeletal: Negative.  Negative for arthralgias and myalgias.   Neurological:  Negative for syncope, weakness and numbness.   Psychiatric/Behavioral: Negative.  Negative for confusion, dysphoric mood and suicidal ideas.          Objective:  Vitals:    10/07/24 1103   BP: 138/88   BP Location: Right arm   Patient Position: Sitting   Cuff Size: Standard   Pulse: 78   Temp: 97.5 °F (36.4 °C)   TempSrc: Tympanic   SpO2: 99%   Weight: 74.8 kg (164 lb 12.8 oz)   Height: 5' 3\" (1.6 m)      Physical Exam  Constitutional:       Appearance: She is well-developed.   HENT:      Right Ear: Ear canal normal. Tympanic membrane is not injected.      Left Ear: Ear canal normal. Tympanic membrane is not injected.      Nose: Nose normal.   Eyes:      General:         Right eye: No discharge.         Left eye: No discharge.      Conjunctiva/sclera: Conjunctivae normal.      Pupils: Pupils are equal, round, " and reactive to light.   Neck:      Thyroid: No thyromegaly.   Cardiovascular:      Rate and Rhythm: Normal rate and regular rhythm.      Heart sounds: Normal heart sounds. No murmur heard.  Pulmonary:      Effort: Pulmonary effort is normal. No respiratory distress.      Breath sounds: Normal breath sounds. No wheezing.   Abdominal:      General: Bowel sounds are normal. There is no distension.      Palpations: Abdomen is soft.      Tenderness: There is no abdominal tenderness.   Musculoskeletal:         General: Normal range of motion.      Cervical back: Normal range of motion and neck supple.   Lymphadenopathy:      Cervical: No cervical adenopathy.   Skin:     General: Skin is warm and dry.   Neurological:      Mental Status: She is alert and oriented to person, place, and time. She is not disoriented.      Sensory: No sensory deficit.      Motor: No weakness.      Coordination: Coordination normal.      Gait: Gait normal.      Deep Tendon Reflexes: Reflexes are normal and symmetric.   Psychiatric:         Speech: Speech normal.         Behavior: Behavior normal.         Thought Content: Thought content normal.         Judgment: Judgment normal.

## 2024-10-07 NOTE — PROGRESS NOTES
Left message for patient, advised INR still low, advised I have her taking 3.75 mg daily. Advised to take 5 mg Mon, 3.75 mg all other days, will recheck in 2 weeks 10/20/24  Advised to call if she was not taking as above, or any questions or concerns. Phone # provided  Patient is a home elvia

## 2024-10-14 ENCOUNTER — HOSPITAL ENCOUNTER (OUTPATIENT)
Dept: ULTRASOUND IMAGING | Facility: CLINIC | Age: 29
Discharge: HOME | End: 2024-10-14
Payer: COMMERCIAL

## 2024-10-14 VITALS — BODY MASS INDEX: 29.2 KG/M2 | WEIGHT: 164.8 LBS | HEIGHT: 63 IN

## 2024-10-14 DIAGNOSIS — N61.1 BREAST ABSCESS OF FEMALE: ICD-10-CM

## 2024-10-14 PROCEDURE — 76642 ULTRASOUND BREAST LIMITED: CPT

## 2024-10-23 ENCOUNTER — ANTICOAG VISIT (OUTPATIENT)
Dept: CARDIOLOGY CLINIC | Facility: CLINIC | Age: 29
End: 2024-10-23

## 2024-10-23 DIAGNOSIS — Z95.2 H/O MECHANICAL AORTIC VALVE REPLACEMENT: Primary | ICD-10-CM

## 2024-10-23 LAB — INR PPP: 2.3 (ref 0.85–1.19)

## 2024-10-23 NOTE — PROGRESS NOTES
Left message for patient, advised INR still a little low, advised I have her taking 5 mg Mon, 3.75 mg all other days,  will have her increase to 5 mg Mon Fri, 3.75 mg all other days, will recheck in 2 weeks 11/5/24  Advised to call with questions or concerns.    Patient is a home elvia

## 2024-10-30 ENCOUNTER — TELEPHONE (OUTPATIENT)
Dept: VASCULAR SURGERY | Facility: HOSPITAL | Age: 29
End: 2024-10-30

## 2024-10-30 NOTE — TELEPHONE ENCOUNTER
Please r/s pt appt w/ Dr Monsivais after her u/s 12/16.     They made me reschedule my ultrasound  so I am assuming I have to reschedule my appointment with you on Dec 9th as well so I see you after the ultrasound   This encounter is not signed. The conversation may

## 2024-11-10 LAB — INR PPP: 2.5 (ref 0.85–1.19)

## 2024-11-11 ENCOUNTER — ANTICOAG VISIT (OUTPATIENT)
Dept: CARDIOLOGY CLINIC | Facility: CLINIC | Age: 29
End: 2024-11-11

## 2024-11-11 DIAGNOSIS — Z95.2 H/O MECHANICAL AORTIC VALVE REPLACEMENT: Primary | ICD-10-CM

## 2024-11-11 NOTE — PROGRESS NOTES
PC to patient leaving message on answering machine that INR was better at 2.5.  I am having her continue the same dosing of warfarin at 5 mgs every M/F and 3.75 mgs all other days of the week.  She tests every two weeks

## 2024-11-26 ENCOUNTER — ANTICOAG VISIT (OUTPATIENT)
Dept: CARDIOLOGY CLINIC | Facility: CLINIC | Age: 29
End: 2024-11-26

## 2024-11-26 DIAGNOSIS — Z95.2 H/O MECHANICAL AORTIC VALVE REPLACEMENT: Primary | ICD-10-CM

## 2024-11-26 LAB — INR PPP: 2.3 (ref 0.85–1.19)

## 2024-11-26 NOTE — PROGRESS NOTES
Left message for patient, advised INR still a little low, advised I have her taking 5 mg Mon Fri, 3.75 mg all other days, will increase to 5 mg Mon Wed Fri, 3.75 mg all other days, will recheck in 2 weeks 12/10/24  Advised to call with questions or concerns.     Patient is a home elvia

## 2024-12-16 ENCOUNTER — HOSPITAL ENCOUNTER (OUTPATIENT)
Dept: NON INVASIVE DIAGNOSTICS | Facility: HOSPITAL | Age: 29
Discharge: HOME | End: 2024-12-16
Attending: SURGERY
Payer: COMMERCIAL

## 2024-12-16 ENCOUNTER — ANTICOAG VISIT (OUTPATIENT)
Dept: CARDIOLOGY CLINIC | Facility: CLINIC | Age: 29
End: 2024-12-16

## 2024-12-16 DIAGNOSIS — Z95.2 H/O MECHANICAL AORTIC VALVE REPLACEMENT: Primary | ICD-10-CM

## 2024-12-16 DIAGNOSIS — I72.2 RENAL ARTERY ANEURYSM (HCC): ICD-10-CM

## 2024-12-16 LAB — INR PPP: 2.1 (ref 0.85–1.19)

## 2024-12-16 PROCEDURE — 93975 VASCULAR STUDY: CPT

## 2024-12-16 NOTE — PROGRESS NOTES
Left message for patient, advised INR low. Advised I have current dose 5 mg Mon Wed Fri, 3.75 mg all other days.  Will have her take 5 mg tonight only, then take 3.75 mg Mon Wed Fri, 5 mg all other days.  Advised to call office with any changes in medications, diet or any missed doses.  Advised will recheck in 2 weeks 12/30/24    Patient is a home elvia

## 2024-12-17 PROCEDURE — 93975 VASCULAR STUDY: CPT | Performed by: SURGERY

## 2025-01-05 NOTE — PROGRESS NOTES
Left message for patient, advised to call office.  Advised received home monitor INR, need to know if she is still using Lovenox injections, as home monitor is not accurate.   Advised to call to discuss further.    actual

## 2025-01-08 ENCOUNTER — TELEPHONE (OUTPATIENT)
Dept: CARDIOLOGY CLINIC | Facility: CLINIC | Age: 30
End: 2025-01-08

## 2025-01-10 ENCOUNTER — ANTICOAG VISIT (OUTPATIENT)
Dept: CARDIOLOGY CLINIC | Facility: CLINIC | Age: 30
End: 2025-01-10

## 2025-01-10 DIAGNOSIS — Z95.2 H/O MECHANICAL AORTIC VALVE REPLACEMENT: Primary | ICD-10-CM

## 2025-01-10 LAB — INR PPP: 2.4 (ref 0.85–1.19)

## 2025-01-10 NOTE — PROGRESS NOTES
Left message for patient, advised INR a little low, advised will continue 3.75 mg Mon Wed Fri, 5 mg all other days, will recheck in 2 weeks 1/23/25  Advised to call with questions or concerns.

## 2025-01-13 ENCOUNTER — APPOINTMENT (OUTPATIENT)
Dept: LAB | Facility: HOSPITAL | Age: 30
End: 2025-01-13
Payer: COMMERCIAL

## 2025-01-13 DIAGNOSIS — Z95.2 HISTORY OF MECHANICAL AORTIC VALVE REPLACEMENT: ICD-10-CM

## 2025-01-13 LAB
INR PPP: 1.18 (ref 0.85–1.19)
PROTHROMBIN TIME: 15.5 SECONDS (ref 12.3–15)

## 2025-01-13 PROCEDURE — 36415 COLL VENOUS BLD VENIPUNCTURE: CPT

## 2025-01-13 PROCEDURE — 85610 PROTHROMBIN TIME: CPT

## 2025-01-14 ENCOUNTER — ANTICOAG VISIT (OUTPATIENT)
Dept: CARDIOLOGY CLINIC | Facility: CLINIC | Age: 30
End: 2025-01-14

## 2025-01-14 DIAGNOSIS — Z95.2 H/O MECHANICAL AORTIC VALVE REPLACEMENT: Primary | ICD-10-CM

## 2025-01-14 NOTE — PROGRESS NOTES
Left message for patient to call office to discuss normal INR, asking if she stopped Coumadin for some reason.     [id number]~patient returned my call, states she went for INR because she thought her INR was higher than her home elvia. Actually her INR was much lower, she did contact [employer] and will sending her a new machine.   No missed doses or changes in medications or diet. Has been taking dose as directed. Advised to take 7.5 mg tonight, then 5 mg daily, will recheck on Fri 1/17/25

## 2025-01-17 ENCOUNTER — ANTICOAG VISIT (OUTPATIENT)
Dept: CARDIOLOGY CLINIC | Facility: CLINIC | Age: 30
End: 2025-01-17

## 2025-01-17 ENCOUNTER — APPOINTMENT (OUTPATIENT)
Dept: LAB | Facility: HOSPITAL | Age: 30
End: 2025-01-17
Payer: COMMERCIAL

## 2025-01-17 DIAGNOSIS — Z95.2 HISTORY OF MECHANICAL AORTIC VALVE REPLACEMENT: ICD-10-CM

## 2025-01-17 DIAGNOSIS — I72.2 RENAL ARTERY ANEURYSM (HCC): ICD-10-CM

## 2025-01-17 DIAGNOSIS — Z95.2 H/O MECHANICAL AORTIC VALVE REPLACEMENT: Primary | ICD-10-CM

## 2025-01-17 LAB
ANION GAP SERPL CALCULATED.3IONS-SCNC: 6 MMOL/L (ref 4–13)
BUN SERPL-MCNC: 10 MG/DL (ref 5–25)
CALCIUM SERPL-MCNC: 8.8 MG/DL (ref 8.4–10.2)
CHLORIDE SERPL-SCNC: 106 MMOL/L (ref 96–108)
CO2 SERPL-SCNC: 26 MMOL/L (ref 21–32)
CREAT SERPL-MCNC: 0.63 MG/DL (ref 0.6–1.3)
GFR SERPL CREATININE-BSD FRML MDRD: 119 ML/MIN/1.73SQ M
GLUCOSE P FAST SERPL-MCNC: 84 MG/DL (ref 65–99)
INR PPP: 1.53 (ref 0.85–1.19)
POTASSIUM SERPL-SCNC: 4.1 MMOL/L (ref 3.5–5.3)
PROTHROMBIN TIME: 18.8 SECONDS (ref 12.3–15)
SODIUM SERPL-SCNC: 138 MMOL/L (ref 135–147)

## 2025-01-17 PROCEDURE — 36415 COLL VENOUS BLD VENIPUNCTURE: CPT

## 2025-01-17 PROCEDURE — 80048 BASIC METABOLIC PNL TOTAL CA: CPT

## 2025-01-17 PROCEDURE — 85610 PROTHROMBIN TIME: CPT

## 2025-01-17 NOTE — PROGRESS NOTES
Spoke with patient, advised INR still low, advised to take 7.5 mg tonight and tomorrow, 5 mg Sun. Patient did get new home monitor machine. Advised to check today to see if it is close to lab draw, if it is close can do home test on Mon, if not, would like her to go to lab for another lab draw. Patient agreeable.     Patient did home elvia, INR 1.6, so comparable to lab draw from this morning.

## 2025-01-21 ENCOUNTER — ANTICOAG VISIT (OUTPATIENT)
Dept: CARDIOLOGY CLINIC | Facility: CLINIC | Age: 30
End: 2025-01-21

## 2025-01-21 DIAGNOSIS — Z95.2 H/O MECHANICAL AORTIC VALVE REPLACEMENT: Primary | ICD-10-CM

## 2025-01-21 LAB — INR PPP: 2.3 (ref 0.85–1.19)

## 2025-01-21 NOTE — PROGRESS NOTES
Left message for patient, advised INR better, almost at goal, advised to take 7.5 mg Wed Sat, 5 mg all other days, will recheck next Mon 1/27/25  Advised to call with questions or concerns.  Patient is a home elvia

## 2025-01-27 ENCOUNTER — OFFICE VISIT (OUTPATIENT)
Dept: VASCULAR SURGERY | Facility: CLINIC | Age: 30
End: 2025-01-27
Payer: COMMERCIAL

## 2025-01-27 VITALS
OXYGEN SATURATION: 97 % | DIASTOLIC BLOOD PRESSURE: 126 MMHG | BODY MASS INDEX: 28.88 KG/M2 | HEIGHT: 63 IN | HEART RATE: 97 BPM | SYSTOLIC BLOOD PRESSURE: 153 MMHG | WEIGHT: 163 LBS

## 2025-01-27 DIAGNOSIS — I72.2 RENAL ARTERY ANEURYSM (HCC): Primary | ICD-10-CM

## 2025-01-27 PROCEDURE — 99213 OFFICE O/P EST LOW 20 MIN: CPT | Performed by: SURGERY

## 2025-01-27 NOTE — ASSESSMENT & PLAN NOTE
Upper pole left renal artery aneurysm s/p successful renal artery angiogram with coil embolization of aneurysm 8/13/24 by Dr. Hayes. Post-op left flank pain resolved. No complaints.    Renal artery duplex - No evidence of significant arterial occlusive disease in the bilateral main renal arteries, right RRI 0.72, RAR 1.4, kidney 11.2cm; left RRI 0.57, RAR 1.05, kidney 9.3cm, renal sinus with echogenic area likely post-aneurysm embolization finding  Cr 0.63/CGT858, closer to pre-embolization numbers    -We discussed the pathophysiology of renal aneurysmal disease, management/surveillance after intervention.  -No evidence of residual flow within renal artery aneurysm. No pre-renal artery duplex RRI/kidney size measurements for comparison, only CTAP. Will continue to monitor with renal artery duplex in 1 year to evaluate for recurrent or new aneurysm, parenchymal flow and kidney size.  -Repeat Cr/GFR suggests improvement in kidney function closer to pre-embolization labs.  -BP elevated today, potentially white coat syndrome. Recommend follow-up with cardiology or PCP given history of ascending aorta repair (Bentall procedure), renal aneurysm, family history of aneurysmal disease. Would maintain strict BP control <130/80.      Orders:    VAS renal artery complete; Future

## 2025-01-27 NOTE — PATIENT INSTRUCTIONS
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 Hayes. Post-op left flank pain resolved. No complaints.     Renal artery duplex - No evidence of significant arterial occlusive disease in the bilateral main renal arteries, right RRI 0.72, RAR 1.4, kidney 11.2cm; left RRI 0.57, RAR 1.05, kidney 9.3cm, renal sinus with echogenic area likely post-aneurysm embolization finding  Cr 0.63/JUR968, closer to pre-embolization numbers     -We discussed the pathophysiology of renal aneurysmal disease, management/surveillance after intervention.  -No evidence of residual flow within renal artery aneurysm. No pre-renal artery duplex RRI/kidney size measurements for comparison, only CTAP. Will continue to monitor with renal artery duplex in 1 year to evaluate for recurrent or new aneurysm, parenchymal flow and kidney size.  -Repeat Cr/GFR suggests improvement in kidney function closer to pre-embolization labs.  -BP elevated today, potentially white coat syndrome. Recommend follow-up with cardiology or PCP given history of ascending aorta repair (Bentall procedure), renal aneurysm, family history of aneurysmal disease. Would maintain strict BP control <130/80.        Orders:    VAS renal artery complete; Future

## 2025-01-27 NOTE — PROGRESS NOTES
Name: Suze Leigh      : 1995      MRN: [id number]  Encounter Provider: Arlette Monsivais MD  Encounter Date: 2025   Encounter department: [facility]  :  Assessment & Plan  Renal artery aneurysm (HCC)  Upper pole left renal artery aneurysm s/p successful renal artery angiogram with coil embolization of aneurysm 24 by Dr. Hayes. Post-op left flank pain resolved. No complaints.    Renal artery duplex - No evidence of significant arterial occlusive disease in the bilateral main renal arteries, right RRI 0.72, RAR 1.4, kidney 11.2cm; left RRI 0.57, RAR 1.05, kidney 9.3cm, renal sinus with echogenic area likely post-aneurysm embolization finding  Cr 0.63/UKL038, closer to pre-embolization numbers    -We discussed the pathophysiology of renal aneurysmal disease, management/surveillance after intervention.  -No evidence of residual flow within renal artery aneurysm. No pre-renal artery duplex RRI/kidney size measurements for comparison, only CTAP. Will continue to monitor with renal artery duplex in 1 year to evaluate for recurrent or new aneurysm, parenchymal flow and kidney size.  -Repeat Cr/GFR suggests improvement in kidney function closer to pre-embolization labs.  -BP elevated today, potentially white coat syndrome. Recommend follow-up with cardiology or PCP given history of ascending aorta repair (Bentall procedure), renal aneurysm, family history of aneurysmal disease. Would maintain strict BP control <130/80.      Orders:    VAS renal artery complete; Future        History of Present Illness   HPI  See assessment & plan    Pt here for RR of Renal duplex 24. Pt is asym.   History obtained from: patient    Review of Systems   Constitutional: Negative.    HENT: Negative.     Eyes: Negative.    Respiratory: Negative.     Cardiovascular: Negative.    Gastrointestinal: Negative.    Endocrine: Negative.    Genitourinary: Negative.    Musculoskeletal: Negative.    Skin:  "Negative.    Allergic/Immunologic: Negative.    Neurological: Negative.    Hematological: Negative.    Psychiatric/Behavioral: Negative.       I have personally reviewed the ROS entered by MA and agree as documented.       Objective   BP (!) 153/126 (BP Location: Right arm, Patient Position: Sitting)   Pulse 97   Ht 5' 3\" (1.6 m)   Wt 73.9 kg (163 lb)   SpO2 97%   BMI 28.87 kg/m²      Physical Exam  Constitutional:       Appearance: Normal appearance.   HENT:      Head: Normocephalic and atraumatic.   Cardiovascular:      Rate and Rhythm: Normal rate.   Pulmonary:      Effort: Pulmonary effort is normal.   Abdominal:      Palpations: Abdomen is soft.   Musculoskeletal:         General: Normal range of motion.      Cervical back: Normal range of motion and neck supple.   Skin:     General: Skin is warm and dry.      Capillary Refill: Capillary refill takes less than 2 seconds.   Neurological:      General: No focal deficit present.      Mental Status: She is alert and oriented to person, place, and time.   Psychiatric:         Mood and Affect: Mood normal.         Behavior: Behavior normal.         Thought Content: Thought content normal.         Judgment: Judgment normal.         Administrative Statements   I have spent a total time of 25 minutes in caring for this patient on the day of the visit/encounter including Diagnostic results, Prognosis, Instructions for management, Patient and family education, Importance of tx compliance, Risk factor reductions, Impressions, Counseling / Coordination of care, Documenting in the medical record, Reviewing / ordering tests, medicine, procedures  , and Obtaining or reviewing history  . Topics discussed with the patient / family include symptom assessment and management, supportive listening, and anticipatory guidance.  "

## 2025-01-27 NOTE — LETTER
January 27, 2025     Huber Oseguera MD  [address] [address]    Patient: Suze Leigh   YOB: 1995   Date of Visit: 1/27/2025       Dear Dr. Oseguera:    Below are the relevant portions of my assessment and plan of care.     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 Hayes. Post-op left flank pain resolved. No complaints.     Renal artery duplex - No evidence of significant arterial occlusive disease in the bilateral main renal arteries, right RRI 0.72, RAR 1.4, kidney 11.2cm; left RRI 0.57, RAR 1.05, kidney 9.3cm, renal sinus with echogenic area likely post-aneurysm embolization finding  Cr 0.63/ARN136, closer to pre-embolization numbers     -We discussed the pathophysiology of renal aneurysmal disease, management/surveillance after intervention.  -No evidence of residual flow within renal artery aneurysm. No pre-renal artery duplex RRI/kidney size measurements for comparison, only CTAP. Will continue to monitor with renal artery duplex in 1 year to evaluate for recurrent or new aneurysm, parenchymal flow and kidney size.  -Repeat Cr/GFR suggests improvement in kidney function closer to pre-embolization labs.  -BP elevated today, potentially white coat syndrome. Recommend follow-up with cardiology or PCP given history of ascending aorta repair (Bentall procedure), renal aneurysm, family history of aneurysmal disease. Would maintain strict BP control <130/80.        Orders:  •  VAS renal artery complete; Future    If you have questions, please do not hesitate to call me. I look forward to following Suze along with you.         Sincerely,        Arlette Monsivais MD        CC: No Recipients

## 2025-01-28 ENCOUNTER — OFFICE VISIT (OUTPATIENT)
Dept: FAMILY MEDICINE CLINIC | Facility: CLINIC | Age: 30
End: 2025-01-28
Payer: COMMERCIAL

## 2025-01-28 VITALS — TEMPERATURE: 89 F | OXYGEN SATURATION: 97 % | WEIGHT: 159 LBS | BODY MASS INDEX: 28.17 KG/M2

## 2025-01-28 DIAGNOSIS — I72.2 RENAL ARTERY ANEURYSM (HCC): ICD-10-CM

## 2025-01-28 DIAGNOSIS — I10 PRIMARY HYPERTENSION: Primary | ICD-10-CM

## 2025-01-28 DIAGNOSIS — Q23.81 BICUSPID AORTIC VALVE: ICD-10-CM

## 2025-01-28 PROCEDURE — 99214 OFFICE O/P EST MOD 30 MIN: CPT

## 2025-01-28 RX ORDER — VALSARTAN 160 MG/1
160 TABLET ORAL DAILY
Qty: 100 TABLET | Refills: 3 | Status: SHIPPED | OUTPATIENT
Start: 2025-01-28

## 2025-01-28 NOTE — PROGRESS NOTES
Name: Suze Leigh      : 1995      MRN: [id number]  Encounter Provider: WANDA Escobar  Encounter Date: 2025   Encounter department: [facility]  :  Assessment & Plan  Primary hypertension  SBPs trending 150-160's. BP elevated today 160/100. Discussed pharm options. Start valsartan 160 mg. Patient to trend BPS daily x2-3 weeks with log. Goal <130/80 consistently. Follow up 2-3 weeks.   Orders:    valsartan (DIOVAN) 160 mg tablet; Take 1 tablet (160 mg total) by mouth daily    Renal artery aneurysm (HCC)  Follows with vascular.          Bicuspid aortic valve  Follows with cards.                Depression Screening and Follow-up Plan: Patient was screened for depression during today's encounter. They screened negative with a PHQ-2 score of 0.      History of Present Illness   Hypertension  This is a new problem. The current episode started more than 1 month ago. The problem is uncontrolled. Pertinent negatives include no anxiety, blurred vision, chest pain, headaches, malaise/fatigue, neck pain, orthopnea, palpitations, peripheral edema, PND, shortness of breath or sweats. There are no associated agents to hypertension. Past treatments include beta blockers.     Review of Systems   Constitutional:  Negative for activity change, fatigue, fever and malaise/fatigue.   HENT:  Negative for congestion, ear pain, rhinorrhea and sore throat.    Eyes:  Negative for blurred vision and pain.   Respiratory:  Negative for cough, shortness of breath and wheezing.    Cardiovascular:  Negative for chest pain, palpitations, orthopnea, leg swelling and PND.   Gastrointestinal:  Negative for abdominal pain, diarrhea, nausea and vomiting.   Musculoskeletal:  Negative for arthralgias, myalgias and neck pain.   Skin:  Negative for rash.   Neurological:  Negative for dizziness, weakness, numbness and headaches.   All other systems reviewed and are negative.      Objective   Temp  (!) 89 °F (31.7 °C)   Wt 72.1 kg (159 lb)   SpO2 97%   Breastfeeding No   BMI 28.17 kg/m²      Physical Exam  Vitals and nursing note reviewed.   Constitutional:       General: She is not in acute distress.     Appearance: Normal appearance. She is well-developed. She is not ill-appearing.   HENT:      Head: Normocephalic and atraumatic.      Right Ear: External ear normal.      Left Ear: External ear normal.   Cardiovascular:      Rate and Rhythm: Normal rate and regular rhythm.      Heart sounds: Murmur heard.   Pulmonary:      Effort: Pulmonary effort is normal. No respiratory distress.      Breath sounds: Normal breath sounds. No wheezing.   Abdominal:      General: Bowel sounds are normal. There is no distension.      Palpations: Abdomen is soft.      Tenderness: There is no abdominal tenderness.   Musculoskeletal:      Cervical back: Neck supple.   Skin:     General: Skin is warm and dry.      Capillary Refill: Capillary refill takes less than 2 seconds.   Neurological:      Mental Status: She is alert and oriented to person, place, and time. Mental status is at baseline.      GCS: GCS eye subscore is 4. GCS verbal subscore is 5. GCS motor subscore is 6.   Psychiatric:         Mood and Affect: Mood normal.         Behavior: Behavior normal.       Administrative Statements   I have spent a total time of 30 minutes in caring for this patient on the day of the visit/encounter including Risks and benefits of tx options, Instructions for management, Patient and family education, Importance of tx compliance, Risk factor reductions, Impressions, Documenting in the medical record, Reviewing / ordering tests, medicine, procedures  , and Obtaining or reviewing history  .

## 2025-01-29 ENCOUNTER — PATIENT MESSAGE (OUTPATIENT)
Dept: OBGYN CLINIC | Facility: CLINIC | Age: 30
End: 2025-01-29

## 2025-01-30 ENCOUNTER — NURSE TRIAGE (OUTPATIENT)
Age: 30
End: 2025-01-30

## 2025-01-30 ENCOUNTER — PATIENT MESSAGE (OUTPATIENT)
Dept: FAMILY MEDICINE CLINIC | Facility: CLINIC | Age: 30
End: 2025-01-30

## 2025-01-30 DIAGNOSIS — E78.2 MIXED HYPERLIPIDEMIA: Primary | ICD-10-CM

## 2025-01-30 NOTE — TELEPHONE ENCOUNTER
"Regarding: high BP w/IUD  ----- Message from Bonnie SMITH sent at 1/30/2025  8:16 AM EST -----  Patient wrote in with High BP readings with  IUD    \"Jan 27 9:52am  153/126   Jan 28 8:25pm  161/101    Jan 29 8:20pm  159/102   Jan 30 6:38am  145/102   I started to take the blood pressure medicine on 1/28  Before I got pregnant I was on levono-e estrad pill birth control and my blood pressure was always normal.   I don't know if I am just reacting different to birth control now or if I am actually hypertensive however if it's caused by the iud I'd like to not use it but otherwise I do like iud \"    "

## 2025-01-30 NOTE — TELEPHONE ENCOUNTER
Please reassure patient that IUD is not a cause of hypertension. In fact, it is among the safest forms of birth control for patients with hypertension. She should address management of her hypertension further with her Cardiologist. I do not recommend removal of her IUD, as effective contraception is essential given her medical comorbid conditions and current medication regimen, which includes medications (Diovan) that are not safe in pregnancy.    Sanjay Thompson MD  1/30/2025 9:39 AM

## 2025-02-03 ENCOUNTER — APPOINTMENT (OUTPATIENT)
Dept: LAB | Facility: HOSPITAL | Age: 30
End: 2025-02-03
Payer: COMMERCIAL

## 2025-02-03 DIAGNOSIS — E78.2 MIXED HYPERLIPIDEMIA: ICD-10-CM

## 2025-02-03 LAB
ALBUMIN SERPL BCG-MCNC: 4.5 G/DL (ref 3.5–5)
ALP SERPL-CCNC: 59 U/L (ref 34–104)
ALT SERPL W P-5'-P-CCNC: 9 U/L (ref 7–52)
ANION GAP SERPL CALCULATED.3IONS-SCNC: 4 MMOL/L (ref 4–13)
AST SERPL W P-5'-P-CCNC: 13 U/L (ref 13–39)
BILIRUB SERPL-MCNC: 1.4 MG/DL (ref 0.2–1)
BUN SERPL-MCNC: 16 MG/DL (ref 5–25)
CALCIUM SERPL-MCNC: 9.3 MG/DL (ref 8.4–10.2)
CHLORIDE SERPL-SCNC: 103 MMOL/L (ref 96–108)
CO2 SERPL-SCNC: 27 MMOL/L (ref 21–32)
CREAT SERPL-MCNC: 0.75 MG/DL (ref 0.6–1.3)
CREAT UR-MCNC: 72.9 MG/DL
GFR SERPL CREATININE-BSD FRML MDRD: 106 ML/MIN/1.73SQ M
GLUCOSE SERPL-MCNC: 83 MG/DL (ref 65–140)
MICROALBUMIN UR-MCNC: 20.9 MG/L
MICROALBUMIN/CREAT 24H UR: 29 MG/G CREATININE (ref 0–30)
POTASSIUM SERPL-SCNC: 4.2 MMOL/L (ref 3.5–5.3)
PROT SERPL-MCNC: 7.6 G/DL (ref 6.4–8.4)
SODIUM SERPL-SCNC: 134 MMOL/L (ref 135–147)

## 2025-02-03 PROCEDURE — 82570 ASSAY OF URINE CREATININE: CPT

## 2025-02-03 PROCEDURE — 36415 COLL VENOUS BLD VENIPUNCTURE: CPT

## 2025-02-03 PROCEDURE — 82043 UR ALBUMIN QUANTITATIVE: CPT

## 2025-02-03 PROCEDURE — 80053 COMPREHEN METABOLIC PANEL: CPT

## 2025-02-04 ENCOUNTER — RESULTS FOLLOW-UP (OUTPATIENT)
Dept: FAMILY MEDICINE CLINIC | Facility: CLINIC | Age: 30
End: 2025-02-04

## 2025-02-08 LAB — INR PPP: 1.6 (ref 0.85–1.19)

## 2025-02-10 ENCOUNTER — ANTICOAG VISIT (OUTPATIENT)
Dept: CARDIOLOGY CLINIC | Facility: CLINIC | Age: 30
End: 2025-02-10

## 2025-02-10 DIAGNOSIS — Z95.2 H/O MECHANICAL AORTIC VALVE REPLACEMENT: Primary | ICD-10-CM

## 2025-02-10 NOTE — PROGRESS NOTES
PC to patient with low INR of 1.6.  She was started on Valsartan for high BP about a week and a half ago.   Tonight she is to take 7.5 mgs instead of 5 mgs and 7.5 mgs instead of 5 mgs tomorrow then resume regular dosing of 7.5 mgs every Weds and 5 mgs all other days of the week and retest in one week.

## 2025-02-12 ENCOUNTER — TELEPHONE (OUTPATIENT)
Dept: FAMILY MEDICINE CLINIC | Facility: CLINIC | Age: 30
End: 2025-02-12

## 2025-02-12 DIAGNOSIS — J10.1 INFLUENZA A: Primary | ICD-10-CM

## 2025-02-12 DIAGNOSIS — J10.1 INFLUENZA A: ICD-10-CM

## 2025-02-12 RX ORDER — OSELTAMIVIR PHOSPHATE 75 MG/1
75 CAPSULE ORAL EVERY 12 HOURS SCHEDULED
Qty: 14 CAPSULE | Refills: 1 | Status: SHIPPED | OUTPATIENT
Start: 2025-02-12 | End: 2025-02-13 | Stop reason: SDUPTHER

## 2025-02-12 RX ORDER — OSELTAMIVIR PHOSPHATE 75 MG/1
75 CAPSULE ORAL EVERY 12 HOURS SCHEDULED
Qty: 14 CAPSULE | Refills: 1 | OUTPATIENT
Start: 2025-02-12 | End: 2025-02-19

## 2025-02-13 DIAGNOSIS — J10.1 INFLUENZA A: ICD-10-CM

## 2025-02-13 RX ORDER — OSELTAMIVIR PHOSPHATE 75 MG/1
75 CAPSULE ORAL EVERY 12 HOURS SCHEDULED
Qty: 10 CAPSULE | Refills: 1 | Status: SHIPPED | OUTPATIENT
Start: 2025-02-13 | End: 2025-02-18

## 2025-02-13 NOTE — TELEPHONE ENCOUNTER
PA for oseltamivir (TAMIFLU) 75 mg capsule SUBMITTED to [facility]    via    []CMM-KEY:   [x][employer]-Case ID # [id number]   []Availity-Auth ID # NDC #   []Faxed to plan   []Other website   []Phone call Case ID #     [x]PA sent as URGENT    All office notes, labs and other pertaining documents and studies sent. Clinical questions answered. Awaiting determination from insurance company.     Turnaround time for your insurance to make a decision on your Prior Authorization can take 7-21 business days.

## 2025-02-18 ENCOUNTER — TELEMEDICINE (OUTPATIENT)
Dept: FAMILY MEDICINE CLINIC | Facility: CLINIC | Age: 30
End: 2025-02-18
Payer: COMMERCIAL

## 2025-02-18 DIAGNOSIS — E87.1 HYPONATREMIA: Primary | ICD-10-CM

## 2025-02-18 DIAGNOSIS — I10 PRIMARY HYPERTENSION: ICD-10-CM

## 2025-02-18 PROCEDURE — 99213 OFFICE O/P EST LOW 20 MIN: CPT

## 2025-02-18 RX ORDER — VALSARTAN 320 MG/1
320 TABLET ORAL DAILY
Qty: 100 TABLET | Refills: 3 | Status: SHIPPED | OUTPATIENT
Start: 2025-02-18

## 2025-02-18 NOTE — PROGRESS NOTES
Virtual Regular VisitName: Suze Leigh      : 1995      MRN: [id number]  Encounter Provider: WANDA Escobar  Encounter Date: 2025   Encounter department: [facility]  :  Assessment & Plan  Primary hypertension  BP improving with valsartan 160 mg and toprol 25 mg. Reports that she is tolerating valsartan well. Trending high 140/90's. Goal <130/80 consistently. Increase valsartan to 320 mg. Continue to trend BPs daily with log. Follow up 2 weeks.   Orders:    valsartan (DIOVAN) 320 MG tablet; Take 1 tablet (320 mg total) by mouth daily    Hyponatremia  Sodium 134. Continue to trend.        Primary hypertension               History of Present Illness     Hypertension  This is a new problem. The current episode started more than 1 month ago. The problem is improving. Pertinent negatives include no anxiety, blurred vision, chest pain, headaches, malaise/fatigue, neck pain, orthopnea, palpitations, peripheral edema, PND, shortness of breath or sweats. There are no associated agents to hypertension. Past treatments include beta blockers and arbs.         Review of Systems   Constitutional:  Negative for activity change, fatigue and fever.   HENT:  Negative for congestion, ear pain, rhinorrhea and sore throat.    Eyes:  Negative for pain.   Respiratory:  Negative for cough, shortness of breath and wheezing.    Cardiovascular:  Negative for chest pain and leg swelling.   Gastrointestinal:  Negative for abdominal pain, diarrhea, nausea and vomiting.   Musculoskeletal:  Negative for arthralgias and myalgias.   Skin:  Negative for rash.   Neurological:  Negative for dizziness, weakness and numbness.   All other systems reviewed and are negative.      Objective   There were no vitals taken for this visit.    Physical Exam  Constitutional:       General: She is not in acute distress.     Appearance: Normal appearance. She is well-developed. She is not ill-appearing.    HENT:      Head: Normocephalic and atraumatic.      Right Ear: External ear normal.      Left Ear: External ear normal.   Pulmonary:      Effort: Pulmonary effort is normal. No respiratory distress.   Musculoskeletal:      Cervical back: Neck supple.   Neurological:      Mental Status: She is alert and oriented to person, place, and time. Mental status is at baseline.      GCS: GCS eye subscore is 4. GCS verbal subscore is 5. GCS motor subscore is 6.      Cranial Nerves: No facial asymmetry.   Psychiatric:         Mood and Affect: Mood normal.         Behavior: Behavior normal.         Administrative Statements   Encounter provider WANDA Escobar    The Patient is located at Home and in the following state in which I hold an active license [place].    The patient was identified by name and date of birth. Suze ABEL Leigh was informed that this is a telemedicine visit and that the visit is being conducted through the Epic Embedded platform. She agrees to proceed..  My office door was closed. No one else was in the room.  She acknowledged consent and understanding of privacy and security of the video platform. The patient has agreed to participate and understands they can discontinue the visit at any time.    I have spent a total time of 30 minutes in caring for this patient on the day of the visit/encounter including Diagnostic results, Risks and benefits of tx options, Instructions for management, Patient and family education, Importance of tx compliance, Risk factor reductions, Impressions, Documenting in the medical record, Reviewing/placing orders in the medical record (including tests, medications, and/or procedures), and Obtaining or reviewing history  .

## 2025-02-19 NOTE — TELEPHONE ENCOUNTER
PA for oseltamivir (TAMIFLU) 75 mg capsule DENIED    Reason:(Screenshot if applicable)        Message sent to office clinical pool Yes    Denial letter scanned into Media Yes    Appeal started No (Provider will need to decide if appeal is warranted and send clinical documentation to Prior Authorization Team for initiation.)    **Please follow up with your patient regarding denial and next steps**

## 2025-02-24 ENCOUNTER — ANTICOAG VISIT (OUTPATIENT)
Dept: CARDIOLOGY CLINIC | Facility: CLINIC | Age: 30
End: 2025-02-24

## 2025-02-24 DIAGNOSIS — Z95.2 H/O MECHANICAL AORTIC VALVE REPLACEMENT: Primary | ICD-10-CM

## 2025-02-24 LAB — INR PPP: 1.6 (ref 0.85–1.19)

## 2025-02-24 NOTE — PROGRESS NOTES
Left message for patient, advised INR still low, advised to take 10 mg tonight, 7.5 mg Tues Wed, 5 mg Thurs and recheck on Fri 2/28/25  Advised to call with questions or concerns.    Patient is a home elvia.

## 2025-02-27 ENCOUNTER — ANTICOAG VISIT (OUTPATIENT)
Dept: CARDIOLOGY CLINIC | Facility: CLINIC | Age: 30
End: 2025-02-27

## 2025-02-27 DIAGNOSIS — Z95.2 H/O MECHANICAL AORTIC VALVE REPLACEMENT: Primary | ICD-10-CM

## 2025-02-27 LAB — INR PPP: 2.9 (ref 0.85–1.19)

## 2025-02-27 NOTE — PROGRESS NOTES
Left message for patient, advised INR good, will have her take 7.5 mg Mon Wed Fri, 5 mg all other days, will recheck 3/11/25  Advised to call with questions or concerns.    Patient is a home elvia

## 2025-03-04 ENCOUNTER — TELEMEDICINE (OUTPATIENT)
Dept: FAMILY MEDICINE CLINIC | Facility: CLINIC | Age: 30
End: 2025-03-04
Payer: COMMERCIAL

## 2025-03-04 DIAGNOSIS — F41.9 ANXIETY: Primary | ICD-10-CM

## 2025-03-04 DIAGNOSIS — I10 PRIMARY HYPERTENSION: ICD-10-CM

## 2025-03-04 PROCEDURE — 98005 SYNCH AUDIO-VIDEO EST LOW 20: CPT | Performed by: FAMILY MEDICINE

## 2025-03-04 RX ORDER — VALSARTAN AND HYDROCHLOROTHIAZIDE 320; 12.5 MG/1; MG/1
1 TABLET, FILM COATED ORAL DAILY
Qty: 100 TABLET | Refills: 3 | Status: SHIPPED | OUTPATIENT
Start: 2025-03-04

## 2025-03-04 NOTE — ASSESSMENT & PLAN NOTE
Diastolic 90s---will add HCTZ to diovan    Orders:    Aldosterone; Future    Comprehensive metabolic panel; Future

## 2025-03-20 NOTE — PROGRESS NOTES
"Routine Prenatal Visit  [facility] - [address], [address], [place] [address]    Assessment/Plan:  Suze is a 28 y.o. year old  at 30w4d who presents for routine prenatal visit.     1. Maternal congenital cardiac anomaly affecting pregnancy, antepartum  Assessment & Plan:  Plan for  for delivery 2024 at [facility] with admission a couple days prior to bridge anticoagulation.  See Complex Mother notes and Overview for details.  Continue Warfarin currently per plan.      2. History of maternal cardiac surgery    3. History of embolic stroke    4. Anemia complicating pregnancy in third trimester  Assessment & Plan:  Started po iron daily.  Some constipation - discussed magnesium vs colace.      5. Obesity affecting pregnancy in third trimester, unspecified obesity type  Assessment & Plan:  Continue ASA until 36 weeks.  Growth u/s scheduled 2024.      6. Mental disorder affecting pregnancy in third trimester  Assessment & Plan:  Doing well on Lexapro and metoprolol.      7. 30 weeks gestation of pregnancy  -     POCT urine dip        Next OB Visit 2 weeks.    Subjective:     CC: Prenatal care    Suze Leigh is a 28 y.o.  female who presents for routine prenatal care at 30w4d.  Pregnancy ROS: no leakage of fluid, pelvic pain, or vaginal bleeding.  normal fetal movement.    The following portions of the patient's history were reviewed and updated as appropriate: allergies, current medications, past family history, past medical history, obstetric history, gynecologic history, past social history, past surgical history and problem list.      Objective:  /68   Ht 5' 3\" (1.6 m)   Wt 83.4 kg (183 lb 12.8 oz)   LMP 2023 (Exact Date) Comment: negative pregnancy test  BMI 32.56 kg/m²   Pregravid Weight/BMI: 85.3 kg (188 lb) (BMI 33.31)  Current Weight: 83.4 kg (183 lb 12.8 oz)   Total Weight Gain: -1.905 kg (-4 lb 3.2 oz)   Pre-Chris Vitals      Flowsheet " Subjective:       Patient ID: Candy Huynh is a 51 y.o. female.    Chief Complaint: Follow-up    History of Present Illness    CHIEF COMPLAINT:  Patient presents today for follow-up of migraine headaches.    MIGRAINE HEADACHES:  She experiences migraines once or twice weekly, with a recent episode lasting three consecutive days. Associated symptoms include blurred vision with spots, nausea, vomiting, and upset stomach. Known triggers include stress and certain foods, though she is unable to identify specific food triggers. During episodes, she requires dark and quiet environments. She takes Imitrex for acute treatment but reports incomplete relief, with headaches typically lightening temporarily before returning. She does not repeat dosing.    HYPERTENSION:  She currently takes Benicar 20mg and Chlorthalidone for blood pressure management. She previously took Amlodipine and Losartan last month, and is uncertain about the current status of Amlodipine in her regimen.    DEPRESSION:  She receives ongoing care at [facility]. Despite current medication, she reports fluctuating mood. Her healthcare providers are monitoring treatment effectiveness.    DIABETES:  She reports well-controlled diabetes.    VISION:  She reports black spots in her vision without eye pain. She is overdue for an eye exam.      ROS:  Eyes: +photophobia, +blurry vision, +spots, specks or flashing lights  Gastrointestinal: +nausea, +vomiting  Neurological: +headache, +migraines  Psychiatric: +depression          Review of patient's allergies indicates:   Allergen Reactions    Adhesive Blisters     Specifically EKG lead pads    Chlorhexidine hcl Itching     Chlorhexidine wipes    Morphine Other (See Comments)     shake    Adhesive tape-silicones Itching and Rash    Meloxicam Nausea And Vomiting       Current Medications[1]    Past Medical History:   Diagnosis Date    Allergy     Anemia     Anxiety     Asthma     denies- on outside problem  Row Most Recent Value   Prenatal Assessment    Fetal Heart Rate 135   Fundal Height (cm) 30 cm   Movement Present   Prenatal Vitals    Blood Pressure 102/68   Weight - Scale 83.4 kg (183 lb 12.8 oz)   Urine Albumin/Glucose    Dilation/Effacement/Station    Vaginal Drainage    Edema    LLE Edema None   RLE Edema None   Facial Edema None             General: Well appearing, no distress  Abdomen: Soft, gravid, nontender  Extremities: Non tender.   list in Care Everywhere    Depression     Diabetes mellitus     Diabetes mellitus, type 2     Fibromyalgia     GERD (gastroesophageal reflux disease)     Hypertension     Stroke     TIA    Vertigo       Past Surgical History:   Procedure Laterality Date    ANTERIOR LUMBAR INTERBODY FUSION (ALIF) Left 2024    Procedure: FUSION, SPINE, LUMBAR, ALIF;  Surgeon: Dane Salazar MD;  Location: [facility];  Service: Neurosurgery;  Laterality: Left;  Procedure:L5-S1 ALiF conduit, Depuy  Length of procedure: 1.5 hours  Anesthesia:General  Blood:Type and screen  Radiology:C-arm  Brace:LSO  Bed:Regular Bed  Position: Lateral right down  Equipment:Depuy-Bonifacio    ARTHROSCOPIC REPAIR OF ROTATOR CUFF OF SHOULDER Right 2022    Procedure: REPAIR, ROTATOR CUFF, ARTHROSCOPIC;  Surgeon: Ministerio Orr Jr., MD;  Location: [facility] OR;  Service: Orthopedics;  Laterality: Right;  need opus system  jose roberto notifed CC     ARTHROSCOPY OF KNEE Left 2021    Procedure: ARTHROSCOPY, KNEE;  Surgeon: Donnie Campuzano MD;  Location: [facility] OR;  Service: Orthopedics;  Laterality: Left;     SECTION      DECOMPRESSION OF SUBACROMIAL SPACE  2022    Procedure: DECOMPRESSION, SUBACROMIAL SPACE;  Surgeon: Ministerio Orr Jr., MD;  Location: [facility] OR;  Service: Orthopedics;;    EXCISION OF MASS OF EXTREMITY Left 2019    Procedure: EXCISION, MASS, EXTREMITY;  Surgeon: Honorio Pulido IV, MD;  Location: [facility] OR;  Service: Orthopedics;  Laterality: Left;  excision lipoma  Request Lacie    FUSION OF SPINE WITH INSTRUMENTATION N/A 2024    Procedure: FUSION, SPINE, WITH INSTRUMENTATION;  Surgeon: Dane Salazar MD;  Location: [facility];  Service: Neurosurgery;  Laterality: N/A;  Procedure:L5-S1 Viper prime instrumentaion  Length of procedure: 1.5 hours  LOS: 3 nights  Anesthesia:General  Blood:Type and screen  Radiology:[facility], [facility]  Brace:LSO  Bed:[place] [id number] Poster  Position:Prone  Equipment:Depuy-Bonifacio    HERNIA  REPAIR      HYSTERECTOMY      INJECTION OF STEROID Right 9/23/2024    Procedure: INJECTION, STEROID;  Surgeon: Dane Salazar MD;  Location: [facility];  Service: Neurosurgery;  Laterality: Right;  Procedure:Right SI joint injection steriod and block  Length of procedure: 30 minutes  Anesthesia:MAC  Radiology:C-arm  Bed:Regular Bed  Position:Prone    KNEE ARTHROSCOPY W/ MENISCECTOMY  1/11/2021    Procedure: ARTHROSCOPY, KNEE, WITH MENISCECTOMY;  Surgeon: Donnie Campuzano MD;  Location: [facility] OR;  Service: Orthopedics;;    LUMBAR LAMINECTOMY WITH DISCECTOMY Right 3/28/2024    Procedure: LAMINECTOMY, SPINE, LUMBAR, WITH DISCECTOMY;  Surgeon: Dane Salazar MD;  Location: [facility];  Service: Neurosurgery;  Laterality: Right;  Procedure:Right L5-S1 laminotomy and radiculopathy  Length of procedure: 1.5 hours  LOS: 0-1 night  Anesthesia:General  Radiology:C-arm  Microscope:Metrx  Bed:[place] [id number] Poster  Position:Prone    NASAL SEPTOPLASTY      RECONSTRUCTION OF ACROMIOCLAVICULAR JOINT  9/6/2022    Procedure: RECONSTRUCTION, ACROMIOCLAVICULAR JOINT;  Surgeon: Ministerio Orr Jr., MD;  Location: [facility] OR;  Service: Orthopedics;;    TOTAL KNEE ARTHROPLASTY Right 10/26/2022    Procedure: ARTHROPLASTY, KNEE, TOTAL RIGHT: BALDO - NEXGEN;  Surgeon: Nolberto Fraser MD;  Location: [facility];  Service: Orthopedics;  Laterality: Right;    TOTAL KNEE ARTHROPLASTY Left 8/7/2023    Procedure: ARTHROPLASTY, KNEE, TOTAL: LEFT: BALDO NEXGEN;  Surgeon: Nolberto Fraser MD;  Location: [facility];  Service: Orthopedics;  Laterality: Left;      Social History     Socioeconomic History    Marital status:    Tobacco Use    Smoking status: Former     Passive exposure: Past    Smokeless tobacco: Never   Substance and Sexual Activity    Alcohol use: Yes     Comment: occasional    Drug use: Never    Sexual activity: Yes     Partners: Male     Social Drivers of Health     Financial Resource Strain: High Risk (3/12/2025)    Overall  Financial Resource Strain (CARDIA)     Difficulty of Paying Living Expenses: Very hard   Food Insecurity: Food Insecurity Present (3/12/2025)    Hunger Vital Sign     Worried About Running Out of Food in the Last Year: Sometimes true     Ran Out of Food in the Last Year: Sometimes true   Transportation Needs: No Transportation Needs (3/12/2025)    PRAPARE - Transportation     Lack of Transportation (Medical): No     Lack of Transportation (Non-Medical): No   Physical Activity: Inactive (3/12/2025)    Exercise Vital Sign     Days of Exercise per Week: 0 days     Minutes of Exercise per Session: 0 min   Stress: Stress Concern Present (3/12/2025)    [place] citizen [employer] - Occupational Stress Questionnaire     Feeling of Stress : Very much   Housing Stability: Low Risk  (3/12/2025)    Housing Stability Vital Sign     Unable to Pay for Housing in the Last Year: No     Number of Times Moved in the Last Year: 1     Homeless in the Last Year: No      Family History   Problem Relation Name Age of Onset    No Known Problems Mother      Hypertension Father      Hypertension Sister      No Known Problems Sister      No Known Problems Brother      No Known Problems Daughter      No Known Problems Daughter      No Known Problems Daughter      No Known Problems Son       Review of Systems    Objective:      Physical Exam    General: No acute distress. Well-developed. Well-nourished.  Eyes: EOMI. Sclerae anicteric.  HENT: Normocephalic. Atraumatic. Nares patent. Moist oral mucosa.  Cardiovascular: Regular rate. Regular rhythm. No murmurs. No rubs. No gallops. Normal S1, S2. Dorsalis pedis 2+.  Respiratory: Normal respiratory effort. Clear to auscultation bilaterally. No rales. No rhonchi. No wheezing.  Musculoskeletal: No  obvious deformity.  Extremities: No lower extremity edema.  Neurological: Alert & oriented x3. No slurred speech. Normal gait. Sensation intact.  Psychiatric: Normal mood. Normal affect. Good  insight. Good judgment.  Skin: Warm. Dry skin. No rash. Dry toenails. No ulcers on the feet.          Assessment:       1. Essential hypertension    2. Migraine with aura and without status migrainosus, not intractable    3. Controlled type 2 diabetes mellitus with stage 2 chronic kidney disease, without long-term current use of insulin        Plan:         Candy was seen today for follow-up.    Diagnoses and all orders for this visit:    Essential hypertension  BP stable    Migraine with aura and without status migrainosus, not intractable  Suspect migraines are triggered by stress.  Advised to discontinue Imitrex due to history of TIA. Trial of Topamax.  -     topiramate (TOPAMAX) 50 MG tablet; Take 0.5 tablets (25 mg total) by mouth once daily for 7 days, THEN 1 tablet (50 mg total) once daily for 7 days, THEN 1 tablet (50 mg total) 2 (two) times daily for 7 days.    Controlled type 2 diabetes mellitus with stage 2 chronic kidney disease, without long-term current use of insulin  -     Patient due for eye exam soon, will schedule          Assessment & Plan    MIGRAINE WITH AURA:  - Evaluated the patient's migraine frequency, noting an increase to three consecutive days recently, with an average of 1-2 occurrences per week in the past month.  - Noted the patient's experience of visual aura (spots) and GI symptoms (nausea, vomiting, upset stomach) during migraines.  - Assessed the efficacy of current Imitrex treatment, which is not fully effective in relieving headaches.  - Discussed potential migraine triggers including stress, certain foods (cheese, wine, MSG-containing foods), and caffeine.  - Explained proper use of Imitrex for migraine , emphasizing the need to repeat dose after 2 hours if needed.  - Increased Imitrex dosage instructions: take 1 dose and repeat after 2 hours if needed for migraine .  - Discussed contraindication of Imitrex with elevated blood pressure.  - Initiated Topamax  for migraine prevention.    ESSENTIAL HYPERTENSION:  - Evaluated the patient's current blood pressure, noting it is high and requires management with multiple medications.  - Reviewed medication history and identified that Amlodipine was unintentionally discontinued.  - Restarted Amlodipine 10 mg daily for blood pressure management.  - Continued Benicar 20 mg for blood pressure management.  - Continued chlorthalidone for blood pressure management.    MAJOR DEPRESSIVE DISORDER:  - Monitored the patient's ongoing treatment for depression, noting variable effectiveness.  - Confirmed that the patient is receiving care from [facility] for depression management.    TYPE 2 DIABETES MELLITUS:  - Confirmed that the patient's diabetes is well-controlled.  - Performed a foot exam, noting dry skin and nail changes but no ulcers.  - Verified that sensation is intact in the feet.    VISUAL DISTURBANCES:  - Noted the patient's report of seeing black spots in vision, both during migraines and at other times.  - Confirmed that the patient denies eye pain or other significant eye problems.  - Ordered eye photo to assess for any abnormalities related to the visual disturbances.    SKIN AND NAIL CONDITIONS:  - Observed dry skin on the patient's feet during exam.  - Noted changes in the patient's toenails during foot exam.    COLORECTAL CANCER SCREENING:  - Provided Cologuard kit for colorectal cancer screening.  - Patient to complete and return the kit.    FOLLOW-UP:  - Scheduled follow-up to assess the effectiveness of the new preventative treatment.  - Scheduled a follow-up visit in 3-4 weeks to reassess blood pressure control before running out of medication.          Bárbara Mathis MD        This note was generated with the assistance of ambient listening technology. Verbal consent was obtained by the patient and accompanying visitor(s) for the recording of patient appointment to facilitate this note. I attest to having  "reviewed and edited the generated note for accuracy, though some syntax or spelling errors may persist. Please contact the author of this note for any clarification.            [1]   Current Outpatient Medications:     albuterol (PROVENTIL/VENTOLIN HFA) 90 mcg/actuation inhaler, Inhale 2 puffs into the lungs every 4 (four) hours as needed., Disp: , Rfl:     ARIPiprazole (ABILIFY) 5 MG Tab, Take 5 mg by mouth every evening., Disp: , Rfl:     aspirin (ECOTRIN) 81 MG EC tablet, Take 1 tablet by mouth once daily., Disp: , Rfl:     azelastine (ASTELIN) 137 mcg (0.1 %) nasal spray, 1 spray once daily., Disp: , Rfl:     BD ALINE 2ND GEN PEN NEEDLE 32 gauge x 5/32" Ndle, USE ONE NEEDLE ONCE A WEEK, Disp: , Rfl:     busPIRone (BUSPAR) 15 MG tablet, Take 1 tablet (15 mg total) by mouth 3 (three) times daily., Disp: 90 tablet, Rfl: 0    celecoxib (CELEBREX) 200 MG capsule, Take 1 capsule (200 mg total) by mouth 2 (two) times daily as needed for Pain., Disp: 60 capsule, Rfl: 2    celecoxib (CELEBREX) 200 MG capsule, Take 1 capsule (200 mg total) by mouth 2 (two) times daily., Disp: 60 capsule, Rfl: 6    cetirizine (ZYRTEC) 10 MG tablet, Take 10 mg by mouth every evening., Disp: , Rfl:     chlorthalidone (HYGROTEN) 25 MG Tab, Take 1 tablet (25 mg total) by mouth once daily., Disp: 30 tablet, Rfl: 0    cholecalciferol, vitamin D3, 1,250 mcg (50,000 unit) capsule, Take 50,000 Units by mouth every 7 days., Disp: , Rfl:     diclofenac sodium (VOLTAREN) 1 % Gel, Apply 2 g topically 4 (four) times daily., Disp: 20 g, Rfl: 0    DULoxetine (CYMBALTA) 20 MG capsule, Take 20 mg by mouth once daily., Disp: , Rfl:     EASY TOUCH ALCOHOL PREP PADS PadM, USE TO prepare FOR glucose testing, Disp: , Rfl:     EPINEPHrine (EPIPEN) 0.3 mg/0.3 mL AtIn, as per administration instructions, Disp: , Rfl:     ergocalciferol (ERGOCALCIFEROL) 50,000 unit Cap, Take 50,000 Units by mouth every 7 days., Disp: , Rfl:     FEROSUL 325 mg (65 mg iron) Tab tablet, " Take by mouth every other day., Disp: , Rfl:     fluticasone propionate (FLONASE) 50 mcg/actuation nasal spray, 2 sprays by Nasal route., Disp: , Rfl:     folic acid (FOLVITE) 1 MG tablet, Take 1,000 mcg by mouth., Disp: , Rfl:     LIDOcaine (LIDODERM) 5 %, Place 1 patch onto the skin once daily. Remove & Discard patch within 12 hours or as directed by MD, Disp: 6 patch, Rfl: 0    loratadine (CLARITIN) 10 mg tablet, Take 10 mg by mouth once daily., Disp: , Rfl:     meclizine (ANTIVERT) 25 mg tablet, Take 1 tablet (25 mg total) by mouth 3 (three) times daily as needed., Disp: 30 tablet, Rfl: 0    methocarbamoL (ROBAXIN) 500 MG Tab, Take 2 tablets (1,000 mg total) by mouth 3 (three) times daily as needed (muscle pain)., Disp: 180 tablet, Rfl: 6    montelukast (SINGULAIR) 10 mg tablet, , Disp: , Rfl:     nicotine polacrilex (NICORETTE) 4 MG Gum, Take 1 each (4 mg total) by mouth as needed., Disp: 100 each, Rfl: 5    olmesartan (BENICAR) 20 MG tablet, Take 1 tablet (20 mg total) by mouth once daily., Disp: 30 tablet, Rfl: 0    ondansetron (ZOFRAN-ODT) 8 MG TbDL, Take 8 mg by mouth every 8 (eight) hours as needed., Disp: , Rfl:     ONETOUCH DELICA PLUS LANCET 33 gauge Misc, Apply topically 4 (four) times daily., Disp: , Rfl:     ONETOUCH ULTRA2 METER Misc, SMARTSIG:Via Meter As Directed, Disp: , Rfl:     ONETOUCH VERIO TEST STRIPS Strp, 1 strip 4 (four) times daily., Disp: , Rfl:     oxyCODONE (ROXICODONE) 10 mg Tab immediate release tablet, Take 1 tablet (10 mg total) by mouth every 8 (eight) hours as needed for Pain., Disp: 45 tablet, Rfl: 0    pantoprazole (PROTONIX) 40 MG tablet, Take 40 mg by mouth., Disp: , Rfl:     pregabalin (LYRICA) 200 MG Cap, Take 1 capsule (200 mg total) by mouth 2 (two) times daily., Disp: 60 capsule, Rfl: 2    rosuvastatin (CRESTOR) 20 MG tablet, Take 20 mg by mouth once daily., Disp: , Rfl:     semaglutide (OZEMPIC) 0.25 mg or 0.5 mg (2 mg/3 mL) pen injector, Inject 0.25 mg into the skin  every 7 (seven) days, Disp: , Rfl:     senna-docusate 8.6-50 mg (SENNA WITH DOCUSATE SODIUM) 8.6-50 mg per tablet, Take 1 tablet by mouth once daily., Disp: 30 tablet, Rfl: 0    STOOL SOFTENER 100 mg capsule, Take 1 softgel by mouth twice daily, Disp: 60 capsule, Rfl: 11    TRELEGY ELLIPTA 200-62.5-25 mcg inhaler, Inhale 1 puff into the lungs once daily., Disp: , Rfl:     fezolinetant (VEOZAH) 45 mg Tab, Take 45 mg by mouth Daily., Disp: 30 tablet, Rfl: 11    FLOWFLEX COVID-19 AG HOME TEST Kit, test AS directed (Patient not taking: Reported on 3/19/2025), Disp: , Rfl:     metFORMIN (GLUCOPHAGE-XR) 750 MG ER 24hr tablet, Take 750 mg by mouth once daily. (Patient not taking: Reported on 3/19/2025), Disp: , Rfl:     topiramate (TOPAMAX) 50 MG tablet, Take 0.5 tablets (25 mg total) by mouth once daily for 7 days, THEN 1 tablet (50 mg total) once daily for 7 days, THEN 1 tablet (50 mg total) 2 (two) times daily for 7 days., Disp: 25 tablet, Rfl: 0  No current facility-administered medications for this visit.    Facility-Administered Medications Ordered in Other Visits:     fentaNYL 50 mcg/mL injection 100 mcg, 100 mcg, Intravenous, PRN, Neno Horton PA-C, 25 mcg at 08/07/23 1234    lactated ringers infusion, , Intravenous, Continuous, Adrienne Johnson, DNP    midazolam (VERSED) 1 mg/mL injection 1 mg, 1 mg, Intravenous, PRN, Neno Horton PA-C, 2 mg at 08/07/23 1120    ropivacaine 0.2% [facility] PainPRO Pump infusion 500 ML, , Perineural, Continuous, Neno Horton PA-C, New Bag at 08/07/23 3966

## 2025-03-26 ENCOUNTER — ANTICOAG VISIT (OUTPATIENT)
Dept: CARDIOLOGY CLINIC | Facility: CLINIC | Age: 30
End: 2025-03-26

## 2025-03-26 DIAGNOSIS — Z95.2 H/O MECHANICAL AORTIC VALVE REPLACEMENT: Primary | ICD-10-CM

## 2025-03-26 LAB — INR PPP: 2.2 (ref 0.85–1.19)

## 2025-03-26 NOTE — PROGRESS NOTES
Left message for patient, advised INR a little low, advised to take 7.5 mg tonight, then 5 mg Mon Wed Fri, 7.5 mg all other days. Will recheck in 2 weeks 4/8/25  Advised to call with questions or concerns    Patient is a home elvia

## 2025-04-14 ENCOUNTER — ANTICOAG VISIT (OUTPATIENT)
Dept: CARDIOLOGY CLINIC | Facility: CLINIC | Age: 30
End: 2025-04-14

## 2025-04-14 DIAGNOSIS — Z95.2 H/O MECHANICAL AORTIC VALVE REPLACEMENT: Primary | ICD-10-CM

## 2025-04-14 LAB — INR PPP: 2 (ref 0.85–1.19)

## 2025-04-14 NOTE — PROGRESS NOTES
Left message for patient, advised INR lower than previous.  Requesting patient call office to to discuss any missed doses, changes in medications or diet.     Patient is a home elvia    1005~spoke with patient, she doesn't think she missed any doses, no changes in medication or diet. Current dose verified. Advised to take 5 mg Mon Fri only, 7.5 mg all other days, will recheck in 2 weeks 4/26/25

## 2025-04-30 ENCOUNTER — ANTICOAG VISIT (OUTPATIENT)
Dept: CARDIOLOGY CLINIC | Facility: CLINIC | Age: 30
End: 2025-04-30

## 2025-04-30 DIAGNOSIS — Z95.2 H/O MECHANICAL AORTIC VALVE REPLACEMENT: Primary | ICD-10-CM

## 2025-04-30 LAB — INR PPP: 3.6 (ref 0.85–1.19)

## 2025-04-30 NOTE — PROGRESS NOTES
Left message for patient, advised INR high. Advised I have her taking 5 mg Mon Fri, 7.5 mg all other days. Advised to take 2.5 mg tonight only, then take 5 mg Mon Wed Fri, 7.5 mg all other days, will recheck next week.  Advised to call with any changes in medications, diet or health.  Patient is a home elvia

## 2025-05-14 NOTE — ASSESSMENT & PLAN NOTE
POD#9 s/p primary low-transverse  section on    Presented today with worsening lower abdominal incision pain  Found to have rectus sheath hematoma  Hx of mechanical valve replacement on warfarin  5/10 CT abd/pelvis: 18.1 x 17.6 x 9.6 infraumbilical rectus sheath hematoma. No extravasation of IV contrast to suggest an active hemorrhage. Subcutaneous tissue stranding in the bilateral flanks, and in the infraumbilical anterior abdominal and pelvic wall, likely related to ecchymosis with several additional small subcutaneous hematomas measuring up to 2.5 x 1.9 cm.   OBGYN aware, consulted    Plan:  Trend CBC  Transfuse as needed   Trend INR -- avoid full reversal of AC given previous hx of CVA when off AC   May need to consider IR embolization   Stable. Continue current medications and regular followup.

## 2025-05-16 ENCOUNTER — ANTICOAG VISIT (OUTPATIENT)
Dept: CARDIOLOGY CLINIC | Facility: CLINIC | Age: 30
End: 2025-05-16

## 2025-05-16 ENCOUNTER — TELEPHONE (OUTPATIENT)
Dept: CARDIOLOGY CLINIC | Facility: CLINIC | Age: 30
End: 2025-05-16

## 2025-05-16 DIAGNOSIS — Z95.2 H/O MECHANICAL AORTIC VALVE REPLACEMENT: Primary | ICD-10-CM

## 2025-05-16 LAB — INR PPP: 2.5 (ref 0.85–1.19)

## 2025-05-16 NOTE — TELEPHONE ENCOUNTER
Can you please reach out to patient to schedule follow up with Dr Shell, her last visit was 5/11/23  Thank you!

## 2025-05-16 NOTE — PROGRESS NOTES
Left message for patient, advised INR good, will continue 5 mg Mon Wed Fri, 7.5 mg all other days, will recheck in 2 weeks 5/30/25  Advised to call with questions or concerns.   Patient is a home elvia    Also reminded patient she is over due for visit, last visit 5/11/2023, to call office to schedule.  I will also send message to Gael sandovalrical.

## 2025-06-09 ENCOUNTER — ANTICOAG VISIT (OUTPATIENT)
Dept: CARDIOLOGY CLINIC | Facility: CLINIC | Age: 30
End: 2025-06-09

## 2025-06-09 DIAGNOSIS — Z95.2 H/O MECHANICAL AORTIC VALVE REPLACEMENT: Primary | ICD-10-CM

## 2025-06-09 LAB — INR PPP: 3.2 (ref 0.85–1.19)

## 2025-06-09 NOTE — PROGRESS NOTES
PC to Suze leaving message on answering machine than INR was slightly elevated at 3.2. I asked her to hold her warfarin tonight and then resume regular dosing of 5 mgs M/W/F and 7.5 mgs all other days of the week.Restest in 2 weeks.

## 2025-06-13 ENCOUNTER — NURSE TRIAGE (OUTPATIENT)
Age: 30
End: 2025-06-13

## 2025-06-13 NOTE — TELEPHONE ENCOUNTER
"REASON FOR CONVERSATION: Neurologic Problem    SYMPTOMS: Pt with right side vision loss x 3 minutes. Vision is back to baseline now. Pt denies other s/s of stroke.    OTHER HEALTH INFORMATION: History of prior stroke in 2022. Per Dr. Kaba's chart notes in 2022, pt advised to got to ED if sudden painless vision loss.    PROTOCOL DISPOSITION: Go to ED/[facility] Now (Or to Office with PCP Approval) Pt scheduled for follow up with Dr. Kaba on 1/20/2026.    CARE ADVICE PROVIDED: Pt agreeable to go to ED for immediate evaluation. Pt to follow up with update and sooner appt.    PRACTICE FOLLOW-UP: Provider notified. Will continue to monitor pt's progress at the ED.    Dr. Sendy PATTERSON, pt agreeable to go to ED for urgent evaluation.    [facility] [phone], ok to leave detailed message.    Reason for Disposition   Neurologic deficit that was brief (now gone), ANY of the following: * Weakness of the face, arm, or leg on one side of the body * Numbness of the face, arm, or leg on one side of the body * Loss of speech or garbled speech    Answer Assessment - Initial Assessment Questions  1. SYMPTOM: \"What is the main symptom you are concerned about?\" (e.g., weakness, numbness)      Blind spot in right eye. Unable to see out of it for 3 mintues but it did go away. Has happened in the past but would go away in seconds. This time was longer which is concerning.    2. ONSET: \"When did this start?\" (minutes, hours, days; while sleeping)      It happened this morning right after dropping child off at , about 7:40 AM.    3. LAST NORMAL: \"When was the last time you (the patient) were normal (no symptoms)?\"      Last occurrence was sometime during the summer in 2024.    4. PATTERN \"Does this come and go, or has it been constant since it started?\"  \"Is it present now?\"      Happened the one time this morning. Not present now.    5. CARDIAC SYMPTOMS: \"Have you had any of the following symptoms: chest pain, difficulty " "breathing, palpitations?\"      Denies    6. NEUROLOGIC SYMPTOMS: \"Have you had any of the following symptoms: headache, dizziness, vision loss, double vision, changes in speech, unsteady on your feet?\"      Vision loss on right eye for 3 minutes. Pt back to baseline.   Pt denies difficulty speaking or swallowing.   Pt denies vertigo/room spinning that does not quickly resolve.  Pt denies weakness/numbness/loss of coordination affecting 1 side of the face or body.    7. OTHER SYMPTOMS: \"Do you have any other symptoms?\"      Has a sore throat and couging going on for a week or so.    8. PREGNANCY: \"Is there any chance you are pregnant?\" \"When was your last menstrual period?\"      No, LMP July of 2023, IUD present.    Protocols used: Neurologic Deficit-Adult-[place]    "

## 2025-06-13 NOTE — TELEPHONE ENCOUNTER
Regarding: New Symptom  ----- Message from Humaira SMITH sent at 6/13/2025  8:35 AM EDT -----  Pt called to advise while driving she noticed a blind spot in her right eye , Pt has had stroke and was last seen in 2022 with Dr. Kaba. She was scheduled for first available appt in January and placed on wait list. Pt will call PCP as well to see if she can get in urgently.

## 2025-06-16 NOTE — TELEPHONE ENCOUNTER
David Kaba MD to Me  Neurology Neurovascular Team 4         6/13/25 12:12 PM  Yes, agree with ED eval

## 2025-06-25 ENCOUNTER — ANTICOAG VISIT (OUTPATIENT)
Dept: CARDIOLOGY CLINIC | Facility: CLINIC | Age: 30
End: 2025-06-25

## 2025-06-25 DIAGNOSIS — I72.2 RENAL ARTERY ANEURYSM (HCC): ICD-10-CM

## 2025-06-25 DIAGNOSIS — Z95.2 H/O MECHANICAL AORTIC VALVE REPLACEMENT: Primary | ICD-10-CM

## 2025-06-25 LAB — INR PPP: 2.8 (ref 0.85–1.19)

## 2025-06-25 NOTE — PROGRESS NOTES
Spoke with patient, advised INR looks good, current dose verified. Continue 5 mg Mon Wed Fri, 7.5 mg all other days, will recheck in 2 weeks 7/8/25  Patient is a home elvia

## 2025-06-26 RX ORDER — ONDANSETRON 4 MG/1
TABLET, FILM COATED ORAL
Qty: 30 TABLET | Refills: 3 | Status: SHIPPED | OUTPATIENT
Start: 2025-06-26

## 2025-07-15 ENCOUNTER — ANTICOAG VISIT (OUTPATIENT)
Dept: CARDIOLOGY CLINIC | Facility: CLINIC | Age: 30
End: 2025-07-15

## 2025-07-15 DIAGNOSIS — Z95.2 H/O MECHANICAL AORTIC VALVE REPLACEMENT: Primary | ICD-10-CM

## 2025-07-15 LAB — INR PPP: 3.5 (ref 0.85–1.19)

## 2025-08-04 ENCOUNTER — ANTICOAG VISIT (OUTPATIENT)
Dept: CARDIOLOGY CLINIC | Facility: CLINIC | Age: 30
End: 2025-08-04

## 2025-08-04 DIAGNOSIS — Z95.2 H/O MECHANICAL AORTIC VALVE REPLACEMENT: Primary | ICD-10-CM

## 2025-08-04 LAB — INR PPP: 2 (ref 0.85–1.19)

## 2025-08-18 ENCOUNTER — ANTICOAG VISIT (OUTPATIENT)
Dept: CARDIOLOGY CLINIC | Facility: CLINIC | Age: 30
End: 2025-08-18

## 2025-08-18 ENCOUNTER — ANNUAL EXAM (OUTPATIENT)
Dept: OBGYN CLINIC | Facility: CLINIC | Age: 30
End: 2025-08-18
Payer: COMMERCIAL

## 2025-08-18 VITALS
WEIGHT: 145 LBS | HEIGHT: 63 IN | SYSTOLIC BLOOD PRESSURE: 130 MMHG | BODY MASS INDEX: 25.69 KG/M2 | DIASTOLIC BLOOD PRESSURE: 92 MMHG

## 2025-08-18 DIAGNOSIS — Z95.2 H/O MECHANICAL AORTIC VALVE REPLACEMENT: Primary | ICD-10-CM

## 2025-08-18 DIAGNOSIS — Z01.419 ENCOUNTER FOR ANNUAL ROUTINE GYNECOLOGICAL EXAMINATION: Primary | ICD-10-CM

## 2025-08-18 LAB — INR PPP: 2.3 (ref 0.85–1.19)

## 2025-08-18 PROCEDURE — S0612 ANNUAL GYNECOLOGICAL EXAMINA: HCPCS | Performed by: STUDENT IN AN ORGANIZED HEALTH CARE EDUCATION/TRAINING PROGRAM

## (undated) DEVICE — BETHLEHEM UNIVERSAL MINOR GEN: Brand: CARDINAL HEALTH

## (undated) DEVICE — GAUZE SPONGES,16 PLY: Brand: CURITY

## (undated) DEVICE — Device

## (undated) DEVICE — GLOVE SRG BIOGEL ECLIPSE 7.5

## (undated) DEVICE — 3M™ TEGADERM™ TRANSPARENT FILM DRESSING FRAME STYLE, 1626W, 4 IN X 4-3/4 IN (10 CM X 12 CM), 50/CT 4CT/CASE: Brand: 3M™ TEGADERM™

## (undated) DEVICE — TUBING SUCTION 5MM X 12 FT

## (undated) DEVICE — 2000CC GUARDIAN II: Brand: GUARDIAN

## (undated) DEVICE — SUT VICRYL 3-0 SH 27 IN J416H

## (undated) DEVICE — SCD SEQUENTIAL COMPRESSION COMFORT SLEEVE MEDIUM KNEE LENGTH: Brand: KENDALL SCD

## (undated) DEVICE — DRESSING MEPILEX AG BORDER 3 X 3 IN

## (undated) DEVICE — SUT VICRYL 2-0 SH 27 IN UNDYED J417H

## (undated) DEVICE — GLOVE PI ULTRA TOUCH SZ.7.5

## (undated) DEVICE — ABDOMINAL PAD: Brand: DERMACEA

## (undated) DEVICE — INTENDED FOR TISSUE SEPARATION, AND OTHER PROCEDURES THAT REQUIRE A SHARP SURGICAL BLADE TO PUNCTURE OR CUT.: Brand: BARD-PARKER SAFETY BLADES SIZE 15, STERILE

## (undated) DEVICE — DRAPE EQUIPMENT RF WAND

## (undated) DEVICE — GLOVE INDICATOR PI UNDERGLOVE SZ 6.5 BLUE

## (undated) DEVICE — CHLORAPREP HI-LITE 26ML ORANGE

## (undated) DEVICE — SUT VICRYL 0 CT-1 36 IN J946H

## (undated) DEVICE — PACK C-SECTION PBDS

## (undated) DEVICE — TELFA ADHESIVE ISLAND DRESSING: Brand: TELFA

## (undated) DEVICE — SUT ETHILON 2-0 FS 18 IN 664H

## (undated) DEVICE — SKIN MARKER DUAL TIP WITH RULER CAP, FLEXIBLE RULER AND LABELS: Brand: DEVON

## (undated) DEVICE — GLOVE INDICATOR PI UNDERGLOVE SZ 7.5 BLUE

## (undated) DEVICE — PLUMEPEN PRO 10FT

## (undated) DEVICE — GLOVE INDICATOR PI UNDERGLOVE SZ 8 BLUE

## (undated) DEVICE — SUT VICRYL 0 CTX 36 IN J978H

## (undated) DEVICE — GLOVE SRG BIOGEL 6.5